# Patient Record
Sex: FEMALE | Race: BLACK OR AFRICAN AMERICAN | NOT HISPANIC OR LATINO | Employment: UNEMPLOYED | URBAN - METROPOLITAN AREA
[De-identification: names, ages, dates, MRNs, and addresses within clinical notes are randomized per-mention and may not be internally consistent; named-entity substitution may affect disease eponyms.]

---

## 2017-03-27 ENCOUNTER — HOSPITAL ENCOUNTER (EMERGENCY)
Facility: OTHER | Age: 22
Discharge: HOME | End: 2017-03-27
Attending: EMERGENCY MEDICINE
Payer: MEDICAID

## 2017-03-27 VITALS
WEIGHT: 280 LBS | HEIGHT: 67 IN | OXYGEN SATURATION: 99 % | SYSTOLIC BLOOD PRESSURE: 159 MMHG | DIASTOLIC BLOOD PRESSURE: 94 MMHG | RESPIRATION RATE: 12 BRPM | BODY MASS INDEX: 43.95 KG/M2 | TEMPERATURE: 99 F | HEART RATE: 102 BPM

## 2017-03-27 DIAGNOSIS — S29.011A CHEST WALL MUSCLE STRAIN, INITIAL ENCOUNTER: Primary | ICD-10-CM

## 2017-03-27 DIAGNOSIS — R07.9 CHEST PAIN: ICD-10-CM

## 2017-03-27 LAB
ANION GAP SERPL CALC-SCNC: 12 MMOL/L
B-HCG UR QL: NEGATIVE
BASOPHILS # BLD AUTO: 0.03 K/UL
BASOPHILS NFR BLD: 0.2 %
BUN SERPL-MCNC: 11 MG/DL
CALCIUM SERPL-MCNC: 9.2 MG/DL
CHLORIDE SERPL-SCNC: 105 MMOL/L
CO2 SERPL-SCNC: 21 MMOL/L
CREAT SERPL-MCNC: 0.8 MG/DL
CTP QC/QA: YES
DIFFERENTIAL METHOD: ABNORMAL
EOSINOPHIL # BLD AUTO: 0.1 K/UL
EOSINOPHIL NFR BLD: 0.5 %
ERYTHROCYTE [DISTWIDTH] IN BLOOD BY AUTOMATED COUNT: 13.7 %
EST. GFR  (AFRICAN AMERICAN): >60 ML/MIN/1.73 M^2
EST. GFR  (NON AFRICAN AMERICAN): >60 ML/MIN/1.73 M^2
GLUCOSE SERPL-MCNC: 85 MG/DL
HCT VFR BLD AUTO: 40.3 %
HGB BLD-MCNC: 13.1 G/DL
LYMPHOCYTES # BLD AUTO: 1.6 K/UL
LYMPHOCYTES NFR BLD: 12.5 %
MCH RBC QN AUTO: 27.8 PG
MCHC RBC AUTO-ENTMCNC: 32.5 %
MCV RBC AUTO: 85 FL
MONOCYTES # BLD AUTO: 0.9 K/UL
MONOCYTES NFR BLD: 7.2 %
NEUTROPHILS # BLD AUTO: 10.3 K/UL
NEUTROPHILS NFR BLD: 79.4 %
PLATELET # BLD AUTO: 317 K/UL
PMV BLD AUTO: 9.8 FL
POTASSIUM SERPL-SCNC: 4.6 MMOL/L
RBC # BLD AUTO: 4.72 M/UL
SODIUM SERPL-SCNC: 138 MMOL/L
WBC # BLD AUTO: 13.02 K/UL

## 2017-03-27 PROCEDURE — 93005 ELECTROCARDIOGRAM TRACING: CPT

## 2017-03-27 PROCEDURE — 81025 URINE PREGNANCY TEST: CPT | Performed by: EMERGENCY MEDICINE

## 2017-03-27 PROCEDURE — 96361 HYDRATE IV INFUSION ADD-ON: CPT

## 2017-03-27 PROCEDURE — 96374 THER/PROPH/DIAG INJ IV PUSH: CPT

## 2017-03-27 PROCEDURE — 25000003 PHARM REV CODE 250: Performed by: EMERGENCY MEDICINE

## 2017-03-27 PROCEDURE — 96375 TX/PRO/DX INJ NEW DRUG ADDON: CPT

## 2017-03-27 PROCEDURE — 63600175 PHARM REV CODE 636 W HCPCS: Performed by: EMERGENCY MEDICINE

## 2017-03-27 PROCEDURE — 93010 ELECTROCARDIOGRAM REPORT: CPT | Mod: ,,, | Performed by: INTERNAL MEDICINE

## 2017-03-27 PROCEDURE — 80048 BASIC METABOLIC PNL TOTAL CA: CPT

## 2017-03-27 PROCEDURE — 99284 EMERGENCY DEPT VISIT MOD MDM: CPT | Mod: 25

## 2017-03-27 PROCEDURE — 85025 COMPLETE CBC W/AUTO DIFF WBC: CPT

## 2017-03-27 RX ORDER — METHOCARBAMOL 500 MG/1
1000 TABLET, FILM COATED ORAL EVERY 8 HOURS PRN
Qty: 30 TABLET | Refills: 0 | Status: SHIPPED | OUTPATIENT
Start: 2017-03-27 | End: 2017-04-01

## 2017-03-27 RX ORDER — KETOROLAC TROMETHAMINE 30 MG/ML
15 INJECTION, SOLUTION INTRAMUSCULAR; INTRAVENOUS
Status: COMPLETED | OUTPATIENT
Start: 2017-03-27 | End: 2017-03-27

## 2017-03-27 RX ORDER — ORPHENADRINE CITRATE 30 MG/ML
60 INJECTION INTRAMUSCULAR; INTRAVENOUS
Status: COMPLETED | OUTPATIENT
Start: 2017-03-27 | End: 2017-03-27

## 2017-03-27 RX ORDER — OXAPROZIN 600 MG/1
600 TABLET, FILM COATED ORAL DAILY
Qty: 14 TABLET | Refills: 0 | Status: SHIPPED | OUTPATIENT
Start: 2017-03-27 | End: 2018-09-04

## 2017-03-27 RX ADMIN — SODIUM CHLORIDE 1000 ML: 0.9 INJECTION, SOLUTION INTRAVENOUS at 08:03

## 2017-03-27 RX ADMIN — KETOROLAC TROMETHAMINE 15 MG: 30 INJECTION, SOLUTION INTRAMUSCULAR at 07:03

## 2017-03-27 RX ADMIN — ORPHENADRINE CITRATE 60 MG: 30 INJECTION INTRAMUSCULAR; INTRAVENOUS at 08:03

## 2017-03-27 NOTE — ED NOTES
"Patient yelling in triage, "I dont want to wait to see no doctor, that's not how this works."  Explained to the patient that an ER physician will assess her EKG and once a room is available for her she will be called back and will be evaluated.  Patient still annoyed and stated, "Well this doesn't make any sense and im not waiting that long." Will update charge nurse on patients behavior.  "

## 2017-03-27 NOTE — ED AVS SNAPSHOT
[facility]  [address]  [facility] [address]               Manisha Bishop   3/27/2017  6:19 PM   ED    Description:  Female : 1995   Department:  [facility]           Your Care was Coordinated By:     Provider Role From To    Joanne Agee MD Attending Provider [phone] --      Reason for Visit     Chest Pain           Diagnoses this Visit        Comments    Chest wall muscle strain, initial encounter    -  Primary     Chest pain           ED Disposition     None           To Do List           Follow-up Information     Schedule an appointment as soon as possible for a visit with a primary care physician or clinic.    Why:  in 2-3 days       These Medications        Disp Refills Start End    oxaprozin (DAYPRO) 600 mg tablet 14 tablet 0 3/27/2017     Take 1 tablet (600 mg total) by mouth once daily. - Oral    methocarbamol (ROBAXIN) 500 MG Tab 30 tablet 0 3/27/2017 2017    Take 2 tablets (1,000 mg total) by mouth every 8 (eight) hours as needed (pain). - Oral      [facility]     [facility] -  Assistance  Registered nurses in the [facility] provide clinical advisement, health education, appointment booking, and other advisory services.  Call for this free service at [phone].             Medications           Message regarding Medications     Verify the changes and/or additions to your medication regime listed below are the same as discussed with your clinician today.  If any of these changes or additions are incorrect, please notify your healthcare provider.        START taking these NEW medications        Refills    oxaprozin (DAYPRO) 600 mg tablet 0    Sig: Take 1 tablet (600 mg total) by mouth once daily.    Class: Print    Route: Oral    methocarbamol (ROBAXIN) 500 MG Tab 0    Sig: Take 2 tablets (1,000 mg total) by mouth every 8 (eight) hours as needed (pain).    Class:  "Print    Route: Oral      These medications were administered today        Dose Freq    sodium chloride 0.9% bolus 1,000 mL 1,000 mL ED 1 Time    Sig: Inject 1,000 mLs into the vein ED 1 Time.    Class: Normal    Route: Intravenous    ketorolac injection 15 mg 15 mg ED 1 Time    Sig: Inject 15 mg into the vein ED 1 Time.    Class: Normal    Route: Intravenous    orphenadrine injection 60 mg 60 mg ED 1 Time    Sig: Inject 2 mLs (60 mg total) into the vein ED 1 Time.    Class: Normal    Route: Intravenous           Verify that the below list of medications is an accurate representation of the medications you are currently taking.  If none reported, the list may be blank. If incorrect, please contact your healthcare provider. Carry this list with you in case of emergency.           Current Medications     ferrous sulfate 325 (65 FE) MG EC tablet Take 1 tablet (325 mg total) by mouth once daily.    fluoxetine (PROZAC) 20 MG capsule Take 20 mg by mouth once daily.    hydrocodone-acetaminophen 5-325mg (NORCO) 5-325 mg per tablet Take 1 tablet by mouth every 6 (six) hours as needed for Pain.    methocarbamol (ROBAXIN) 500 MG Tab Take 2 tablets (1,000 mg total) by mouth every 8 (eight) hours as needed (pain).    ondansetron (ZOFRAN-ODT) 8 MG TbDL Take 1 tablet (8 mg total) by mouth every 8 (eight) hours.    oxaprozin (DAYPRO) 600 mg tablet Take 1 tablet (600 mg total) by mouth once daily.    pantoprazole (PROTONIX) 20 MG tablet Take 1 tablet (20 mg total) by mouth once daily.           Clinical Reference Information           Your Vitals Were     BP Pulse Temp Resp Height Weight    147/71 98 98.5 °F (36.9 °C) (Oral) 12 5' 7" (1.702 m) 127 kg (280 lb)    SpO2 BMI             100% 43.85 kg/m2         Allergies as of 3/27/2017        Reactions    Iodine And Iodide Containing Products Hives      Immunizations Administered on Date of Encounter - 3/27/2017     None      ED Micro, Lab, POCT     Start Ordered       Status Ordering " Provider    03/27/17 1841 03/27/17 1841  CBC auto differential  STAT      Final result     03/27/17 1841 03/27/17 1841  Basic metabolic panel  STAT      Final result     03/27/17 1806 03/27/17 1805  POCT urine pregnancy  Once      Final result       ED Imaging Orders     Start Ordered       Status Ordering Provider    03/27/17 1840 03/27/17 1841  X-Ray Chest PA And Lateral  1 time imaging      Final result       Discharge References/Attachments     CHEST WALL STRAIN (ENGLISH)      [facility] Sign-Up     Activating your [facility] account is as easy as 1-2-3!     1) Visit [email or web address], select Sign Up Now, enter this activation code and your date of birth, then select Next.  [email or web address]  Expires: 5/11/2017  9:08 PM      2) Create a username and password to use when you visit [facility] in the future and select a security question in case you lose your password and select Next.    3) Enter your e-mail address and click Sign Up!    Additional Information  If you have questions, please e-mail [email or web address] or call [phone] to talk to our [facility] staff. Remember, [facility] is NOT to be used for urgent needs. For medical emergencies, dial 911.          [facility] complies with applicable Federal civil rights laws and does not discriminate on the basis of race, color, national origin, age, disability, or sex.        Language Assistance Services     ATTENTION: Language assistance services are available, free of charge. Please call [phone].      ATENCIÓN: Si habla español, tiene a gold disposición servicios gratuitos de asistencia lingüística. Llame al [phone].     CHÚ Ý: N?u b?n nói Ti?ng Vi?t, có các d?ch v? h? tr? ngôn ng? mi?n phí dành cho b?n. G?i s? [phone].

## 2017-03-27 NOTE — ED PROVIDER NOTES
"Encounter Date: 3/27/2017    SCRIBE #1 NOTE: I, Robson Glover, am scribing for, and in the presence of,  Dr. Agee. I have scribed the entire note.       History     Chief Complaint   Patient presents with    Chest Pain     Patient c/o midsternal chest pain since earlier today. Patient also c/o bilateral side pain with nausea.  Patient denies taking any medication for my symptoms.      Review of patient's allergies indicates:   Allergen Reactions    Iodine and iodide containing products Hives     HPI Comments: Time seen by provider: 6:31 PM    This is a 21 y.o. female who presents with complaint of chest pain starting this morning. The pain has been worsening throughout the day and is accompanied by a dry cough. She describes the pain as "sharp" and central.  It is better when sitting up and worse while lying flat. The pain radiates around the bilateral ribs.. Yesterday she had abdominal pain with nausea and vomiting that has since resolved. She denies fever, urinary symptoms, CP, SOB, numbness and weakness. She has a PMHx of PNA. She denies recent travel. She denies smoking.  She has no additional complaints.    Additional past medical, surgical, and social history as outlined in the nursing assessment was reviewed by me.     The history is provided by the patient.     Past Medical History:   Diagnosis Date    Bipolar 1 disorder      Past Surgical History:   Procedure Laterality Date    LEG SURGERY       Family History   Problem Relation Age of Onset    No Known Problems Mother     No Known Problems Father      Social History   Substance Use Topics    Smoking status: Never Smoker    Smokeless tobacco: Not on file    Alcohol use No     Review of Systems   Constitutional: Negative for chills, diaphoresis and fever.   HENT: Negative for congestion and sore throat.    Respiratory: Positive for cough. Negative for shortness of breath.    Cardiovascular: Positive for chest pain and leg swelling (chronic left " leg swelling). Negative for palpitations.   Gastrointestinal: Positive for abdominal pain, nausea (resolved) and vomiting (resolved). Negative for diarrhea.   Genitourinary: Positive for flank pain (right). Negative for difficulty urinating, dysuria, frequency and urgency.   Musculoskeletal: Negative for myalgias.   Skin: Negative for color change.   Allergic/Immunologic: Negative for immunocompromised state.   Neurological: Negative for weakness, light-headedness and headaches.   Psychiatric/Behavioral: Negative for behavioral problems and confusion.       Physical Exam   Initial Vitals   BP Pulse Resp Temp SpO2   03/27/17 1726 03/27/17 1726 03/27/17 1726 03/27/17 1726 03/27/17 1726   131/84 108 12 98.5 °F (36.9 °C) 98 %     Physical Exam    Nursing note and vitals reviewed.  Constitutional: She appears well-developed and well-nourished. She is not diaphoretic. No distress.   HENT:   Head: Normocephalic and atraumatic.   Mouth/Throat: Oropharynx is clear and moist.   Eyes: Conjunctivae and EOM are normal. Pupils are equal, round, and reactive to light. Right eye exhibits no discharge. Left eye exhibits no discharge.   Neck: Normal range of motion. Neck supple. No tracheal deviation present.   Cardiovascular: Regular rhythm, normal heart sounds and intact distal pulses. Exam reveals no gallop and no friction rub.    No murmur heard.  tachycardia   Pulmonary/Chest: Breath sounds normal. No stridor. No respiratory distress. She has no wheezes. She has no rhonchi. She has no rales.   Abdominal: Soft. Bowel sounds are normal. She exhibits no distension. There is no tenderness. There is no rebound and no guarding.   Musculoskeletal: Normal range of motion. She exhibits no edema.   Chest pain reproducible with rotation of torso.    Neurological: She is alert and oriented to person, place, and time. She has normal strength. No cranial nerve deficit.   Skin: Skin is warm and dry. No rash noted. No erythema. No pallor.    Psychiatric: She has a normal mood and affect. Her behavior is normal. Judgment and thought content normal.         ED Course   Procedures  Labs Reviewed   CBC W/ AUTO DIFFERENTIAL - Abnormal; Notable for the following:        Result Value    WBC 13.02 (*)     Gran # 10.3 (*)     Gran% 79.4 (*)     Lymph% 12.5 (*)     All other components within normal limits   BASIC METABOLIC PANEL   POCT URINE PREGNANCY     EKG Readings: (Independently Interpreted)   Initial Reading: No STEMI.   Sinus tachycardia at a rate of 107 with benign early repolarization.        X-Rays:   Independently Interpreted Readings:   Chest X-Ray: Poor inspiratory effort. No effusion, consolidation or pneumothorax.       Imaging Results         X-Ray Chest PA And Lateral (Final result) Result time:  03/27/17 19:00:09    Final result by Aimee Hardy MD (03/27/17 19:00:09)    Impression:         Stable chronic findings, no acute cardiopulmonary process..          Electronically signed by: AIMEE HARDY MD  Date:     03/27/17  Time:    19:00     Narrative:    Chest PA and lateral    Indication:Chest pain    Comparison:CT 11/25/2016, radiograph 11/25/2015    Findings:  The cardiomediastinal silhouette is prominent, likely on the basis of magnification and shallow inspiratory effort.  There is no pleural effusion.  The trachea is midline.  The lungs are symmetrically expanded bilaterally with minimal record central hilar interstitial attenuation, likely on the basis of shallow inspiration. No large focal consolidation seen.  There is no pneumothorax.  The osseous structures are remarkable for mild dextro scoliotic curvature of the thoracic spine.             Medical Decision Making:   Clinical Tests:   Lab Tests: Ordered and Reviewed  Radiological Study: Ordered and Reviewed  Medical Tests: Ordered and Reviewed  ED Management:  Patient presents with cough and pleuritic chest pain. I will obtain a CXR to r/o PNA. Because of the positional relief  I will look for cardiomegaly and pericardial effusion. My differential also includes pericarditis. Her EKG does not suggest acute coronary ischemia. I will give parenteral analgesia. I will reassess.     Of note, after I explained this plan to the patient she questioned whether or not she would be placed into a bed.  I responded that we do not have any available at present; however, if a bed or recliner becomes available she will be placed into it.  Patient became very upset when she was made aware that she would need to begin her workup in our RWR area.  I stressed that this was so we can begin testing even before a bed is available and thus expedite her care.  At this time the patient chose to not talk to me any further during my assessment.  On a series of further questioning she did not respond.  I will continue to monitor.    9:02 PM - patient notes marked improvement of her symptoms.  She says her pain is currently mild.  When asked for her to give it a number she stated it was a 10.  Her bedside ultrasound showed no pericardial effusion.  There is also no evidence of pneumonia on her x-ray.  She remains afebrile and her heart rate has improved after IV fluids.  Her labs are unremarkable except for a mild leukocytosis of 13 K.  She appears well perfused.  I do not suspect occult bacteremia or further serious bacterial illness at this time.  I explained to the patient that her symptoms are likely due to musculoskeletal strain.  I advised that she take NSAIDs for relief.  She has asked for a muscle relaxant noted that NSAIDs do not work for her. I will give her information for PCP follow-up.  I stressed the importance of re-evaluation if her symptoms persist.  I am comfortable with her discharge at this time.    Additional MDM:   EKG: I have independently interpreted EKG(s) - see notes.   X-Rays: I have independently interpreted X-Ray(s) - see notes.          Scribe Attestation:   Scribe #1: I performed the  above scribed service and the documentation accurately describes the services I performed. I attest to the accuracy of the note.    Attending Attestation:           Physician Attestation for Scribe:  Physician Attestation Statement for Scribe #1: I, Dr. Agee, reviewed documentation, as scribed by Robson Glover in my presence, and it is both accurate and complete.                 ED Course     Clinical Impression:     1. Chest pain               Joanne Agee MD  03/27/17 2106       Joanne Agee MD  03/27/17 2108

## 2017-03-28 NOTE — ED NOTES
Patient reclined in recliner, eyes open, AAOx4.  No apparent SOB or distress noted.  Comfort measures address, call bell within reach.  Will continue to monitor.

## 2017-03-28 NOTE — ED NOTES
"Pt noted to be in wheelchair , wheeled herself from DISPO room to nurses station yelling and stating,"if I don't get my pain meds I'm going to flip out"  Pt informed the nurse is ready to giver her the pain medication is the DISPO room and I personally wheeled the pt in the wheelchair to the DISPO room to an awaiting LPN, Monica, to start her IV, get blood work and giver her medicine.  "

## 2017-03-28 NOTE — ED NOTES
"Pt stated I was a "sorry ass fucking nurse", pt stated "your an ugly ass lazy bitch" "all you fucking nurses here are some lazy ass bitches", pt stated "if you don't hurry and give me pain medicine I'm gonna click you the fuck out of here", TROY Mitchell Rn Charge made aware pt threatened me and asked me to perform orders.  "

## 2017-03-28 NOTE — ED NOTES
"While inserting IV, pt stated "all you bitches up in here are a bunch of lazy fucking nurses" "if you don't hurry and give me my pain medication, I'm gonna click you the fuck out", pt phone rang and speaking to caller, stated "that stupid ass woman doctor told me ain't nothing wrong with my heart, I told her bitch please, I'm gonna click that ugly ass bitch the fuck out if she don't take care of my heart", pt hung up phone, two patient identifiers were completed, pt asked if I was going to administer the pain medication at this time, I explained to her that I was unable to and an RN would administer the medication, pt asked me what I was, I stated to pt that I was an LPN, pt stated "the stupid ass RN's at [facility] ain't worth shit", pt stated "if you don't get someone to give me my pain medication now I'm gonna click you the fuck out", pt stated "I've been waiting here an hour, no make that two hours for my pain medication.  If I don't get it soon I'm gonna bounce out of here", TROY Mitchell RN Charge notified pt stated threats to me, Charge nurse with pt at this time.  "

## 2017-04-14 ENCOUNTER — HOSPITAL ENCOUNTER (EMERGENCY)
Facility: OTHER | Age: 22
Discharge: HOME | End: 2017-04-15
Attending: EMERGENCY MEDICINE
Payer: MEDICAID

## 2017-04-14 DIAGNOSIS — R07.9 CHEST PAIN: ICD-10-CM

## 2017-04-14 DIAGNOSIS — F31.9 BIPOLAR 1 DISORDER: Primary | ICD-10-CM

## 2017-04-14 PROCEDURE — 96375 TX/PRO/DX INJ NEW DRUG ADDON: CPT

## 2017-04-14 PROCEDURE — 93010 ELECTROCARDIOGRAM REPORT: CPT | Mod: ,,, | Performed by: INTERNAL MEDICINE

## 2017-04-14 PROCEDURE — 96374 THER/PROPH/DIAG INJ IV PUSH: CPT

## 2017-04-14 PROCEDURE — 99285 EMERGENCY DEPT VISIT HI MDM: CPT | Mod: 25

## 2017-04-14 PROCEDURE — 93005 ELECTROCARDIOGRAM TRACING: CPT

## 2017-04-14 PROCEDURE — 96361 HYDRATE IV INFUSION ADD-ON: CPT

## 2017-04-14 PROCEDURE — 81025 URINE PREGNANCY TEST: CPT | Performed by: EMERGENCY MEDICINE

## 2017-04-14 NOTE — ED AVS SNAPSHOT
[facility]  [address]  [facility] [address]               Manisha Bishop   2017 11:16 PM   ED    Description:  Female : 1995   Department:  [facility], [place], operated by [facility]           Your Care was Coordinated By:     Provider Role From To    Quynh Conner MD Attending Provider 17 [phone] --      Reason for Visit     Chest Pain           Diagnoses this Visit        Comments    Bipolar 1 disorder    -  Primary     Chest pain           ED Disposition     ED Disposition Condition Comment    Discharge             To Do List           Follow-up Information     Go to [facility].    Specialties:  Behavioral Health, Psychiatry, Psychology    Contact information:    [address] [facility] [address]  [phone]        [facility]     [facility] -  Assistance  Unless otherwise directed by your provider, please contact [facility], our nurse care line that is available for  assistance.     Registered nurses in the [facility] provide: appointment scheduling, clinical advisement, health education, and other advisory services.  Call: [phone] (toll free)               Medications           Message regarding Medications     Verify the changes and/or additions to your medication regime listed below are the same as discussed with your clinician today.  If any of these changes or additions are incorrect, please notify your healthcare provider.        These medications were administered today        Dose Freq    sodium chloride 0.9% bolus 1,000 mL 1,000 mL ED 1 Time    Sig: Inject 1,000 mLs into the vein ED 1 Time.    Class: Normal    Route: Intravenous    ketorolac injection 30 mg 30 mg ED 1 Time    Sig: Inject 30 mg into the vein ED 1 Time.    Class: Normal    Route: Intravenous    diazePAM injection 5 mg 5 mg ED 1 Time    Sig: Inject 1 mL (5 mg total) into the vein ED 1 Time.    Class: Normal     "Route: Intravenous           Verify that the below list of medications is an accurate representation of the medications you are currently taking.  If none reported, the list may be blank. If incorrect, please contact your healthcare provider. Carry this list with you in case of emergency.           Current Medications     ferrous sulfate 325 (65 FE) MG EC tablet Take 1 tablet (325 mg total) by mouth once daily.    fluoxetine (PROZAC) 20 MG capsule Take 20 mg by mouth once daily.    hydrocodone-acetaminophen 5-325mg (NORCO) 5-325 mg per tablet Take 1 tablet by mouth every 6 (six) hours as needed for Pain.    ondansetron (ZOFRAN-ODT) 8 MG TbDL Take 1 tablet (8 mg total) by mouth every 8 (eight) hours.    oxaprozin (DAYPRO) 600 mg tablet Take 1 tablet (600 mg total) by mouth once daily.    pantoprazole (PROTONIX) 20 MG tablet Take 1 tablet (20 mg total) by mouth once daily.           Clinical Reference Information           Your Vitals Were     BP Pulse Temp Resp Height Weight    128/57 90 99.7 °F (37.6 °C) (Oral) 14 5' 7" (1.702 m) 127 kg (280 lb)    SpO2 BMI             99% 43.85 kg/m2         Allergies as of 4/15/2017        Reactions    Iodine And Iodide Containing Products Hives      Immunizations Administered on Date of Encounter - 4/15/2017     None      ED Micro, Lab, POCT     Start Ordered       Status Ordering Provider    04/15/17 0005 04/15/17 0004  CBC auto differential  STAT      Final result     04/15/17 0005 04/15/17 0004  Comprehensive metabolic panel  STAT      Final result     04/15/17 0005 04/15/17 0004  Troponin I  STAT      Final result     04/15/17 0005 04/15/17 0004  TSH  STAT      Final result     04/14/17 2325 04/14/17 2324  POCT urine pregnancy  Once      Final result       ED Imaging Orders     Start Ordered       Status Ordering Provider    04/15/17 0151 04/15/17 0150  NM Lung Ventilation Perfusion Imaging  1 time imaging      Final result     04/15/17 0100 04/15/17 0100    1 time imaging,   " Status:  Canceled      Canceled         Discharge Instructions         Noncardiac Chest Pain    Based on your visit today, the health care provider doesnt know what is causing your chest pain. In most cases, people who come to the emergency department with chest pain dont have a problem with their heart. Instead, the pain is caused by other conditions. These may be problems with the lungs, muscles, bones, digestive tract, nerves, or mental health.  Lung problems  · Inflammation around the lungs (pleurisy)  · Collapsed lung (pneumothorax)  · Fluid around the lungs (pleural effusion)  · Lung cancer. This is a rare cause of chest pain.  Muscle or bone problems  · Inflamed cartilage between the ribs (pleurisy)  · Fibromyalgia  · Rheumatoid arthritis  Digestive system problems  · Reflux  · Stomach ulcer  · Spasms of the esophagus  · Gall stones  · Gallbladder inflammation  Mental health conditions  · Panic or anxiety attacks  · Emotional distress  Your condition doesnt seem serious and your pain doesnt appear to be coming from your heart. But sometimes the signs of a serious problem take more time to appear. Watch for the warning signs listed below.  Home care  Follow these guidelines when caring for yourself at home:  · Rest today and avoid strenuous activity.  · Take any prescribed medicine as directed.  Follow-up care  Follow up with your health care provider, or as advised, if you dont start to feel better within 24 hours.  When to seek medical advice  Call your health care provider right away if any of these occur:  · A change in the type of pain. Call if it feels different, becomes more serious, lasts longer, or begins to spread into your shoulder, arm, neck, jaw, or back.  · Shortness of breath  · You feel more pain when you breathe  · Cough with dark-colored mucus or blood  · Weakness, dizziness, or fainting  · Fever of 100.4ºF (38ºC) or higher, or as directed by your health care provider  · Swelling, pain,  or redness in one leg  Date Last Reviewed: 11/24/2014  © 5589-4837 [employer], [employer] [employer] [facility]. [address] [place] [address], [place] [address]. All rights reserved. This information is not intended as a substitute for professional medical care. Always follow your healthcare professional's instructions.          [facility] Sign-Up     Activating your [facility] account is as easy as 1-2-3!     1) Visit [email or web address], select Sign Up Now, enter this activation code and your date of birth, then select Next.  [email or web address]  Expires: 5/11/2017  9:08 PM      2) Create a username and password to use when you visit [facility] in the future and select a security question in case you lose your password and select Next.    3) Enter your e-mail address and click Sign Up!    Additional Information  If you have questions, please e-mail [email or web address] or call [phone] to talk to our [facility] staff. Remember, [facility] is NOT to be used for urgent needs. For medical emergencies, dial 911.          [facility] complies with applicable Federal civil rights laws and does not discriminate on the basis of race, color, national origin, age, disability, or sex.        Language Assistance Services     ATTENTION: Language assistance services are available, free of charge. Please call [phone].      ATENCIÓN: Si habla español, tiene a gold disposición servicios gratuitos de asistencia lingüística. Llame al [phone].     CHÚ Ý: N?u b?n nói Ti?ng Vi?t, có các d?ch v? h? tr? ngôn ng? mi?n phí dành cho b?n. G?i s? [phone].

## 2017-04-15 VITALS
HEIGHT: 67 IN | OXYGEN SATURATION: 99 % | SYSTOLIC BLOOD PRESSURE: 128 MMHG | HEART RATE: 90 BPM | DIASTOLIC BLOOD PRESSURE: 57 MMHG | BODY MASS INDEX: 43.95 KG/M2 | TEMPERATURE: 100 F | RESPIRATION RATE: 14 BRPM | WEIGHT: 280 LBS

## 2017-04-15 LAB
ALBUMIN SERPL BCP-MCNC: 3.6 G/DL
ALP SERPL-CCNC: 53 U/L
ALT SERPL W/O P-5'-P-CCNC: 13 U/L
ANION GAP SERPL CALC-SCNC: 11 MMOL/L
AST SERPL-CCNC: 13 U/L
B-HCG UR QL: NEGATIVE
BASOPHILS # BLD AUTO: 0.03 K/UL
BASOPHILS NFR BLD: 0.3 %
BILIRUB SERPL-MCNC: 0.7 MG/DL
BUN SERPL-MCNC: 9 MG/DL
CALCIUM SERPL-MCNC: 9.1 MG/DL
CHLORIDE SERPL-SCNC: 104 MMOL/L
CO2 SERPL-SCNC: 22 MMOL/L
CREAT SERPL-MCNC: 0.7 MG/DL
CTP QC/QA: YES
DIFFERENTIAL METHOD: ABNORMAL
EOSINOPHIL # BLD AUTO: 0.1 K/UL
EOSINOPHIL NFR BLD: 1 %
ERYTHROCYTE [DISTWIDTH] IN BLOOD BY AUTOMATED COUNT: 14.5 %
EST. GFR  (AFRICAN AMERICAN): >60 ML/MIN/1.73 M^2
EST. GFR  (NON AFRICAN AMERICAN): >60 ML/MIN/1.73 M^2
GLUCOSE SERPL-MCNC: 113 MG/DL
HCT VFR BLD AUTO: 39.9 %
HGB BLD-MCNC: 13.1 G/DL
LYMPHOCYTES # BLD AUTO: 1.9 K/UL
LYMPHOCYTES NFR BLD: 15.8 %
MCH RBC QN AUTO: 28.2 PG
MCHC RBC AUTO-ENTMCNC: 32.8 %
MCV RBC AUTO: 86 FL
MONOCYTES # BLD AUTO: 1.3 K/UL
MONOCYTES NFR BLD: 11.2 %
NEUTROPHILS # BLD AUTO: 8.3 K/UL
NEUTROPHILS NFR BLD: 71.4 %
PLATELET # BLD AUTO: 271 K/UL
PMV BLD AUTO: 10 FL
POTASSIUM SERPL-SCNC: 3.4 MMOL/L
PROT SERPL-MCNC: 7.3 G/DL
RBC # BLD AUTO: 4.65 M/UL
SODIUM SERPL-SCNC: 137 MMOL/L
TROPONIN I SERPL DL<=0.01 NG/ML-MCNC: <0.006 NG/ML
TSH SERPL DL<=0.005 MIU/L-ACNC: 1.01 UIU/ML
WBC # BLD AUTO: 11.68 K/UL

## 2017-04-15 PROCEDURE — 63600175 PHARM REV CODE 636 W HCPCS: Performed by: EMERGENCY MEDICINE

## 2017-04-15 PROCEDURE — 84443 ASSAY THYROID STIM HORMONE: CPT

## 2017-04-15 PROCEDURE — 80053 COMPREHEN METABOLIC PANEL: CPT

## 2017-04-15 PROCEDURE — 84484 ASSAY OF TROPONIN QUANT: CPT

## 2017-04-15 PROCEDURE — 85025 COMPLETE CBC W/AUTO DIFF WBC: CPT

## 2017-04-15 PROCEDURE — 25000003 PHARM REV CODE 250: Performed by: EMERGENCY MEDICINE

## 2017-04-15 RX ORDER — KETOROLAC TROMETHAMINE 30 MG/ML
30 INJECTION, SOLUTION INTRAMUSCULAR; INTRAVENOUS
Status: COMPLETED | OUTPATIENT
Start: 2017-04-15 | End: 2017-04-15

## 2017-04-15 RX ORDER — DIAZEPAM 10 MG/2ML
5 INJECTION INTRAMUSCULAR
Status: COMPLETED | OUTPATIENT
Start: 2017-04-15 | End: 2017-04-15

## 2017-04-15 RX ADMIN — SODIUM CHLORIDE 1000 ML: 0.9 INJECTION, SOLUTION INTRAVENOUS at 12:04

## 2017-04-15 RX ADMIN — DIAZEPAM 5 MG: 5 INJECTION, SOLUTION INTRAMUSCULAR; INTRAVENOUS at 12:04

## 2017-04-15 RX ADMIN — KETOROLAC TROMETHAMINE 30 MG: 30 INJECTION, SOLUTION INTRAMUSCULAR at 12:04

## 2017-04-15 NOTE — ED NOTES
Went in to update pt on plan of care, pt is asleep and laying on her L side, VSS, pt is showing no signs of distress, will continue to monitor.

## 2017-04-15 NOTE — ED NOTES
[facility] called and stated they were ready for the patient. Tech notified to transport pt via wheelchair.

## 2017-04-15 NOTE — ED PROVIDER NOTES
Encounter Date: 4/14/2017    SCRIBE #1 NOTE: I, Marcus Ames, am scribing for, and in the presence of, Dr. Conner.       History     Chief Complaint   Patient presents with    Chest Pain     R side chest pain since this AM.      Review of patient's allergies indicates:   Allergen Reactions    Iodine and iodide containing products Hives     HPI Comments: Time seen by provider: 11:52 PM    This is a 21 y.o. female who presents to the ED with a chief complaint of moderate right sided chest pain. The patient reports it began early this morning and is worse with deep inspiration and with laying down. She notes that she feels SOB. She reports that she has had a cough today, but it has not been productive. She reports no exacerbation with movement of her arms. No analgesia use reported. She reports that she was here 3 weeks ago with similar complaints. She denies any hx of PE or DVT. The patient denies any calf pain, leg swelling, rhinorrhea, congestion, fevers, or palpitations. She reports no familial hx of MI's. She denies any tobacco, alcohol, or drug use.     The history is provided by the patient.     Past Medical History:   Diagnosis Date    Bipolar 1 disorder      Past Surgical History:   Procedure Laterality Date    LEG SURGERY       Family History   Problem Relation Age of Onset    No Known Problems Mother     No Known Problems Father      Social History   Substance Use Topics    Smoking status: Never Smoker    Smokeless tobacco: Not on file    Alcohol use No     Review of Systems   Constitutional: Negative for activity change, appetite change, chills, diaphoresis and fever.   HENT: Negative for congestion, rhinorrhea, sore throat and trouble swallowing.    Eyes: Negative for photophobia and visual disturbance.   Respiratory: Positive for cough and shortness of breath. Negative for chest tightness.    Cardiovascular: Positive for chest pain. Negative for palpitations and leg swelling (or calf pain).    Gastrointestinal: Negative for abdominal pain, nausea and vomiting.   Endocrine: Negative for polydipsia and polyuria.   Genitourinary: Negative for difficulty urinating and flank pain.   Musculoskeletal: Negative for back pain and neck pain.   Skin: Negative for rash.   Neurological: Negative for weakness and headaches.   Psychiatric/Behavioral: Negative for confusion.       Physical Exam   Initial Vitals   BP Pulse Resp Temp SpO2   04/14/17 2320 04/14/17 2320 04/14/17 2320 04/14/17 2320 04/14/17 2320   146/76 114 18 99.7 °F (37.6 °C) 100 %     Physical Exam    Nursing note and vitals reviewed.  Constitutional: She is cooperative.  Non-toxic appearance. No distress.   Obese.    HENT:   Head: Normocephalic and atraumatic.   Mouth/Throat: Oropharynx is clear and moist.   Eyes: Conjunctivae and EOM are normal. Pupils are equal, round, and reactive to light.   Neck: Normal range of motion and full passive range of motion without pain. Neck supple. No thyromegaly present. No JVD present.   Cardiovascular: Normal rate, regular rhythm, normal heart sounds and normal pulses.   Pulmonary/Chest: Effort normal. No respiratory distress.   Mild tachypnea. Symmetric breath sounds.   Abdominal: Soft. Normal appearance and bowel sounds are normal. She exhibits no distension and no mass. There is no tenderness.   Musculoskeletal: Normal range of motion.   No reproducible chest wall TTP. No calf edema or TTP.    Neurological: She is alert and oriented to person, place, and time. She has normal strength. No cranial nerve deficit or sensory deficit.   Skin: Skin is warm, dry and intact. No rash noted.   Psychiatric: She has a normal mood and affect. Her speech is normal and behavior is normal. Judgment and thought content normal.         ED Course   Procedures  Labs Reviewed   CBC W/ AUTO DIFFERENTIAL - Abnormal; Notable for the following:        Result Value    Gran # 8.3 (*)     Mono # 1.3 (*)     Lymph% 15.8 (*)     All other  "components within normal limits   COMPREHENSIVE METABOLIC PANEL - Abnormal; Notable for the following:     Potassium 3.4 (*)     CO2 22 (*)     Glucose 113 (*)     Alkaline Phosphatase 53 (*)     All other components within normal limits   POCT URINE PREGNANCY - Normal   TROPONIN I   TSH     EKG Readings: (Independently Interpreted)   Sinus tachycardia. Rate of 110. Normal axis. Normal intervals. T wave inversions in leads III and AVF.           Medical Decision Making:   Initial Assessment:   Urgent evaluation of 21-year-old female with history of bipolar disorder presenting with central chest discomfort and shortness of breath since this morning.  Endorses previous symptoms began last month, "but they didn't do anything, and told me my heart was fine".  Patient denies fevers, no sputum production, no sick contacts, recent travel, no history of DVT or PE.  Patient has been seen for this previously, but also admitted for pneumonia in the past.  Differential includes pneumonia, anxiety, costochondritis, pericarditis, or PE.   Clinical Tests:   Lab Tests: Ordered and Reviewed  The following lab test(s) were unremarkable: CBC       <> Summary of Lab: Mild hypokalemia, negative troponin, not pregnant  Radiological Study: Ordered and Reviewed  Medical Tests: Ordered and Reviewed  ED Management:  Given persistent tachycardia, decision was made to obtain imaging to evaluate for thromboembolic disease.  Given patient unable to receive iodine, VQ scan was performed and negative for perfusion defect.    4:12 AM: Patient made aware of negative workup, was found sleeping in bed comfortably.  No longer tachycardic. Stable for discharge home.             Scribe Attestation:   Scribe #1: I performed the above scribed service and the documentation accurately describes the services I performed. I attest to the accuracy of the note.    Attending Attestation:           Physician Attestation for Scribe:  Physician Attestation Statement " for Scribe #1: I, Dr. Conner, reviewed documentation, as scribed by Marcus Ames in my presence, and it is both accurate and complete.                 ED Course     Clinical Impression:     1. Bipolar 1 disorder    2. Chest pain          Disposition:   Disposition: Discharged  Condition: Stable       Quynh Conner MD  04/15/17 0510

## 2017-04-15 NOTE — ED NOTES
"Went in to discharge pt, pt was sleeping and easily aroused, pt was given d/c instructions to include OTC medications to take as well as follow up care, pt states she has no follow up care and educated pt on how to get PCP through this hospital as well as community clinics. Pt started to be irate and stated "well if it doesn't get better I'm coming right back." Assured pt our doors are always open and she can come back at any time. Pt states "well i think i was having an anxiety attack and yall didn't rule that out!" reminded pt of all tests that were performed and medication that was given. Pt states " Yall are worse then [facility], can I get dressed now." Pt given d/c instructions and allow privacy to get dressed. Pt ambulated with steady gait to d/c window and d/c in stable condition.  "

## 2017-04-15 NOTE — ED NOTES
Rounding on pt complete. Pt reports no change in pain and still states is a 10/10. Pt is resting comfortably in bed, VSS, no signs of distress noted.

## 2017-04-15 NOTE — DISCHARGE INSTRUCTIONS
Noncardiac Chest Pain    Based on your visit today, the health care provider doesnt know what is causing your chest pain. In most cases, people who come to the emergency department with chest pain dont have a problem with their heart. Instead, the pain is caused by other conditions. These may be problems with the lungs, muscles, bones, digestive tract, nerves, or mental health.  Lung problems  · Inflammation around the lungs (pleurisy)  · Collapsed lung (pneumothorax)  · Fluid around the lungs (pleural effusion)  · Lung cancer. This is a rare cause of chest pain.  Muscle or bone problems  · Inflamed cartilage between the ribs (pleurisy)  · Fibromyalgia  · Rheumatoid arthritis  Digestive system problems  · Reflux  · Stomach ulcer  · Spasms of the esophagus  · Gall stones  · Gallbladder inflammation  Mental health conditions  · Panic or anxiety attacks  · Emotional distress  Your condition doesnt seem serious and your pain doesnt appear to be coming from your heart. But sometimes the signs of a serious problem take more time to appear. Watch for the warning signs listed below.  Home care  Follow these guidelines when caring for yourself at home:  · Rest today and avoid strenuous activity.  · Take any prescribed medicine as directed.  Follow-up care  Follow up with your health care provider, or as advised, if you dont start to feel better within 24 hours.  When to seek medical advice  Call your health care provider right away if any of these occur:  · A change in the type of pain. Call if it feels different, becomes more serious, lasts longer, or begins to spread into your shoulder, arm, neck, jaw, or back.  · Shortness of breath  · You feel more pain when you breathe  · Cough with dark-colored mucus or blood  · Weakness, dizziness, or fainting  · Fever of 100.4ºF (38ºC) or higher, or as directed by your health care provider  · Swelling, pain, or redness in one leg  Date Last Reviewed: 11/24/2014  © 0963-6944  [employer], [employer]. [address] [place] [address], [place] [address]. All rights reserved. This information is not intended as a substitute for professional medical care. Always follow your healthcare professional's instructions.

## 2017-10-17 ENCOUNTER — TELEPHONE (OUTPATIENT)
Dept: OBSTETRICS AND GYNECOLOGY | Facility: CLINIC | Age: 22
End: 2017-10-17

## 2017-10-17 NOTE — TELEPHONE ENCOUNTER
----- Message from Nicole Bird sent at 10/17/2017  9:06 AM CDT -----  X_  1st Request  _  2nd Request  _  3rd Request      Who: FABIANO ASHFORD [[id number]]    Why: Requesting a call back in regards to scheduling an initial ob visit. Her private area is irritating her, UTI symptoms and she's experiencing cramping.    LMP-09-15-17  Pos Preg Test-10-15-17  What Number to Call Back:[phone]    When to Expect a call back: (Within 24 hours)    Please return the call at earliest convenience. Thanks!

## 2017-10-17 NOTE — TELEPHONE ENCOUNTER
Pt requesting information on home remedies to end pregnancy. Advised not aware of any and that she can call an  provider foe that infor. Advised can come in to this clinic for pregnancy confirmation.

## 2017-10-18 ENCOUNTER — LAB VISIT (OUTPATIENT)
Dept: LAB | Facility: OTHER | Age: 22
End: 2017-10-18
Attending: STUDENT IN AN ORGANIZED HEALTH CARE EDUCATION/TRAINING PROGRAM
Payer: MEDICAID

## 2017-10-18 ENCOUNTER — ROUTINE PRENATAL (OUTPATIENT)
Dept: OBSTETRICS AND GYNECOLOGY | Facility: CLINIC | Age: 22
End: 2017-10-18
Payer: MEDICAID

## 2017-10-18 VITALS
DIASTOLIC BLOOD PRESSURE: 80 MMHG | SYSTOLIC BLOOD PRESSURE: 120 MMHG | WEIGHT: 290.81 LBS | BODY MASS INDEX: 45.54 KG/M2

## 2017-10-18 DIAGNOSIS — Z32.01 POSITIVE PREGNANCY TEST: ICD-10-CM

## 2017-10-18 DIAGNOSIS — R10.2 FEMALE PELVIC PAIN: ICD-10-CM

## 2017-10-18 DIAGNOSIS — R30.0 DYSURIA: ICD-10-CM

## 2017-10-18 DIAGNOSIS — Z32.01 POSITIVE PREGNANCY TEST: Primary | ICD-10-CM

## 2017-10-18 DIAGNOSIS — O20.9 VAGINAL BLEEDING IN PREGNANCY, FIRST TRIMESTER: ICD-10-CM

## 2017-10-18 DIAGNOSIS — Z20.2 POSSIBLE EXPOSURE TO STD: ICD-10-CM

## 2017-10-18 DIAGNOSIS — N89.8 VAGINAL DISCHARGE: ICD-10-CM

## 2017-10-18 LAB
CANDIDA RRNA VAG QL PROBE: NEGATIVE
G VAGINALIS RRNA GENITAL QL PROBE: POSITIVE
HCG INTACT+B SERPL-ACNC: 671 MIU/ML
T VAGINALIS RRNA GENITAL QL PROBE: POSITIVE

## 2017-10-18 PROCEDURE — 84702 CHORIONIC GONADOTROPIN TEST: CPT

## 2017-10-18 PROCEDURE — 87086 URINE CULTURE/COLONY COUNT: CPT

## 2017-10-18 PROCEDURE — 99204 OFFICE O/P NEW MOD 45 MIN: CPT | Mod: TH,S$PBB,, | Performed by: STUDENT IN AN ORGANIZED HEALTH CARE EDUCATION/TRAINING PROGRAM

## 2017-10-18 PROCEDURE — 36415 COLL VENOUS BLD VENIPUNCTURE: CPT

## 2017-10-18 PROCEDURE — 87480 CANDIDA DNA DIR PROBE: CPT

## 2017-10-18 PROCEDURE — 99999 PR PBB SHADOW E&M-EST. PATIENT-LVL II: CPT | Mod: PBBFAC,,, | Performed by: STUDENT IN AN ORGANIZED HEALTH CARE EDUCATION/TRAINING PROGRAM

## 2017-10-18 PROCEDURE — 99212 OFFICE O/P EST SF 10 MIN: CPT | Mod: PBBFAC,PO | Performed by: STUDENT IN AN ORGANIZED HEALTH CARE EDUCATION/TRAINING PROGRAM

## 2017-10-18 PROCEDURE — 87077 CULTURE AEROBIC IDENTIFY: CPT

## 2017-10-18 PROCEDURE — 87591 N.GONORRHOEAE DNA AMP PROB: CPT

## 2017-10-18 PROCEDURE — 87088 URINE BACTERIA CULTURE: CPT

## 2017-10-18 PROCEDURE — 87186 SC STD MICRODIL/AGAR DIL: CPT

## 2017-10-18 PROCEDURE — 87660 TRICHOMONAS VAGIN DIR PROBE: CPT

## 2017-10-18 RX ORDER — ALPRAZOLAM 1 MG/1
1 TABLET ORAL 2 TIMES DAILY
Refills: 0 | COMMUNITY
Start: 2017-09-27 | End: 2017-12-01

## 2017-10-18 NOTE — PROGRESS NOTES
Chief Complaint   Patient presents with    Vaginal Discharge     with odor    Vaginal Bleeding     a little bleeding     Vaginal Itching    Pelvic Pain       HPI:  22 y.o. female  who presents with a recent positive pregnancy test. She states that her last menstrual period was on 2017.  She states that this was an unplanned pregnancy and is unsure as to whether she desires to continue with this pregnancy. On initial examination the patient states that she had come in to terminate her pregnancy but after more conversation she later admitted that she was unsure as to whether she wanted to continue the pregnancy. In addition to her positive pregnancy exam, the patient presents complaining of vaginal discharge and mild intermittent abdominal pain. She denies vaginal bleeding but complains that the vaginal discharge is without color and not malodorous.  She also complains of some vague urinary frequency and discomfort.     Past Medical History:   Diagnosis Date    Bipolar 1 disorder      Past Surgical History:   Procedure Laterality Date    LEG SURGERY         Social History   Substance Use Topics    Smoking status: Never Smoker    Smokeless tobacco: Not on file    Alcohol use No     Family History   Problem Relation Age of Onset    No Known Problems Mother     No Known Problems Father     Breast cancer Neg Hx     Colon cancer Neg Hx     Ovarian cancer Neg Hx      OB History    Para Term  AB Living   1             SAB TAB Ectopic Multiple Live Births                  # Outcome Date GA Lbr Ilia/2nd Weight Sex Delivery Anes PTL Lv   1 Current                   MEDICATIONS: Reviewed with patient.  ALLERGIES: Iodine and iodide containing products     ROS:  Review of Systems   Constitutional: Negative for chills and fatigue.   Respiratory: Negative for shortness of breath.    Cardiovascular: Negative for chest pain.   Gastrointestinal: Negative for abdominal pain.   Genitourinary: Positive  for vaginal discharge. Negative for frequency, hematuria and vaginal bleeding.        Amenorrhea   Musculoskeletal: Negative.    Skin:  Negative.   Neurological: Negative for headaches.   Psychiatric/Behavioral: The patient is nervous/anxious.        PHYSICAL EXAM:    /80   Wt 131.9 kg (290 lb 12.6 oz)   LMP 09/20/2017   BMI 45.54 kg/m²     Physical Exam:   Constitutional: She is oriented to person, place, and time. She appears well-developed and well-nourished. No distress.    HENT:   Head: Normocephalic and atraumatic.    Eyes: Conjunctivae are normal.    Neck: Neck supple.    Cardiovascular: Normal rate, regular rhythm and intact distal pulses.     Pulmonary/Chest: Effort normal and breath sounds normal. No respiratory distress.        Abdominal: She exhibits no distension. There is no rebound and no guarding.     Genitourinary: Vagina normal. Cervix is normal. No vaginal discharge found.   Genitourinary Comments: External genitalia: Normal  Urethra: No tenderness  Urethral meatus: Normal  Bladder: No tenderness  No abnormal discharge noted               Neurological: She is alert and oriented to person, place, and time.    Skin: Skin is warm and dry. She is not diaphoretic.    Psychiatric: She has a normal mood and affect. Her behavior is normal. Judgment and thought content normal.         ASSESSMENT & PLAN:   Positive pregnancy test  -     hCG, quantitative; Future; Expected date: 10/18/2017  -     hCG, quantitative; Future; Expected date: 10/20/2017  -     hCG, quantitative; Future; Expected date: 10/18/2017    Vaginal discharge  -     Vaginosis Screen by DNA Probe    Dysuria  -     Urine culture    Possible exposure to STD  -     C. trachomatis/N. gonorrhoeae by AMP DNA Cervix    Female pelvic pain  -     hCG, quantitative; Future; Expected date: 10/18/2017  -     hCG, quantitative; Future; Expected date: 10/20/2017  -     hCG, quantitative; Future; Expected date: 10/18/2017    Vaginal bleeding in  pregnancy, first trimester  -     hCG, quantitative; Future; Expected date: 10/18/2017  -     hCG, quantitative; Future; Expected date: 10/20/2017  -     hCG, quantitative; Future; Expected date: 10/18/2017      Resources provided for [facility] in the event patient chooses not to proceed with current pregnancy.    Will order dating scan if B-HCG shows appropriate rise.

## 2017-10-19 LAB
C TRACH DNA SPEC QL NAA+PROBE: NOT DETECTED
N GONORRHOEA DNA SPEC QL NAA+PROBE: NOT DETECTED

## 2017-10-20 ENCOUNTER — LAB VISIT (OUTPATIENT)
Dept: LAB | Facility: OTHER | Age: 22
End: 2017-10-20
Attending: STUDENT IN AN ORGANIZED HEALTH CARE EDUCATION/TRAINING PROGRAM
Payer: MEDICAID

## 2017-10-20 DIAGNOSIS — Z32.01 POSITIVE PREGNANCY TEST: ICD-10-CM

## 2017-10-20 LAB — HCG INTACT+B SERPL-ACNC: 1548 MIU/ML

## 2017-10-20 PROCEDURE — 84702 CHORIONIC GONADOTROPIN TEST: CPT

## 2017-10-20 PROCEDURE — 36415 COLL VENOUS BLD VENIPUNCTURE: CPT

## 2017-10-21 ENCOUNTER — TELEPHONE (OUTPATIENT)
Dept: OBSTETRICS AND GYNECOLOGY | Facility: CLINIC | Age: 22
End: 2017-10-21

## 2017-10-21 ENCOUNTER — HOSPITAL ENCOUNTER (EMERGENCY)
Facility: OTHER | Age: 22
Discharge: HOME | End: 2017-10-22
Attending: EMERGENCY MEDICINE
Payer: MEDICAID

## 2017-10-21 DIAGNOSIS — O21.9 NAUSEA AND VOMITING IN PREGNANCY: Primary | ICD-10-CM

## 2017-10-21 LAB
ABO + RH BLD: NORMAL
ALBUMIN SERPL BCP-MCNC: 3.4 G/DL
ALP SERPL-CCNC: 65 U/L
ALT SERPL W/O P-5'-P-CCNC: 13 U/L
ANION GAP SERPL CALC-SCNC: 8 MMOL/L
AST SERPL-CCNC: 16 U/L
B-HCG UR QL: POSITIVE
BACTERIA UR CULT: NORMAL
BASOPHILS # BLD AUTO: 0.03 K/UL
BASOPHILS NFR BLD: 0.3 %
BILIRUB SERPL-MCNC: 0.5 MG/DL
BILIRUB UR QL STRIP: NEGATIVE
BLD GP AB SCN CELLS X3 SERPL QL: NORMAL
BUN SERPL-MCNC: 10 MG/DL
CALCIUM SERPL-MCNC: 8.5 MG/DL
CHLORIDE SERPL-SCNC: 105 MMOL/L
CLARITY UR: CLEAR
CO2 SERPL-SCNC: 26 MMOL/L
COLOR UR: YELLOW
CREAT SERPL-MCNC: 0.8 MG/DL
CTP QC/QA: YES
DIFFERENTIAL METHOD: ABNORMAL
EOSINOPHIL # BLD AUTO: 0.1 K/UL
EOSINOPHIL NFR BLD: 0.8 %
ERYTHROCYTE [DISTWIDTH] IN BLOOD BY AUTOMATED COUNT: 13.2 %
EST. GFR  (AFRICAN AMERICAN): >60 ML/MIN/1.73 M^2
EST. GFR  (NON AFRICAN AMERICAN): >60 ML/MIN/1.73 M^2
GLUCOSE SERPL-MCNC: 84 MG/DL
GLUCOSE UR QL STRIP: NEGATIVE
HCG INTACT+B SERPL-ACNC: 2858 MIU/ML
HCT VFR BLD AUTO: 36.4 %
HGB BLD-MCNC: 12 G/DL
HGB UR QL STRIP: NEGATIVE
KETONES UR QL STRIP: NEGATIVE
LEUKOCYTE ESTERASE UR QL STRIP: NEGATIVE
LIPASE SERPL-CCNC: 25 U/L
LYMPHOCYTES # BLD AUTO: 2.2 K/UL
LYMPHOCYTES NFR BLD: 20.5 %
MCH RBC QN AUTO: 28.2 PG
MCHC RBC AUTO-ENTMCNC: 33 G/DL
MCV RBC AUTO: 86 FL
MONOCYTES # BLD AUTO: 0.8 K/UL
MONOCYTES NFR BLD: 7.5 %
NEUTROPHILS # BLD AUTO: 7.5 K/UL
NEUTROPHILS NFR BLD: 70.6 %
NITRITE UR QL STRIP: NEGATIVE
PH UR STRIP: 6 [PH] (ref 5–8)
PLATELET # BLD AUTO: 329 K/UL
PMV BLD AUTO: 9.5 FL
POTASSIUM SERPL-SCNC: 3.5 MMOL/L
PROT SERPL-MCNC: 6.8 G/DL
PROT UR QL STRIP: NEGATIVE
RBC # BLD AUTO: 4.25 M/UL
SODIUM SERPL-SCNC: 139 MMOL/L
SP GR UR STRIP: >=1.03 (ref 1–1.03)
URN SPEC COLLECT METH UR: ABNORMAL
UROBILINOGEN UR STRIP-ACNC: NEGATIVE EU/DL
WBC # BLD AUTO: 10.6 K/UL

## 2017-10-21 PROCEDURE — 86850 RBC ANTIBODY SCREEN: CPT

## 2017-10-21 PROCEDURE — 63600175 PHARM REV CODE 636 W HCPCS: Performed by: EMERGENCY MEDICINE

## 2017-10-21 PROCEDURE — 80053 COMPREHEN METABOLIC PANEL: CPT

## 2017-10-21 PROCEDURE — 85025 COMPLETE CBC W/AUTO DIFF WBC: CPT

## 2017-10-21 PROCEDURE — 83690 ASSAY OF LIPASE: CPT

## 2017-10-21 PROCEDURE — 25000003 PHARM REV CODE 250: Performed by: EMERGENCY MEDICINE

## 2017-10-21 PROCEDURE — 86900 BLOOD TYPING SEROLOGIC ABO: CPT

## 2017-10-21 PROCEDURE — 96361 HYDRATE IV INFUSION ADD-ON: CPT

## 2017-10-21 PROCEDURE — 81025 URINE PREGNANCY TEST: CPT | Performed by: EMERGENCY MEDICINE

## 2017-10-21 PROCEDURE — 84702 CHORIONIC GONADOTROPIN TEST: CPT

## 2017-10-21 PROCEDURE — 99284 EMERGENCY DEPT VISIT MOD MDM: CPT | Mod: 25

## 2017-10-21 PROCEDURE — 96374 THER/PROPH/DIAG INJ IV PUSH: CPT

## 2017-10-21 PROCEDURE — 96375 TX/PRO/DX INJ NEW DRUG ADDON: CPT

## 2017-10-21 PROCEDURE — 81003 URINALYSIS AUTO W/O SCOPE: CPT

## 2017-10-21 RX ORDER — METOCLOPRAMIDE HYDROCHLORIDE 5 MG/ML
10 INJECTION INTRAMUSCULAR; INTRAVENOUS
Status: COMPLETED | OUTPATIENT
Start: 2017-10-21 | End: 2017-10-21

## 2017-10-21 RX ORDER — DEXTROAMPHETAMINE SACCHARATE, AMPHETAMINE ASPARTATE MONOHYDRATE, DEXTROAMPHETAMINE SULFATE AND AMPHETAMINE SULFATE 7.5; 7.5; 7.5; 7.5 MG/1; MG/1; MG/1; MG/1
30 CAPSULE, EXTENDED RELEASE ORAL 4 TIMES DAILY
COMMUNITY
End: 2018-09-04

## 2017-10-21 RX ORDER — DIPHENHYDRAMINE HYDROCHLORIDE 50 MG/ML
50 INJECTION INTRAMUSCULAR; INTRAVENOUS
Status: COMPLETED | OUTPATIENT
Start: 2017-10-21 | End: 2017-10-21

## 2017-10-21 RX ORDER — NITROFURANTOIN 25; 75 MG/1; MG/1
100 CAPSULE ORAL 2 TIMES DAILY
Qty: 14 CAPSULE | Refills: 0 | Status: SHIPPED | OUTPATIENT
Start: 2017-10-21 | End: 2017-10-28

## 2017-10-21 RX ORDER — DOXYLAMINE SUCCINATE AND PYRIDOXINE HYDROCHLORIDE, DELAYED RELEASE TABLETS 10 MG/10 MG 10; 10 MG/1; MG/1
2 TABLET, DELAYED RELEASE ORAL NIGHTLY
Qty: 60 TABLET | Refills: 3 | Status: SHIPPED | OUTPATIENT
Start: 2017-10-21 | End: 2018-09-04

## 2017-10-21 RX ORDER — METRONIDAZOLE 500 MG/1
2000 TABLET ORAL ONCE
Qty: 4 TABLET | Refills: 0 | Status: SHIPPED | OUTPATIENT
Start: 2017-10-21 | End: 2017-10-21

## 2017-10-21 RX ADMIN — METOCLOPRAMIDE 10 MG: 5 INJECTION, SOLUTION INTRAMUSCULAR; INTRAVENOUS at 08:10

## 2017-10-21 RX ADMIN — SODIUM CHLORIDE 1000 ML: 0.9 INJECTION, SOLUTION INTRAVENOUS at 10:10

## 2017-10-21 RX ADMIN — SODIUM CHLORIDE 1000 ML: 0.9 INJECTION, SOLUTION INTRAVENOUS at 08:10

## 2017-10-21 RX ADMIN — DIPHENHYDRAMINE HYDROCHLORIDE 50 MG: 50 INJECTION, SOLUTION INTRAMUSCULAR; INTRAVENOUS at 08:10

## 2017-10-21 NOTE — TELEPHONE ENCOUNTER
Patient contacted via phone regarding recent laboratory results indicating appropriate rise in B-HCG, + Trichomonas, and + urinary tract infection.  Patient did not answer the phone.  Clinical staff notified to follow-up with patient.  Prescriptions sent to patient's pharmacy.

## 2017-10-22 VITALS
HEART RATE: 100 BPM | HEIGHT: 67 IN | RESPIRATION RATE: 13 BRPM | SYSTOLIC BLOOD PRESSURE: 127 MMHG | OXYGEN SATURATION: 98 % | WEIGHT: 280 LBS | BODY MASS INDEX: 43.95 KG/M2 | TEMPERATURE: 99 F | DIASTOLIC BLOOD PRESSURE: 60 MMHG

## 2017-10-22 NOTE — ED PROVIDER NOTES
"Encounter Date: 10/21/2017    SCRIBE #1 NOTE: I, Reynafilomena Nicole, am scribing for, and in the presence of, Dr. Agee.       History     Chief Complaint   Patient presents with    Abdominal Pain     C/o intermittent generalized abdominal pain onset today, worse now. Currently 5 weeks pregnant, last seen by OBGYN 3 days ago. Reports bleeding upon wiping only 2 days ago, now stopped. + nausea, one episode of vomiting.     Time seen by provider: 8:03 PM    This is a 22 y.o. female that is 5 weeks pregnant who presents with complaint of lower abdominal pain that has persisted since this morning.  She endorses constant suprapubic discomfort. She additionally endorses nausea and vomiting; her last episode of emesis was at 4am.  She mentions that she had one episode of diarrhea  2 days ago, which has since resolved, and she had 1 episode of vaginal bleeding noted when wiping approximately 1 week ago.  She denies fever, chills, or  symptoms. She says she was seen at Urgent Care last week and treated for a UTI which is now gone.  She was also seen at GYN clinic this week and told her baby may be in her fallopian tube. She has not taken any pain medication noting tylenol "won't do anything."  She has been drinking plenty of fluids though her solid intake is minimal. She has no additional complaints.      Review of patient's medical records shows that she was seen in GYN clinic 3 days ago and had a vaginal screen, which was positive for BV and trichomonas.  Her UA was also consistent with a UTI.  Dr. Hardy of OB attempted to contact patient this morning and make her aware that prescriptions for the above were available at her local pharmacy.  Patient says no one tried to call her.       The patient has history of Type I Bipolar Disorder. Additional past medical, surgical, and social history as outlined in the nursing assessment was reviewed by me.       The history is provided by the patient and medical records.     Review " of patient's allergies indicates:   Allergen Reactions    Iodine and iodide containing products Hives     Past Medical History:   Diagnosis Date    Bipolar 1 disorder      Past Surgical History:   Procedure Laterality Date    LEG SURGERY       Family History   Problem Relation Age of Onset    No Known Problems Mother     No Known Problems Father     Breast cancer Neg Hx     Colon cancer Neg Hx     Ovarian cancer Neg Hx      Social History   Substance Use Topics    Smoking status: Never Smoker    Smokeless tobacco: Not on file    Alcohol use No     Review of Systems   Constitutional: Negative for chills and fever.   HENT: Negative for congestion, rhinorrhea and sore throat.    Respiratory: Negative for cough and shortness of breath.    Cardiovascular: Negative for chest pain.   Gastrointestinal: Positive for abdominal pain, nausea and vomiting. Diarrhea: Resolved.   Endocrine: Negative for polyuria.   Genitourinary: Negative for decreased urine volume, difficulty urinating, dysuria, frequency, urgency, vaginal bleeding (Resolved.) and vaginal discharge.   Musculoskeletal: Negative for back pain.   Skin: Negative for rash.   Allergic/Immunologic: Negative for immunocompromised state.   Neurological: Negative for dizziness and weakness.   Hematological: Does not bruise/bleed easily.   Psychiatric/Behavioral: Negative for confusion.       Physical Exam     Initial Vitals [10/21/17 1842]   BP Pulse Resp Temp SpO2   138/88 (S) (!) 120 (S) (!) 26 98.7 °F (37.1 °C) 100 %      MAP       104.67         Physical Exam    Nursing note and vitals reviewed.  Constitutional: She appears well-developed and well-nourished. She is not diaphoretic. No distress.   HENT:   Head: Normocephalic and atraumatic.   Right Ear: External ear normal.   Left Ear: External ear normal.   Eyes: Conjunctivae and EOM are normal. Right eye exhibits no discharge. Left eye exhibits no discharge.   Neck: Normal range of motion. Neck supple. No  tracheal deviation present.   Cardiovascular: Normal rate, regular rhythm, normal heart sounds and intact distal pulses. Exam reveals no gallop and no friction rub.    No murmur heard.  Pulmonary/Chest: Breath sounds normal. No stridor. No respiratory distress. She has no wheezes. She has no rhonchi. She has no rales.   Abdominal: Soft. Bowel sounds are normal. She exhibits no distension. There is no tenderness. There is no rebound and no guarding.   Musculoskeletal: Normal range of motion. She exhibits no edema or tenderness.   Neurological: She is alert and oriented to person, place, and time. She has normal strength. No cranial nerve deficit.   Skin: Skin is warm and dry. Capillary refill takes less than 2 seconds. No erythema. No pallor.   Psychiatric: She has a normal mood and affect. Her behavior is normal.         ED Course   Procedures  Labs Reviewed   URINALYSIS - Abnormal; Notable for the following:        Result Value    Specific Gravity, UA >=1.030 (*)     All other components within normal limits   CBC W/ AUTO DIFFERENTIAL - Abnormal; Notable for the following:     Hematocrit 36.4 (*)     All other components within normal limits   COMPREHENSIVE METABOLIC PANEL - Abnormal; Notable for the following:     Calcium 8.5 (*)     Albumin 3.4 (*)     All other components within normal limits   POCT URINE PREGNANCY - Abnormal; Notable for the following:     POC Preg Test, Ur Positive (*)     All other components within normal limits   LIPASE   HCG, QUANTITATIVE, PREGNANCY   TYPE & SCREEN     Imaging Results    None                 Medical Decision Making:   History:   Old Medical Records: I decided to obtain old medical records.  Initial Assessment:   Patient presents with abdominal pain in pregnancy. Her abdomen is soft and nontender. I will obtain an US to look for IUP. I discussed the lab findings from 10/18 with the patient. She said she knows she doesn't have a UTI and won't be taking antibiotics for it,  and I am keeping her from knowing what I really wrong with her baby. I assured her that an US is being obtained. She has no vaginal bleeding or discharge. She also just had STD testing 72 hours ago. I do not suspect ovarian torsion, PID, or TOA. I will check LFTs and renal function. I will give IVF in addition to Reglan. I will reassess.   Clinical Tests:   Lab Tests: Reviewed and Ordered  ED Management:  9:45 PM - Patient resting comfortably. Tachycardic on several prior admissions; I do not suspect this is due to shock. Labs unremarkable except elevated SG on urinalysis. HCG is 2500. US is pending. Care will be endorsed to Dr. Leo at 22:00. Assuming unremarkable US, patient can be discharged and followup with GYN in clinic.             Scribe Attestation:   Scribe #1: I performed the above scribed service and the documentation accurately describes the services I performed. I attest to the accuracy of the note.    I, Dr. Joanne Agee, reviewed documentation as scribed above. I personally performed the services described in this documentation.  I agree that the record reflects my personal performance and is accurate and complete. Joanne Agee MD.  10/21/2017 9:52 PM          ED Course      Clinical Impression:     1. Nausea and vomiting in pregnancy                                 Joanne Agee MD  10/21/17 9531

## 2017-10-22 NOTE — ED NOTES
Pt still appears drowsy from IV benadryl administration x 2 hours ago. MD notified. Pt to be monitored for x 2 hours. Pt given food to eat. Will continue to monitor.

## 2017-10-22 NOTE — ED NOTES
Pt AAOx4 and appropriate at this time. Pt ambulatory with steady gait to discharge window. Pt requesting security to escort her to her car in garage. Security notified and will escort pt to garage.

## 2017-10-22 NOTE — ED NOTES
Unsuccessful attempt for IV access x 2 by primary RN. Another RN at bedside and will attempt x 3. Will continue to monitor.

## 2017-10-22 NOTE — DISCHARGE INSTRUCTIONS
Your gynecologist sent prescriptions for antibiotics to your local [facility] pharmacy to treat Trichomonas and urinary tract infection.

## 2017-10-22 NOTE — ED NOTES
Pt presents to ED via POV with complaints of generalized abd pain with N/V that started earlier today, pt is approx 5 weeks pregnant, reports early in pregnancy approx 1.5 weeks pta the pt began to have episodes of mild vaginal bleeding but only when the pt wiped, denies bleeding at this time, seen by OB Wednesday scheduled blood worked, does not have the results yet at this time, pt appears anxious but cooperative, AAOx4, VSS with mild tachycardia noted on monitor without signs of distress, RR easy non labored, abd soft with mild tenderness noted to bilateral lower and quads, pt reports pain as intermittent over the past few days but is worse today. Side rails up x2, call light within reach, bed in lowest locked position, will continue to monitor and assess

## 2017-10-23 ENCOUNTER — TELEPHONE (OUTPATIENT)
Dept: OBSTETRICS AND GYNECOLOGY | Facility: CLINIC | Age: 22
End: 2017-10-23

## 2017-10-23 NOTE — TELEPHONE ENCOUNTER
States missed a call from the doctor over the weekend, explained pt needs to be treated for infections per Dr Hardy, and needs follow up appts. Pt [facility] States will  meds from pharmacy and make follow up appt after termination.

## 2017-10-23 NOTE — TELEPHONE ENCOUNTER
----- Message from Bia Mendiola sent at 10/23/2017 11:37 AM CDT -----  Ob pt needs to talk to nurse about her lab results. Pt can be reached at [phone].

## 2017-10-27 ENCOUNTER — TELEPHONE (OUTPATIENT)
Dept: OBSTETRICS AND GYNECOLOGY | Facility: CLINIC | Age: 22
End: 2017-10-27

## 2017-10-27 NOTE — TELEPHONE ENCOUNTER
----- Message from Keesha Morrissey LPN sent at 10/26/2017  4:54 PM CDT -----  Audi Hardy MD   [facility] Ob[facility]etric[facility] Gynecology Clinical Support Staff; CIELO Klein MD 23 hours ago (5:43 PM)     Please follow-up with patient regarding scheduling of repeat transvaginal ultrasound for 10/30 as B-HCGs showed appropriate rise but an IUP was not identified on ultrasound examination, likely given early gestation.  (Routing comment)     Audi Hardy MD 23 hours ago (5:32 PM)     Patient called without answer.  Patient to be scheduled for follow-up transvaginal ultrasound as B-HCGs showed appropriate rise but an IUP was not identified on ultrasound examination, likely given early gestation.     Documentation

## 2017-10-30 ENCOUNTER — TELEPHONE (OUTPATIENT)
Dept: OBSTETRICS AND GYNECOLOGY | Facility: CLINIC | Age: 22
End: 2017-10-30

## 2017-10-30 DIAGNOSIS — N91.2 AMENORRHEA: Primary | ICD-10-CM

## 2017-10-31 ENCOUNTER — TELEPHONE (OUTPATIENT)
Dept: OBSTETRICS AND GYNECOLOGY | Facility: CLINIC | Age: 22
End: 2017-10-31

## 2017-10-31 ENCOUNTER — HOSPITAL ENCOUNTER (OUTPATIENT)
Dept: RADIOLOGY | Facility: OTHER | Age: 22
Discharge: HOME | End: 2017-10-31
Attending: OBSTETRICS & GYNECOLOGY
Payer: MEDICAID

## 2017-10-31 DIAGNOSIS — N91.2 AMENORRHEA: ICD-10-CM

## 2017-10-31 PROCEDURE — 76801 OB US < 14 WKS SINGLE FETUS: CPT | Mod: 26,,, | Performed by: RADIOLOGY

## 2017-10-31 PROCEDURE — 76802 OB US < 14 WKS ADDL FETUS: CPT | Mod: TC

## 2017-10-31 NOTE — TELEPHONE ENCOUNTER
----- Message from Keesha Morrissey LPN sent at 10/31/2017  4:50 PM CDT -----  Needs appt this week with dr. Klein

## 2017-10-31 NOTE — TELEPHONE ENCOUNTER
----- Message from Matias Whaley MA sent at 10/31/2017  1:42 PM CDT -----  Contact: Self  Pt states that she is needing paper work confirming pregnancy to receive WIC.  The pt can be reached at [phone].  Thanks [place]

## 2017-11-01 ENCOUNTER — TELEPHONE (OUTPATIENT)
Dept: OBSTETRICS AND GYNECOLOGY | Facility: CLINIC | Age: 22
End: 2017-11-01

## 2017-11-02 ENCOUNTER — TELEPHONE (OUTPATIENT)
Dept: OBSTETRICS AND GYNECOLOGY | Facility: CLINIC | Age: 22
End: 2017-11-02

## 2017-11-02 DIAGNOSIS — O21.9 NAUSEA AND VOMITING IN PREGNANCY: Primary | ICD-10-CM

## 2017-11-02 RX ORDER — PYRIDOXINE HCL (VITAMIN B6) 25 MG
25 TABLET ORAL NIGHTLY
Qty: 30 TABLET | Refills: 2 | Status: SHIPPED | OUTPATIENT
Start: 2017-11-02 | End: 2018-01-31

## 2017-11-02 NOTE — PROGRESS NOTES
Called patient with results of her pelvic ultrasound.    Ultrasound demonstrated an IUP with fetal pole at about 6 weeks EGA.  No cardiac activity was seen yet.  Patient will need a repeat scan in 1 week.  Repeat ultrasound is already scheduled for 11/9/2017.  Patient notes some occasional cramps but denies any bleeding.    Patient c/o nausea and vomiting.  Rx for vitB6 and unisom ordered for pregnancy-associated N/V.    Bleeding precautions given.

## 2017-11-15 ENCOUNTER — TELEPHONE (OUTPATIENT)
Dept: OBSTETRICS AND GYNECOLOGY | Facility: CLINIC | Age: 22
End: 2017-11-15

## 2017-11-15 NOTE — TELEPHONE ENCOUNTER
Returned pt call and pt stated that she need a excuse letter for her school. Informed pt that letter will be at the  at the [facility]. Pt verbalized understanding

## 2017-11-15 NOTE — TELEPHONE ENCOUNTER
----- Message from Bia Mendiola sent at 11/15/2017  9:11 AM CST -----  Ob pt needs to talk to nurse about a doctor excuse. Pt can be reached at [phone].

## 2017-11-16 ENCOUNTER — TELEPHONE (OUTPATIENT)
Dept: OBSTETRICS AND GYNECOLOGY | Facility: CLINIC | Age: 22
End: 2017-11-16

## 2017-12-01 ENCOUNTER — HOSPITAL ENCOUNTER (EMERGENCY)
Facility: OTHER | Age: 22
Discharge: HOME | End: 2017-12-01
Attending: EMERGENCY MEDICINE
Payer: COMMERCIAL

## 2017-12-01 VITALS
RESPIRATION RATE: 17 BRPM | OXYGEN SATURATION: 100 % | WEIGHT: 280 LBS | DIASTOLIC BLOOD PRESSURE: 66 MMHG | BODY MASS INDEX: 43.95 KG/M2 | HEART RATE: 89 BPM | SYSTOLIC BLOOD PRESSURE: 132 MMHG | TEMPERATURE: 98 F | HEIGHT: 67 IN

## 2017-12-01 DIAGNOSIS — R51.9 ACUTE NONINTRACTABLE HEADACHE, UNSPECIFIED HEADACHE TYPE: ICD-10-CM

## 2017-12-01 DIAGNOSIS — R10.30 LOWER ABDOMINAL PAIN: Primary | ICD-10-CM
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
## 2017-12-01 LAB
B-HCG UR QL: POSITIVE
BILIRUB UR QL STRIP: NEGATIVE
CLARITY UR: CLEAR
COLOR UR: YELLOW
CTP QC/QA: YES
GLUCOSE UR QL STRIP: NEGATIVE
HGB UR QL STRIP: NEGATIVE
KETONES UR QL STRIP: NEGATIVE
LEUKOCYTE ESTERASE UR QL STRIP: NEGATIVE
NITRITE UR QL STRIP: NEGATIVE
PH UR STRIP: 8 [PH] (ref 5–8)
PROT UR QL STRIP: NEGATIVE
SP GR UR STRIP: 1.01 (ref 1–1.03)
URN SPEC COLLECT METH UR: NORMAL
UROBILINOGEN UR STRIP-ACNC: NEGATIVE EU/DL

## 2017-12-01 PROCEDURE — 25000003 PHARM REV CODE 250: Performed by: PHYSICIAN ASSISTANT

## 2017-12-01 PROCEDURE — 81003 URINALYSIS AUTO W/O SCOPE: CPT

## 2017-12-01 PROCEDURE — 81025 URINE PREGNANCY TEST: CPT | Performed by: PHYSICIAN ASSISTANT

## 2017-12-01 PROCEDURE — 99283 EMERGENCY DEPT VISIT LOW MDM: CPT | Mod: 25

## 2017-12-01 RX ORDER — ACETAMINOPHEN 500 MG
1000 TABLET ORAL EVERY 6 HOURS PRN
Qty: 20 TABLET | Refills: 0 | Status: SHIPPED | OUTPATIENT
Start: 2017-12-01 | End: 2018-09-04

## 2017-12-01 RX ORDER — ACETAMINOPHEN 500 MG
1000 TABLET ORAL
Status: COMPLETED | OUTPATIENT
Start: 2017-12-01 | End: 2017-12-01

## 2017-12-01 RX ADMIN — ACETAMINOPHEN 1000 MG: 500 TABLET ORAL at 02:12

## 2017-12-01 NOTE — ED NOTES
"Patient is not being cooperative states she has never had to go to an intake room they must have just created this and she is pregnant and needs to be in a bed .  I explained to patient the intake process is not new and we do not have any beds in the back but she is being seen by a provider in the intake.  She still was not happy she was not in "the back" I told the patient again we do not have any rooms in the back if she wanted to go back to the waiting room and wait for a room in the back she could do that.  Patient decided she would stay in intake.    "
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
## 2018-01-23 ENCOUNTER — TELEPHONE (OUTPATIENT)
Dept: OBSTETRICS AND GYNECOLOGY | Facility: CLINIC | Age: 23
End: 2018-01-23

## 2018-01-23 NOTE — TELEPHONE ENCOUNTER
----- Message from Bia Mendiola sent at 1/23/2018  8:54 AM CST -----  Patient can be reached [phone]. Please call pt, she needs to talk to nurse about paper work.
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
## 2018-01-31 ENCOUNTER — TELEPHONE (OUTPATIENT)
Dept: OBSTETRICS AND GYNECOLOGY | Facility: CLINIC | Age: 23
End: 2018-01-31

## 2018-01-31 NOTE — TELEPHONE ENCOUNTER
----- Message from Bia Mendiola sent at 1/30/2018 10:24 AM CST -----  Pt needs to talk to nurse about letter. Pt states that she is having lower stomach. Pt can be reached at [phone].

## 2018-01-31 NOTE — TELEPHONE ENCOUNTER
Returned pt call and pt stated that she is having pelvic pain. Scheduled pt 2/5/2018 with . Pt verbalized understanding

## 2018-02-08 ENCOUNTER — TELEPHONE (OUTPATIENT)
Dept: OBSTETRICS AND GYNECOLOGY | Facility: CLINIC | Age: 23
End: 2018-02-08

## 2018-02-08 NOTE — TELEPHONE ENCOUNTER
----- Message from Nuris Centeno MA sent at 2/8/2018 10:18 AM CST -----      ----- Message -----  From: Giovana Dudley MA  Sent: 2/7/2018   3:52 PM  To: Nuris Centeno MA    Tried making this pt f/u appt., she refused 2/23. Pt would like something sooner  ----- Message -----  From: Bia Mendiola  Sent: 2/7/2018   2:49 PM  To: [facility] Staff    Please call about appt. Pt can be reached at [phone].

## 2018-02-08 NOTE — TELEPHONE ENCOUNTER
Pt scheduled and aware that she is scheduled at 11am on 2/19/2018 at [facility] with Dr Klein

## 2018-02-19 ENCOUNTER — OFFICE VISIT (OUTPATIENT)
Dept: OBSTETRICS AND GYNECOLOGY | Facility: CLINIC | Age: 23
End: 2018-02-19
Payer: MEDICAID

## 2018-02-19 ENCOUNTER — LAB VISIT (OUTPATIENT)
Dept: LAB | Facility: OTHER | Age: 23
End: 2018-02-19
Attending: OBSTETRICS & GYNECOLOGY
Payer: MEDICAID

## 2018-02-19 VITALS
DIASTOLIC BLOOD PRESSURE: 70 MMHG | SYSTOLIC BLOOD PRESSURE: 120 MMHG | BODY MASS INDEX: 45.54 KG/M2 | HEIGHT: 67 IN | WEIGHT: 290.13 LBS

## 2018-02-19 DIAGNOSIS — R10.2 PELVIC PAIN: Primary | ICD-10-CM

## 2018-02-19 DIAGNOSIS — A59.01 VAGINITIS DUE TO TRICHOMONAS: ICD-10-CM

## 2018-02-19 DIAGNOSIS — Z12.4 ENCOUNTER FOR SCREENING FOR MALIGNANT NEOPLASM OF CERVIX: ICD-10-CM

## 2018-02-19 DIAGNOSIS — Z20.2 POSSIBLE EXPOSURE TO STD: ICD-10-CM

## 2018-02-19 DIAGNOSIS — R10.2 SUPRAPUBIC PAIN: ICD-10-CM

## 2018-02-19 LAB
B-HCG UR QL: NEGATIVE
C TRACH DNA SPEC QL NAA+PROBE: NOT DETECTED
CANDIDA RRNA VAG QL PROBE: NEGATIVE
CTP QC/QA: YES
G VAGINALIS RRNA GENITAL QL PROBE: POSITIVE
N GONORRHOEA DNA SPEC QL NAA+PROBE: NOT DETECTED
T VAGINALIS RRNA GENITAL QL PROBE: NEGATIVE

## 2018-02-19 PROCEDURE — 87086 URINE CULTURE/COLONY COUNT: CPT

## 2018-02-19 PROCEDURE — 81025 URINE PREGNANCY TEST: CPT | Mod: PBBFAC | Performed by: OBSTETRICS & GYNECOLOGY

## 2018-02-19 PROCEDURE — 99213 OFFICE O/P EST LOW 20 MIN: CPT | Mod: PBBFAC | Performed by: OBSTETRICS & GYNECOLOGY

## 2018-02-19 PROCEDURE — 36415 COLL VENOUS BLD VENIPUNCTURE: CPT

## 2018-02-19 PROCEDURE — 88175 CYTOPATH C/V AUTO FLUID REDO: CPT | Performed by: PATHOLOGY

## 2018-02-19 PROCEDURE — 87480 CANDIDA DNA DIR PROBE: CPT

## 2018-02-19 PROCEDURE — 99999 PR PBB SHADOW E&M-EST. PATIENT-LVL III: CPT | Mod: PBBFAC,,, | Performed by: OBSTETRICS & GYNECOLOGY

## 2018-02-19 PROCEDURE — 99213 OFFICE O/P EST LOW 20 MIN: CPT | Mod: S$PBB,,, | Performed by: OBSTETRICS & GYNECOLOGY

## 2018-02-19 PROCEDURE — 88141 CYTOPATH C/V INTERPRET: CPT | Mod: ,,, | Performed by: PATHOLOGY

## 2018-02-19 PROCEDURE — 87491 CHLMYD TRACH DNA AMP PROBE: CPT

## 2018-02-19 PROCEDURE — 3008F BODY MASS INDEX DOCD: CPT | Mod: ,,, | Performed by: OBSTETRICS & GYNECOLOGY

## 2018-02-19 PROCEDURE — 86592 SYPHILIS TEST NON-TREP QUAL: CPT

## 2018-02-19 PROCEDURE — 86703 HIV-1/HIV-2 1 RESULT ANTBDY: CPT

## 2018-02-19 RX ORDER — ALPRAZOLAM 1 MG/1
2 TABLET ORAL 2 TIMES DAILY PRN
Refills: 0 | COMMUNITY
Start: 2018-02-17 | End: 2019-06-04

## 2018-02-19 RX ORDER — BUPRENORPHINE HYDROCHLORIDE AND NALOXONE HYDROCHLORIDE DIHYDRATE 8; 2 MG/1; MG/1
TABLET SUBLINGUAL
Refills: 0 | COMMUNITY
Start: 2017-12-04 | End: 2018-09-04

## 2018-02-19 RX ORDER — NORGESTIMATE AND ETHINYL ESTRADIOL 0.25-0.035
1 KIT ORAL DAILY
Refills: 1 | COMMUNITY
Start: 2017-12-22 | End: 2018-09-04

## 2018-02-19 RX ORDER — DEXTROAMPHETAMINE SACCHARATE, AMPHETAMINE ASPARTATE, DEXTROAMPHETAMINE SULFATE AND AMPHETAMINE SULFATE 7.5; 7.5; 7.5; 7.5 MG/1; MG/1; MG/1; MG/1
TABLET ORAL
Refills: 0 | COMMUNITY
Start: 2018-02-17 | End: 2018-09-04

## 2018-02-19 RX ORDER — IBUPROFEN 800 MG/1
800 TABLET ORAL EVERY 8 HOURS
Refills: 0 | COMMUNITY
Start: 2017-12-22 | End: 2018-09-04

## 2018-02-19 NOTE — PROGRESS NOTES
"Chief Complaint   Patient presents with    Pelvic Pain    STD CHECK       HPI:  22 y.o. female     Patient's last menstrual period was 2017.    - Patient is s/p SAB in 2017  - She notes some pelvic pain/suprapubic pain since that time  - She also notes a possible recent STD exposure and desires testing  - She has a recent history of trichomonas, treated in 2017  - She denies any irregular bleeding; she had what was likely her cycle last month    Contraception: Declines  Pap: No result found, Performed today  Mammogram: N/A    Past Medical History:   Diagnosis Date    Bipolar 1 disorder      Past Surgical History:   Procedure Laterality Date    LEG SURGERY         Social History   Substance Use Topics    Smoking status: Never Smoker    Smokeless tobacco: Never Used    Alcohol use No     Family History   Problem Relation Age of Onset    No Known Problems Mother     No Known Problems Father     Breast cancer Neg Hx     Colon cancer Neg Hx     Ovarian cancer Neg Hx      OB History    Para Term  AB Living   1       1     SAB TAB Ectopic Multiple Live Births   1              # Outcome Date GA Lbr Ilia/2nd Weight Sex Delivery Anes PTL Lv   1 SAB 17                  MEDICATIONS: Reviewed with patient.  ALLERGIES: Iodine and iodide containing products     ROS:  Review of Systems   Constitutional: Negative for fever.   Respiratory: Negative for shortness of breath.    Cardiovascular: Negative for chest pain.   Gastrointestinal: Negative for abdominal pain, nausea and vomiting.   Genitourinary: Positive for pelvic pain. Negative for dysuria, menstrual problem, vaginal bleeding and vaginal discharge.   Neurological: Negative for headaches.       PHYSICAL EXAM:    /70   Ht 5' 7" (1.702 m)   Wt 131.6 kg (290 lb 2 oz)   LMP 2017   BMI 45.44 kg/m²     Physical Exam:   Constitutional: She is oriented to person, place, and time. She appears well-developed.    HENT: "   Head: Normocephalic.       Pulmonary/Chest: Effort normal.        Abdominal: She exhibits no mass. There is no hepatosplenomegaly. There is no tenderness.     Genitourinary: Vagina normal. Uterus is not enlarged and not tender. Cervix is normal. Right adnexum displays no tenderness and no fullness. Left adnexum displays no tenderness and no fullness. No vaginal discharge found.   Genitourinary Comments: External genitalia: Normal  Urethra: No tenderness; normal meatus  Bladder: No tenderness               Neurological: She is alert and oriented to person, place, and time.     Psychiatric: She has a normal mood and affect.         ASSESSMENT & PLAN:   Pelvic pain  -     POCT urine pregnancy  -     C. trachomatis/N. gonorrhoeae by AMP DNA Cervix  -     Vaginosis Screen by DNA Probe  -     US Pelvis Complete Non OB; Future; Expected date: 02/19/2018    Vaginitis due to Trichomonas  -     Vaginosis Screen by DNA Probe    Possible exposure to STD  -     HIV-1 and HIV-2 antibodies; Future; Expected date: 02/19/2018  -     RPR; Future; Expected date: 02/19/2018  -     C. trachomatis/N. gonorrhoeae by AMP DNA Cervix    Suprapubic pain  -     Urine culture    Encounter for screening for malignant neoplasm of cervix  -     Liquid-based pap smear, screening        - UPT=(-)  - No significant pain on exam  - No bleeding noted  - Check urine culture  - Check Gc, chlamydia, RPR, and HIV  - Given recent trich infection, check vaginosis screen  - Schedule pelvic u/s    - Pap performed today

## 2018-02-20 LAB
BACTERIA UR CULT: NORMAL
HIV 1+2 AB+HIV1 P24 AG SERPL QL IA: NEGATIVE
RPR SER QL: NORMAL

## 2018-02-21 ENCOUNTER — OFFICE VISIT (OUTPATIENT)
Dept: PLASTIC SURGERY | Facility: CLINIC | Age: 23
End: 2018-02-21
Payer: MEDICAID

## 2018-02-21 VITALS
BODY MASS INDEX: 45.52 KG/M2 | DIASTOLIC BLOOD PRESSURE: 89 MMHG | HEART RATE: 112 BPM | TEMPERATURE: 98 F | HEIGHT: 67 IN | SYSTOLIC BLOOD PRESSURE: 134 MMHG | WEIGHT: 290 LBS

## 2018-02-21 DIAGNOSIS — G89.29 CHRONIC NECK PAIN: ICD-10-CM

## 2018-02-21 DIAGNOSIS — N62 MACROMASTIA: Primary | ICD-10-CM

## 2018-02-21 DIAGNOSIS — M54.2 CHRONIC NECK PAIN: ICD-10-CM

## 2018-02-21 PROCEDURE — 3008F BODY MASS INDEX DOCD: CPT | Mod: ,,, | Performed by: SURGERY

## 2018-02-21 PROCEDURE — 99213 OFFICE O/P EST LOW 20 MIN: CPT | Mod: PBBFAC,PO | Performed by: SURGERY

## 2018-02-21 PROCEDURE — 99204 OFFICE O/P NEW MOD 45 MIN: CPT | Mod: S$PBB,,, | Performed by: SURGERY

## 2018-02-21 PROCEDURE — 99999 PR PBB SHADOW E&M-EST. PATIENT-LVL III: CPT | Mod: PBBFAC,,, | Performed by: SURGERY

## 2018-03-01 ENCOUNTER — TELEPHONE (OUTPATIENT)
Dept: OBSTETRICS AND GYNECOLOGY | Facility: CLINIC | Age: 23
End: 2018-03-01

## 2018-03-01 NOTE — TELEPHONE ENCOUNTER
----- Message from Bia Mendiola sent at 3/1/2018 11:02 AM CST -----  Pt needs excuse for school. Pt can be reached at [phone].

## 2018-03-18 ENCOUNTER — HOSPITAL ENCOUNTER (EMERGENCY)
Facility: OTHER | Age: 23
Discharge: HOME | End: 2018-03-18
Attending: EMERGENCY MEDICINE
Payer: MEDICAID

## 2018-03-18 VITALS
SYSTOLIC BLOOD PRESSURE: 140 MMHG | HEIGHT: 67 IN | WEIGHT: 286 LBS | BODY MASS INDEX: 44.89 KG/M2 | OXYGEN SATURATION: 99 % | DIASTOLIC BLOOD PRESSURE: 88 MMHG | HEART RATE: 88 BPM | RESPIRATION RATE: 18 BRPM | TEMPERATURE: 98 F

## 2018-03-18 DIAGNOSIS — R07.89 DISCOMFORT IN CHEST: ICD-10-CM

## 2018-03-18 DIAGNOSIS — B96.89 BACTERIAL VAGINOSIS: Primary | ICD-10-CM

## 2018-03-18 DIAGNOSIS — R10.30 LOWER ABDOMINAL PAIN: ICD-10-CM

## 2018-03-18 DIAGNOSIS — N76.0 BACTERIAL VAGINOSIS: Primary | ICD-10-CM

## 2018-03-18 LAB
B-HCG UR QL: NEGATIVE
BILIRUBIN, POC UA: NEGATIVE
BLOOD, POC UA: NEGATIVE
CLARITY, POC UA: CLEAR
COLOR, POC UA: YELLOW
CTP QC/QA: YES
GLUCOSE, POC UA: NEGATIVE
KETONES, POC UA: NEGATIVE
LEUKOCYTE EST, POC UA: NEGATIVE
NITRITE, POC UA: NEGATIVE
PH UR STRIP: 7.5 [PH]
PROTEIN, POC UA: NEGATIVE
SPECIFIC GRAVITY, POC UA: 1.02
UROBILINOGEN, POC UA: 0.2 E.U./DL

## 2018-03-18 PROCEDURE — 93010 ELECTROCARDIOGRAM REPORT: CPT | Mod: ,,, | Performed by: INTERNAL MEDICINE

## 2018-03-18 PROCEDURE — 93005 ELECTROCARDIOGRAM TRACING: CPT

## 2018-03-18 PROCEDURE — 81003 URINALYSIS AUTO W/O SCOPE: CPT

## 2018-03-18 PROCEDURE — 81025 URINE PREGNANCY TEST: CPT | Performed by: EMERGENCY MEDICINE

## 2018-03-18 PROCEDURE — 99284 EMERGENCY DEPT VISIT MOD MDM: CPT | Mod: 25

## 2018-03-18 RX ORDER — METRONIDAZOLE 500 MG/1
500 TABLET ORAL EVERY 12 HOURS
Qty: 14 TABLET | Refills: 0 | Status: SHIPPED | OUTPATIENT
Start: 2018-03-18 | End: 2018-03-25

## 2018-03-18 NOTE — ED NOTES
Pt to nursing station stating she is still waiting for a blood pregnancy test. Pt states she is angry with the wait time. Pt accompanied back to room and RN summarized testing and care provided to this point as well as acuity in the ED - MD in room shortly after to discuss plan of care with pt. Pt calmer following explanation.

## 2018-03-18 NOTE — ED NOTES
Pt reports abdominal pain and spotting x3 days. She states she went to urgent care on Wednesday and had a negative pregnancy test but was told that she needed to go to the hospital and have a blood test performed. She states she had an  in December and is worried because she was told she would be more fertile afterward. Pt c/o cramping type abdominal pain, not localized.

## 2018-04-03 NOTE — ED PROVIDER NOTES
"Encounter Date: 3/18/2018       History     Chief Complaint   Patient presents with    Abdominal Pain     pt states "I've been having abdominal pain x 3 days. I started spotting 3 days ago". pt denies taking medicine for symptoms        Ms Bishop reports midlines lower abd pain for 2-3 days that is mild and intermittent and she wanted to know if she is pregnant. States she began spotting and this is the normal time for her cycle but she talked to someone who said she might be pregnant and she wanted to know. She said she was worried and her abd pain went up into her chest but that went away and she really just wanted to come to the back of the er to see about her stomach. She said she went to [facility] last week and was not put on any meds and had a pelvic exam done at that time.       The history is provided by the patient.     Review of patient's allergies indicates:   Allergen Reactions    Iodine and iodide containing products Hives     Past Medical History:   Diagnosis Date    Bipolar 1 disorder      Past Surgical History:   Procedure Laterality Date    LEG SURGERY       Family History   Problem Relation Age of Onset    No Known Problems Mother     No Known Problems Father     Breast cancer Neg Hx     Colon cancer Neg Hx     Ovarian cancer Neg Hx      Social History   Substance Use Topics    Smoking status: Never Smoker    Smokeless tobacco: Never Used    Alcohol use No     Review of Systems   Gastrointestinal: Positive for abdominal pain.   Genitourinary: Positive for vaginal bleeding. Negative for dysuria and flank pain.   All other systems reviewed and are negative.      Physical Exam     Initial Vitals [03/18/18 1406]   BP Pulse Resp Temp SpO2   (!) 140/88 88 18 97.8 °F (36.6 °C) 99 %      MAP       105.33         Physical Exam    Nursing note and vitals reviewed.  Constitutional: She appears well-developed and well-nourished. She is not diaphoretic. No distress.   HENT:   Head: Normocephalic " and atraumatic.   Eyes: Conjunctivae and EOM are normal.   Neck: Normal range of motion. Neck supple.   Cardiovascular: Normal rate, regular rhythm and normal heart sounds.   No murmur heard.  Pulmonary/Chest: Breath sounds normal. No respiratory distress. She has no wheezes.   Abdominal: Soft. She exhibits no distension. There is no tenderness. There is no rebound and no guarding.   Musculoskeletal: Normal range of motion. She exhibits no edema or tenderness.   Neurological: She is alert and oriented to person, place, and time. No cranial nerve deficit or sensory deficit.   Skin: Skin is warm and dry. Capillary refill takes less than 2 seconds. No rash noted. No erythema.   Psychiatric: Thought content normal.   Anxious, impulsive at times. Able to calm.          ED Course   Procedures  Labs Reviewed   POCT URINALYSIS W/O SCOPE - Abnormal; Notable for the following:        Result Value    Glucose, UA Negative (*)     Bilirubin, UA Negative (*)     Ketones, UA Negative (*)     Blood, UA Negative (*)     Protein, UA Negative (*)     Nitrite, UA Negative (*)     Leukocytes, UA Negative (*)     All other components within normal limits   POCT URINE PREGNANCY   POCT URINALYSIS W/O SCOPE     EKG Readings: (Independently Interpreted)   Initial Reading: No STEMI. Rhythm: Normal Sinus Rhythm. Ectopy: No Ectopy. Conduction: Normal.     ECG Results          EKG 12-lead (Final result)  Result time 03/19/18 17:53:10    Final result by Interface, Lab In [facility] (03/19/18 17:53:10)                 Narrative:    Test Reason : R07.89  Blood Pressure : 140/88 mmHG  Vent. Rate : 076 BPM     Atrial Rate : 076 BPM     P-R Int : 148 ms          QRS Dur : 082 ms      QT Int : 366 ms       P-R-T Axes : 024 039 007 degrees     QTc Int : 411 ms    Normal sinus rhythm with sinus arrhythmia  Low voltage QRS  Borderline Abnormal ECG  When compared with ECG of 14-APR-2017 23:31,  Nonspecific T wave abnormality no longer evident in Lateral  leads  Confirmed by Audrey Johnson MD ([id number]) on 3/19/2018 5:52:58 PM    Referred By: [id number]   SELF           Confirmed By:Audrey Johnson MD                               Medical Decision Making:   Clinical Tests:   Lab Tests: Ordered and Reviewed  ED Management:  Upt negative. Previous records reviewed. Pt tested pos for bv, states she was not told or give meds. Will prescribe flagyl and pt will f/u w pcp. She is stable for d/c. We discussed home care and worrisome signs that should prompt need to return to er should they occur. Of note, pt reportedly told ekg tech that she didn't have chest pain, just said that so she could come to back quickly. There is no indication for further emergent intervention or evaluation at this time.                         Clinical Impression:   The primary encounter diagnosis was Bacterial vaginosis. Diagnoses of Discomfort in chest and Lower abdominal pain were also pertinent to this visit.                           Maya Stokes MD  04/02/18 [id number]

## 2018-05-17 ENCOUNTER — HOSPITAL ENCOUNTER (EMERGENCY)
Facility: HOSPITAL | Age: 23
Discharge: HOME | End: 2018-05-18
Attending: EMERGENCY MEDICINE
Payer: MEDICAID

## 2018-05-17 VITALS
HEIGHT: 67 IN | HEART RATE: 102 BPM | SYSTOLIC BLOOD PRESSURE: 120 MMHG | WEIGHT: 280 LBS | TEMPERATURE: 98 F | OXYGEN SATURATION: 98 % | DIASTOLIC BLOOD PRESSURE: 60 MMHG | RESPIRATION RATE: 20 BRPM | BODY MASS INDEX: 43.95 KG/M2

## 2018-05-17 DIAGNOSIS — N94.6 DYSMENORRHEA: Primary | ICD-10-CM

## 2018-05-17 LAB
ABO + RH BLD: NORMAL
ALBUMIN SERPL BCP-MCNC: 4.1 G/DL
ALP SERPL-CCNC: 71 U/L
ALT SERPL W/O P-5'-P-CCNC: 13 U/L
ANION GAP SERPL CALC-SCNC: 11 MMOL/L
AST SERPL-CCNC: 15 U/L
B-HCG UR QL: NEGATIVE
BASOPHILS # BLD AUTO: 0.04 K/UL
BASOPHILS NFR BLD: 0.3 %
BILIRUB SERPL-MCNC: 0.9 MG/DL
BILIRUB UR QL STRIP: NEGATIVE
BLD GP AB SCN CELLS X3 SERPL QL: NORMAL
BUN SERPL-MCNC: 10 MG/DL
CALCIUM SERPL-MCNC: 9.6 MG/DL
CHLORIDE SERPL-SCNC: 105 MMOL/L
CLARITY UR: CLEAR
CO2 SERPL-SCNC: 21 MMOL/L
COLOR UR: YELLOW
CREAT SERPL-MCNC: 0.8 MG/DL
CTP QC/QA: YES
DIFFERENTIAL METHOD: ABNORMAL
EOSINOPHIL # BLD AUTO: 0.1 K/UL
EOSINOPHIL NFR BLD: 0.4 %
ERYTHROCYTE [DISTWIDTH] IN BLOOD BY AUTOMATED COUNT: 14.3 %
EST. GFR  (AFRICAN AMERICAN): >60 ML/MIN/1.73 M^2
EST. GFR  (NON AFRICAN AMERICAN): >60 ML/MIN/1.73 M^2
GLUCOSE SERPL-MCNC: 99 MG/DL
GLUCOSE UR QL STRIP: NEGATIVE
HCG INTACT+B SERPL-ACNC: <1.2 MIU/ML
HCT VFR BLD AUTO: 40.5 %
HGB BLD-MCNC: 13.4 G/DL
HGB UR QL STRIP: NEGATIVE
INR PPP: 1
KETONES UR QL STRIP: NEGATIVE
LEUKOCYTE ESTERASE UR QL STRIP: NEGATIVE
LYMPHOCYTES # BLD AUTO: 1.4 K/UL
LYMPHOCYTES NFR BLD: 9.2 %
MCH RBC QN AUTO: 27.5 PG
MCHC RBC AUTO-ENTMCNC: 33.1 G/DL
MCV RBC AUTO: 83 FL
MONOCYTES # BLD AUTO: 1 K/UL
MONOCYTES NFR BLD: 7.1 %
NEUTROPHILS # BLD AUTO: 12.1 K/UL
NEUTROPHILS NFR BLD: 82.8 %
NITRITE UR QL STRIP: NEGATIVE
PH UR STRIP: 6 [PH] (ref 5–8)
PLATELET # BLD AUTO: 367 K/UL
PMV BLD AUTO: 10 FL
POTASSIUM SERPL-SCNC: 3.7 MMOL/L
PROT SERPL-MCNC: 8.2 G/DL
PROT UR QL STRIP: NEGATIVE
PROTHROMBIN TIME: 10.4 SEC
RBC # BLD AUTO: 4.87 M/UL
SODIUM SERPL-SCNC: 137 MMOL/L
SP GR UR STRIP: 1.02 (ref 1–1.03)
URN SPEC COLLECT METH UR: NORMAL
UROBILINOGEN UR STRIP-ACNC: NEGATIVE EU/DL
WBC # BLD AUTO: 14.6 K/UL

## 2018-05-17 PROCEDURE — 85610 PROTHROMBIN TIME: CPT

## 2018-05-17 PROCEDURE — 85025 COMPLETE CBC W/AUTO DIFF WBC: CPT

## 2018-05-17 PROCEDURE — 81025 URINE PREGNANCY TEST: CPT | Performed by: EMERGENCY MEDICINE

## 2018-05-17 PROCEDURE — 63600175 PHARM REV CODE 636 W HCPCS: Performed by: NURSE PRACTITIONER

## 2018-05-17 PROCEDURE — 81003 URINALYSIS AUTO W/O SCOPE: CPT

## 2018-05-17 PROCEDURE — 96374 THER/PROPH/DIAG INJ IV PUSH: CPT

## 2018-05-17 PROCEDURE — 96361 HYDRATE IV INFUSION ADD-ON: CPT

## 2018-05-17 PROCEDURE — 99284 EMERGENCY DEPT VISIT MOD MDM: CPT | Mod: 25

## 2018-05-17 PROCEDURE — 84702 CHORIONIC GONADOTROPIN TEST: CPT

## 2018-05-17 PROCEDURE — 25000003 PHARM REV CODE 250: Performed by: PHYSICIAN ASSISTANT

## 2018-05-17 PROCEDURE — 80053 COMPREHEN METABOLIC PANEL: CPT

## 2018-05-17 PROCEDURE — 86901 BLOOD TYPING SEROLOGIC RH(D): CPT

## 2018-05-17 RX ORDER — HYDROMORPHONE HYDROCHLORIDE 1 MG/ML
0.5 INJECTION, SOLUTION INTRAMUSCULAR; INTRAVENOUS; SUBCUTANEOUS
Status: DISCONTINUED | OUTPATIENT
Start: 2018-05-17 | End: 2018-05-17

## 2018-05-17 RX ORDER — MORPHINE SULFATE 10 MG/ML
4 INJECTION INTRAMUSCULAR; INTRAVENOUS; SUBCUTANEOUS
Status: COMPLETED | OUTPATIENT
Start: 2018-05-17 | End: 2018-05-17

## 2018-05-17 RX ORDER — NAPROXEN 500 MG/1
500 TABLET ORAL 2 TIMES DAILY WITH MEALS
Qty: 14 TABLET | Refills: 0 | Status: SHIPPED | OUTPATIENT
Start: 2018-05-17 | End: 2018-09-04

## 2018-05-17 RX ADMIN — MORPHINE SULFATE 4 MG: 10 INJECTION INTRAVENOUS at 10:05

## 2018-05-17 RX ADMIN — SODIUM CHLORIDE 1000 ML: 0.9 INJECTION, SOLUTION INTRAVENOUS at 09:05

## 2018-05-18 NOTE — ED PROVIDER NOTES
Encounter Date: 5/17/2018    SORT:  22 y.o. female presenting to ED for vaginal bleeding associated with lower abdominal pain. Soaking 1 pad/tampon per hour. Limited triage exam:  Non-toxic. If orders were placed, they are pending.     Stanley Da Silva PA-C     History     Chief Complaint   Patient presents with    Abdominal Pain     Pt c/o abdominal pain that started yesterday.  Reports last menses March 31, 2018.  Reports negative UPT at home.  Reports bleeding that started yesterday.  Denies taking birth control.  Reports hx of normal cycles.      Chief complaint:  Abdominal pain    History of present illness:  Patient is a 22-year-old female who states that for the last day she is experiencing bilateral lower quadrant abdominal pain that is constant and worsening.  She states the character of the pain is sharp and shooting.  Nothing alleviates the pain.  Movement exacerbates the pain.  Currently reports 10/10 pain. She states that she has bled through approximately 1 tampon per hour.  She reports vomiting x1 and denies urinary symptoms as well as fever and chills, diarrhea constipation.      The history is provided by the patient. No  was used.     Review of patient's allergies indicates:   Allergen Reactions    Iodine and iodide containing products Hives     Past Medical History:   Diagnosis Date    Bipolar 1 disorder      Past Surgical History:   Procedure Laterality Date    LEG SURGERY       Family History   Problem Relation Age of Onset    No Known Problems Mother     No Known Problems Father     Breast cancer Neg Hx     Colon cancer Neg Hx     Ovarian cancer Neg Hx      Social History   Substance Use Topics    Smoking status: Never Smoker    Smokeless tobacco: Never Used    Alcohol use No     Review of Systems   Constitutional: Negative for chills, fatigue and fever.   HENT: Negative for congestion, ear discharge, ear pain, postnasal drip, rhinorrhea, sinus pressure, sneezing,  sore throat and voice change.    Eyes: Negative for discharge and itching.   Respiratory: Negative for cough, shortness of breath and wheezing.    Cardiovascular: Negative for chest pain, palpitations and leg swelling.   Gastrointestinal: Positive for abdominal pain, nausea and vomiting. Negative for constipation and diarrhea.   Endocrine: Negative for polydipsia, polyphagia and polyuria.   Genitourinary: Positive for vaginal bleeding. Negative for dysuria, frequency, hematuria, urgency, vaginal discharge and vaginal pain.   Musculoskeletal: Negative for arthralgias and myalgias.   Skin: Negative for rash and wound.   Neurological: Negative for dizziness, seizures, syncope, weakness and numbness.   Hematological: Negative for adenopathy. Does not bruise/bleed easily.   Psychiatric/Behavioral: Negative for self-injury and suicidal ideas. The patient is not nervous/anxious.        Physical Exam     Initial Vitals [05/17/18 2030]   BP Pulse Resp Temp SpO2   128/86 (!) 123 16 99.7 °F (37.6 °C) 100 %      MAP       100         Physical Exam    Nursing note and vitals reviewed.  Constitutional: She appears well-developed and well-nourished.   HENT:   Head: Normocephalic and atraumatic.   Right Ear: External ear normal.   Left Ear: External ear normal.   Nose: Nose normal.   Eyes: Conjunctivae and EOM are normal. Pupils are equal, round, and reactive to light. Right eye exhibits no discharge. Left eye exhibits no discharge.   Neck: Normal range of motion.   Abdominal: Soft. Normal appearance and bowel sounds are normal. She exhibits no distension. There is no tenderness. Hernia confirmed negative in the right inguinal area and confirmed negative in the left inguinal area.   Genitourinary: Vagina normal and uterus normal. Pelvic exam was performed with patient prone. No labial fusion. There is no rash, tenderness, lesion or injury on the right labia. There is no rash, tenderness, lesion or injury on the left labia. Uterus  is not deviated, not enlarged, not fixed and not tender. Cervix exhibits no motion tenderness, no discharge and no friability. Right adnexum displays no mass, no tenderness and no fullness. Left adnexum displays no mass, no tenderness and no fullness. No erythema, tenderness or bleeding in the vagina. No foreign body in the vagina. No signs of injury around the vagina. No vaginal discharge found.   Genitourinary Comments: Blood prsent in the vaginal vault but the cervix is not actively bleeding.   Musculoskeletal: Normal range of motion.   Lymphadenopathy:        Right: No inguinal adenopathy present.        Left: No inguinal adenopathy present.   Neurological: She is alert and oriented to person, place, and time.   Skin: Skin is dry. Capillary refill takes less than 2 seconds.         ED Course   Procedures  Labs Reviewed   CBC W/ AUTO DIFFERENTIAL - Abnormal; Notable for the following:        Result Value    WBC 14.60 (*)     Platelets 367 (*)     Gran # (ANC) 12.1 (*)     Gran% 82.8 (*)     Lymph% 9.2 (*)     All other components within normal limits   COMPREHENSIVE METABOLIC PANEL - Abnormal; Notable for the following:     CO2 21 (*)     All other components within normal limits   URINALYSIS   PROTIME-INR   HCG, QUANTITATIVE, PREGNANCY   POCT URINE PREGNANCY   TYPE & SCREEN                   APC / Resident Notes:   Patient is a 22-year-old female who reports vaginal bleeding and bilateral lower quadrant abdominal pain. She is afebrile and nontoxic with a nontender abdomen x4 quadrants.  She is not vomiting in the room at the present time.   examination reveals there is some blood in the vaginal vault but it is not refilling.  See below for laboratory analysis and interpretation.    Patient is not pregnant.   In the emergency department patient was given morphine 4 mg IV push, normal saline 1 L which gave some relief.    This is most likely abnormal uterine bleeding and patient should follow up with her OBGYN  at the soonest convenience.  She is not anemic.  No emergency is represented by her presentation.    Patient is discharged to home in good condition. She is given a prescription for Naprosyn 500 mg p.o. B.i.d.    Care of the patient discussed with Dr. Webster who agreed with the assessment and plan.         Attending Attestation:     Physician Attestation Statement for NP/PA:   I reviewed the chart but I did not personally examine the patient. The face to face encounter was performed by the NP/PA.                  ED Course as of May 18 0221   Thu May 17, 2018   2105 Preg Test, Ur: Negative [VC]   2136 CBC: leukocyte count was increased, the H&H was normal. The platelet count was reduced. This indicates possible infection      [VC]   2137 UA is negative for infection, no nitrites, leukocytes, blood, or protein present.    [VC]   2137 Protime: 10.4 [VC]   2137 PT/INR is within normal limits. Coumadin Monitoring INR: 1.0 [VC]   2153 The chemistry was negative for hypo-or hyper natremia, kalemia, chloridemia, or other electrolyte abnormalities; BUN and creatinine were within normal limits indicating normal kidney function, ALT and AST were within normal limits indicating normal liver function, there was no transaminitis.      [VC]   2253 Group & Rh: O POS [VC]   2253 hCG Quant: <1.2 [VC]      ED Course User Index  [VC] Elmer Ribeiro DNP     Clinical Impression:   The encounter diagnosis was Dysmenorrhea.    Disposition:   Disposition: Discharged  Condition: Stable                        Elmer Ribeiro DNP  05/18/18 0221       Morales Webster MD  05/24/18 [id number]

## 2018-05-21 ENCOUNTER — TELEPHONE (OUTPATIENT)
Dept: OBSTETRICS AND GYNECOLOGY | Facility: CLINIC | Age: 23
End: 2018-05-21
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
## 2018-06-04 ENCOUNTER — TELEPHONE (OUTPATIENT)
Dept: OBSTETRICS AND GYNECOLOGY | Facility: CLINIC | Age: 23
End: 2018-06-04

## 2018-06-04 NOTE — TELEPHONE ENCOUNTER
----- Message from Matias Whaley MA sent at 6/4/2018 10:21 AM CDT -----  Contact: Self  Pt states that has questions regarding getting records of her pregnancy.  The pt can be reached at [phone].  Thanks FL
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
## 2018-09-04 ENCOUNTER — HOSPITAL ENCOUNTER (EMERGENCY)
Facility: HOSPITAL | Age: 23
Discharge: HOME | End: 2018-09-04
Attending: EMERGENCY MEDICINE
Payer: MEDICAID

## 2018-09-04 VITALS
OXYGEN SATURATION: 97 % | BODY MASS INDEX: 41.44 KG/M2 | HEIGHT: 67 IN | RESPIRATION RATE: 18 BRPM | WEIGHT: 264 LBS | TEMPERATURE: 98 F | HEART RATE: 88 BPM | SYSTOLIC BLOOD PRESSURE: 109 MMHG | DIASTOLIC BLOOD PRESSURE: 73 MMHG

## 2018-09-04 DIAGNOSIS — M25.562 LEFT KNEE PAIN: ICD-10-CM

## 2018-09-04 DIAGNOSIS — R07.9 CHEST PAIN: Primary | ICD-10-CM

## 2018-09-04 DIAGNOSIS — F41.9 ANXIETY: ICD-10-CM

## 2018-09-04 LAB
ALBUMIN SERPL BCP-MCNC: 4 G/DL
ALP SERPL-CCNC: 72 U/L
ALT SERPL W/O P-5'-P-CCNC: 11 U/L
ANION GAP SERPL CALC-SCNC: 7 MMOL/L
AST SERPL-CCNC: 12 U/L
B-HCG UR QL: NEGATIVE
BASOPHILS # BLD AUTO: 0.04 K/UL
BASOPHILS NFR BLD: 0.5 %
BILIRUB SERPL-MCNC: 1.1 MG/DL
BILIRUB UR QL STRIP: NEGATIVE
BUN SERPL-MCNC: 7 MG/DL
CALCIUM SERPL-MCNC: 9.9 MG/DL
CHLORIDE SERPL-SCNC: 100 MMOL/L
CLARITY UR: ABNORMAL
CO2 SERPL-SCNC: 31 MMOL/L
COLOR UR: YELLOW
CREAT SERPL-MCNC: 0.9 MG/DL
CTP QC/QA: YES
DIFFERENTIAL METHOD: ABNORMAL
EOSINOPHIL # BLD AUTO: 0 K/UL
EOSINOPHIL NFR BLD: 0.5 %
ERYTHROCYTE [DISTWIDTH] IN BLOOD BY AUTOMATED COUNT: 14.8 %
EST. GFR  (AFRICAN AMERICAN): >60 ML/MIN/1.73 M^2
EST. GFR  (NON AFRICAN AMERICAN): >60 ML/MIN/1.73 M^2
GLUCOSE SERPL-MCNC: 95 MG/DL
GLUCOSE UR QL STRIP: NEGATIVE
HCT VFR BLD AUTO: 43 %
HGB BLD-MCNC: 14.4 G/DL
HGB UR QL STRIP: NEGATIVE
KETONES UR QL STRIP: ABNORMAL
LEUKOCYTE ESTERASE UR QL STRIP: NEGATIVE
LIPASE SERPL-CCNC: 16 U/L
LYMPHOCYTES # BLD AUTO: 2.2 K/UL
LYMPHOCYTES NFR BLD: 29.5 %
MCH RBC QN AUTO: 28.6 PG
MCHC RBC AUTO-ENTMCNC: 33.5 G/DL
MCV RBC AUTO: 86 FL
MONOCYTES # BLD AUTO: 0.8 K/UL
MONOCYTES NFR BLD: 10.9 %
NEUTROPHILS # BLD AUTO: 4.4 K/UL
NEUTROPHILS NFR BLD: 59.1 %
NITRITE UR QL STRIP: NEGATIVE
PH UR STRIP: 5 [PH] (ref 5–8)
PLATELET # BLD AUTO: 342 K/UL
PMV BLD AUTO: 10.4 FL
POTASSIUM SERPL-SCNC: 3.8 MMOL/L
PROT SERPL-MCNC: 8 G/DL
PROT UR QL STRIP: NEGATIVE
RBC # BLD AUTO: 5.03 M/UL
SODIUM SERPL-SCNC: 138 MMOL/L
SP GR UR STRIP: 1.02 (ref 1–1.03)
TROPONIN I SERPL DL<=0.01 NG/ML-MCNC: 0.01 NG/ML
URN SPEC COLLECT METH UR: ABNORMAL
UROBILINOGEN UR STRIP-ACNC: NEGATIVE EU/DL
WBC # BLD AUTO: 7.59 K/UL

## 2018-09-04 PROCEDURE — 96374 THER/PROPH/DIAG INJ IV PUSH: CPT

## 2018-09-04 PROCEDURE — 81003 URINALYSIS AUTO W/O SCOPE: CPT

## 2018-09-04 PROCEDURE — 80053 COMPREHEN METABOLIC PANEL: CPT

## 2018-09-04 PROCEDURE — 93005 ELECTROCARDIOGRAM TRACING: CPT

## 2018-09-04 PROCEDURE — 99284 EMERGENCY DEPT VISIT MOD MDM: CPT | Mod: 25

## 2018-09-04 PROCEDURE — 93010 ELECTROCARDIOGRAM REPORT: CPT | Mod: ,,, | Performed by: INTERNAL MEDICINE

## 2018-09-04 PROCEDURE — 85025 COMPLETE CBC W/AUTO DIFF WBC: CPT

## 2018-09-04 PROCEDURE — 81025 URINE PREGNANCY TEST: CPT | Performed by: PHYSICIAN ASSISTANT

## 2018-09-04 PROCEDURE — 84484 ASSAY OF TROPONIN QUANT: CPT

## 2018-09-04 PROCEDURE — 63600175 PHARM REV CODE 636 W HCPCS: Performed by: PHYSICIAN ASSISTANT

## 2018-09-04 PROCEDURE — 83690 ASSAY OF LIPASE: CPT

## 2018-09-04 RX ORDER — KETOROLAC TROMETHAMINE 30 MG/ML
15 INJECTION, SOLUTION INTRAMUSCULAR; INTRAVENOUS
Status: COMPLETED | OUTPATIENT
Start: 2018-09-04 | End: 2018-09-04

## 2018-09-04 RX ORDER — ACETAMINOPHEN 500 MG
1000 TABLET ORAL
Status: DISCONTINUED | OUTPATIENT
Start: 2018-09-04 | End: 2018-09-04

## 2018-09-04 RX ORDER — KETOROLAC TROMETHAMINE 30 MG/ML
15 INJECTION, SOLUTION INTRAMUSCULAR; INTRAVENOUS
Status: DISCONTINUED | OUTPATIENT
Start: 2018-09-04 | End: 2018-09-04

## 2018-09-04 RX ADMIN — KETOROLAC TROMETHAMINE 15 MG: 30 INJECTION INTRAMUSCULAR; INTRAVENOUS at 03:09

## 2018-09-04 NOTE — ED TRIAGE NOTES
"Pt states her SOB and upper left sided chest pain started day before yesterday. Denies n/v/d. Reports her left knee is " locking up". States she was able to bend it back into place, now the pt is walking with a limp.  "
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
## 2018-09-04 NOTE — ED NOTES
Kylee called from lab stating pt's specimen hemolyzed and someone will be sent to redraw her labs.

## 2018-10-11 ENCOUNTER — OFFICE VISIT (OUTPATIENT)
Dept: OBSTETRICS AND GYNECOLOGY | Facility: CLINIC | Age: 23
End: 2018-10-11
Payer: MEDICAID

## 2018-10-11 VITALS — WEIGHT: 256.63 LBS | HEIGHT: 67 IN | BODY MASS INDEX: 40.28 KG/M2

## 2018-10-11 DIAGNOSIS — R87.612 LOW GRADE SQUAMOUS INTRAEPITHELIAL LESION ON CYTOLOGIC SMEAR OF CERVIX (LGSIL): ICD-10-CM

## 2018-10-11 DIAGNOSIS — N89.8 VAGINAL DISCHARGE: Primary | ICD-10-CM

## 2018-10-11 DIAGNOSIS — Z20.2 POSSIBLE EXPOSURE TO STD: ICD-10-CM

## 2018-10-11 PROCEDURE — 99999 PR PBB SHADOW E&M-EST. PATIENT-LVL III: CPT | Mod: PBBFAC,,, | Performed by: OBSTETRICS & GYNECOLOGY

## 2018-10-11 PROCEDURE — 87491 CHLMYD TRACH DNA AMP PROBE: CPT

## 2018-10-11 PROCEDURE — 99213 OFFICE O/P EST LOW 20 MIN: CPT | Mod: PBBFAC,PO | Performed by: OBSTETRICS & GYNECOLOGY

## 2018-10-11 PROCEDURE — 99213 OFFICE O/P EST LOW 20 MIN: CPT | Mod: S$PBB,,, | Performed by: OBSTETRICS & GYNECOLOGY

## 2018-10-11 PROCEDURE — 87660 TRICHOMONAS VAGIN DIR PROBE: CPT

## 2018-10-11 NOTE — PROGRESS NOTES
"Chief Complaint   Patient presents with    STD CHECK    Vaginal Discharge       HPI:  23 y.o. female     Patient's last menstrual period was 2018 (approximate).    - Vaginal discharge for about 1 week  - Possible odor  - Desires GC and chlamydia testing  - Declines blood draw    Contraception: Declines  Pap: 2018, lsil  Mammogram: N/A    Past Medical History:   Diagnosis Date    Bipolar 1 disorder     Miscarriage 2018    pt reported      Past Surgical History:   Procedure Laterality Date    LEG SURGERY         Social History     Tobacco Use    Smoking status: Never Smoker    Smokeless tobacco: Never Used   Substance Use Topics    Alcohol use: No     Family History   Problem Relation Age of Onset    No Known Problems Mother     No Known Problems Father     Breast cancer Neg Hx     Colon cancer Neg Hx     Ovarian cancer Neg Hx      OB History    Para Term  AB Living   1       1     SAB TAB Ectopic Multiple Live Births   1              # Outcome Date GA Lbr Ilia/2nd Weight Sex Delivery Anes PTL Lv   1 SAB 17                  MEDICATIONS: Reviewed with patient.  ALLERGIES: Iodine and iodide containing products     ROS:  Review of Systems   Constitutional: Negative for fever.   Respiratory: Negative for shortness of breath.    Cardiovascular: Negative for chest pain.   Gastrointestinal: Negative for abdominal pain, nausea and vomiting.   Genitourinary: Positive for vaginal discharge.       PHYSICAL EXAM:    Ht 5' 7" (1.702 m)   Wt 116.4 kg (256 lb 9.9 oz)   LMP 2018 (Approximate)   BMI 40.19 kg/m²     Physical Exam:   Constitutional: She is oriented to person, place, and time. She appears well-developed.    HENT:   Head: Normocephalic.       Pulmonary/Chest: Effort normal.          Genitourinary: Cervix is normal. Vaginal discharge found.   Genitourinary Comments: External genitalia: Normal  Urethra: No tenderness; normal meatus  Bladder: No tenderness         "       Neurological: She is alert and oriented to person, place, and time.     Psychiatric: She has a normal mood and affect.         ASSESSMENT & PLAN:   Vaginal discharge  -     C. trachomatis/N. gonorrhoeae by AMP DNA  -     Vaginosis Screen by DNA Probe    Possible exposure to STD  -     C. trachomatis/N. gonorrhoeae by AMP DNA      - Labs as above  - Will treat based on results    - Counseled patient on pap findings from 2/2018 and need for repeat pap in 2/2019  - Patient was 22 at time of pap.  Per [facility]P guidelines, repeat pap in 12 months is recommended

## 2018-10-12 LAB
C TRACH DNA SPEC QL NAA+PROBE: NOT DETECTED
CANDIDA RRNA VAG QL PROBE: NEGATIVE
G VAGINALIS RRNA GENITAL QL PROBE: POSITIVE
N GONORRHOEA DNA SPEC QL NAA+PROBE: NOT DETECTED
T VAGINALIS RRNA GENITAL QL PROBE: NEGATIVE

## 2018-10-15 ENCOUNTER — TELEPHONE (OUTPATIENT)
Dept: OBSTETRICS AND GYNECOLOGY | Facility: CLINIC | Age: 23
End: 2018-10-15

## 2018-10-15 RX ORDER — METRONIDAZOLE 7.5 MG/G
1 GEL VAGINAL DAILY
Qty: 70 G | Refills: 0 | Status: SHIPPED | OUTPATIENT
Start: 2018-10-15 | End: 2018-10-20

## 2018-10-15 NOTE — TELEPHONE ENCOUNTER
Spoke with Pt    Gave pt her results for her GCCT cultures which were negative.    Pt was told she was positive for BV:  Your test came back positive for Bacterial Vaginosis.    There are both good and bad bacteria normally in a healthy vagina.  Bacterial vaginosis (BV) occurs when these bacteria get out of balance.  The numbers of good bacteria decrease.  This allows the numbers of bad bacteria to increase and cause BV.  In most cases, BV is not a serious problem.   BV is often treated with antibiotics.  They may be given in oral pill form or as a vaginal cream.  Please be sure to take all of your medication, even is your symptoms go away.  If your taking antibiotic pills, do not drink alcohol until your 're finished with all your medication.  If your using vaginal cream, apply it as directed.  Be aware that the cream may make condoms and diaphragms less effective.  Please reach back out to me if your symptoms do not go way within 4 days of starting treatment.  Also call if you have a reaction to the medication;        Please avoid using any scented soaps, scented detergents, scented dryer sheets, and feminine hygiene   products.    Pt was instructed to take medication as directed.    Pt verbalized understanding.

## 2018-10-15 NOTE — TELEPHONE ENCOUNTER
----- Message from Adelina Nieves MA sent at 10/15/2018  3:01 PM CDT -----  Dr Hameed,  Dr Klein is out of the office this afternoon.  Pt is requesting to know the results for her cultures.  Please advise....  Thank You  Adelina  ----- Message -----  From: Bia Mendiola  Sent: 10/15/2018   2:19 PM  To: [facility] Staff    Please call pt about her lab results. Pt can be reached at [phone].

## 2018-10-22 ENCOUNTER — TELEPHONE (OUTPATIENT)
Dept: OBSTETRICS AND GYNECOLOGY | Facility: CLINIC | Age: 23
End: 2018-10-22

## 2018-10-22 DIAGNOSIS — N76.0 ACUTE VAGINITIS: Primary | ICD-10-CM

## 2018-10-22 RX ORDER — METRONIDAZOLE 500 MG/1
500 TABLET ORAL EVERY 12 HOURS
Qty: 14 TABLET | Refills: 0 | Status: SHIPPED | OUTPATIENT
Start: 2018-10-22 | End: 2018-10-29

## 2018-10-22 NOTE — TELEPHONE ENCOUNTER
Returned pt call and informed pt that medication was sent to her pharmacy. Pt verbalized understanding

## 2018-11-05 ENCOUNTER — HOSPITAL ENCOUNTER (EMERGENCY)
Facility: HOSPITAL | Age: 23
Discharge: HOME | End: 2018-11-05
Attending: EMERGENCY MEDICINE
Payer: MEDICAID

## 2018-11-05 VITALS
HEIGHT: 67 IN | TEMPERATURE: 99 F | BODY MASS INDEX: 39.24 KG/M2 | OXYGEN SATURATION: 98 % | HEART RATE: 87 BPM | RESPIRATION RATE: 18 BRPM | WEIGHT: 250 LBS | SYSTOLIC BLOOD PRESSURE: 130 MMHG | DIASTOLIC BLOOD PRESSURE: 81 MMHG

## 2018-11-05 DIAGNOSIS — R07.9 CHEST PAIN, UNSPECIFIED TYPE: Primary | ICD-10-CM

## 2018-11-05 PROCEDURE — 99283 EMERGENCY DEPT VISIT LOW MDM: CPT | Performed by: EMERGENCY MEDICINE

## 2018-11-05 RX ORDER — HYDROXYZINE PAMOATE 50 MG/1
50 CAPSULE ORAL 4 TIMES DAILY
Qty: 20 CAPSULE | Refills: 0 | Status: SHIPPED | OUTPATIENT
Start: 2018-11-05 | End: 2019-06-04

## 2018-11-05 NOTE — ED PROVIDER NOTES
Encounter Date: 11/5/2018       History     Chief Complaint   Patient presents with    Chest Pain     pt here for midsternal chest pain that began this morning; pain is constant. denies n/v/d.      Chief Complaint:  Chest pain  History of  Present Illness: History obtained from patient. This 23 y.o. female who has past medical history of bipolar disorder and anxiety presents to the ED complaining of midsternal chest pain that began at 7:00 a.m. this morning.  Patient reports a history of panic attacks and states that she has been under a lot of stress lately with family issues.  She states she frequently gets chest pain with her panic attacks.  She states she has previously been prescribed Xanax by her psychiatrist's but states she lost the remainder of her pills.  She denies shortness of breath, cough, fever, chills, abdominal pain nausea, vomiting, back pain, dizziness, weakness, SI, HI, auditory or visual hallucinations. She reports last dose of Xanax approximately 1 week ago.            Review of patient's allergies indicates:   Allergen Reactions    Iodine and iodide containing products Hives     Past Medical History:   Diagnosis Date    Anxiety     Bipolar 1 disorder     Miscarriage 05/2018    pt reported      Past Surgical History:   Procedure Laterality Date    LEG SURGERY       Family History   Problem Relation Age of Onset    No Known Problems Mother     No Known Problems Father     Breast cancer Neg Hx     Colon cancer Neg Hx     Ovarian cancer Neg Hx      Social History     Tobacco Use    Smoking status: Never Smoker    Smokeless tobacco: Never Used   Substance Use Topics    Alcohol use: No    Drug use: No     Review of Systems   Constitutional: Negative for fever.   Respiratory: Negative for cough and shortness of breath.    Cardiovascular: Positive for chest pain.   Gastrointestinal: Negative for abdominal pain, nausea and vomiting.   Musculoskeletal: Negative for back pain.   Skin:  Negative for rash.   Neurological: Negative for dizziness, weakness and numbness.   Psychiatric/Behavioral: Negative for hallucinations and suicidal ideas.        (-) homicidal ideations       Physical Exam     Initial Vitals [11/05/18 1626]   BP Pulse Resp Temp SpO2   138/83 91 18 98.4 °F (36.9 °C) 100 %      MAP       --         Physical Exam    Nursing note and vitals reviewed.  Constitutional: She appears well-developed and well-nourished. No distress.   HENT:   Head: Normocephalic and atraumatic.   Eyes: EOM are normal. Pupils are equal, round, and reactive to light.   Neck: Normal range of motion. Neck supple.   Cardiovascular: Normal rate, regular rhythm and normal heart sounds.   Pulmonary/Chest: Breath sounds normal. No respiratory distress. She has no wheezes. She exhibits no tenderness.   Abdominal: Soft. There is no tenderness.   Musculoskeletal: Normal range of motion.   Neurological: She is alert and oriented to person, place, and time.   Skin: Skin is warm. Capillary refill takes less than 2 seconds.   Psychiatric:   Flat affect         ED Course   Procedures  Labs Reviewed - No data to display  EKG Readings: (Independently Interpreted)   Initial Reading: No STEMI. Rhythm: Normal Sinus Rhythm. Heart Rate: 95. Ectopy: No Ectopy. Conduction: Normal. ST Segments: Normal ST Segments. T Waves: Normal. Clinical Impression: Normal Sinus Rhythm       Imaging Results    None           Medical Decision Making:   ED Management:  This is an evaluation of a 23 y.o. female who presents to the ED for chest pain.  Vital signs are stable.  Afebrile.  Patient is nontoxic appearing and in no acute distress. Heart sounds are normal.  Lungs are clear to auscultation. Pain is not reproducible with palpation of the chest wall or movement.  Patient has flat affect. EKG shows normal sinus rhythm with a rate of 95 with no ST segment changes.  I suspect the etiology of patient's pain likely panic attack.  Given patient's angry  regarding her Xanax, I suspect the patient may be seeking a refill of her Xanax.  I informed the patient we do not routinely prescribed Xanax abdomen emergency department.  Patient will be discharged home with Vistaril.    Patient given return precautions and instructed to return to the emergency department for any new or worsening symptoms. Patient verbalized understanding and agreed with plan.     I discussed the case with Dr. Brooks who is in agreement with my assessment and plan.                        Clinical Impression:   The encounter diagnosis was Chest pain, unspecified type.                             Grover Caban PA-C  11/05/18 [id number]

## 2018-11-05 NOTE — ED TRIAGE NOTES
Patient stated that she's having chest pain since this morning. HX of anxiety. Sharp and constant in nature. Pain 10/10. Activity worsens pain. No medication taken today for pain

## 2018-11-07 ENCOUNTER — HOSPITAL ENCOUNTER (EMERGENCY)
Facility: HOSPITAL | Age: 23
Discharge: HOME | End: 2018-11-07
Attending: EMERGENCY MEDICINE
Payer: MEDICAID

## 2018-11-07 VITALS
TEMPERATURE: 98 F | HEIGHT: 67 IN | OXYGEN SATURATION: 98 % | WEIGHT: 240 LBS | RESPIRATION RATE: 19 BRPM | SYSTOLIC BLOOD PRESSURE: 134 MMHG | BODY MASS INDEX: 37.67 KG/M2 | DIASTOLIC BLOOD PRESSURE: 82 MMHG | HEART RATE: 79 BPM

## 2018-11-07 DIAGNOSIS — R07.9 CHEST PAIN: ICD-10-CM

## 2018-11-07 DIAGNOSIS — R03.0 ELEVATED BLOOD-PRESSURE READING, WITHOUT DIAGNOSIS OF HYPERTENSION: Primary | ICD-10-CM

## 2018-11-07 PROCEDURE — 93005 ELECTROCARDIOGRAM TRACING: CPT

## 2018-11-07 PROCEDURE — 99284 EMERGENCY DEPT VISIT MOD MDM: CPT

## 2018-11-07 PROCEDURE — 93010 ELECTROCARDIOGRAM REPORT: CPT | Mod: ,,, | Performed by: INTERNAL MEDICINE

## 2018-11-07 PROCEDURE — 25000003 PHARM REV CODE 250: Performed by: EMERGENCY MEDICINE

## 2018-11-07 RX ORDER — ACETAMINOPHEN 325 MG/1
650 TABLET ORAL
Status: COMPLETED | OUTPATIENT
Start: 2018-11-07 | End: 2018-11-07

## 2018-11-07 RX ADMIN — ACETAMINOPHEN 650 MG: 325 TABLET, FILM COATED ORAL at 07:11

## 2018-11-08 NOTE — ED NOTES
MD at bedside discussing patient's discharge. Patient verbalizes understanding and asks MD if he can prescribe her some anxiety medication. MD states to follow up with PCP.

## 2018-11-08 NOTE — ED PROVIDER NOTES
"Encounter Date: 11/7/2018    SCRIBE #1 NOTE: I, Harvey Boone, am scribing for, and in the presence of,  Flakito Mahoney MD. I have scribed the following portions of the note - Other sections scribed: HPI, ROS and PE.       History     Chief Complaint   Patient presents with    Chest Pain     Midsternal CP that radiates to the left breast and SOB x 2 days. Pt reports pain worse with breathing. She states she was seen for same symptoms 2 days ago and diagnosed with anxiety.      CC: Chest Pain     HPI: This 23 y.o F (LMP 10/30/18) with Anxiety and Bipolar 1 disorder presents to the ED c/o acute onset of constant and severe (10/10) "sharp" chest pain for two days. This pain is located at the left upper chest. She denies trauma including heavy lifting, pushing, and pulling. She states her pain is worse with deep breaths and when lying flat. She was last seen in this ED 11/5/18 for chest pain and was prescribed Vistaril. She reports no improvement with this medication. She has a history of anxiety and feels that her chest pain is caused by this. She states that she has been feeling very stressed out. She denies smoking cigarettes. No family hx of MI. She denies fever, chills, diaphoresis, nausea, emesis, diarrhea, abdominal pain, back pain, chest pain, dysuria, difficulty urinating, visual disturbances and rash.       The history is provided by the patient. No  was used.     Review of patient's allergies indicates:   Allergen Reactions    Iodine and iodide containing products Hives     Past Medical History:   Diagnosis Date    Anxiety     Bipolar 1 disorder     Miscarriage 05/2018    pt reported      Past Surgical History:   Procedure Laterality Date    LEG SURGERY       Family History   Problem Relation Age of Onset    No Known Problems Mother     No Known Problems Father     Breast cancer Neg Hx     Colon cancer Neg Hx     Ovarian cancer Neg Hx      Social History     Tobacco Use    " Smoking status: Never Smoker    Smokeless tobacco: Never Used   Substance Use Topics    Alcohol use: No    Drug use: No     Review of Systems   Constitutional: Negative for chills, diaphoresis and fever.   HENT: Negative for sore throat and trouble swallowing.    Eyes: Negative for visual disturbance.   Respiratory: Positive for shortness of breath. Negative for cough.    Cardiovascular: Positive for chest pain. Negative for palpitations.   Gastrointestinal: Negative for abdominal pain, diarrhea, nausea and vomiting.   Skin: Negative for rash.   Psychiatric/Behavioral: The patient is nervous/anxious.        Physical Exam     Initial Vitals [11/07/18 1819]   BP Pulse Resp Temp SpO2   (!) 172/102 78 16 98.5 °F (36.9 °C) 100 %      MAP       --         Physical Exam    Constitutional: She appears well-developed and well-nourished. She is not diaphoretic. No distress.   HENT:   Mouth/Throat: Oropharynx is clear and moist.   Neck: No JVD present.   Cardiovascular: Normal rate and regular rhythm. Exam reveals no gallop and no friction rub.    No murmur heard.  Pulses:       Radial pulses are 2+ on the right side, and 2+ on the left side.        Posterior tibial pulses are 2+ on the right side, and 2+ on the left side.   No peripheral edema.   Pulmonary/Chest: Effort normal and breath sounds normal. No respiratory distress. She has no decreased breath sounds. She has no wheezes. She has no rhonchi. She has no rales.   Abdominal: Soft. She exhibits no distension. There is no tenderness.   Musculoskeletal:   No calf swelling or tenderness. Negative Vance's sign.   Neurological: She is alert and oriented to person, place, and time. No cranial nerve deficit or sensory deficit. Gait normal. GCS eye subscore is 4. GCS verbal subscore is 5. GCS motor subscore is 6.   Skin: Skin is warm and dry. No pallor.         ED Course   Procedures  Labs Reviewed - No data to display  EKG Readings: (Independently Interpreted)   Initial  Reading: No STEMI. Rhythm: Normal Sinus Rhythm. Heart Rate: 90. Ectopy: No Ectopy. Conduction: Normal. ST Segments: Normal ST Segments. T Waves: Normal. Axis: Normal. Other Impression: Normal study.       Imaging Results          X-Ray Chest PA And Lateral (Final result)  Result time 11/07/18 20:35:35    Final result by Shamika Abarca MD (11/07/18 20:35:35)                 Impression:      No acute intrathoracic process identified.      Electronically signed by: Shamika Abarca MD  Date:    11/07/2018  Time:    20:35             Narrative:    EXAMINATION:  XR CHEST PA AND LATERAL    CLINICAL HISTORY:  Chest pain, unspecified    TECHNIQUE:  PA and lateral views of the chest were performed.    COMPARISON:  09/04/2018.    FINDINGS:  Cardiac silhouette is normal in size.  Lungs are symmetrically expanded.  No evidence of focal consolidative process, pneumothorax, or significant effusion.  No acute osseous abnormality identified.                                            Scribe Attestation:   Scribe #1: I performed the above scribed service and the documentation accurately describes the services I performed. I attest to the accuracy of the note.    Attending Attestation:           Physician Attestation for Scribe:  Physician Attestation Statement for Scribe #1: I, Flakito Mahoney MD, reviewed documentation, as scribed by Harvey Boone in my presence, and it is both accurate and complete.                    Clinical Impression:   The primary encounter diagnosis was Elevated blood-pressure reading, without diagnosis of hypertension. A diagnosis of Chest pain was also pertinent to this visit.      Disposition:   Disposition: Discharged  Condition: Stable                        Flakito Mahoney III, MD  11/07/18 2100

## 2018-12-03 ENCOUNTER — HOSPITAL ENCOUNTER (EMERGENCY)
Facility: HOSPITAL | Age: 23
Discharge: HOME | End: 2018-12-03
Attending: EMERGENCY MEDICINE
Payer: MEDICAID

## 2018-12-03 VITALS
BODY MASS INDEX: 38.14 KG/M2 | HEART RATE: 94 BPM | WEIGHT: 243 LBS | OXYGEN SATURATION: 99 % | TEMPERATURE: 99 F | DIASTOLIC BLOOD PRESSURE: 63 MMHG | RESPIRATION RATE: 20 BRPM | HEIGHT: 67 IN | SYSTOLIC BLOOD PRESSURE: 110 MMHG

## 2018-12-03 DIAGNOSIS — J18.9 PNEUMONIA OF RIGHT LUNG DUE TO INFECTIOUS ORGANISM, UNSPECIFIED PART OF LUNG: Primary | ICD-10-CM

## 2018-12-03 DIAGNOSIS — R52 PAIN: ICD-10-CM

## 2018-12-03 DIAGNOSIS — R07.9 CHEST PAIN: ICD-10-CM

## 2018-12-03 LAB
ALBUMIN SERPL BCP-MCNC: 3.9 G/DL
ALP SERPL-CCNC: 65 U/L
ALT SERPL W/O P-5'-P-CCNC: 10 U/L
ANION GAP SERPL CALC-SCNC: 8 MMOL/L
AST SERPL-CCNC: 11 U/L
B-HCG UR QL: NEGATIVE
BASOPHILS # BLD AUTO: 0.02 K/UL
BASOPHILS NFR BLD: 0.2 %
BILIRUB SERPL-MCNC: 1.3 MG/DL
BUN SERPL-MCNC: 9 MG/DL
CALCIUM SERPL-MCNC: 9.4 MG/DL
CHLORIDE SERPL-SCNC: 104 MMOL/L
CO2 SERPL-SCNC: 28 MMOL/L
CREAT SERPL-MCNC: 0.9 MG/DL
CTP QC/QA: YES
D DIMER PPP IA.FEU-MCNC: <0.19 MG/L FEU
DIFFERENTIAL METHOD: ABNORMAL
EOSINOPHIL # BLD AUTO: 0 K/UL
EOSINOPHIL NFR BLD: 0.3 %
ERYTHROCYTE [DISTWIDTH] IN BLOOD BY AUTOMATED COUNT: 13.4 %
EST. GFR  (AFRICAN AMERICAN): >60 ML/MIN/1.73 M^2
EST. GFR  (NON AFRICAN AMERICAN): >60 ML/MIN/1.73 M^2
GLUCOSE SERPL-MCNC: 82 MG/DL
HCT VFR BLD AUTO: 38.5 %
HGB BLD-MCNC: 12.9 G/DL
LYMPHOCYTES # BLD AUTO: 1.5 K/UL
LYMPHOCYTES NFR BLD: 16.3 %
MCH RBC QN AUTO: 28.8 PG
MCHC RBC AUTO-ENTMCNC: 33.5 G/DL
MCV RBC AUTO: 86 FL
MONOCYTES # BLD AUTO: 1 K/UL
MONOCYTES NFR BLD: 10.9 %
NEUTROPHILS # BLD AUTO: 6.4 K/UL
NEUTROPHILS NFR BLD: 72.3 %
PLATELET # BLD AUTO: 274 K/UL
PMV BLD AUTO: 10.4 FL
POTASSIUM SERPL-SCNC: 3.7 MMOL/L
PROT SERPL-MCNC: 7.5 G/DL
RBC # BLD AUTO: 4.48 M/UL
SODIUM SERPL-SCNC: 140 MMOL/L
TROPONIN I SERPL DL<=0.01 NG/ML-MCNC: <0.006 NG/ML
WBC # BLD AUTO: 8.91 K/UL

## 2018-12-03 PROCEDURE — 96374 THER/PROPH/DIAG INJ IV PUSH: CPT

## 2018-12-03 PROCEDURE — 84484 ASSAY OF TROPONIN QUANT: CPT

## 2018-12-03 PROCEDURE — 81025 URINE PREGNANCY TEST: CPT | Performed by: EMERGENCY MEDICINE

## 2018-12-03 PROCEDURE — 85379 FIBRIN DEGRADATION QUANT: CPT

## 2018-12-03 PROCEDURE — 85025 COMPLETE CBC W/AUTO DIFF WBC: CPT

## 2018-12-03 PROCEDURE — 99285 EMERGENCY DEPT VISIT HI MDM: CPT | Mod: 25

## 2018-12-03 PROCEDURE — 93005 ELECTROCARDIOGRAM TRACING: CPT

## 2018-12-03 PROCEDURE — 80053 COMPREHEN METABOLIC PANEL: CPT

## 2018-12-03 PROCEDURE — 63600175 PHARM REV CODE 636 W HCPCS: Performed by: EMERGENCY MEDICINE

## 2018-12-03 PROCEDURE — 93010 ELECTROCARDIOGRAM REPORT: CPT | Mod: ,,, | Performed by: INTERNAL MEDICINE

## 2018-12-03 RX ORDER — NAPROXEN 500 MG/1
500 TABLET ORAL 2 TIMES DAILY WITH MEALS
Qty: 60 TABLET | Refills: 0 | Status: SHIPPED | OUTPATIENT
Start: 2018-12-03 | End: 2019-06-04

## 2018-12-03 RX ORDER — LEVOFLOXACIN 500 MG/1
750 TABLET, FILM COATED ORAL DAILY
Qty: 8 TABLET | Refills: 0 | Status: SHIPPED | OUTPATIENT
Start: 2018-12-03 | End: 2018-12-08

## 2018-12-03 RX ORDER — LEVOFLOXACIN 750 MG/1
750 TABLET ORAL
Status: COMPLETED | OUTPATIENT
Start: 2018-12-03 | End: 2018-12-03

## 2018-12-03 RX ORDER — KETOROLAC TROMETHAMINE 30 MG/ML
15 INJECTION, SOLUTION INTRAMUSCULAR; INTRAVENOUS
Status: COMPLETED | OUTPATIENT
Start: 2018-12-03 | End: 2018-12-03

## 2018-12-03 RX ADMIN — LEVOFLOXACIN 750 MG: 750 TABLET, FILM COATED ORAL at 02:12

## 2018-12-03 RX ADMIN — KETOROLAC TROMETHAMINE 15 MG: 30 INJECTION, SOLUTION INTRAMUSCULAR at 01:12

## 2018-12-03 NOTE — ED TRIAGE NOTES
Pt reports chest pain with movement starting yesterday, shortness of breath on exertion. Report having anxiety in the past

## 2018-12-03 NOTE — ED PROVIDER NOTES
Encounter Date: 12/3/2018    SCRIBE #1 NOTE: I, Cristofer Laura, am scribing for, and in the presence of,  Lorena Walker MD. I have scribed the following portions of the note - Other sections scribed: HPI, ROS and PE.       History     Chief Complaint   Patient presents with    Chest Pain     reports started yesterday; constant sharp stabbing pain; denies cough     CC: Chest Pain    HPI: This is a 23 y.o. female PMHx bipolar 1 disorder, anxiety who presents with worsening right-sided chest pain that began yesterday. Non-migratory. Describes pain as sharp. Rates pain 10/10. It is exacerbated by inspiration and positional changes. Patient also reports associated SOB this morning. Symptoms similar to when she had pneumonia 2 years ago. She was diaphoretic last night as well. Denies recent strenuous activity, long travel. No leg swelling or pain, cough, congestion, rhinorrhea. Patient recovered from an illness 2 days ago. She does not have a family history of heart disease or blood clots. No treatment attempted. Allergic to iodine (rash). No further symptoms.      The history is provided by the patient. No  was used.     Review of patient's allergies indicates:   Allergen Reactions    Iodine and iodide containing products Hives     Past Medical History:   Diagnosis Date    Anxiety     Bipolar 1 disorder     Miscarriage 05/2018    pt reported      Past Surgical History:   Procedure Laterality Date    LEG SURGERY       Family History   Problem Relation Age of Onset    No Known Problems Mother     No Known Problems Father     Breast cancer Neg Hx     Colon cancer Neg Hx     Ovarian cancer Neg Hx      Social History     Tobacco Use    Smoking status: Never Smoker    Smokeless tobacco: Never Used   Substance Use Topics    Alcohol use: No    Drug use: No     Review of Systems   Constitutional: Positive for diaphoresis. Negative for chills and fever.   HENT: Negative for congestion,  rhinorrhea and sore throat.    Eyes: Negative for visual disturbance.   Respiratory: Positive for shortness of breath. Negative for cough.    Cardiovascular: Positive for chest pain.   Gastrointestinal: Negative for abdominal pain, constipation, diarrhea, nausea and vomiting.   Genitourinary: Negative for dysuria.   Neurological: Negative for headaches.   All other systems reviewed and are negative.      Physical Exam     Initial Vitals [12/03/18 1152]   BP Pulse Resp Temp SpO2   131/77 96 20 98.7 °F (37.1 °C) 100 %      MAP       --         Physical Exam    Nursing note and vitals reviewed.  Constitutional: No distress.   HENT:   Head: Normocephalic and atraumatic.   Nose: Nose normal.   Mouth/Throat: Oropharynx is clear and moist.   Eyes: EOM are normal. Pupils are equal, round, and reactive to light.   Neck: Normal range of motion.   Cardiovascular: Intact distal pulses.   See documented heart rate and blood pressure, radial pulses 2+ equal DP pulses 2+ and equal.   Pulmonary/Chest: Breath sounds normal. No respiratory distress. She has no wheezes. She has no rhonchi. She has no rales.   Abdominal: Soft. She exhibits no distension. There is no tenderness. There is no rebound and no guarding.   Musculoskeletal: She exhibits no edema.   No tender to palpation over right chest wall where patient complained of pain.   Neurological: She is alert and oriented to person, place, and time. She has normal strength.   No obvious focal deficit   Skin: Skin is warm.   Psychiatric: She has a normal mood and affect.         ED Course   Procedures  Labs Reviewed   CBC W/ AUTO DIFFERENTIAL - Abnormal; Notable for the following components:       Result Value    Lymph% 16.3 (*)     All other components within normal limits   COMPREHENSIVE METABOLIC PANEL - Abnormal; Notable for the following components:    Total Bilirubin 1.3 (*)     All other components within normal limits   TROPONIN I   D DIMER, QUANTITATIVE   POCT URINE  PREGNANCY     EKG Readings: (Independently Interpreted)   Initial Reading: No STEMI. Rhythm: Normal Sinus Rhythm.       Imaging Results          X-Ray Chest PA And Lateral (Final result)  Result time 12/03/18 13:31:39    Final result by Brannon Burgess MD (12/03/18 13:31:39)                 Impression:      Mild increased density within the right lung base medially.  This may be due to atelectatic changes, however subtle right basilar consolidation/pneumonia cannot be excluded.      Electronically signed by: Brannon Burgess MD  Date:    12/03/2018  Time:    13:31             Narrative:    EXAMINATION:  XR CHEST PA AND LATERAL    CLINICAL HISTORY:  Chest pain, unspecified    TECHNIQUE:  PA and lateral views of the chest were performed.    COMPARISON:  11/07/2018.    FINDINGS:  There is a poor depth of inspiration.  The heart and mediastinal structures appear unchanged.  Pulmonary vasculature is within normal limits.  There is mild increased density within the right lung base likely due to right basilar atelectasis/consolidation.  Subtle right basilar pneumonia cannot be excluded.  There is no evidence for pneumothorax or pleural effusions.  Bony structures are grossly intact.                              X-Rays:   Independently Interpreted Readings:   Chest X-Ray: Normal heart size. Question of infiltrate, right sided     Medical Decision Making:   Clinical Tests:   Lab Tests: Reviewed  The following lab test(s) were unremarkable: BMP, CBC, D-Dimer and Troponin  Radiological Study: Reviewed  ED Management:  22 yo very well appearing female c/o pain to the right chest x 2 days with mild cough recent cold and assoicated SOB. Patient found to have Xray chest concerning for pneumonia. Will treat with Levaquin, Naprosyn, Incentive spirometer. Return precautions given.            Scribe Attestation:   Scribe #1: I performed the above scribed service and the documentation accurately describes the services I performed. I  attest to the accuracy of the note.    Attending Attestation:           Physician Attestation for Scribe:  Physician Attestation Statement for Scribe #1: I, Lorena Walker MD, reviewed documentation, as scribed by Cristofer Laura in my presence, and it is both accurate and complete.                    Clinical Impression:   The primary encounter diagnosis was Pneumonia of right lung due to infectious organism, unspecified part of lung. A diagnosis of Chest pain was also pertinent to this visit.      Disposition:   Disposition: Discharged  Condition: Fair                        Lorena Walker MD  12/03/18 7036

## 2018-12-12 ENCOUNTER — HOSPITAL ENCOUNTER (EMERGENCY)
Facility: HOSPITAL | Age: 23
Discharge: HOME | End: 2018-12-12
Attending: EMERGENCY MEDICINE
Payer: MEDICAID

## 2018-12-12 VITALS
TEMPERATURE: 98 F | WEIGHT: 244 LBS | HEART RATE: 90 BPM | OXYGEN SATURATION: 99 % | BODY MASS INDEX: 38.3 KG/M2 | HEIGHT: 67 IN | RESPIRATION RATE: 20 BRPM | DIASTOLIC BLOOD PRESSURE: 67 MMHG | SYSTOLIC BLOOD PRESSURE: 127 MMHG

## 2018-12-12 DIAGNOSIS — R07.89 CHEST DISCOMFORT: Primary | ICD-10-CM

## 2018-12-12 LAB
ALBUMIN SERPL BCP-MCNC: 3.8 G/DL
ALP SERPL-CCNC: 64 U/L
ALT SERPL W/O P-5'-P-CCNC: 13 U/L
ANION GAP SERPL CALC-SCNC: 7 MMOL/L
AST SERPL-CCNC: 19 U/L
B-HCG UR QL: NEGATIVE
BASOPHILS # BLD AUTO: 0.03 K/UL
BASOPHILS NFR BLD: 0.4 %
BILIRUB SERPL-MCNC: 1.2 MG/DL
BUN SERPL-MCNC: 7 MG/DL
CALCIUM SERPL-MCNC: 9.1 MG/DL
CHLORIDE SERPL-SCNC: 104 MMOL/L
CO2 SERPL-SCNC: 27 MMOL/L
CREAT SERPL-MCNC: 0.8 MG/DL
CTP QC/QA: YES
D DIMER PPP IA.FEU-MCNC: <0.19 MG/L FEU
DIFFERENTIAL METHOD: ABNORMAL
EOSINOPHIL # BLD AUTO: 0.2 K/UL
EOSINOPHIL NFR BLD: 2.1 %
ERYTHROCYTE [DISTWIDTH] IN BLOOD BY AUTOMATED COUNT: 14.1 %
EST. GFR  (AFRICAN AMERICAN): >60 ML/MIN/1.73 M^2
EST. GFR  (NON AFRICAN AMERICAN): >60 ML/MIN/1.73 M^2
FLUAV AG SPEC QL IA: NEGATIVE
FLUBV AG SPEC QL IA: NEGATIVE
GLUCOSE SERPL-MCNC: 77 MG/DL
HCT VFR BLD AUTO: 38.1 %
HGB BLD-MCNC: 12.7 G/DL
LYMPHOCYTES # BLD AUTO: 1.5 K/UL
LYMPHOCYTES NFR BLD: 17.8 %
MCH RBC QN AUTO: 28.4 PG
MCHC RBC AUTO-ENTMCNC: 33.3 G/DL
MCV RBC AUTO: 85 FL
MONOCYTES # BLD AUTO: 0.8 K/UL
MONOCYTES NFR BLD: 9.4 %
NEUTROPHILS # BLD AUTO: 6 K/UL
NEUTROPHILS NFR BLD: 69.9 %
PLATELET # BLD AUTO: 162 K/UL
PMV BLD AUTO: 11.2 FL
POTASSIUM SERPL-SCNC: 3.9 MMOL/L
PROT SERPL-MCNC: 7 G/DL
RBC # BLD AUTO: 4.47 M/UL
SODIUM SERPL-SCNC: 138 MMOL/L
SPECIMEN SOURCE: NORMAL
TROPONIN I SERPL DL<=0.01 NG/ML-MCNC: <0.006 NG/ML
WBC # BLD AUTO: 8.5 K/UL

## 2018-12-12 PROCEDURE — 81025 URINE PREGNANCY TEST: CPT | Performed by: PHYSICIAN ASSISTANT

## 2018-12-12 PROCEDURE — 85025 COMPLETE CBC W/AUTO DIFF WBC: CPT

## 2018-12-12 PROCEDURE — 87400 INFLUENZA A/B EACH AG IA: CPT

## 2018-12-12 PROCEDURE — 80053 COMPREHEN METABOLIC PANEL: CPT

## 2018-12-12 PROCEDURE — 99285 EMERGENCY DEPT VISIT HI MDM: CPT | Mod: 25

## 2018-12-12 PROCEDURE — 63600175 PHARM REV CODE 636 W HCPCS: Performed by: PHYSICIAN ASSISTANT

## 2018-12-12 PROCEDURE — 85379 FIBRIN DEGRADATION QUANT: CPT

## 2018-12-12 PROCEDURE — 93005 ELECTROCARDIOGRAM TRACING: CPT

## 2018-12-12 PROCEDURE — 93010 ELECTROCARDIOGRAM REPORT: CPT | Mod: ,,, | Performed by: INTERNAL MEDICINE

## 2018-12-12 PROCEDURE — 99900035 HC TECH TIME PER 15 MIN (STAT)

## 2018-12-12 PROCEDURE — 84484 ASSAY OF TROPONIN QUANT: CPT

## 2018-12-12 PROCEDURE — 96372 THER/PROPH/DIAG INJ SC/IM: CPT

## 2018-12-12 RX ORDER — KETOROLAC TROMETHAMINE 10 MG/1
10 TABLET, FILM COATED ORAL 3 TIMES DAILY PRN
Qty: 15 TABLET | Refills: 0 | Status: SHIPPED | OUTPATIENT
Start: 2018-12-12 | End: 2018-12-17

## 2018-12-12 RX ORDER — KETOROLAC TROMETHAMINE 30 MG/ML
15 INJECTION, SOLUTION INTRAMUSCULAR; INTRAVENOUS
Status: COMPLETED | OUTPATIENT
Start: 2018-12-12 | End: 2018-12-12

## 2018-12-12 RX ORDER — HYDROXYZINE PAMOATE 25 MG/1
25 CAPSULE ORAL EVERY 6 HOURS PRN
Qty: 12 CAPSULE | Refills: 0 | Status: SHIPPED | OUTPATIENT
Start: 2018-12-12 | End: 2019-06-04

## 2018-12-12 RX ADMIN — KETOROLAC TROMETHAMINE 15 MG: 30 INJECTION, SOLUTION INTRAMUSCULAR at 09:12

## 2018-12-12 NOTE — ED NOTES
One unsuccessful attempt for IV access to right ac with 20 gauge, pt tolerated well, will continue to monitor.

## 2018-12-12 NOTE — ED PROVIDER NOTES
Encounter Date: 12/12/2018    SCRIBE #1 NOTE: I, Marshal Marroquin, am scribing for, and in the presence of,  Geoffrey Valera PA-C. I have scribed the following portions of the note - Other sections scribed: HPI/ROS.       History     Chief Complaint   Patient presents with    Chest Pain     center to right sided chest pains.  Dx: pneumonia on 12/03.  states it feels like the same pain but worse.  denies radiation.  denies n/v/d or fever.  states pain worse with deep breathing or exertion.  pt is tearful and hyperventilating.  attempts to get breathing under control, unsucessful.     CC: Chest Pain      HPI: This 23 y.o. female who does not smoke presents to the ED for an evaluation of acute onset, severe (10/10) R-sided chest pain that began yesterday around 2:00 PM. Pt reports pain worsened this morning. Pain becomes worse with deep breathing and movement. It is alleviated when sitting up. She describes the pain as sharp. There is associated SOB. Pt was evaluated at this ED on 12/3/18 where she was d/x with pneumonia and started on Levaquin. She does not feel anxious at the moment. No family hx of heart attacks. No personal hx of cardiac problems. No alleviating factors. No prior tx. Otherwise, pt denies fever, chills, n/v/d, abdominal pain, light-headedness, dizziness, and any other associated symptoms.      The history is provided by the patient. No  was used.     Review of patient's allergies indicates:   Allergen Reactions    Iodine and iodide containing products Hives     Past Medical History:   Diagnosis Date    Anxiety     Bipolar 1 disorder     Miscarriage 05/2018    pt reported      Past Surgical History:   Procedure Laterality Date    LEG SURGERY       Family History   Problem Relation Age of Onset    No Known Problems Mother     No Known Problems Father     Breast cancer Neg Hx     Colon cancer Neg Hx     Ovarian cancer Neg Hx      Social History     Tobacco Use     Smoking status: Never Smoker    Smokeless tobacco: Never Used   Substance Use Topics    Alcohol use: No    Drug use: No     Review of Systems   Constitutional: Negative for chills and fever.   HENT: Negative for congestion, ear pain, rhinorrhea and sore throat.    Eyes: Negative for pain and visual disturbance.   Respiratory: Positive for shortness of breath. Negative for cough.    Cardiovascular: Positive for chest pain.   Gastrointestinal: Negative for abdominal pain, diarrhea, nausea and vomiting.   Genitourinary: Negative for dysuria.   Musculoskeletal: Negative for back pain and neck pain.   Skin: Negative for rash.   Neurological: Negative for dizziness, light-headedness and headaches.   All other systems reviewed and are negative.      Physical Exam     Initial Vitals [12/12/18 0820]   BP Pulse Resp Temp SpO2   (!) 145/83 (!) 18 (!) 22 99.4 °F (37.4 °C) 97 %      MAP       --         Physical Exam    Nursing note and vitals reviewed.  Constitutional: She appears well-developed and well-nourished. She is not diaphoretic. No distress.   Overall well-appearing, nontoxic.  Resting comfortably on exam table.  Speaking in full sentences without pause or difficulty.   HENT:   Head: Normocephalic and atraumatic.   Eyes: Conjunctivae and EOM are normal. Pupils are equal, round, and reactive to light.   Neck: Normal range of motion. Neck supple. No tracheal deviation present.   Cardiovascular: Intact distal pulses.   No lower extremity edema.   Pulmonary/Chest: No stridor. No respiratory distress.   Decreased breath sounds to right base.  Poor inspiratory effort.  No wheeze, rhonchi.  No hypoxia on room air.  No tachypnea.  No chest wall tenderness.   Abdominal: Soft. Bowel sounds are normal. She exhibits no distension. There is no tenderness.   Musculoskeletal: Normal range of motion. She exhibits no tenderness.   No unilateral lower extremity swelling or calf tenderness.   Lymphadenopathy:     She has no cervical  adenopathy.   Neurological: She is alert and oriented to person, place, and time.   Skin: Skin is warm and dry. Capillary refill takes less than 2 seconds.   Psychiatric: She has a normal mood and affect. Her behavior is normal. Judgment and thought content normal.         ED Course   Procedures  Labs Reviewed   CBC W/ AUTO DIFFERENTIAL - Abnormal; Notable for the following components:       Result Value    Lymph% 17.8 (*)     All other components within normal limits   COMPREHENSIVE METABOLIC PANEL - Abnormal; Notable for the following components:    Total Bilirubin 1.2 (*)     Anion Gap 7 (*)     All other components within normal limits   TROPONIN I   D DIMER, QUANTITATIVE   INFLUENZA A AND B ANTIGEN   POCT URINE PREGNANCY     EKG Readings: (Independently Interpreted)   Normal sinus rhythm.  Ventricular rate 99 beats per minute.  No evidence of ischemia, arrhythmia, or heart block.  No ST elevation.       Imaging Results          X-Ray Chest PA And Lateral (Final result)  Result time 12/12/18 09:20:59    Final result by Marcus Ba MD (12/12/18 09:20:59)                 Impression:      Hypoinflation of the lungs.      Electronically signed by: aMrcus Ba MD  Date:    12/12/2018  Time:    09:20             Narrative:    EXAMINATION:  XR CHEST PA AND LATERAL    CLINICAL HISTORY:  Other chest pain    TECHNIQUE:  PA and lateral views of the chest were performed.    COMPARISON:  Chest radiograph from 12/03/2018    FINDINGS:  Hypoinflation of the lungs.  No cardiomegaly.  Normal pulmonary vasculature.  Lungs are clear.  Osseous structures are unremarkable.                                 Medical Decision Making:   Differential Diagnosis:   Anxiety, myocardial ischemia, arrhythmia, pneumothorax, pleural effusion, costochondritis  ED Management:  23-year-old female with a history of anxiety presents to ED complaining of chest pain and shortness of breath.  Patient states yesterday she felt right-sided  chest discomfort, described as a sharp, stabbing pain.  Pain is worse with change in position, worse with deep inspiration.  She admits to shortness of breath, dyspnea on exertion.  Recently treated for pneumonia on 12/03/2018.  She took all of the Levaquin antibiotics.  She denies fever.  Denies night sweats. On exam she is well appearing and nontoxic.  Her vitals are reassuring.  She has decreased breath sounds to her right lung, however no wheeze or rhonchi.  There is no tachypnea.  There is no hypoxia on room air.  There is no chest wall tenderness.  There is no rash. EKG with normal sinus rhythm, no evidence of ischemia, arrhythmia, heart block, or ST elevation.  Given recent infectious process to right lung, shortness of breath and chest discomfort, will get some lab work, D-dimer, imaging.  She denies history of PE or DVT.  No lower extremity unilateral swelling. She is not a diabetic.  No history of hypertension or hyperlipidemia.  Denies family history of premature heart disease.  No history of MI.    I see no acute change compared to previous chest x-ray.  D-dimer negative. Troponin negative. Heart score 0, possibly 1.  CBC without leukocytosis or anemia.  CMP within normal limits. Flu swab negative. Overall, low suspicion for emergent process.  Will discharge with refill of Vistaril as needed for anxiety, Toradol as needed for chest discomfort.  I have given her information to follow up with PCP.  She does understand and agree.  Return precautions given.    Additional MDM:   Heart Score:    History:          Slightly suspicious.  ECG:             Normal  Age:               Less than 45 years  Risk factors: no risk factors known  Troponin:       Less than or equal to normal limit  Final Score: 0             Scribe Attestation:   Scribe #1: I performed the above scribed service and the documentation accurately describes the services I performed. I attest to the accuracy of the note.    Attending  Attestation:           Physician Attestation for Scribe:  Physician Attestation Statement for Scribe #1: I, Geoffrey Valera PA-C, reviewed documentation, as scribed by Marshal Marroquin in my presence, and it is both accurate and complete.                 ED Course as of Dec 12 1131   Wed Dec 12, 2018   0833 This was entered in error.  Pulse: (!) 18 [SM]      ED Course User Index  [SM] Geoffrey Valera PA-C     Clinical Impression:   The encounter diagnosis was Chest discomfort.      Disposition:   Disposition: Discharged  Condition: Stable                        Geoffrey Valera PA-C  12/12/18 1138

## 2018-12-12 NOTE — ED TRIAGE NOTES
"Patient reports CP and SOB.  Patient was diagnosed with pneumonia on 12/03/18, Patient refusing to answer questions, informed this nurse "all of my information is in the computer I shouldn't have to tell you anything else".  "

## 2018-12-12 NOTE — DISCHARGE INSTRUCTIONS
Continue with Vistaril as needed for anxiety. Toradol as needed for chest discomfort. Follow-up with a primary care provider for reevaluation. Return to this ED if you begin with worsening shortness of breath or difficulty breathing, if chest pain persists despite treatment, if you begin with fever, if any other problems occur.

## 2019-03-20 ENCOUNTER — TELEPHONE (OUTPATIENT)
Dept: OBSTETRICS AND GYNECOLOGY | Facility: CLINIC | Age: 24
End: 2019-03-20

## 2019-03-20 NOTE — TELEPHONE ENCOUNTER
----- Message from CIELO Klein MD sent at 3/20/2019  2:32 PM CDT -----  Regarding: FW: Pap  Please schedule patient for a repeat pap.  Thanks.    ----- Message -----  From: CIELO Klein MD  Sent: 3/16/2018  12:33 PM  To: CIELO Klein MD  Subject: Pap                                              Needs repeat pap in 1 year.

## 2019-04-29 ENCOUNTER — TELEPHONE (OUTPATIENT)
Dept: OBSTETRICS AND GYNECOLOGY | Facility: CLINIC | Age: 24
End: 2019-04-29

## 2019-04-29 NOTE — TELEPHONE ENCOUNTER
----- Message from CIELO Klein MD sent at 4/26/2019  9:51 PM CDT -----  Regarding: FW: Pap  Please schedule patient for a repeat pap.  Thanks.    ----- Message -----  From: CIELO Klein MD  Sent: 3/20/2019   2:32 PM  To: CIELO Klein MD  Subject: Pap                                              Needs 2nd reminder for repeat pap.

## 2019-04-29 NOTE — TELEPHONE ENCOUNTER
I have been unable to reach this patient by phone.  A letter is being sent to the last known home address.

## 2019-05-08 ENCOUNTER — HOSPITAL ENCOUNTER (EMERGENCY)
Facility: HOSPITAL | Age: 24
Discharge: HOME | End: 2019-05-08
Attending: EMERGENCY MEDICINE
Payer: MEDICAID

## 2019-05-08 VITALS
BODY MASS INDEX: 36.1 KG/M2 | HEART RATE: 101 BPM | WEIGHT: 230 LBS | SYSTOLIC BLOOD PRESSURE: 98 MMHG | OXYGEN SATURATION: 100 % | RESPIRATION RATE: 19 BRPM | HEIGHT: 67 IN | TEMPERATURE: 97 F | DIASTOLIC BLOOD PRESSURE: 63 MMHG

## 2019-05-08 DIAGNOSIS — R10.84 GENERALIZED ABDOMINAL PAIN: ICD-10-CM

## 2019-05-08 DIAGNOSIS — N39.0 URINARY TRACT INFECTION WITHOUT HEMATURIA, SITE UNSPECIFIED: Primary | ICD-10-CM

## 2019-05-08 LAB
ALBUMIN SERPL BCP-MCNC: 3.4 G/DL (ref 3.5–5.2)
ALP SERPL-CCNC: 69 U/L (ref 55–135)
ALT SERPL W/O P-5'-P-CCNC: 9 U/L (ref 10–44)
AMORPH CRY URNS QL MICRO: ABNORMAL
ANION GAP SERPL CALC-SCNC: 8 MMOL/L (ref 8–16)
AST SERPL-CCNC: 12 U/L (ref 10–40)
B-HCG UR QL: NEGATIVE
BACTERIA #/AREA URNS HPF: ABNORMAL /HPF
BASOPHILS # BLD AUTO: 0.03 K/UL (ref 0–0.2)
BASOPHILS NFR BLD: 0.3 % (ref 0–1.9)
BILIRUB SERPL-MCNC: 1.3 MG/DL (ref 0.1–1)
BILIRUB UR QL STRIP: NEGATIVE
BUN SERPL-MCNC: 9 MG/DL (ref 6–20)
CALCIUM SERPL-MCNC: 9.2 MG/DL (ref 8.7–10.5)
CHLORIDE SERPL-SCNC: 100 MMOL/L (ref 95–110)
CLARITY UR: ABNORMAL
CO2 SERPL-SCNC: 27 MMOL/L (ref 23–29)
COLOR UR: ABNORMAL
CREAT SERPL-MCNC: 0.9 MG/DL (ref 0.5–1.4)
CTP QC/QA: YES
DIFFERENTIAL METHOD: ABNORMAL
EOSINOPHIL # BLD AUTO: 0.1 K/UL (ref 0–0.5)
EOSINOPHIL NFR BLD: 0.9 % (ref 0–8)
ERYTHROCYTE [DISTWIDTH] IN BLOOD BY AUTOMATED COUNT: 14.2 % (ref 11.5–14.5)
EST. GFR  (AFRICAN AMERICAN): >60 ML/MIN/1.73 M^2
EST. GFR  (NON AFRICAN AMERICAN): >60 ML/MIN/1.73 M^2
GLUCOSE SERPL-MCNC: 87 MG/DL (ref 70–110)
GLUCOSE UR QL STRIP: NEGATIVE
HCT VFR BLD AUTO: 35.6 % (ref 37–48.5)
HGB BLD-MCNC: 11.3 G/DL (ref 12–16)
HGB UR QL STRIP: ABNORMAL
HYALINE CASTS #/AREA URNS LPF: 0 /LPF
KETONES UR QL STRIP: NEGATIVE
LEUKOCYTE ESTERASE UR QL STRIP: ABNORMAL
LIPASE SERPL-CCNC: 12 U/L (ref 4–60)
LYMPHOCYTES # BLD AUTO: 1 K/UL (ref 1–4.8)
LYMPHOCYTES NFR BLD: 9.7 % (ref 18–48)
MCH RBC QN AUTO: 27.3 PG (ref 27–31)
MCHC RBC AUTO-ENTMCNC: 31.7 G/DL (ref 32–36)
MCV RBC AUTO: 86 FL (ref 82–98)
MICROSCOPIC COMMENT: ABNORMAL
MONOCYTES # BLD AUTO: 0.6 K/UL (ref 0.3–1)
MONOCYTES NFR BLD: 6.5 % (ref 4–15)
NEUTROPHILS # BLD AUTO: 8.1 K/UL (ref 1.8–7.7)
NEUTROPHILS NFR BLD: 82.6 % (ref 38–73)
NITRITE UR QL STRIP: NEGATIVE
PH UR STRIP: 5 [PH] (ref 5–8)
PLATELET # BLD AUTO: 332 K/UL (ref 150–350)
PMV BLD AUTO: 9.1 FL (ref 9.2–12.9)
POTASSIUM SERPL-SCNC: 3.9 MMOL/L (ref 3.5–5.1)
PROT SERPL-MCNC: 7.4 G/DL (ref 6–8.4)
PROT UR QL STRIP: ABNORMAL
RBC # BLD AUTO: 4.14 M/UL (ref 4–5.4)
RBC #/AREA URNS HPF: 0 /HPF (ref 0–4)
SODIUM SERPL-SCNC: 135 MMOL/L (ref 136–145)
SP GR UR STRIP: 1.02 (ref 1–1.03)
URN SPEC COLLECT METH UR: ABNORMAL
UROBILINOGEN UR STRIP-ACNC: NEGATIVE EU/DL
WBC # BLD AUTO: 9.75 K/UL (ref 3.9–12.7)
WBC #/AREA URNS HPF: >100 /HPF (ref 0–5)

## 2019-05-08 PROCEDURE — 81000 URINALYSIS NONAUTO W/SCOPE: CPT

## 2019-05-08 PROCEDURE — 63600175 PHARM REV CODE 636 W HCPCS: Performed by: EMERGENCY MEDICINE

## 2019-05-08 PROCEDURE — 96375 TX/PRO/DX INJ NEW DRUG ADDON: CPT | Mod: 59

## 2019-05-08 PROCEDURE — 96365 THER/PROPH/DIAG IV INF INIT: CPT

## 2019-05-08 PROCEDURE — 81025 URINE PREGNANCY TEST: CPT | Performed by: EMERGENCY MEDICINE

## 2019-05-08 PROCEDURE — 83690 ASSAY OF LIPASE: CPT

## 2019-05-08 PROCEDURE — 25000003 PHARM REV CODE 250: Performed by: EMERGENCY MEDICINE

## 2019-05-08 PROCEDURE — 85025 COMPLETE CBC W/AUTO DIFF WBC: CPT

## 2019-05-08 PROCEDURE — 80053 COMPREHEN METABOLIC PANEL: CPT

## 2019-05-08 PROCEDURE — 99284 EMERGENCY DEPT VISIT MOD MDM: CPT | Mod: 25

## 2019-05-08 PROCEDURE — 96361 HYDRATE IV INFUSION ADD-ON: CPT

## 2019-05-08 RX ORDER — ONDANSETRON 4 MG/1
4 TABLET, FILM COATED ORAL EVERY 6 HOURS PRN
Qty: 12 TABLET | Refills: 0 | Status: SHIPPED | OUTPATIENT
Start: 2019-05-08 | End: 2019-06-04

## 2019-05-08 RX ORDER — MORPHINE SULFATE 10 MG/ML
4 INJECTION INTRAMUSCULAR; INTRAVENOUS; SUBCUTANEOUS
Status: COMPLETED | OUTPATIENT
Start: 2019-05-08 | End: 2019-05-08

## 2019-05-08 RX ORDER — ONDANSETRON 4 MG/1
4 TABLET, FILM COATED ORAL EVERY 6 HOURS PRN
Qty: 12 TABLET | Refills: 0 | Status: SHIPPED | OUTPATIENT
Start: 2019-05-08 | End: 2019-05-08 | Stop reason: SDUPTHER

## 2019-05-08 RX ORDER — CEPHALEXIN 500 MG/1
500 CAPSULE ORAL EVERY 12 HOURS
Qty: 14 CAPSULE | Refills: 0 | Status: SHIPPED | OUTPATIENT
Start: 2019-05-08 | End: 2019-05-08 | Stop reason: SDUPTHER

## 2019-05-08 RX ORDER — CEPHALEXIN 500 MG/1
500 CAPSULE ORAL EVERY 12 HOURS
Qty: 14 CAPSULE | Refills: 0 | Status: SHIPPED | OUTPATIENT
Start: 2019-05-08 | End: 2019-05-15

## 2019-05-08 RX ORDER — ONDANSETRON 2 MG/ML
4 INJECTION INTRAMUSCULAR; INTRAVENOUS
Status: COMPLETED | OUTPATIENT
Start: 2019-05-08 | End: 2019-05-08

## 2019-05-08 RX ORDER — NAPROXEN 500 MG/1
500 TABLET ORAL 2 TIMES DAILY PRN
Qty: 14 TABLET | Refills: 0 | Status: SHIPPED | OUTPATIENT
Start: 2019-05-08 | End: 2019-05-15

## 2019-05-08 RX ORDER — NAPROXEN 500 MG/1
500 TABLET ORAL 2 TIMES DAILY PRN
Qty: 14 TABLET | Refills: 0 | Status: SHIPPED | OUTPATIENT
Start: 2019-05-08 | End: 2019-05-08 | Stop reason: SDUPTHER

## 2019-05-08 RX ADMIN — SODIUM CHLORIDE 1000 ML: 0.9 INJECTION, SOLUTION INTRAVENOUS at 11:05

## 2019-05-08 RX ADMIN — ONDANSETRON 4 MG: 2 INJECTION INTRAMUSCULAR; INTRAVENOUS at 11:05

## 2019-05-08 RX ADMIN — MORPHINE SULFATE 4 MG: 10 INJECTION INTRAVENOUS at 11:05

## 2019-05-08 RX ADMIN — CEFTRIAXONE 1 G: 1 INJECTION, SOLUTION INTRAVENOUS at 01:05

## 2019-05-08 NOTE — ED PROVIDER NOTES
Encounter Date: 5/8/2019    SCRIBE #1 NOTE: I, Madonna Kaur, am scribing for, and in the presence of,  Reyna Cintron MD. I have scribed the following portions of the note - Other sections scribed: HPI, ROS, and PE.       History     Chief Complaint   Patient presents with    Abdominal Pain     reports having abdominal pain since yesterday. denies n/v and pain with urination      CC: Abdominal Pain    HPI: This 23 y.o female who has anxiety, bipolar 1 disorder presents to the ED for an evaluation of acute onset, constant, 10/10 lower abdominal pain described as being sharp in nature for the past 2 days.  Patient also reports of fever (temp max 101) and light headed last night.  Patient reports due to her symptoms she has been having difficulty eating and sleeping.  She reports she has been able to tolerate some water PO.  She reports attempting treatment with Ibuprofen, but reports of no improvement.  Patient denies fever, chills, shortness of breath, nausea, emesis, vaginal bleeding, vaginal discharge, or any other associated symptoms.  No prior abdominal surgeries.  No sick contacts.    The history is provided by the patient. No  was used.     Review of patient's allergies indicates:   Allergen Reactions    Iodine and iodide containing products Hives     Past Medical History:   Diagnosis Date    Anxiety     Bipolar 1 disorder     Miscarriage 05/2018    pt reported      Past Surgical History:   Procedure Laterality Date    LEG SURGERY       Family History   Problem Relation Age of Onset    No Known Problems Mother     No Known Problems Father     Breast cancer Neg Hx     Colon cancer Neg Hx     Ovarian cancer Neg Hx      Social History     Tobacco Use    Smoking status: Never Smoker    Smokeless tobacco: Never Used   Substance Use Topics    Alcohol use: No    Drug use: No     Review of Systems   Constitutional: Positive for fever. Negative for chills.   HENT: Negative for ear  pain and sore throat.    Eyes: Negative for pain.   Respiratory: Negative for cough and shortness of breath.    Cardiovascular: Negative for chest pain.   Gastrointestinal: Positive for abdominal pain. Negative for blood in stool, constipation, diarrhea, nausea and vomiting.   Genitourinary: Negative for difficulty urinating, dysuria, frequency, hematuria, urgency, vaginal bleeding and vaginal discharge.   Musculoskeletal: Negative for back pain.   Skin: Negative for rash.   Neurological: Positive for light-headedness. Negative for headaches.       Physical Exam     Initial Vitals [05/08/19 1035]   BP Pulse Resp Temp SpO2   120/71 108 18 98.8 °F (37.1 °C) 100 %      MAP       --         Physical Exam    Nursing note and vitals reviewed.  Constitutional: She is not diaphoretic. No distress.   Obese, BMI 36   HENT:   Head: Normocephalic and atraumatic.   Mouth/Throat: Oropharynx is clear and moist.   Eyes: Conjunctivae are normal. Pupils are equal, round, and reactive to light. No scleral icterus.   Neck: Normal range of motion. Neck supple.   Cardiovascular: Regular rhythm. Tachycardia present.    Pulmonary/Chest: Breath sounds normal. No respiratory distress.   Abdominal: Soft. She exhibits no distension. Bowel sounds are decreased. There is generalized tenderness (TTP near epigastrum) and tenderness in the epigastric area.   Musculoskeletal: She exhibits no edema.   Neurological: She is alert and oriented to person, place, and time.   Skin: Skin is warm and dry.   Psychiatric: Her mood appears anxious.         ED Course   Procedures  Labs Reviewed   URINALYSIS - Abnormal; Notable for the following components:       Result Value    Appearance, UA Cloudy (*)     Protein, UA 1+ (*)     Occult Blood UA 3+ (*)     Leukocytes, UA 3+ (*)     All other components within normal limits   CBC W/ AUTO DIFFERENTIAL - Abnormal; Notable for the following components:    Hemoglobin 11.3 (*)     Hematocrit 35.6 (*)     Mean  Corpuscular Hemoglobin Conc 31.7 (*)     MPV 9.1 (*)     Gran # (ANC) 8.1 (*)     Gran% 82.6 (*)     Lymph% 9.7 (*)     All other components within normal limits   COMPREHENSIVE METABOLIC PANEL - Abnormal; Notable for the following components:    Sodium 135 (*)     Albumin 3.4 (*)     Total Bilirubin 1.3 (*)     ALT 9 (*)     All other components within normal limits   URINALYSIS MICROSCOPIC - Abnormal; Notable for the following components:    WBC, UA >100 (*)     Bacteria Few (*)     Amorphous, UA Many (*)     All other components within normal limits   C. TRACHOMATIS/N. GONORRHOEAE BY AMP DNA   LIPASE   POCT URINE PREGNANCY          Imaging Results          CT Abdomen Pelvis  Without Contrast (Final result)  Result time 05/08/19 12:31:17    Final result by Arpita Reyna MD (05/08/19 12:31:17)                 Impression:      There are no CT findings to explain this patient's acute abdominal pain.      Electronically signed by: Arpita Reyna MD  Date:    05/08/2019  Time:    12:31             Narrative:    EXAMINATION:  CT ABDOMEN PELVIS WITHOUT CONTRAST    CLINICAL HISTORY:  Abdominal pain, unspecified;    TECHNIQUE:  Low dose axial images, sagittal and coronal reformations were obtained from the lung bases to the pubic symphysis.    COMPARISON:  None    FINDINGS:  The liver, spleen, adrenal glands, kidneys and pancreas show an unremarkable appearance.  The gallbladder is in place.  There is no evidence bowel obstruction.  There is no evidence intra-abdominal free fluid nor free air.  The appendix is within normal limits.  The osseous structures are unremarkable.                                 Medical Decision Making:   Initial Assessment:   23-year-old female presents with abdominal pain. Decreased p.o. intake, reports history fever last night.  Exam notable for mild tachycardia, tenderness that is most severe near the epigastrium but is diffuse.  No rigidity or abdominal distention. Etiology unclear, SBO  versus pancreatitis versus gastritis versus UTI.  Appendicitis consideration.  Workup with labs, CT abdomen pelvis without contrast as patient has had allergic reaction to IV contrast in the past, will treat with IV fluids, Zofran and morphine.            Scribe Attestation:   Scribe #1: I performed the above scribed service and the documentation accurately describes the services I performed. I attest to the accuracy of the note.    Attending Attestation:           Physician Attestation for Scribe:  Physician Attestation Statement for Scribe #1: I, Reyna Cintron MD, reviewed documentation, as scribed by Madonna Kaur in my presence, and it is both accurate and complete.                 ED Course as of May 09 1204   Wed May 08, 2019   1332 On reassessment, patient is sleeping in bed.  No apparent distress. I reviewed with her her laboratory and CT findings.  Urinalysis significant for leukocytes, greater than 100 white blood cells, bacteria.  Her CT abdomen pelvis did not show any acute finding.  There is no leukocytosis.  I suspect patient's symptoms may be related to urinary tract infection.  I will give her dose of Rocephin here, will send GC Chlamydia on the urine, plan to discharge with course of NSAIDs and Keflex and follow up with primary care.  I also noted in this patient's chart that her OBGYN has been trying to get in touch with her for a Pap smear.  I confirmed with her her phone number listed in the chart.  I advised her to call her OBGYN to schedule her routine Pap smear.    [LH]      ED Course User Index  [LH] Reyna Cintron MD     Clinical Impression:       ICD-10-CM ICD-9-CM   1. Urinary tract infection without hematuria, site unspecified N39.0 599.0   2. Generalized abdominal pain R10.84 789.07                                Reyna Cintron MD  05/09/19 1204

## 2019-05-08 NOTE — ED NOTES
"Called to room by triage nurse, pt. States she would like a bed and I do not want to seat on the exam table. Pt. Stated, " If I get on that table and fall, hitting my head, then that will fall on you all". Pt. Cont to states that she will fall of the exam table and staff will be responsible for her fall. explaind to pt. That we only have 1 bed available and it is being occupied by another pt. Explained to pt that the main side of the ED is full at the moment. Pt. States she would like to wait for a bed and cont to say to staff, " if I fall of that table you all are responsible, right"  "

## 2019-05-08 NOTE — ED TRIAGE NOTES
"Pt arrived via personal transportation with c/o mid lower abd pain that began 2 days ago; states "it feels sharp;" denies n/v/d; denies cp or sob as well as fever pr chills  "

## 2019-06-04 ENCOUNTER — HOSPITAL ENCOUNTER (EMERGENCY)
Facility: HOSPITAL | Age: 24
Discharge: HOME | End: 2019-06-04
Attending: EMERGENCY MEDICINE
Payer: MEDICAID

## 2019-06-04 VITALS
OXYGEN SATURATION: 100 % | TEMPERATURE: 99 F | BODY MASS INDEX: 36.1 KG/M2 | WEIGHT: 230 LBS | RESPIRATION RATE: 18 BRPM | DIASTOLIC BLOOD PRESSURE: 74 MMHG | HEIGHT: 67 IN | SYSTOLIC BLOOD PRESSURE: 150 MMHG | HEART RATE: 79 BPM

## 2019-06-04 DIAGNOSIS — S00.83XA CONTUSION OF FACE, INITIAL ENCOUNTER: Primary | ICD-10-CM

## 2019-06-04 DIAGNOSIS — S16.1XXA CERVICAL STRAIN, ACUTE, INITIAL ENCOUNTER: ICD-10-CM

## 2019-06-04 DIAGNOSIS — S09.90XA CLOSED HEAD INJURY: ICD-10-CM

## 2019-06-04 LAB
B-HCG UR QL: NEGATIVE
CTP QC/QA: YES

## 2019-06-04 PROCEDURE — 81025 URINE PREGNANCY TEST: CPT | Performed by: EMERGENCY MEDICINE

## 2019-06-04 PROCEDURE — 99284 EMERGENCY DEPT VISIT MOD MDM: CPT | Mod: 25

## 2019-06-04 RX ORDER — IBUPROFEN 600 MG/1
600 TABLET ORAL EVERY 6 HOURS PRN
Qty: 20 TABLET | Refills: 0 | OUTPATIENT
Start: 2019-06-04 | End: 2019-09-19

## 2019-06-04 NOTE — DISCHARGE INSTRUCTIONS
Ibuprofen for pain.  You may apply ice to injuries for 24 hr after the injury. Return immediately if you get worse or if new problems develop.  Please follow-up with your primary care doctor, or than Ear Nose and Throat doctor above.
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
## 2019-06-11 ENCOUNTER — HOSPITAL ENCOUNTER (EMERGENCY)
Facility: HOSPITAL | Age: 24
Discharge: HOME | End: 2019-06-11
Attending: EMERGENCY MEDICINE
Payer: MEDICAID

## 2019-06-11 VITALS
OXYGEN SATURATION: 100 % | WEIGHT: 240 LBS | TEMPERATURE: 100 F | DIASTOLIC BLOOD PRESSURE: 96 MMHG | BODY MASS INDEX: 37.67 KG/M2 | HEIGHT: 67 IN | RESPIRATION RATE: 16 BRPM | HEART RATE: 100 BPM | SYSTOLIC BLOOD PRESSURE: 125 MMHG

## 2019-06-11 DIAGNOSIS — R07.9 CHEST PAIN: ICD-10-CM

## 2019-06-11 DIAGNOSIS — R07.9 CHEST PAIN, UNSPECIFIED TYPE: Primary | ICD-10-CM

## 2019-06-11 LAB
ALBUMIN SERPL BCP-MCNC: 4.2 G/DL (ref 3.5–5.2)
ALP SERPL-CCNC: 68 U/L (ref 55–135)
ALT SERPL W/O P-5'-P-CCNC: 13 U/L (ref 10–44)
ANION GAP SERPL CALC-SCNC: 9 MMOL/L (ref 8–16)
AST SERPL-CCNC: 15 U/L (ref 10–40)
BASOPHILS # BLD AUTO: 0.04 K/UL (ref 0–0.2)
BASOPHILS NFR BLD: 0.4 % (ref 0–1.9)
BILIRUB SERPL-MCNC: 0.7 MG/DL (ref 0.1–1)
BUN SERPL-MCNC: 15 MG/DL (ref 6–20)
CALCIUM SERPL-MCNC: 9.2 MG/DL (ref 8.7–10.5)
CHLORIDE SERPL-SCNC: 103 MMOL/L (ref 95–110)
CO2 SERPL-SCNC: 26 MMOL/L (ref 23–29)
CREAT SERPL-MCNC: 0.8 MG/DL (ref 0.5–1.4)
D DIMER PPP IA.FEU-MCNC: <0.19 MG/L FEU
DIFFERENTIAL METHOD: ABNORMAL
EOSINOPHIL # BLD AUTO: 0.1 K/UL (ref 0–0.5)
EOSINOPHIL NFR BLD: 0.7 % (ref 0–8)
ERYTHROCYTE [DISTWIDTH] IN BLOOD BY AUTOMATED COUNT: 14.4 % (ref 11.5–14.5)
EST. GFR  (AFRICAN AMERICAN): >60 ML/MIN/1.73 M^2
EST. GFR  (NON AFRICAN AMERICAN): >60 ML/MIN/1.73 M^2
GLUCOSE SERPL-MCNC: 78 MG/DL (ref 70–110)
HCT VFR BLD AUTO: 39.5 % (ref 37–48.5)
HGB BLD-MCNC: 12.5 G/DL (ref 12–16)
LYMPHOCYTES # BLD AUTO: 1.6 K/UL (ref 1–4.8)
LYMPHOCYTES NFR BLD: 15.1 % (ref 18–48)
MCH RBC QN AUTO: 28 PG (ref 27–31)
MCHC RBC AUTO-ENTMCNC: 31.6 G/DL (ref 32–36)
MCV RBC AUTO: 89 FL (ref 82–98)
MONOCYTES # BLD AUTO: 0.8 K/UL (ref 0.3–1)
MONOCYTES NFR BLD: 7.6 % (ref 4–15)
NEUTROPHILS # BLD AUTO: 8 K/UL (ref 1.8–7.7)
NEUTROPHILS NFR BLD: 76.2 % (ref 38–73)
PLATELET # BLD AUTO: 322 K/UL (ref 150–350)
PMV BLD AUTO: 9.9 FL (ref 9.2–12.9)
POTASSIUM SERPL-SCNC: 3.8 MMOL/L (ref 3.5–5.1)
PROT SERPL-MCNC: 7.7 G/DL (ref 6–8.4)
RBC # BLD AUTO: 4.46 M/UL (ref 4–5.4)
SODIUM SERPL-SCNC: 138 MMOL/L (ref 136–145)
WBC # BLD AUTO: 10.44 K/UL (ref 3.9–12.7)

## 2019-06-11 PROCEDURE — 93010 EKG 12-LEAD: ICD-10-PCS | Mod: ,,, | Performed by: INTERNAL MEDICINE

## 2019-06-11 PROCEDURE — 85379 FIBRIN DEGRADATION QUANT: CPT

## 2019-06-11 PROCEDURE — 80307 DRUG TEST PRSMV CHEM ANLYZR: CPT

## 2019-06-11 PROCEDURE — 96374 THER/PROPH/DIAG INJ IV PUSH: CPT

## 2019-06-11 PROCEDURE — 85025 COMPLETE CBC W/AUTO DIFF WBC: CPT

## 2019-06-11 PROCEDURE — 93005 ELECTROCARDIOGRAM TRACING: CPT

## 2019-06-11 PROCEDURE — 63600175 PHARM REV CODE 636 W HCPCS: Performed by: EMERGENCY MEDICINE

## 2019-06-11 PROCEDURE — 93010 ELECTROCARDIOGRAM REPORT: CPT | Mod: ,,, | Performed by: INTERNAL MEDICINE

## 2019-06-11 PROCEDURE — 81000 URINALYSIS NONAUTO W/SCOPE: CPT | Mod: 59

## 2019-06-11 PROCEDURE — 99285 EMERGENCY DEPT VISIT HI MDM: CPT | Mod: 25

## 2019-06-11 PROCEDURE — 80053 COMPREHEN METABOLIC PANEL: CPT

## 2019-06-11 PROCEDURE — 96375 TX/PRO/DX INJ NEW DRUG ADDON: CPT

## 2019-06-11 RX ORDER — LORAZEPAM 2 MG/ML
1 INJECTION INTRAMUSCULAR
Status: COMPLETED | OUTPATIENT
Start: 2019-06-11 | End: 2019-06-11

## 2019-06-11 RX ORDER — SUCRALFATE 1 G/1
1 TABLET ORAL 4 TIMES DAILY
Qty: 20 TABLET | Refills: 0 | Status: SHIPPED | OUTPATIENT
Start: 2019-06-11 | End: 2021-05-14 | Stop reason: CLARIF

## 2019-06-11 RX ORDER — KETOROLAC TROMETHAMINE 30 MG/ML
15 INJECTION, SOLUTION INTRAMUSCULAR; INTRAVENOUS
Status: COMPLETED | OUTPATIENT
Start: 2019-06-11 | End: 2019-06-11

## 2019-06-11 RX ADMIN — KETOROLAC TROMETHAMINE 15 MG: 30 INJECTION, SOLUTION INTRAMUSCULAR; INTRAVENOUS at 10:06

## 2019-06-11 RX ADMIN — LORAZEPAM 1 MG: 2 INJECTION INTRAMUSCULAR; INTRAVENOUS at 10:06

## 2019-06-12 LAB
AMPHET+METHAMPHET UR QL: NEGATIVE
BACTERIA #/AREA URNS HPF: NORMAL /HPF
BARBITURATES UR QL SCN>200 NG/ML: NEGATIVE
BENZODIAZ UR QL SCN>200 NG/ML: NORMAL
BILIRUB UR QL STRIP: NEGATIVE
BZE UR QL SCN: NEGATIVE
CANNABINOIDS UR QL SCN: NEGATIVE
CLARITY UR: ABNORMAL
COLOR UR: YELLOW
CREAT UR-MCNC: 60.4 MG/DL (ref 15–325)
GLUCOSE UR QL STRIP: NEGATIVE
HGB UR QL STRIP: ABNORMAL
KETONES UR QL STRIP: NEGATIVE
LEUKOCYTE ESTERASE UR QL STRIP: NEGATIVE
METHADONE UR QL SCN>300 NG/ML: NEGATIVE
MICROSCOPIC COMMENT: NORMAL
NITRITE UR QL STRIP: NEGATIVE
OPIATES UR QL SCN: NEGATIVE
PCP UR QL SCN>25 NG/ML: NEGATIVE
PH UR STRIP: 5 [PH] (ref 5–8)
PROT UR QL STRIP: NEGATIVE
RBC #/AREA URNS HPF: 0 /HPF (ref 0–4)
SP GR UR STRIP: 1.01 (ref 1–1.03)
SQUAMOUS #/AREA URNS HPF: NORMAL /HPF
TOXICOLOGY INFORMATION: NORMAL
URN SPEC COLLECT METH UR: ABNORMAL
UROBILINOGEN UR STRIP-ACNC: NEGATIVE EU/DL
WBC #/AREA URNS HPF: 3 /HPF (ref 0–5)

## 2019-06-12 NOTE — ED TRIAGE NOTES
"Pt brought in by EMS. States she has been having chest pain and SOB since this morning. Also states she has had 2 episodes of syncope. States the chest pain is localized to her right side of the chest and can be described as "sharp." In NAD at this time.  "
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
## 2019-08-09 ENCOUNTER — TELEPHONE (OUTPATIENT)
Dept: PLASTIC SURGERY | Facility: CLINIC | Age: 24
End: 2019-08-09

## 2019-08-09 NOTE — TELEPHONE ENCOUNTER
----- Message from Jeremy Olivas sent at 8/9/2019  2:15 PM CDT -----  Contact: Pt  Needs Advice    Reason for call:The Pt states that she lost the 50 pounds needed to be lost in order to get her surgery and she wants a call back to schedule it.        Communication Preference:[phone]    Additional Information:

## 2019-08-09 NOTE — TELEPHONE ENCOUNTER
Patient called requesting F/U consult for BBR, patient has Medicaid insurance, scheduled for appt at [facility], patient states her authorization for surgery should be already complete and she should be able to schedule surgery, advised patient that she has not been seen since 2/21/2018 and at that time she must have a weight below 250 lbs prior to going to surgery and prior to authorization for surgery, patient current weight 240 lbs, discussed at length surgery authorization process with patient, discussed at length surgery scheduling process, patient requesting appointment reminder be emailed to her at [email or web address], emailed per her request

## 2019-08-21 ENCOUNTER — TELEPHONE (OUTPATIENT)
Dept: PLASTIC SURGERY | Facility: CLINIC | Age: 24
End: 2019-08-21

## 2019-08-21 NOTE — TELEPHONE ENCOUNTER
Called pt back and she answered . I asked pt to move her appt time up as our schedule had changed and we were to have a morning clinics. Pt began to raise her voice and tell me how she was going to purposely show up late to appt if rescheduled. Also stated that she was going to have her surgery approved and that she knew this because she had already spoken to her insurance company. I tried to explain to pt that we could reschedule to a late morning appt but pt continued to be rude and eventually said she would call back and hung up the phone.

## 2019-08-21 NOTE — TELEPHONE ENCOUNTER
I called pt to move her appt time up as we will not be operating that day. Pt didnt answer the phone and no vm was set up to leave a message. Pt is also not active on the portal.

## 2019-08-22 ENCOUNTER — TELEPHONE (OUTPATIENT)
Dept: PLASTIC SURGERY | Facility: CLINIC | Age: 24
End: 2019-08-22

## 2019-08-22 NOTE — TELEPHONE ENCOUNTER
Spoke with pt and f/u with her to see if she wouldnt mind moving her appt up to an earlier time slot . Pt agreed to come in for 11am. Pt stated that she would not have school but she wasnt going to come until after 10am. I told her that was fine.

## 2019-08-23 ENCOUNTER — TELEPHONE (OUTPATIENT)
Dept: PLASTIC SURGERY | Facility: CLINIC | Age: 24
End: 2019-08-23

## 2019-08-23 NOTE — TELEPHONE ENCOUNTER
spoke to pt to see if she wouldnt mind coming on the 13th because Dr Cottrell would be in surgery at [facility] and would not be available to come to [facility] that day . Pt said that she would call me back if she decided to be scheduled.

## 2019-08-23 NOTE — TELEPHONE ENCOUNTER
spoke with pt who had an extremly disresptful tone/attitude told me that she would like to cancel her appt and that she would like to move it to the following friday I explained that we are only in clinics every other friday. Pt asked for another physician I told her that at this time Dr Cottrell was the physician who accepted her ins . She insisted that there was someone else at [facility] who did accept Medicaid for breast reductions. I told the pt that if she would like to reschedule with us that's fine I will add her to schedule and if not I could call her back when she was ready to schedule. She agreed to be added to another schedule but the time was an issue. She wanted to know why she couldn't have a late afternoon appt. Around 3-4 I told at the time slots that we had available.She agreed to come in at 11:30 on September 6th                   ----- Message from Trini Gutiérrez sent at 8/23/2019  9:03 AM CDT -----  Contact: pt  The pt states that she is unable to make it to her appt on today. The pt would like to reschedule for next Friday 08/30.     Preferred Contact: [phone]

## 2019-09-19 ENCOUNTER — HOSPITAL ENCOUNTER (EMERGENCY)
Facility: HOSPITAL | Age: 24
Discharge: HOME | End: 2019-09-19
Attending: EMERGENCY MEDICINE
Payer: MEDICAID

## 2019-09-19 VITALS
HEART RATE: 91 BPM | SYSTOLIC BLOOD PRESSURE: 144 MMHG | RESPIRATION RATE: 18 BRPM | HEIGHT: 67 IN | WEIGHT: 230 LBS | BODY MASS INDEX: 36.1 KG/M2 | DIASTOLIC BLOOD PRESSURE: 85 MMHG | OXYGEN SATURATION: 100 % | TEMPERATURE: 99 F

## 2019-09-19 DIAGNOSIS — M54.2 NECK PAIN: ICD-10-CM

## 2019-09-19 DIAGNOSIS — R10.13 EPIGASTRIC ABDOMINAL PAIN: Primary | ICD-10-CM

## 2019-09-19 LAB
B-HCG UR QL: NEGATIVE
CTP QC/QA: YES

## 2019-09-19 PROCEDURE — 81025 URINE PREGNANCY TEST: CPT | Performed by: PHYSICIAN ASSISTANT

## 2019-09-19 PROCEDURE — 25000003 PHARM REV CODE 250: Performed by: PHYSICIAN ASSISTANT

## 2019-09-19 PROCEDURE — 99284 EMERGENCY DEPT VISIT MOD MDM: CPT | Mod: 25

## 2019-09-19 PROCEDURE — 63600175 PHARM REV CODE 636 W HCPCS: Performed by: PHYSICIAN ASSISTANT

## 2019-09-19 RX ORDER — PANTOPRAZOLE SODIUM 20 MG/1
20 TABLET, DELAYED RELEASE ORAL DAILY
Qty: 14 TABLET | Refills: 0 | Status: SHIPPED | OUTPATIENT
Start: 2019-09-19 | End: 2021-06-18 | Stop reason: ALTCHOICE

## 2019-09-19 RX ORDER — PREDNISONE 20 MG/1
40 TABLET ORAL
Status: COMPLETED | OUTPATIENT
Start: 2019-09-19 | End: 2019-09-19

## 2019-09-19 RX ORDER — PREDNISONE 20 MG/1
TABLET ORAL
Qty: 7 TABLET | Refills: 0 | Status: SHIPPED | OUTPATIENT
Start: 2019-09-19 | End: 2019-09-25

## 2019-09-19 RX ORDER — PANTOPRAZOLE SODIUM 40 MG/1
40 TABLET, DELAYED RELEASE ORAL
Status: COMPLETED | OUTPATIENT
Start: 2019-09-19 | End: 2019-09-19

## 2019-09-19 RX ORDER — IBUPROFEN 600 MG/1
600 TABLET ORAL EVERY 6 HOURS PRN
Qty: 20 TABLET | Refills: 0 | OUTPATIENT
Start: 2019-09-19 | End: 2021-01-12

## 2019-09-19 RX ADMIN — PANTOPRAZOLE SODIUM 40 MG: 40 TABLET, DELAYED RELEASE ORAL at 11:09

## 2019-09-19 RX ADMIN — PREDNISONE 40 MG: 20 TABLET ORAL at 11:09

## 2019-09-19 NOTE — ED PROVIDER NOTES
"Encounter Date: 9/19/2019    SCRIBE #1 NOTE: I, Harvey Boone, am scribing for, and in the presence of,  Geoffrey Valera PA-C. I have scribed the following portions of the note - Other sections scribed: HPI, ROS.       History     Chief Complaint   Patient presents with    Abdominal Pain     Pt with pain to top of neck and epigastric abdominal pain starting last night. Denies nausea, emesis or diarrhea. LMP "2 weeks ago"     CC: Abdominal Pain     HPI: This 24 y.o F with a hx of Anxiety and Bipolar 1 disorder presents to the ED c/o acute onset of constant and severe (10/10) epigastric abdominal pain since last night. She states "I just felt l had to use the bathroom." Her last normal BM occurred yesterday. She also c/o constant neck pain described as pulsatile "sharp and shooting pain" since last night. Her neck pain is worse with movement. No alleviating factors. She denies any recent heavy lifting. The pt denies fever, chills, diaphoresis, nausea, emesis, diarrhea, constipation, SOB and rash. She does not smoke cigarettes.     PSHx: Leg Surgery               Review of patient's allergies indicates:   Allergen Reactions    Iodine and iodide containing products Hives     Past Medical History:   Diagnosis Date    Anxiety     Bipolar 1 disorder     Miscarriage 05/2018    pt reported      Past Surgical History:   Procedure Laterality Date    LEG SURGERY       Family History   Problem Relation Age of Onset    No Known Problems Mother     No Known Problems Father     Breast cancer Neg Hx     Colon cancer Neg Hx     Ovarian cancer Neg Hx      Social History     Tobacco Use    Smoking status: Never Smoker    Smokeless tobacco: Never Used   Substance Use Topics    Alcohol use: No    Drug use: No     Review of Systems   Constitutional: Negative for chills, diaphoresis and fever.   HENT: Negative for rhinorrhea and sore throat.    Eyes: Negative for redness.   Respiratory: Negative for cough and shortness of " breath.    Cardiovascular: Negative for chest pain.   Gastrointestinal: Positive for abdominal pain. Negative for diarrhea, nausea and vomiting.   Genitourinary: Negative for dysuria, frequency and urgency.   Musculoskeletal: Positive for neck pain. Negative for back pain.   Skin: Negative for rash.   Psychiatric/Behavioral: The patient is not nervous/anxious.        Physical Exam     Initial Vitals [09/19/19 1003]   BP Pulse Resp Temp SpO2   138/88 98 16 98.3 °F (36.8 °C) 98 %      MAP       --         Physical Exam    Nursing note and vitals reviewed.  Constitutional: She appears well-developed and well-nourished. She is not diaphoretic. No distress.   HENT:   Head: Normocephalic and atraumatic.   Eyes: Conjunctivae and EOM are normal. Pupils are equal, round, and reactive to light.   Neck: Normal range of motion. Neck supple. No tracheal deviation present.   No nuchal rigidity.   Cardiovascular: Intact distal pulses.   1+ radial bilaterally.   Pulmonary/Chest: No stridor. No respiratory distress.   Abdominal:   Abdomen overall soft, normal bowel sounds ×4.  No significant tenderness to palpation of any quadrant.  No rebound or guarding.  No palpable mass or distention.  No flank or CVA tenderness.  Negative Segura sign.  No pain over McBurney's point.   Musculoskeletal: Normal range of motion. She exhibits no tenderness.   No midline spinal tenderness.   Lymphadenopathy:     She has no cervical adenopathy.   Neurological: She is alert and oriented to person, place, and time. GCS score is 15. GCS eye subscore is 4. GCS verbal subscore is 5. GCS motor subscore is 6.   Cervical motor/sensory grossly intact.   Skin: Skin is warm and dry. Capillary refill takes less than 2 seconds.   Psychiatric: She has a normal mood and affect. Her behavior is normal. Judgment and thought content normal.         ED Course   Procedures  Labs Reviewed   POCT URINE PREGNANCY          Imaging Results    None          Medical Decision  Making:   Differential Diagnosis:   Muscle strain/sprain, muscle spasm, fracture, radiculopathy, GERD, gastritis, cholecystitis  ED Management:  Complaining of upper abdomen also neck pain since last night. No trauma.    Describes neck pain as a shooting type pain radiating up and down in her cervical spinal region. No midline spinal ttp.  No muscular tenderness. Neck is supple without nuchal rigidity.  Vitals reassuring.  Neurovascularly intact distally.  No unilateral weakness or focal deficit.  Does not appear to be muscular.  Will discharge with prednisone in case there is some radicular component.    No abdominal tenderness despite complaint of abdominal pain. No nausea vomiting. No local peritoneal signs. Vitals reassuring.  She is overall well-appearing and nontoxic.  She does admit to heartburn last week.  No right upper quadrant tenderness, negative Segura sign. Will DC with PPI, have her see her PCP.    Return precautions given.            Scribe Attestation:   Scribe #1: I performed the above scribed service and the documentation accurately describes the services I performed. I attest to the accuracy of the note.    I, Geoffrey Valera, personally performed the services described in this documentation. All medical record entries made by the scribe were at my direction and in my presence. I have reviewed the chart and agree that the record reflects my personal performance and is accurate and complete.           Clinical Impression:       ICD-10-CM ICD-9-CM   1. Epigastric abdominal pain R10.13 789.06   2. Neck pain M54.2 723.1         Disposition:   Disposition: Discharged  Condition: Stable                        Geoffrey Valera PA-C  09/19/19 1450

## 2019-09-19 NOTE — ED TRIAGE NOTES
"Ptc/o upper abdominal pain that "shoots up" to her neck since last night. Denies trauma, fall, strenuous activity. Pt states "It feels like a ball in there" (abdomen). Describes pain as constant/sharp, rated 10/10.   "

## 2019-09-19 NOTE — ED NOTES
Pt given specimen cup and instructed to give urine sample. Pt has ambulated to restroom at this time

## 2019-09-19 NOTE — ED NOTES
Yokasta De La Rosa attempting to speak to patient and explaining to patient that she can be seen in qtrack. Pt is irate and stated she wants a bed. Security speaking with patient as well at present. Pt still refusing.

## 2019-09-19 NOTE — ED NOTES
"Pt refusing to go to fast track. Pt cussing and fussing with RN. Explained to patient that the provider is available to see the patient in fast track now. Pt is demanding an ED bed. Explained to patient that there is no ED bed available at present time and that she can be seen in qtrack now so she can be evaluated by a provider. Pt refusing to finish triage stating "you will need security for me now because I will click out" Pt getting in RN personal space. Alerted security, Charge RN and Yokasta  "

## 2019-09-19 NOTE — DISCHARGE INSTRUCTIONS
GERD diet.  Protonix daily.  Prednisone as prescribed.  Ibuprofen as needed for discomfort.    Follow-up with your primary care provider for re-evaluation and further recommendations.  Return to this emergency department if symptoms persist or worsen despite treatment, if any other problems occur.

## 2020-05-20 ENCOUNTER — TELEPHONE (OUTPATIENT)
Dept: OBSTETRICS AND GYNECOLOGY | Facility: CLINIC | Age: 25
End: 2020-05-20

## 2020-05-20 NOTE — TELEPHONE ENCOUNTER
----- Message from CIELO Klein MD sent at 5/20/2020  9:31 AM CDT -----  Regarding: FW: Pap  Please schedule patient for annual exam / repeat pap.  Thanks.    ----- Message -----  From: CIELO Klein MD  Sent: 4/26/2019   9:50 PM CDT  To: CIELO Klein MD  Subject: Pap                                              Needs 3rd reminder for repeat pap

## 2020-05-22 ENCOUNTER — TELEPHONE (OUTPATIENT)
Dept: OBSTETRICS AND GYNECOLOGY | Facility: CLINIC | Age: 25
End: 2020-05-22

## 2020-05-22 NOTE — TELEPHONE ENCOUNTER
----- Message from Kateryna Gallardo MA sent at 5/21/2020  4:16 PM CDT -----  Regarding: FW: Pap      ----- Message -----  From: CIELO Klein MD  Sent: 5/20/2020   9:31 AM CDT  To: [facility] Staff  Subject: FW: Pap                                          Please schedule patient for annual exam / repeat pap.  Thanks.    ----- Message -----  From: CIELO Klein MD  Sent: 4/26/2019   9:50 PM CDT  To: CIELO Klein MD  Subject: Pap                                              Needs 3rd reminder for repeat pap

## 2020-10-03 ENCOUNTER — HOSPITAL ENCOUNTER (EMERGENCY)
Facility: HOSPITAL | Age: 25
Discharge: LEFT BEFORE DISCHARGE | End: 2020-10-03
Attending: EMERGENCY MEDICINE
Payer: MEDICAID

## 2020-10-03 ENCOUNTER — HOSPITAL ENCOUNTER (EMERGENCY)
Facility: HOSPITAL | Age: 25
Discharge: HOME | End: 2020-10-03
Attending: EMERGENCY MEDICINE
Payer: MEDICAID

## 2020-10-03 VITALS
DIASTOLIC BLOOD PRESSURE: 66 MMHG | OXYGEN SATURATION: 99 % | HEART RATE: 86 BPM | HEIGHT: 67 IN | TEMPERATURE: 98 F | RESPIRATION RATE: 18 BRPM | SYSTOLIC BLOOD PRESSURE: 126 MMHG | WEIGHT: 260 LBS | BODY MASS INDEX: 40.81 KG/M2

## 2020-10-03 VITALS
RESPIRATION RATE: 54 BRPM | TEMPERATURE: 99 F | HEART RATE: 100 BPM | BODY MASS INDEX: 40.81 KG/M2 | OXYGEN SATURATION: 100 % | HEIGHT: 67 IN | WEIGHT: 260 LBS | DIASTOLIC BLOOD PRESSURE: 66 MMHG | SYSTOLIC BLOOD PRESSURE: 137 MMHG

## 2020-10-03 DIAGNOSIS — Z76.5 DRUG-SEEKING BEHAVIOR: ICD-10-CM

## 2020-10-03 DIAGNOSIS — R10.9 ABDOMINAL PAIN, UNSPECIFIED ABDOMINAL LOCATION: Primary | ICD-10-CM

## 2020-10-03 DIAGNOSIS — R10.9 ABDOMINAL PAIN: Primary | ICD-10-CM

## 2020-10-03 LAB
ALBUMIN SERPL BCP-MCNC: 3.8 G/DL (ref 3.5–5.2)
ALBUMIN SERPL BCP-MCNC: 4 G/DL (ref 3.5–5.2)
ALP SERPL-CCNC: 52 U/L (ref 55–135)
ALP SERPL-CCNC: 58 U/L (ref 55–135)
ALT SERPL W/O P-5'-P-CCNC: 11 U/L (ref 10–44)
ALT SERPL W/O P-5'-P-CCNC: 12 U/L (ref 10–44)
ANION GAP SERPL CALC-SCNC: 12 MMOL/L (ref 8–16)
ANION GAP SERPL CALC-SCNC: 8 MMOL/L (ref 8–16)
APTT PPP: 37.1 SEC (ref 23.6–33.3)
AST SERPL-CCNC: 12 U/L (ref 10–40)
AST SERPL-CCNC: 15 U/L (ref 10–40)
B-HCG UR QL: NEGATIVE
B-HCG UR QL: NEGATIVE
BACTERIA #/AREA URNS HPF: NEGATIVE /HPF
BASOPHILS # BLD AUTO: 0.09 K/UL (ref 0–0.2)
BASOPHILS # BLD AUTO: 0.09 K/UL (ref 0–0.2)
BASOPHILS NFR BLD: 0.9 % (ref 0–1.9)
BASOPHILS NFR BLD: 1.1 % (ref 0–1.9)
BILIRUB SERPL-MCNC: 0.9 MG/DL (ref 0.1–1)
BILIRUB SERPL-MCNC: 1.3 MG/DL (ref 0.1–1)
BILIRUB UR QL STRIP: NEGATIVE
BNP SERPL-MCNC: 55 PG/ML (ref 0–99)
BUN SERPL-MCNC: 5 MG/DL (ref 6–20)
BUN SERPL-MCNC: 6 MG/DL (ref 6–20)
CALCIUM SERPL-MCNC: 8.4 MG/DL (ref 8.7–10.5)
CALCIUM SERPL-MCNC: 9 MG/DL (ref 8.7–10.5)
CHLORIDE SERPL-SCNC: 102 MMOL/L (ref 95–110)
CHLORIDE SERPL-SCNC: 103 MMOL/L (ref 95–110)
CK SERPL-CCNC: 85 U/L (ref 20–180)
CLARITY UR: CLEAR
CO2 SERPL-SCNC: 22 MMOL/L (ref 23–29)
CO2 SERPL-SCNC: 23 MMOL/L (ref 23–29)
COLOR UR: YELLOW
CREAT SERPL-MCNC: 0.6 MG/DL (ref 0.5–1.4)
CREAT SERPL-MCNC: 0.7 MG/DL (ref 0.5–1.4)
CTP QC/QA: YES
CTP QC/QA: YES
DIFFERENTIAL METHOD: ABNORMAL
DIFFERENTIAL METHOD: ABNORMAL
EOSINOPHIL # BLD AUTO: 0.3 K/UL (ref 0–0.5)
EOSINOPHIL # BLD AUTO: 0.3 K/UL (ref 0–0.5)
EOSINOPHIL NFR BLD: 2.7 % (ref 0–8)
EOSINOPHIL NFR BLD: 3.2 % (ref 0–8)
ERYTHROCYTE [DISTWIDTH] IN BLOOD BY AUTOMATED COUNT: 14.2 % (ref 11.5–14.5)
ERYTHROCYTE [DISTWIDTH] IN BLOOD BY AUTOMATED COUNT: 14.4 % (ref 11.5–14.5)
EST. GFR  (AFRICAN AMERICAN): >60 ML/MIN/1.73 M^2
EST. GFR  (AFRICAN AMERICAN): >60 ML/MIN/1.73 M^2
EST. GFR  (NON AFRICAN AMERICAN): >60 ML/MIN/1.73 M^2
EST. GFR  (NON AFRICAN AMERICAN): >60 ML/MIN/1.73 M^2
GLUCOSE SERPL-MCNC: 102 MG/DL (ref 70–110)
GLUCOSE SERPL-MCNC: 79 MG/DL (ref 70–110)
GLUCOSE UR QL STRIP: NEGATIVE
HCT VFR BLD AUTO: 35.4 % (ref 37–48.5)
HCT VFR BLD AUTO: 41.4 % (ref 37–48.5)
HGB BLD-MCNC: 11.5 G/DL (ref 12–16)
HGB BLD-MCNC: 13.1 G/DL (ref 12–16)
HGB UR QL STRIP: NEGATIVE
HYALINE CASTS #/AREA URNS LPF: 3 /LPF
IMM GRANULOCYTES # BLD AUTO: 0.03 K/UL (ref 0–0.04)
IMM GRANULOCYTES # BLD AUTO: 0.04 K/UL (ref 0–0.04)
IMM GRANULOCYTES NFR BLD AUTO: 0.4 % (ref 0–0.5)
IMM GRANULOCYTES NFR BLD AUTO: 0.4 % (ref 0–0.5)
INR PPP: 1.1
KETONES UR QL STRIP: NEGATIVE
LACTATE SERPL-SCNC: 1.1 MMOL/L (ref 0.5–1.9)
LEUKOCYTE ESTERASE UR QL STRIP: ABNORMAL
LIPASE SERPL-CCNC: 20 U/L (ref 4–60)
LIPASE SERPL-CCNC: 26 U/L (ref 4–60)
LYMPHOCYTES # BLD AUTO: 1.8 K/UL (ref 1–4.8)
LYMPHOCYTES # BLD AUTO: 2.5 K/UL (ref 1–4.8)
LYMPHOCYTES NFR BLD: 18.3 % (ref 18–48)
LYMPHOCYTES NFR BLD: 29.2 % (ref 18–48)
MAGNESIUM SERPL-MCNC: 1.9 MG/DL (ref 1.6–2.6)
MCH RBC QN AUTO: 27.2 PG (ref 27–31)
MCH RBC QN AUTO: 27.4 PG (ref 27–31)
MCHC RBC AUTO-ENTMCNC: 31.6 G/DL (ref 32–36)
MCHC RBC AUTO-ENTMCNC: 32.5 G/DL (ref 32–36)
MCV RBC AUTO: 84 FL (ref 82–98)
MCV RBC AUTO: 86 FL (ref 82–98)
MICROSCOPIC COMMENT: ABNORMAL
MONOCYTES # BLD AUTO: 0.8 K/UL (ref 0.3–1)
MONOCYTES # BLD AUTO: 1 K/UL (ref 0.3–1)
MONOCYTES NFR BLD: 8.8 % (ref 4–15)
MONOCYTES NFR BLD: 9.8 % (ref 4–15)
NEUTROPHILS # BLD AUTO: 4.9 K/UL (ref 1.8–7.7)
NEUTROPHILS # BLD AUTO: 6.8 K/UL (ref 1.8–7.7)
NEUTROPHILS NFR BLD: 57.3 % (ref 38–73)
NEUTROPHILS NFR BLD: 67.9 % (ref 38–73)
NITRITE UR QL STRIP: NEGATIVE
NRBC BLD-RTO: 0 /100 WBC
NRBC BLD-RTO: 0 /100 WBC
PH UR STRIP: 6 [PH] (ref 5–8)
PLATELET # BLD AUTO: 311 K/UL (ref 150–350)
PLATELET # BLD AUTO: 315 K/UL (ref 150–350)
PMV BLD AUTO: 10.4 FL (ref 9.2–12.9)
PMV BLD AUTO: 9.9 FL (ref 9.2–12.9)
POTASSIUM SERPL-SCNC: 3.3 MMOL/L (ref 3.5–5.1)
POTASSIUM SERPL-SCNC: 3.3 MMOL/L (ref 3.5–5.1)
PROCALCITONIN SERPL IA-MCNC: <0.05 NG/ML (ref 0–0.5)
PROT SERPL-MCNC: 7.3 G/DL (ref 6–8.4)
PROT SERPL-MCNC: 7.6 G/DL (ref 6–8.4)
PROT UR QL STRIP: NEGATIVE
PROTHROMBIN TIME: 13.8 SEC (ref 10.6–14.8)
RBC # BLD AUTO: 4.2 M/UL (ref 4–5.4)
RBC # BLD AUTO: 4.82 M/UL (ref 4–5.4)
RBC #/AREA URNS HPF: 1 /HPF (ref 0–4)
SARS-COV-2 RDRP RESP QL NAA+PROBE: NEGATIVE
SODIUM SERPL-SCNC: 133 MMOL/L (ref 136–145)
SODIUM SERPL-SCNC: 137 MMOL/L (ref 136–145)
SP GR UR STRIP: 1.01 (ref 1–1.03)
SQUAMOUS #/AREA URNS HPF: 5 /HPF
TROPONIN I SERPL DL<=0.01 NG/ML-MCNC: <0.03 NG/ML
TSH SERPL DL<=0.005 MIU/L-ACNC: 2.16 UIU/ML (ref 0.34–5.6)
URN SPEC COLLECT METH UR: ABNORMAL
UROBILINOGEN UR STRIP-ACNC: NEGATIVE EU/DL
WBC # BLD AUTO: 8.54 K/UL (ref 3.9–12.7)
WBC # BLD AUTO: 9.95 K/UL (ref 3.9–12.7)
WBC #/AREA URNS HPF: 9 /HPF (ref 0–5)

## 2020-10-03 PROCEDURE — 99284 EMERGENCY DEPT VISIT MOD MDM: CPT | Mod: 25

## 2020-10-03 PROCEDURE — 85730 THROMBOPLASTIN TIME PARTIAL: CPT

## 2020-10-03 PROCEDURE — 96361 HYDRATE IV INFUSION ADD-ON: CPT

## 2020-10-03 PROCEDURE — 99285 EMERGENCY DEPT VISIT HI MDM: CPT | Mod: 25

## 2020-10-03 PROCEDURE — 80053 COMPREHEN METABOLIC PANEL: CPT | Mod: 91

## 2020-10-03 PROCEDURE — 83605 ASSAY OF LACTIC ACID: CPT

## 2020-10-03 PROCEDURE — 84484 ASSAY OF TROPONIN QUANT: CPT

## 2020-10-03 PROCEDURE — 63600175 PHARM REV CODE 636 W HCPCS: Performed by: GENERAL PRACTICE

## 2020-10-03 PROCEDURE — 25000003 PHARM REV CODE 250: Performed by: GENERAL PRACTICE

## 2020-10-03 PROCEDURE — 83880 ASSAY OF NATRIURETIC PEPTIDE: CPT

## 2020-10-03 PROCEDURE — 83690 ASSAY OF LIPASE: CPT

## 2020-10-03 PROCEDURE — 84145 PROCALCITONIN (PCT): CPT

## 2020-10-03 PROCEDURE — 82550 ASSAY OF CK (CPK): CPT

## 2020-10-03 PROCEDURE — 85025 COMPLETE CBC W/AUTO DIFF WBC: CPT | Mod: 91

## 2020-10-03 PROCEDURE — 81025 URINE PREGNANCY TEST: CPT | Performed by: EMERGENCY MEDICINE

## 2020-10-03 PROCEDURE — 84443 ASSAY THYROID STIM HORMONE: CPT

## 2020-10-03 PROCEDURE — 25000003 PHARM REV CODE 250: Performed by: EMERGENCY MEDICINE

## 2020-10-03 PROCEDURE — 96366 THER/PROPH/DIAG IV INF ADDON: CPT

## 2020-10-03 PROCEDURE — U0002 COVID-19 LAB TEST NON-CDC: HCPCS

## 2020-10-03 PROCEDURE — 83735 ASSAY OF MAGNESIUM: CPT

## 2020-10-03 PROCEDURE — 93005 ELECTROCARDIOGRAM TRACING: CPT | Performed by: INTERNAL MEDICINE

## 2020-10-03 PROCEDURE — 93010 EKG 12-LEAD: ICD-10-PCS | Mod: ,,, | Performed by: INTERNAL MEDICINE

## 2020-10-03 PROCEDURE — 96374 THER/PROPH/DIAG INJ IV PUSH: CPT

## 2020-10-03 PROCEDURE — 80053 COMPREHEN METABOLIC PANEL: CPT

## 2020-10-03 PROCEDURE — 96372 THER/PROPH/DIAG INJ SC/IM: CPT | Mod: 59

## 2020-10-03 PROCEDURE — 85610 PROTHROMBIN TIME: CPT

## 2020-10-03 PROCEDURE — 96375 TX/PRO/DX INJ NEW DRUG ADDON: CPT

## 2020-10-03 PROCEDURE — 96365 THER/PROPH/DIAG IV INF INIT: CPT

## 2020-10-03 PROCEDURE — 63600175 PHARM REV CODE 636 W HCPCS: Performed by: EMERGENCY MEDICINE

## 2020-10-03 PROCEDURE — 83690 ASSAY OF LIPASE: CPT | Mod: 91

## 2020-10-03 PROCEDURE — 96376 TX/PRO/DX INJ SAME DRUG ADON: CPT

## 2020-10-03 PROCEDURE — 81001 URINALYSIS AUTO W/SCOPE: CPT

## 2020-10-03 PROCEDURE — 85025 COMPLETE CBC W/AUTO DIFF WBC: CPT

## 2020-10-03 PROCEDURE — 80307 DRUG TEST PRSMV CHEM ANLYZR: CPT

## 2020-10-03 PROCEDURE — 93010 ELECTROCARDIOGRAM REPORT: CPT | Mod: ,,, | Performed by: INTERNAL MEDICINE

## 2020-10-03 RX ORDER — HYOSCYAMINE SULFATE 0.12 MG/1
0.12 TABLET SUBLINGUAL
Status: COMPLETED | OUTPATIENT
Start: 2020-10-03 | End: 2020-10-03

## 2020-10-03 RX ORDER — METHYLPREDNISOLONE SOD SUCC 125 MG
40 VIAL (EA) INJECTION
Status: DISCONTINUED | OUTPATIENT
Start: 2020-10-03 | End: 2020-10-04 | Stop reason: HOSPADM

## 2020-10-03 RX ORDER — HYDROMORPHONE HYDROCHLORIDE 1 MG/ML
0.5 INJECTION, SOLUTION INTRAMUSCULAR; INTRAVENOUS; SUBCUTANEOUS
Status: COMPLETED | OUTPATIENT
Start: 2020-10-03 | End: 2020-10-03

## 2020-10-03 RX ORDER — ONDANSETRON 2 MG/ML
4 INJECTION INTRAMUSCULAR; INTRAVENOUS
Status: COMPLETED | OUTPATIENT
Start: 2020-10-03 | End: 2020-10-03

## 2020-10-03 RX ORDER — DIPHENHYDRAMINE HYDROCHLORIDE 50 MG/ML
25 INJECTION INTRAMUSCULAR; INTRAVENOUS
Status: DISCONTINUED | OUTPATIENT
Start: 2020-10-03 | End: 2020-10-03

## 2020-10-03 RX ORDER — NAPROXEN 500 MG/1
500 TABLET ORAL 2 TIMES DAILY WITH MEALS
Qty: 20 TABLET | Refills: 0 | Status: SHIPPED | OUTPATIENT
Start: 2020-10-03 | End: 2020-10-13

## 2020-10-03 RX ORDER — DICYCLOMINE HYDROCHLORIDE 10 MG/ML
20 INJECTION INTRAMUSCULAR
Status: COMPLETED | OUTPATIENT
Start: 2020-10-03 | End: 2020-10-03

## 2020-10-03 RX ORDER — DIPHENHYDRAMINE HYDROCHLORIDE 50 MG/ML
50 INJECTION INTRAMUSCULAR; INTRAVENOUS
Status: DISCONTINUED | OUTPATIENT
Start: 2020-10-03 | End: 2020-10-04 | Stop reason: HOSPADM

## 2020-10-03 RX ORDER — METHYLPREDNISOLONE SOD SUCC 125 MG
125 VIAL (EA) INJECTION
Status: DISCONTINUED | OUTPATIENT
Start: 2020-10-03 | End: 2020-10-03

## 2020-10-03 RX ORDER — POTASSIUM CHLORIDE 20 MEQ/1
40 TABLET, EXTENDED RELEASE ORAL ONCE
Status: DISCONTINUED | OUTPATIENT
Start: 2020-10-03 | End: 2020-10-04 | Stop reason: HOSPADM

## 2020-10-03 RX ADMIN — SODIUM CHLORIDE 1000 ML: 0.9 INJECTION, SOLUTION INTRAVENOUS at 02:10

## 2020-10-03 RX ADMIN — HYDROMORPHONE HYDROCHLORIDE 0.5 MG: 1 INJECTION, SOLUTION INTRAMUSCULAR; INTRAVENOUS; SUBCUTANEOUS at 04:10

## 2020-10-03 RX ADMIN — PROMETHAZINE HYDROCHLORIDE 12.5 MG: 25 INJECTION INTRAMUSCULAR; INTRAVENOUS at 09:10

## 2020-10-03 RX ADMIN — DICYCLOMINE HYDROCHLORIDE 20 MG: 10 INJECTION INTRAMUSCULAR at 10:10

## 2020-10-03 RX ADMIN — HYOSCYAMINE SULFATE 0.12 MG: 0.12 TABLET ORAL at 09:10

## 2020-10-03 RX ADMIN — HYDROMORPHONE HYDROCHLORIDE 0.5 MG: 1 INJECTION, SOLUTION INTRAMUSCULAR; INTRAVENOUS; SUBCUTANEOUS at 03:10

## 2020-10-03 RX ADMIN — SODIUM CHLORIDE 1000 ML: 9 INJECTION, SOLUTION INTRAVENOUS at 07:10

## 2020-10-03 RX ADMIN — ONDANSETRON 4 MG: 2 INJECTION INTRAMUSCULAR; INTRAVENOUS at 03:10

## 2020-10-03 RX ADMIN — POTASSIUM BICARBONATE 50 MEQ: 977.5 TABLET, EFFERVESCENT ORAL at 03:10

## 2020-10-03 NOTE — ED PROVIDER NOTES
"Encounter Date: 10/3/2020       History     Chief Complaint   Patient presents with    Abdominal Pain     "off and on since last week"      Manisha Bishop is a 25 y.o. female with history of anxiety, bipolar disorder presents to the emergency department with 2-3 days of constant lower abdominal pain.  Patient states onset of abdominal pain Thursday morning upon awakening; patient denies any significant vomiting; but does endorse some nausea intermittently.  States that the pain just below the umbilicus radiating bilaterally outward across the lower abdomen.  Denies fevers, SOB, CP, vaginal bleeding, vaginal discharge, dysuria, or menstrual concerns; LMP two week's ago (lasted 5 day's).  Patient endorses poor p.o. intake over the past 2-3 days secondary to nausea and abdominal pain.  Denies EtOH, illicit drug use, or tobacco use.        Review of patient's allergies indicates:   Allergen Reactions    Iodine and iodide containing products Hives     Past Medical History:   Diagnosis Date    Anxiety     Bipolar 1 disorder     Miscarriage 05/2018    pt reported      Past Surgical History:   Procedure Laterality Date    LEG SURGERY       Family History   Problem Relation Age of Onset    No Known Problems Mother     No Known Problems Father     Breast cancer Neg Hx     Colon cancer Neg Hx     Ovarian cancer Neg Hx      Social History     Tobacco Use    Smoking status: Never Smoker    Smokeless tobacco: Never Used   Substance Use Topics    Alcohol use: No    Drug use: No     Review of Systems   Constitutional: Positive for activity change. Negative for chills, fatigue and fever.   HENT: Negative for congestion.    Eyes: Negative for photophobia and visual disturbance.   Respiratory: Negative for cough, shortness of breath, wheezing and stridor.    Gastrointestinal: Positive for abdominal pain, diarrhea and nausea. Negative for vomiting.   Genitourinary: Negative for dysuria, flank pain and hematuria. "   Allergic/Immunologic: Negative for environmental allergies, food allergies and immunocompromised state.   Neurological: Negative for dizziness, weakness, numbness and headaches.   Psychiatric/Behavioral: Negative for agitation, behavioral problems and confusion.     Physical Exam     Initial Vitals [10/03/20 0152]   BP Pulse Resp Temp SpO2   137/69 90 16 98.8 °F (37.1 °C) 99 %      MAP       --         Physical Exam    Nursing note and vitals reviewed.  Constitutional: She appears well-developed and well-nourished. She is not diaphoretic.   HENT:   Head: Normocephalic and atraumatic.   Nose: Nose normal.   Mouth/Throat: No oropharyngeal exudate.   Eyes: Conjunctivae and EOM are normal. Pupils are equal, round, and reactive to light. No scleral icterus.   Neck: Normal range of motion. Neck supple. No tracheal deviation present. No JVD present.   Cardiovascular: Normal rate, regular rhythm, normal heart sounds and intact distal pulses.   No murmur heard.  Pulmonary/Chest: Breath sounds normal. No stridor. No respiratory distress. She has no wheezes. She has no rales.   Abdominal: Soft. She exhibits no mass. Tenderness: Minimal tenderness in LLQ; otherwise redistractable abdominal pain. There is no rebound and no guarding.   Morbidly obese   Musculoskeletal: Normal range of motion. No tenderness or edema.   Neurological: She is alert and oriented to person, place, and time. She has normal strength. GCS score is 15. GCS eye subscore is 4. GCS verbal subscore is 5. GCS motor subscore is 6.   Skin: Skin is warm. No rash noted. No erythema.   Psychiatric: She has a normal mood and affect. Her behavior is normal. Judgment and thought content normal.       ED Course   Procedures  Labs Reviewed   URINALYSIS, REFLEX TO URINE CULTURE   CBC W/ AUTO DIFFERENTIAL   COMPREHENSIVE METABOLIC PANEL   LIPASE   POCT URINE PREGNANCY          Imaging Results    None          Medical Decision Making:   Initial Assessment:   25-year-old  female with 2-3 days lower abdominal pain presents to the emergency department for emergent evaluation  Differential Diagnosis:   Muscle spasm; functional abdominal pain; viral gastroenteritis; diverticulosis/diverticulitis  Clinical Tests:   Lab Tests: Ordered and Reviewed  The following lab test(s) were unremarkable: CBC, CMP, Lipase and Urinalysis  ED Management:  CBC, CMP, lipase, UA/DVT, IV fluids, IV Zofran    Attending Note:  I provided a face to face evaluation of this patient.  I discussed the patient's care with the Resident.  I reviewed their note and agree with the history, physical, assessment, diagnosis, treatment, all procedures performed, xray and EKG interpretations and discharge plan provided by the Resident. My overall impression is generalized abd pain, worse in lower abdomen.  Patient originally told Dr. Skelton she was having pain localized to the lower abdomen particularly left lower quadrant with minimal tenderness it was read distractible during physical exam and was nauseous.  when I evaluated the patient patient now reports she was never nauseous and was upset that she was going to be given Zofran.  She reports she only having pain and reports pain is generalized.  On my exam she is tearful with voluntary guarding throughout the abdomen.  There is no rebound abdomen is soft normal bowel sounds.  The patient is crying and appears in extreme pain she also reports she feels lightheaded and dizzy.  Her lasted appear was 2 weeks ago will check a UPT to ensure patient does not have pregnancy and possible ectopic.  Lab work is pending.  She began be given 0.5 mg of Dilaudid.  We have added a CT scan of the abdomen pelvis for further evaluation.  Marisela Yoo M.D. 10/3/2020 2:50 AM  Labs revealed negative UPT, urinalysis with 1+ leukocytes 9 white blood cells no bacteria, 5 squamous cells, CBC with an H&H of 11.5 and 35.4 normal white count of 9.9 lipase 20, CMP with a sodium 133 potassium of  3.3 which was replaced orally, bicarb 22 calcium 8.4 bili of 1.3 alk-phos 52 patient had CT scan of the abdomen pelvis which I have visualized.  This was without contrast.  I do not see any acute abnormalities gallbladder does not reveal any stones dilation or pericholecystic fluid we are awaiting a facial CT read from Radiology.  Marisela Yoo M.D.  4:23 AM 10/3/2020      Other:   I discussed test(s) with the performing physician.       APC / Resident Notes:   25 year old female presenting to the emergency department for emergent evaluation of lower abdominal pain; most tender in the left lower quadrant.    Afebrile, normal heart rate and blood pressure.    Abdomen was soft, nondistended, obese, tender palpation in the left lower quadrant without rebound, guarding, peritoneal findings.  Heart regular rate and rhythm; lungs clear to auscultation bilaterally.    CC order did not demonstrate any evidence of leukocytosis that would suggest urinary tract infection/pyelonephritis with the additional findings of a normal urinalysis; H/H shows a stable anemia.  CMP was obtained and demonstrated hypokalemia (3.3) which was repleted orally in the emergency department this evening.  UPT obtained was negative so unlikely to be normal pregnancy or ectopic pregnancy.    IV fluids, IV pain medications, IV antiemetics were ordered for symptomatic management; CT abdomen pelvis without contrast ( patient has iodine allergy) because when patient was evaluated by staff she was tearful and in significant amount pain.    Awaiting CT abdomen pelvis; will continue monitor closely at this time    Song Skelton DO  PGY-3 Emergency Medicine  2:22 AM    PGY-3 Update  CT abdomen/pelvis demonstrated no intra-abdominal pathology including colitis, nephrolithiasis/hydronephrosis, bowel obstruction, mass.    Labs and imaging reviewed with patient at bedside this evening; discussed ongoing treatment with naproxen and follow up with primary  care; discussed following up with Ob/gyn as an outpatient for further evaluation if she continues to have ongoing symptoms.  ED return precautions discussed; discharged home in stable condition.    Song Skelton DO  PGY-2 Emergency Medicine  4:43 AM                    ED Course as of Oct 03 0402   Sat Oct 03, 2020   0355 Potassium(!): 3.3 [RF]   0355 WBC: 9.95 [RF]   0355 NITRITE UA: Negative [RF]   0355 Leukocytes, UA(!): 1+ [RF]   0355 Preg Test, Ur: Negative [RF]   0402 Hemoglobin(!): 11.5 [RF]   0402 Hematocrit(!): 35.4 [RF]      ED Course User Index  [RF] Song Skelton DO            Clinical Impression:     ICD-10-CM ICD-9-CM   1. Abdominal pain, unspecified abdominal location  R10.9 789.00                                               Song Skelton DO  Resident  10/03/20 0444

## 2020-10-03 NOTE — ED NOTES
After being notified by Yumiko that the pt demanded to speak to her boss, I went to lobby and spoke to pt. Pt stated she did not want to be seen by a practitioner and needed to be back in a room. I advised the pt of our room status and stated that being seen by a practitioner does not mean she would not see a doctor it is just to help get her care started so that time is not wasted. Pt refused the practitioner. Pt requested to see the House Supervisor.

## 2020-10-03 NOTE — ED NOTES
"PT UPSET BC I TOLD HER THERE  WERE NO ROOMS AVAIL IN THE ER AT THIS TIME, I TOLD HER  I WAS GOING TO GET THE PRACTIONER FOR A QUICK EVAL , PT GOT UPSET AND STATED "MY CONDITION IS SERIOUS, MY PAIN IS GETTING WORSER" " I WANT TO SEE A DOCTOR". AT THAT POINT I STATED I WOULD NOT GET THE PRACTIONER AND THAT SHE COULD WAIT UNTIL A ROOM BECAME AVAILABLE AND SEE A DOCTOR. I TOLD HER WE WOULDN'T DO ANYTHING SHE DIDN'T WANT US TO DO. AT THAT TIME SHE ASKED TO SPEAK TO MY BOSS. BRENDAN, CHARGE RN WAS NOTIFIED AND WENT TO TALK WITH THE PATIENT  "

## 2020-10-04 NOTE — ED PROVIDER NOTES
Encounter Date: 10/3/2020       History     Chief Complaint   Patient presents with    Abdominal Pain     X 2-3 DAYS     This is a 25-year-old female who presents complaining of abdominal pain.  The patient reports she has been having crampy diffuse abdominal pain over the past 3 days.  She has had nausea but has not had vomiting.  She states that eating or drinking anything makes the pain worse.  She was seen in the emergency room for these symptoms last night and underwent a CT scan.  Symptoms had improved.  She was discharged.  Symptoms have reoccurred.  She denies fever or chills.  She denies chest pain or shortness of breath.  She denies any gastrointestinal bleeding.  She states that her bowel movements have been normal.  She states that her symptoms have worsened today and are worse than previous symptoms for which she was evaluated and underwent a CT scan.  She denies chest pain or shortness of breath.  She denies coughing or upper respiratory symptoms.  She denies dysuria frequency urgency or vaginal discharge.  She denies any other problems or complaints.        Review of patient's allergies indicates:   Allergen Reactions    Haloperidol lactate Anaphylaxis    Iodine and iodide containing products Hives    Vitamin e (d-alpha tocopherol)      Hives (skin)^Black eyed peas and oatmeal causede hives     Past Medical History:   Diagnosis Date    Anxiety     Bipolar 1 disorder     GERD (gastroesophageal reflux disease)     Miscarriage 05/2018    pt reported     Obese      Past Surgical History:   Procedure Laterality Date    LEG SURGERY       Family History   Problem Relation Age of Onset    No Known Problems Mother     No Known Problems Father     Breast cancer Neg Hx     Colon cancer Neg Hx     Ovarian cancer Neg Hx      Social History     Tobacco Use    Smoking status: Never Smoker    Smokeless tobacco: Never Used   Substance Use Topics    Alcohol use: Yes    Drug use: No     Review of  Systems   Constitutional: Positive for appetite change and fatigue. Negative for activity change, chills and fever.   HENT: Negative for congestion, ear pain, rhinorrhea, sinus pressure, sinus pain, sore throat and trouble swallowing.    Eyes: Negative.  Negative for photophobia, pain, redness and visual disturbance.   Respiratory: Negative.  Negative for cough, chest tightness, shortness of breath and wheezing.    Cardiovascular: Negative.  Negative for chest pain, palpitations and leg swelling.   Gastrointestinal: Positive for abdominal pain, diarrhea and nausea. Negative for abdominal distention, anal bleeding, blood in stool, constipation and vomiting.   Endocrine: Negative.    Genitourinary: Negative.  Negative for decreased urine volume, difficulty urinating, dysuria, flank pain, frequency, hematuria, pelvic pain and urgency.   Musculoskeletal: Negative.  Negative for arthralgias, back pain, gait problem, joint swelling, myalgias, neck pain and neck stiffness.   Skin: Negative.  Negative for color change, pallor and rash.   Neurological: Negative.  Negative for dizziness, tremors, seizures, syncope, facial asymmetry, speech difficulty, weakness, light-headedness, numbness and headaches.   Hematological: Negative.  Does not bruise/bleed easily.   Psychiatric/Behavioral: Negative.  Negative for confusion.   All other systems reviewed and are negative.      Physical Exam     Initial Vitals [10/03/20 1808]   BP Pulse Resp Temp SpO2   132/72 90 18 98.9 °F (37.2 °C) 97 %      MAP       --         Physical Exam    Nursing note and vitals reviewed.  Constitutional: She is not diaphoretic. She is active and cooperative.  Non-toxic appearance. She does not have a sickly appearance. She does not appear ill. No distress.   HENT:   Head: Normocephalic and atraumatic.   Right Ear: Tympanic membrane normal.   Left Ear: Tympanic membrane normal.   Nose: Nose normal.   Mouth/Throat: Uvula is midline, oropharynx is clear and  moist and mucous membranes are normal. No oral lesions. No uvula swelling. No oropharyngeal exudate, posterior oropharyngeal edema or posterior oropharyngeal erythema.   Eyes: Conjunctivae, EOM and lids are normal. Pupils are equal, round, and reactive to light. Right eye exhibits no discharge. Left eye exhibits no discharge. No scleral icterus.   Neck: Trachea normal, normal range of motion, full passive range of motion without pain and phonation normal. Neck supple. No thyroid mass and no thyromegaly present. No stridor present. No spinous process tenderness and no muscular tenderness present. No tracheal deviation, no edema, no erythema and normal range of motion present. No neck rigidity. No JVD present.   Cardiovascular: Normal rate, regular rhythm, normal heart sounds, intact distal pulses and normal pulses. Exam reveals no gallop and no friction rub.    No murmur heard.  Pulmonary/Chest: Effort normal and breath sounds normal. No accessory muscle usage. No tachypnea. No respiratory distress. She has no wheezes. She has no rhonchi. She has no rales.   Abdominal: Soft. Normal appearance and bowel sounds are normal. She exhibits no distension, no pulsatile midline mass and no mass. There is no hepatosplenomegaly. There is generalized abdominal tenderness. There is no rigidity, no rebound, no guarding, no CVA tenderness, no tenderness at McBurney's point and negative Segura's sign.   Musculoskeletal: Normal range of motion. No tenderness or edema.      Comments: Pulses are 2+ throughout, cap refill is less than 2 sec throughout, extremities are nontender throughout with full range of motion. There is no spinal tenderness to palpation.   Lymphadenopathy:     She has no cervical adenopathy.   Neurological: She is alert and oriented to person, place, and time. She has normal strength. She displays normal reflexes. No cranial nerve deficit or sensory deficit.   No focal deficits.   Skin: Skin is warm, dry and intact.  Capillary refill takes less than 2 seconds. No ecchymosis, no petechiae and no rash noted. No erythema. No pallor.   Psychiatric: She has a normal mood and affect. Her speech is normal and behavior is normal. Judgment and thought content normal. Cognition and memory are normal.         ED Course   Procedures  Labs Reviewed   APTT - Abnormal; Notable for the following components:       Result Value    aPTT 37.1 (*)     All other components within normal limits    Narrative:     Recoll. [id number] by [facility] at 10/03/2020 20:40, reason: Specimen   clotted   CBC W/ AUTO DIFFERENTIAL - Abnormal; Notable for the following components:    Mean Corpuscular Hemoglobin Conc 31.6 (*)     All other components within normal limits   COMPREHENSIVE METABOLIC PANEL - Abnormal; Notable for the following components:    Potassium 3.3 (*)     BUN, Bld 5 (*)     All other components within normal limits   B-TYPE NATRIURETIC PEPTIDE   LIPASE   MAGNESIUM   PROTIME-INR    Narrative:     Recoll. [id number] by [facility] at 10/03/2020 20:40, reason: Specimen   clotted   TROPONIN I   TSH   CK   PROCALCITONIN   LACTIC ACID, PLASMA   SARS-COV-2 RNA AMPLIFICATION, QUAL   DRUG SCREEN PANEL, URINE EMERGENCY   URINALYSIS   POCT URINE PREGNANCY          Imaging Results          X-Ray Chest AP Portable (Preliminary result)  Result time 10/03/20 21:56:56   Procedure changed from X-Ray Chest 1 View     ED Interpretation by Dakota De DO (10/03/20 21:56:56, [facility], Emergency Medicine)    Poor inspiratory effort.  No acute pathology.                               Medical Decision Making:   ED Management:  I received the patient sign-out from Dr. Rodgers.  I evaluated the patient at bedside.  Patient was requesting pain medication.  She did not want any medication that I offered.  I thought that getting a CT scan with IV contrast would be the most efficient study patient has had hives in the past.  We did order premedication with Benadryl /  Solu-Medrol.  Patient did not report that she has never had any anaphylaxis with IV contrast.  She was also drinking oral contrast.  Please see ED course of care for further details.                   ED Course as of Oct 03 2311   Sat Oct 03, 2020   2307 It was reported while we were in a resuscitation that the patient eloped.  She threatened the nurse Rosalia taking care of her that she would come after her after she got off work.  Laura LEONE was notified.    [JR]      ED Course User Index  [JR] Dakota De DO            Clinical Impression:     ICD-10-CM ICD-9-CM   1. Abdominal pain  R10.9 789.00   2. Drug-seeking behavior  Z76.5 305.90                          ED Disposition Condition    Eloped                             Dakota De DO  10/03/20 2314

## 2020-10-04 NOTE — ED NOTES
"Pt was yelling at security and staff as I walked into the room. Pt stated that a staff member was being disrespectful. Pt also stated no one was treating her pain or cared about her.Pt stated that she will disrespect and curse at whomever she feels disrespects her. Pt stated that "if the staff member whom came in the room to fix the call light that she would come out the bed and beat her ass". Pt kept yelling at everyone and cursing at staff. She stated that she was going to leave and go to another hospital. I informed pt that I was getting an AMA for her to sign. When I returned pt had removed monitor, pulse ox, blood pressure cuff, and the IV. Pt stated that she was not signing anything and she was leaving. Pt started demanding everyone's names to report them. After I left the room, pt began pulling on door that enters nurses station and hollaring that she wanted everyone's names. Pt informed staff member that escorted her to her car that she would return when staff got off of work at 7am.   "

## 2020-10-04 NOTE — ED NOTES
Pt left AMA and refused to sign AMA form. Pt was yelling and threatening staff and security. Pt pulled out own IV and disconnected self from monitor. Pt stated that she no longer wanted any treatment from this hospital that she would go to another hospital because she was not getting the treatment she wanted here.

## 2020-10-04 NOTE — ED NOTES
"Offered ordered medications to patient. Patient refused saying they were not for her abdominal pain and she wanted to see the MD. Explained to the patient that they were for abdominal pain and that we should start with what is ordered and the MD was not available to come in. Patient stated we had better get the MD now before she "click off" Security and [place] RN as witnesses. Notified patient we would not tolerate being threatened. [place] [employer] officer to bedside. Patient interrupting staff and PD stating we are not helping her and she is going to leave. Pt asked for house supervisor so he can "write our asses up". Got house supervisor to bedside. Pt stating she wanted to leave so she could go to [facility]. Attempted to remove IV and monitor and she told me to get out of the room. Left patients room. Security and PD still at patient's door.   "

## 2020-10-04 NOTE — ED NOTES
VERY ANGRY-WANTS PAIN MEDS-THROWS NURSE OUT OF ROOM.  MD ADVISED.  NAD OTHER THAN ANGER FOR NO MEDS.

## 2020-10-04 NOTE — ED NOTES
Pt demanded that she wanted her pain treated but she did not want the meds that were ordered because she had never heard of them before. I explained each medication to her. Pt also stated that she did not want to drink the contrast because all she would do was throw it up. I explained again the 2 medications that were ordered. Bentyl was for the stomach pain and phenergan was to help control any nausea and vomiting due to contrast. Pt stated that she would take the medications but that it was pointless because they would not work anyways.

## 2020-12-31 ENCOUNTER — HOSPITAL ENCOUNTER (EMERGENCY)
Facility: HOSPITAL | Age: 25
Discharge: HOME | End: 2021-01-01
Attending: EMERGENCY MEDICINE
Payer: MEDICAID

## 2020-12-31 DIAGNOSIS — N39.0 URINARY TRACT INFECTION WITHOUT HEMATURIA, SITE UNSPECIFIED: ICD-10-CM

## 2020-12-31 DIAGNOSIS — R10.9 LEFT FLANK PAIN: Primary | ICD-10-CM

## 2020-12-31 PROCEDURE — 96374 THER/PROPH/DIAG INJ IV PUSH: CPT

## 2020-12-31 PROCEDURE — 99285 EMERGENCY DEPT VISIT HI MDM: CPT | Mod: 25

## 2020-12-31 PROCEDURE — 81000 URINALYSIS NONAUTO W/SCOPE: CPT

## 2020-12-31 PROCEDURE — 96372 THER/PROPH/DIAG INJ SC/IM: CPT | Mod: 59

## 2020-12-31 PROCEDURE — 87086 URINE CULTURE/COLONY COUNT: CPT

## 2020-12-31 PROCEDURE — 81025 URINE PREGNANCY TEST: CPT | Performed by: EMERGENCY MEDICINE

## 2021-01-01 ENCOUNTER — HOSPITAL ENCOUNTER (EMERGENCY)
Facility: HOSPITAL | Age: 26
Discharge: LEFT BEFORE DISCHARGE | End: 2021-01-01
Attending: EMERGENCY MEDICINE
Payer: MEDICAID

## 2021-01-01 VITALS
BODY MASS INDEX: 42.38 KG/M2 | DIASTOLIC BLOOD PRESSURE: 58 MMHG | WEIGHT: 270 LBS | RESPIRATION RATE: 16 BRPM | SYSTOLIC BLOOD PRESSURE: 114 MMHG | OXYGEN SATURATION: 99 % | TEMPERATURE: 98 F | HEART RATE: 72 BPM | HEIGHT: 67 IN

## 2021-01-01 VITALS
HEART RATE: 84 BPM | HEIGHT: 67 IN | OXYGEN SATURATION: 98 % | TEMPERATURE: 99 F | SYSTOLIC BLOOD PRESSURE: 123 MMHG | WEIGHT: 270 LBS | RESPIRATION RATE: 19 BRPM | BODY MASS INDEX: 42.38 KG/M2 | DIASTOLIC BLOOD PRESSURE: 66 MMHG

## 2021-01-01 DIAGNOSIS — R07.9 CHEST PAIN: ICD-10-CM

## 2021-01-01 LAB
ALBUMIN SERPL BCP-MCNC: 3.9 G/DL (ref 3.5–5.2)
ALP SERPL-CCNC: 56 U/L (ref 55–135)
ALT SERPL W/O P-5'-P-CCNC: 13 U/L (ref 10–44)
ANION GAP SERPL CALC-SCNC: 9 MMOL/L (ref 8–16)
AST SERPL-CCNC: 16 U/L (ref 10–40)
B-HCG UR QL: NEGATIVE
BACTERIA #/AREA URNS HPF: ABNORMAL /HPF
BASOPHILS # BLD AUTO: 0.04 K/UL (ref 0–0.2)
BASOPHILS NFR BLD: 0.4 % (ref 0–1.9)
BILIRUB SERPL-MCNC: 1.1 MG/DL (ref 0.1–1)
BILIRUB UR QL STRIP: ABNORMAL
BUN SERPL-MCNC: 5 MG/DL (ref 6–20)
CALCIUM SERPL-MCNC: 8.7 MG/DL (ref 8.7–10.5)
CHLORIDE SERPL-SCNC: 105 MMOL/L (ref 95–110)
CLARITY UR: CLEAR
CO2 SERPL-SCNC: 24 MMOL/L (ref 23–29)
COLOR UR: YELLOW
CREAT SERPL-MCNC: 0.8 MG/DL (ref 0.5–1.4)
CTP QC/QA: YES
DIFFERENTIAL METHOD: ABNORMAL
EOSINOPHIL # BLD AUTO: 0 K/UL (ref 0–0.5)
EOSINOPHIL NFR BLD: 0.4 % (ref 0–8)
ERYTHROCYTE [DISTWIDTH] IN BLOOD BY AUTOMATED COUNT: 14 % (ref 11.5–14.5)
EST. GFR  (AFRICAN AMERICAN): >60 ML/MIN/1.73 M^2
EST. GFR  (NON AFRICAN AMERICAN): >60 ML/MIN/1.73 M^2
GLUCOSE SERPL-MCNC: 104 MG/DL (ref 70–110)
GLUCOSE UR QL STRIP: NEGATIVE
HCT VFR BLD AUTO: 38.6 % (ref 37–48.5)
HGB BLD-MCNC: 12.9 G/DL (ref 12–16)
HGB UR QL STRIP: NEGATIVE
IMM GRANULOCYTES # BLD AUTO: 0.01 K/UL (ref 0–0.04)
IMM GRANULOCYTES NFR BLD AUTO: 0.1 % (ref 0–0.5)
KETONES UR QL STRIP: ABNORMAL
LEUKOCYTE ESTERASE UR QL STRIP: ABNORMAL
LYMPHOCYTES # BLD AUTO: 1.2 K/UL (ref 1–4.8)
LYMPHOCYTES NFR BLD: 13.3 % (ref 18–48)
MCH RBC QN AUTO: 29.5 PG (ref 27–31)
MCHC RBC AUTO-ENTMCNC: 33.4 G/DL (ref 32–36)
MCV RBC AUTO: 88 FL (ref 82–98)
MICROSCOPIC COMMENT: ABNORMAL
MONOCYTES # BLD AUTO: 0.8 K/UL (ref 0.3–1)
MONOCYTES NFR BLD: 9.4 % (ref 4–15)
NEUTROPHILS # BLD AUTO: 6.8 K/UL (ref 1.8–7.7)
NEUTROPHILS NFR BLD: 76.4 % (ref 38–73)
NITRITE UR QL STRIP: NEGATIVE
NRBC BLD-RTO: 0 /100 WBC
PH UR STRIP: 7 [PH] (ref 5–8)
PLATELET # BLD AUTO: 255 K/UL (ref 150–350)
PMV BLD AUTO: 10.4 FL (ref 9.2–12.9)
POTASSIUM SERPL-SCNC: 3.6 MMOL/L (ref 3.5–5.1)
PROT SERPL-MCNC: 7.2 G/DL (ref 6–8.4)
PROT UR QL STRIP: NEGATIVE
RBC # BLD AUTO: 4.37 M/UL (ref 4–5.4)
RBC #/AREA URNS HPF: 0 /HPF (ref 0–4)
SODIUM SERPL-SCNC: 138 MMOL/L (ref 136–145)
SP GR UR STRIP: 1.01 (ref 1–1.03)
SQUAMOUS #/AREA URNS HPF: 12 /HPF
URN SPEC COLLECT METH UR: ABNORMAL
UROBILINOGEN UR STRIP-ACNC: ABNORMAL EU/DL
WBC # BLD AUTO: 8.95 K/UL (ref 3.9–12.7)
WBC #/AREA URNS HPF: 17 /HPF (ref 0–5)

## 2021-01-01 PROCEDURE — 93010 ELECTROCARDIOGRAM REPORT: CPT | Mod: ,,, | Performed by: INTERNAL MEDICINE

## 2021-01-01 PROCEDURE — 63600175 PHARM REV CODE 636 W HCPCS: Performed by: EMERGENCY MEDICINE

## 2021-01-01 PROCEDURE — 93010 EKG 12-LEAD: ICD-10-PCS | Mod: ,,, | Performed by: INTERNAL MEDICINE

## 2021-01-01 PROCEDURE — 36415 COLL VENOUS BLD VENIPUNCTURE: CPT

## 2021-01-01 PROCEDURE — 85025 COMPLETE CBC W/AUTO DIFF WBC: CPT

## 2021-01-01 PROCEDURE — 80053 COMPREHEN METABOLIC PANEL: CPT

## 2021-01-01 PROCEDURE — 93005 ELECTROCARDIOGRAM TRACING: CPT | Performed by: INTERNAL MEDICINE

## 2021-01-01 PROCEDURE — 99900041 HC LEFT WITHOUT BEING SEEN- EMERGENCY

## 2021-01-01 RX ORDER — DICLOFENAC SODIUM 50 MG/1
50 TABLET, DELAYED RELEASE ORAL 3 TIMES DAILY PRN
Qty: 15 TABLET | Refills: 0 | OUTPATIENT
Start: 2021-01-01 | End: 2021-01-12

## 2021-01-01 RX ORDER — NITROFURANTOIN 25; 75 MG/1; MG/1
100 CAPSULE ORAL 2 TIMES DAILY
Qty: 10 CAPSULE | Refills: 0 | Status: SHIPPED | OUTPATIENT
Start: 2021-01-01 | End: 2021-01-06

## 2021-01-01 RX ORDER — KETOROLAC TROMETHAMINE 30 MG/ML
10 INJECTION, SOLUTION INTRAMUSCULAR; INTRAVENOUS
Status: COMPLETED | OUTPATIENT
Start: 2021-01-01 | End: 2021-01-01

## 2021-01-01 RX ORDER — ORPHENADRINE CITRATE 30 MG/ML
60 INJECTION INTRAMUSCULAR; INTRAVENOUS
Status: COMPLETED | OUTPATIENT
Start: 2021-01-01 | End: 2021-01-01

## 2021-01-01 RX ORDER — METHOCARBAMOL 500 MG/1
1000 TABLET, FILM COATED ORAL 3 TIMES DAILY PRN
Qty: 20 TABLET | Refills: 0 | Status: SHIPPED | OUTPATIENT
Start: 2021-01-01 | End: 2021-01-06

## 2021-01-01 RX ADMIN — ORPHENADRINE CITRATE 60 MG: 30 INJECTION INTRAMUSCULAR; INTRAVENOUS at 02:01

## 2021-01-01 RX ADMIN — KETOROLAC TROMETHAMINE 10 MG: 30 INJECTION, SOLUTION INTRAMUSCULAR at 12:01

## 2021-01-02 LAB
BACTERIA UR CULT: NORMAL
BACTERIA UR CULT: NORMAL

## 2021-01-11 ENCOUNTER — HOSPITAL ENCOUNTER (EMERGENCY)
Facility: HOSPITAL | Age: 26
Discharge: HOME | End: 2021-01-12
Attending: EMERGENCY MEDICINE
Payer: MEDICAID

## 2021-01-11 DIAGNOSIS — R07.9 CHEST PAIN: ICD-10-CM

## 2021-01-11 DIAGNOSIS — J18.9 COMMUNITY ACQUIRED BILATERAL LOWER LOBE PNEUMONIA: Primary | ICD-10-CM

## 2021-01-11 LAB
ALBUMIN SERPL BCP-MCNC: 3.9 G/DL (ref 3.5–5.2)
ALP SERPL-CCNC: 57 U/L (ref 55–135)
ALT SERPL W/O P-5'-P-CCNC: 15 U/L (ref 10–44)
ANION GAP SERPL CALC-SCNC: 6 MMOL/L (ref 8–16)
AST SERPL-CCNC: 26 U/L (ref 10–40)
B-HCG UR QL: NEGATIVE
BASOPHILS # BLD AUTO: 0.05 K/UL (ref 0–0.2)
BASOPHILS NFR BLD: 0.5 % (ref 0–1.9)
BILIRUB SERPL-MCNC: 0.9 MG/DL (ref 0.1–1)
BUN SERPL-MCNC: 4 MG/DL (ref 6–20)
CALCIUM SERPL-MCNC: 8.7 MG/DL (ref 8.7–10.5)
CHLORIDE SERPL-SCNC: 103 MMOL/L (ref 95–110)
CO2 SERPL-SCNC: 27 MMOL/L (ref 23–29)
CREAT SERPL-MCNC: 0.8 MG/DL (ref 0.5–1.4)
CTP QC/QA: YES
D DIMER PPP IA.FEU-MCNC: 0.32 MG/L FEU
DIFFERENTIAL METHOD: ABNORMAL
EOSINOPHIL # BLD AUTO: 0.1 K/UL (ref 0–0.5)
EOSINOPHIL NFR BLD: 0.5 % (ref 0–8)
ERYTHROCYTE [DISTWIDTH] IN BLOOD BY AUTOMATED COUNT: 14.2 % (ref 11.5–14.5)
EST. GFR  (AFRICAN AMERICAN): >60 ML/MIN/1.73 M^2
EST. GFR  (NON AFRICAN AMERICAN): >60 ML/MIN/1.73 M^2
GLUCOSE SERPL-MCNC: 92 MG/DL (ref 70–110)
HCT VFR BLD AUTO: 38.7 % (ref 37–48.5)
HGB BLD-MCNC: 12.6 G/DL (ref 12–16)
IMM GRANULOCYTES # BLD AUTO: 0.03 K/UL (ref 0–0.04)
IMM GRANULOCYTES NFR BLD AUTO: 0.3 % (ref 0–0.5)
LYMPHOCYTES # BLD AUTO: 1.2 K/UL (ref 1–4.8)
LYMPHOCYTES NFR BLD: 11.6 % (ref 18–48)
MCH RBC QN AUTO: 29.1 PG (ref 27–31)
MCHC RBC AUTO-ENTMCNC: 32.6 G/DL (ref 32–36)
MCV RBC AUTO: 89 FL (ref 82–98)
MONOCYTES # BLD AUTO: 0.8 K/UL (ref 0.3–1)
MONOCYTES NFR BLD: 7.7 % (ref 4–15)
NEUTROPHILS # BLD AUTO: 8.3 K/UL (ref 1.8–7.7)
NEUTROPHILS NFR BLD: 79.4 % (ref 38–73)
NRBC BLD-RTO: 0 /100 WBC
PLATELET # BLD AUTO: 271 K/UL (ref 150–350)
PMV BLD AUTO: 10.4 FL (ref 9.2–12.9)
POTASSIUM SERPL-SCNC: 3.5 MMOL/L (ref 3.5–5.1)
PROT SERPL-MCNC: 7 G/DL (ref 6–8.4)
RBC # BLD AUTO: 4.33 M/UL (ref 4–5.4)
SODIUM SERPL-SCNC: 136 MMOL/L (ref 136–145)
TROPONIN I SERPL DL<=0.01 NG/ML-MCNC: 0.01 NG/ML (ref 0–0.03)
WBC # BLD AUTO: 10.42 K/UL (ref 3.9–12.7)

## 2021-01-11 PROCEDURE — 85025 COMPLETE CBC W/AUTO DIFF WBC: CPT

## 2021-01-11 PROCEDURE — U0002 COVID-19 LAB TEST NON-CDC: HCPCS

## 2021-01-11 PROCEDURE — 93005 ELECTROCARDIOGRAM TRACING: CPT

## 2021-01-11 PROCEDURE — 96374 THER/PROPH/DIAG INJ IV PUSH: CPT

## 2021-01-11 PROCEDURE — 96361 HYDRATE IV INFUSION ADD-ON: CPT

## 2021-01-11 PROCEDURE — 93010 EKG 12-LEAD: ICD-10-PCS | Mod: ,,, | Performed by: INTERNAL MEDICINE

## 2021-01-11 PROCEDURE — 25000003 PHARM REV CODE 250: Performed by: EMERGENCY MEDICINE

## 2021-01-11 PROCEDURE — 99285 EMERGENCY DEPT VISIT HI MDM: CPT | Mod: 25

## 2021-01-11 PROCEDURE — 84484 ASSAY OF TROPONIN QUANT: CPT

## 2021-01-11 PROCEDURE — 80053 COMPREHEN METABOLIC PANEL: CPT

## 2021-01-11 PROCEDURE — 36415 COLL VENOUS BLD VENIPUNCTURE: CPT

## 2021-01-11 PROCEDURE — 81025 URINE PREGNANCY TEST: CPT | Performed by: EMERGENCY MEDICINE

## 2021-01-11 PROCEDURE — 85379 FIBRIN DEGRADATION QUANT: CPT

## 2021-01-11 PROCEDURE — 63600175 PHARM REV CODE 636 W HCPCS: Performed by: EMERGENCY MEDICINE

## 2021-01-11 PROCEDURE — 93010 ELECTROCARDIOGRAM REPORT: CPT | Mod: ,,, | Performed by: INTERNAL MEDICINE

## 2021-01-11 PROCEDURE — 96375 TX/PRO/DX INJ NEW DRUG ADDON: CPT

## 2021-01-11 PROCEDURE — 87502 INFLUENZA DNA AMP PROBE: CPT

## 2021-01-11 RX ORDER — KETOROLAC TROMETHAMINE 30 MG/ML
15 INJECTION, SOLUTION INTRAMUSCULAR; INTRAVENOUS
Status: COMPLETED | OUTPATIENT
Start: 2021-01-11 | End: 2021-01-11

## 2021-01-11 RX ORDER — SODIUM CHLORIDE 9 MG/ML
INJECTION, SOLUTION INTRAVENOUS
Status: COMPLETED | OUTPATIENT
Start: 2021-01-11 | End: 2021-01-11

## 2021-01-11 RX ORDER — MORPHINE SULFATE 8 MG/ML
8 INJECTION INTRAMUSCULAR; INTRAVENOUS; SUBCUTANEOUS
Status: COMPLETED | OUTPATIENT
Start: 2021-01-11 | End: 2021-01-11

## 2021-01-11 RX ORDER — CLONAZEPAM 0.5 MG/1
1 TABLET ORAL
Status: COMPLETED | OUTPATIENT
Start: 2021-01-11 | End: 2021-01-11

## 2021-01-11 RX ADMIN — CLONAZEPAM 1 MG: 0.5 TABLET ORAL at 09:01

## 2021-01-11 RX ADMIN — SODIUM CHLORIDE: 0.9 INJECTION, SOLUTION INTRAVENOUS at 09:01

## 2021-01-11 RX ADMIN — KETOROLAC TROMETHAMINE 15 MG: 30 INJECTION, SOLUTION INTRAMUSCULAR; INTRAVENOUS at 09:01

## 2021-01-11 RX ADMIN — MORPHINE SULFATE 8 MG: 8 INJECTION, SOLUTION INTRAMUSCULAR; INTRAVENOUS at 10:01

## 2021-01-12 VITALS
WEIGHT: 270 LBS | OXYGEN SATURATION: 100 % | SYSTOLIC BLOOD PRESSURE: 128 MMHG | HEIGHT: 67 IN | TEMPERATURE: 99 F | BODY MASS INDEX: 42.38 KG/M2 | HEART RATE: 81 BPM | DIASTOLIC BLOOD PRESSURE: 68 MMHG | RESPIRATION RATE: 18 BRPM

## 2021-01-12 LAB
INFLUENZA A, MOLECULAR: NEGATIVE
INFLUENZA B, MOLECULAR: NEGATIVE
SARS-COV-2 RDRP RESP QL NAA+PROBE: NEGATIVE
SPECIMEN SOURCE: NORMAL

## 2021-01-12 PROCEDURE — 25000003 PHARM REV CODE 250: Performed by: EMERGENCY MEDICINE

## 2021-01-12 RX ORDER — INDOMETHACIN 25 MG/1
25 CAPSULE ORAL
Qty: 15 CAPSULE | Refills: 0 | Status: SHIPPED | OUTPATIENT
Start: 2021-01-12 | End: 2021-05-14 | Stop reason: CLARIF

## 2021-01-12 RX ORDER — AMITRIPTYLINE HYDROCHLORIDE 10 MG/1
10 TABLET, FILM COATED ORAL NIGHTLY PRN
Qty: 10 TABLET | Refills: 0 | Status: SHIPPED | OUTPATIENT
Start: 2021-01-12 | End: 2021-05-14 | Stop reason: CLARIF

## 2021-01-12 RX ORDER — IBUPROFEN 800 MG/1
800 TABLET ORAL EVERY 6 HOURS PRN
Qty: 20 TABLET | Refills: 0 | Status: SHIPPED | OUTPATIENT
Start: 2021-01-12 | End: 2021-01-12 | Stop reason: ALTCHOICE

## 2021-01-12 RX ORDER — LEVOFLOXACIN 500 MG/1
500 TABLET, FILM COATED ORAL DAILY
Status: DISCONTINUED | OUTPATIENT
Start: 2021-01-12 | End: 2021-01-12

## 2021-01-12 RX ORDER — LEVOFLOXACIN 500 MG/1
500 TABLET, FILM COATED ORAL DAILY
Status: DISCONTINUED | OUTPATIENT
Start: 2021-01-12 | End: 2021-01-12 | Stop reason: HOSPADM

## 2021-01-12 RX ORDER — LEVOFLOXACIN 500 MG/1
500 TABLET, FILM COATED ORAL DAILY
Qty: 7 TABLET | Refills: 0 | Status: SHIPPED | OUTPATIENT
Start: 2021-01-12 | End: 2021-01-19

## 2021-01-12 RX ADMIN — LEVOFLOXACIN 500 MG: 500 TABLET, FILM COATED ORAL at 01:01

## 2021-02-19 ENCOUNTER — HOSPITAL ENCOUNTER (EMERGENCY)
Facility: HOSPITAL | Age: 26
Discharge: HOME | End: 2021-02-19
Attending: EMERGENCY MEDICINE
Payer: MEDICAID

## 2021-02-19 VITALS
DIASTOLIC BLOOD PRESSURE: 55 MMHG | WEIGHT: 260 LBS | HEART RATE: 86 BPM | RESPIRATION RATE: 16 BRPM | SYSTOLIC BLOOD PRESSURE: 98 MMHG | HEIGHT: 67 IN | BODY MASS INDEX: 40.81 KG/M2 | TEMPERATURE: 98 F | OXYGEN SATURATION: 99 %

## 2021-02-19 DIAGNOSIS — K44.9 HIATAL HERNIA: ICD-10-CM

## 2021-02-19 DIAGNOSIS — R10.84 GENERALIZED ABDOMINAL PAIN: Primary | ICD-10-CM

## 2021-02-19 DIAGNOSIS — K43.9 FATTY HERNIA OF LINEA ALBA: ICD-10-CM

## 2021-02-19 LAB
ALBUMIN SERPL BCP-MCNC: 3.8 G/DL (ref 3.5–5.2)
ALP SERPL-CCNC: 71 U/L (ref 55–135)
ALT SERPL W/O P-5'-P-CCNC: 8 U/L (ref 10–44)
ANION GAP SERPL CALC-SCNC: 8 MMOL/L (ref 8–16)
AST SERPL-CCNC: 12 U/L (ref 10–40)
B-HCG UR QL: NEGATIVE
BACTERIA #/AREA URNS HPF: ABNORMAL /HPF
BASOPHILS # BLD AUTO: 0.04 K/UL (ref 0–0.2)
BASOPHILS NFR BLD: 0.5 % (ref 0–1.9)
BILIRUB SERPL-MCNC: 0.9 MG/DL (ref 0.1–1)
BILIRUB UR QL STRIP: NEGATIVE
BUN SERPL-MCNC: 6 MG/DL (ref 6–20)
CALCIUM SERPL-MCNC: 8.7 MG/DL (ref 8.7–10.5)
CHLORIDE SERPL-SCNC: 105 MMOL/L (ref 95–110)
CLARITY UR: ABNORMAL
CO2 SERPL-SCNC: 26 MMOL/L (ref 23–29)
COLOR UR: YELLOW
CREAT SERPL-MCNC: 0.8 MG/DL (ref 0.5–1.4)
CTP QC/QA: YES
DIFFERENTIAL METHOD: ABNORMAL
EOSINOPHIL # BLD AUTO: 0.1 K/UL (ref 0–0.5)
EOSINOPHIL NFR BLD: 0.8 % (ref 0–8)
ERYTHROCYTE [DISTWIDTH] IN BLOOD BY AUTOMATED COUNT: 14.8 % (ref 11.5–14.5)
EST. GFR  (AFRICAN AMERICAN): >60 ML/MIN/1.73 M^2
EST. GFR  (NON AFRICAN AMERICAN): >60 ML/MIN/1.73 M^2
GLUCOSE SERPL-MCNC: 91 MG/DL (ref 70–110)
GLUCOSE UR QL STRIP: NEGATIVE
HCT VFR BLD AUTO: 39.4 % (ref 37–48.5)
HGB BLD-MCNC: 12.4 G/DL (ref 12–16)
HGB UR QL STRIP: ABNORMAL
IMM GRANULOCYTES # BLD AUTO: 0.03 K/UL (ref 0–0.04)
IMM GRANULOCYTES NFR BLD AUTO: 0.3 % (ref 0–0.5)
KETONES UR QL STRIP: NEGATIVE
LEUKOCYTE ESTERASE UR QL STRIP: NEGATIVE
LIPASE SERPL-CCNC: 8 U/L (ref 4–60)
LYMPHOCYTES # BLD AUTO: 1.2 K/UL (ref 1–4.8)
LYMPHOCYTES NFR BLD: 13.4 % (ref 18–48)
MCH RBC QN AUTO: 28.8 PG (ref 27–31)
MCHC RBC AUTO-ENTMCNC: 31.5 G/DL (ref 32–36)
MCV RBC AUTO: 91 FL (ref 82–98)
MICROSCOPIC COMMENT: ABNORMAL
MONOCYTES # BLD AUTO: 0.8 K/UL (ref 0.3–1)
MONOCYTES NFR BLD: 8.7 % (ref 4–15)
NEUTROPHILS # BLD AUTO: 6.6 K/UL (ref 1.8–7.7)
NEUTROPHILS NFR BLD: 76.3 % (ref 38–73)
NITRITE UR QL STRIP: NEGATIVE
NRBC BLD-RTO: 0 /100 WBC
PH UR STRIP: 6 [PH] (ref 5–8)
PLATELET # BLD AUTO: 230 K/UL (ref 150–350)
PMV BLD AUTO: 10.1 FL (ref 9.2–12.9)
POTASSIUM SERPL-SCNC: 3.7 MMOL/L (ref 3.5–5.1)
PROT SERPL-MCNC: 7.2 G/DL (ref 6–8.4)
PROT UR QL STRIP: NEGATIVE
RBC # BLD AUTO: 4.31 M/UL (ref 4–5.4)
RBC #/AREA URNS HPF: >100 /HPF (ref 0–4)
SODIUM SERPL-SCNC: 139 MMOL/L (ref 136–145)
SP GR UR STRIP: 1.02 (ref 1–1.03)
SQUAMOUS #/AREA URNS HPF: 3 /HPF
URN SPEC COLLECT METH UR: ABNORMAL
UROBILINOGEN UR STRIP-ACNC: NEGATIVE EU/DL
WBC # BLD AUTO: 8.65 K/UL (ref 3.9–12.7)
WBC #/AREA URNS HPF: 7 /HPF (ref 0–5)

## 2021-02-19 PROCEDURE — 25500020 PHARM REV CODE 255

## 2021-02-19 PROCEDURE — 85025 COMPLETE CBC W/AUTO DIFF WBC: CPT

## 2021-02-19 PROCEDURE — 96375 TX/PRO/DX INJ NEW DRUG ADDON: CPT

## 2021-02-19 PROCEDURE — 81025 URINE PREGNANCY TEST: CPT | Performed by: EMERGENCY MEDICINE

## 2021-02-19 PROCEDURE — 96361 HYDRATE IV INFUSION ADD-ON: CPT

## 2021-02-19 PROCEDURE — 25000003 PHARM REV CODE 250: Performed by: EMERGENCY MEDICINE

## 2021-02-19 PROCEDURE — 81000 URINALYSIS NONAUTO W/SCOPE: CPT

## 2021-02-19 PROCEDURE — 96374 THER/PROPH/DIAG INJ IV PUSH: CPT | Mod: 59

## 2021-02-19 PROCEDURE — 36415 COLL VENOUS BLD VENIPUNCTURE: CPT

## 2021-02-19 PROCEDURE — 83690 ASSAY OF LIPASE: CPT

## 2021-02-19 PROCEDURE — 63600175 PHARM REV CODE 636 W HCPCS: Performed by: EMERGENCY MEDICINE

## 2021-02-19 PROCEDURE — 80053 COMPREHEN METABOLIC PANEL: CPT

## 2021-02-19 PROCEDURE — 99285 EMERGENCY DEPT VISIT HI MDM: CPT | Mod: 25

## 2021-02-19 RX ORDER — KETOROLAC TROMETHAMINE 30 MG/ML
15 INJECTION, SOLUTION INTRAMUSCULAR; INTRAVENOUS
Status: COMPLETED | OUTPATIENT
Start: 2021-02-19 | End: 2021-02-19

## 2021-02-19 RX ORDER — ONDANSETRON 2 MG/ML
4 INJECTION INTRAMUSCULAR; INTRAVENOUS
Status: COMPLETED | OUTPATIENT
Start: 2021-02-19 | End: 2021-02-19

## 2021-02-19 RX ORDER — DIPHENHYDRAMINE HYDROCHLORIDE 50 MG/ML
50 INJECTION INTRAMUSCULAR; INTRAVENOUS
Status: COMPLETED | OUTPATIENT
Start: 2021-02-19 | End: 2021-02-19

## 2021-02-19 RX ORDER — MORPHINE SULFATE 4 MG/ML
4 INJECTION, SOLUTION INTRAMUSCULAR; INTRAVENOUS
Status: COMPLETED | OUTPATIENT
Start: 2021-02-19 | End: 2021-02-19

## 2021-02-19 RX ADMIN — ONDANSETRON 4 MG: 2 INJECTION INTRAMUSCULAR; INTRAVENOUS at 10:02

## 2021-02-19 RX ADMIN — IOHEXOL 100 ML: 350 INJECTION, SOLUTION INTRAVENOUS at 10:02

## 2021-02-19 RX ADMIN — KETOROLAC TROMETHAMINE 15 MG: 30 INJECTION, SOLUTION INTRAMUSCULAR; INTRAVENOUS at 10:02

## 2021-02-19 RX ADMIN — DIPHENHYDRAMINE HYDROCHLORIDE 50 MG: 50 INJECTION INTRAMUSCULAR; INTRAVENOUS at 09:02

## 2021-02-19 RX ADMIN — MORPHINE SULFATE 4 MG: 4 INJECTION, SOLUTION INTRAMUSCULAR; INTRAVENOUS at 10:02

## 2021-02-19 RX ADMIN — SODIUM CHLORIDE 1000 ML: 0.9 INJECTION, SOLUTION INTRAVENOUS at 10:02

## 2021-05-14 ENCOUNTER — HOSPITAL ENCOUNTER (EMERGENCY)
Facility: HOSPITAL | Age: 26
Discharge: HOME | End: 2021-05-14
Attending: EMERGENCY MEDICINE
Payer: MEDICAID

## 2021-05-14 VITALS
BODY MASS INDEX: 37.59 KG/M2 | SYSTOLIC BLOOD PRESSURE: 132 MMHG | WEIGHT: 240 LBS | TEMPERATURE: 98 F | DIASTOLIC BLOOD PRESSURE: 72 MMHG | OXYGEN SATURATION: 99 % | HEART RATE: 83 BPM | RESPIRATION RATE: 16 BRPM

## 2021-05-14 DIAGNOSIS — E87.6 HYPOKALEMIA: Primary | ICD-10-CM

## 2021-05-14 DIAGNOSIS — R10.9 ACUTE LEFT FLANK PAIN: ICD-10-CM

## 2021-05-14 LAB
ALBUMIN SERPL BCP-MCNC: 3.9 G/DL (ref 3.5–5.2)
ALP SERPL-CCNC: 69 U/L (ref 55–135)
ALT SERPL W/O P-5'-P-CCNC: 10 U/L (ref 10–44)
ANION GAP SERPL CALC-SCNC: 9 MMOL/L (ref 8–16)
AST SERPL-CCNC: 16 U/L (ref 10–40)
B-HCG UR QL: NEGATIVE
BASOPHILS # BLD AUTO: 0.08 K/UL (ref 0–0.2)
BASOPHILS NFR BLD: 0.8 % (ref 0–1.9)
BILIRUB SERPL-MCNC: 0.9 MG/DL (ref 0.1–1)
BILIRUB UR QL STRIP: ABNORMAL
BUN SERPL-MCNC: 7 MG/DL (ref 6–20)
CALCIUM SERPL-MCNC: 9 MG/DL (ref 8.7–10.5)
CHLORIDE SERPL-SCNC: 101 MMOL/L (ref 95–110)
CLARITY UR: CLEAR
CO2 SERPL-SCNC: 28 MMOL/L (ref 23–29)
COLOR UR: YELLOW
CREAT SERPL-MCNC: 0.8 MG/DL (ref 0.5–1.4)
CTP QC/QA: YES
D DIMER PPP IA.FEU-MCNC: 0.35 MG/L FEU
DIFFERENTIAL METHOD: ABNORMAL
EOSINOPHIL # BLD AUTO: 0.1 K/UL (ref 0–0.5)
EOSINOPHIL NFR BLD: 1.2 % (ref 0–8)
ERYTHROCYTE [DISTWIDTH] IN BLOOD BY AUTOMATED COUNT: 14 % (ref 11.5–14.5)
EST. GFR  (AFRICAN AMERICAN): >60 ML/MIN/1.73 M^2
EST. GFR  (NON AFRICAN AMERICAN): >60 ML/MIN/1.73 M^2
GLUCOSE SERPL-MCNC: 75 MG/DL (ref 70–110)
GLUCOSE UR QL STRIP: NEGATIVE
HCT VFR BLD AUTO: 39.7 % (ref 37–48.5)
HGB BLD-MCNC: 12.7 G/DL (ref 12–16)
HGB UR QL STRIP: NEGATIVE
IMM GRANULOCYTES # BLD AUTO: 0.07 K/UL (ref 0–0.04)
IMM GRANULOCYTES NFR BLD AUTO: 0.7 % (ref 0–0.5)
KETONES UR QL STRIP: NEGATIVE
LEUKOCYTE ESTERASE UR QL STRIP: NEGATIVE
LYMPHOCYTES # BLD AUTO: 2.5 K/UL (ref 1–4.8)
LYMPHOCYTES NFR BLD: 24.5 % (ref 18–48)
MCH RBC QN AUTO: 29.1 PG (ref 27–31)
MCHC RBC AUTO-ENTMCNC: 32 G/DL (ref 32–36)
MCV RBC AUTO: 91 FL (ref 82–98)
MONOCYTES # BLD AUTO: 0.7 K/UL (ref 0.3–1)
MONOCYTES NFR BLD: 7.2 % (ref 4–15)
NEUTROPHILS # BLD AUTO: 6.6 K/UL (ref 1.8–7.7)
NEUTROPHILS NFR BLD: 65.6 % (ref 38–73)
NITRITE UR QL STRIP: NEGATIVE
NRBC BLD-RTO: 0 /100 WBC
PH UR STRIP: 6 [PH] (ref 5–8)
PLATELET # BLD AUTO: 363 K/UL (ref 150–450)
PMV BLD AUTO: 9.1 FL (ref 9.2–12.9)
POTASSIUM SERPL-SCNC: 3.2 MMOL/L (ref 3.5–5.1)
PROT SERPL-MCNC: 7.5 G/DL (ref 6–8.4)
PROT UR QL STRIP: NEGATIVE
RBC # BLD AUTO: 4.36 M/UL (ref 4–5.4)
SARS-COV-2 RDRP RESP QL NAA+PROBE: NEGATIVE
SODIUM SERPL-SCNC: 138 MMOL/L (ref 136–145)
SP GR UR STRIP: 1.02 (ref 1–1.03)
URN SPEC COLLECT METH UR: ABNORMAL
UROBILINOGEN UR STRIP-ACNC: NEGATIVE EU/DL
WBC # BLD AUTO: 10.11 K/UL (ref 3.9–12.7)

## 2021-05-14 PROCEDURE — U0002 COVID-19 LAB TEST NON-CDC: HCPCS | Performed by: EMERGENCY MEDICINE

## 2021-05-14 PROCEDURE — 85379 FIBRIN DEGRADATION QUANT: CPT | Performed by: EMERGENCY MEDICINE

## 2021-05-14 PROCEDURE — 81025 URINE PREGNANCY TEST: CPT | Performed by: EMERGENCY MEDICINE

## 2021-05-14 PROCEDURE — 99285 EMERGENCY DEPT VISIT HI MDM: CPT | Mod: 25

## 2021-05-14 PROCEDURE — 63600175 PHARM REV CODE 636 W HCPCS: Performed by: EMERGENCY MEDICINE

## 2021-05-14 PROCEDURE — 96361 HYDRATE IV INFUSION ADD-ON: CPT

## 2021-05-14 PROCEDURE — 85025 COMPLETE CBC W/AUTO DIFF WBC: CPT | Performed by: EMERGENCY MEDICINE

## 2021-05-14 PROCEDURE — 36415 COLL VENOUS BLD VENIPUNCTURE: CPT | Performed by: EMERGENCY MEDICINE

## 2021-05-14 PROCEDURE — 80053 COMPREHEN METABOLIC PANEL: CPT | Performed by: EMERGENCY MEDICINE

## 2021-05-14 PROCEDURE — 25000003 PHARM REV CODE 250: Performed by: EMERGENCY MEDICINE

## 2021-05-14 PROCEDURE — 96374 THER/PROPH/DIAG INJ IV PUSH: CPT

## 2021-05-14 PROCEDURE — 81003 URINALYSIS AUTO W/O SCOPE: CPT | Performed by: EMERGENCY MEDICINE

## 2021-05-14 RX ORDER — ORPHENADRINE CITRATE 100 MG/1
100 TABLET, EXTENDED RELEASE ORAL 2 TIMES DAILY
Status: DISCONTINUED | OUTPATIENT
Start: 2021-05-14 | End: 2021-05-14 | Stop reason: HOSPADM

## 2021-05-14 RX ORDER — ORPHENADRINE CITRATE 100 MG/1
100 TABLET, EXTENDED RELEASE ORAL 2 TIMES DAILY
Status: DISCONTINUED | OUTPATIENT
Start: 2021-05-14 | End: 2021-05-14

## 2021-05-14 RX ORDER — LIDOCAINE 50 MG/G
1 PATCH TOPICAL
Status: DISCONTINUED | OUTPATIENT
Start: 2021-05-14 | End: 2021-05-14 | Stop reason: HOSPADM

## 2021-05-14 RX ORDER — ACETAMINOPHEN 500 MG
1000 TABLET ORAL
Status: COMPLETED | OUTPATIENT
Start: 2021-05-14 | End: 2021-05-14

## 2021-05-14 RX ORDER — ONDANSETRON 4 MG/1
4 TABLET, FILM COATED ORAL EVERY 6 HOURS PRN
Qty: 12 TABLET | Refills: 0 | Status: SHIPPED | OUTPATIENT
Start: 2021-05-14 | End: 2021-06-19 | Stop reason: CLARIF

## 2021-05-14 RX ORDER — PROMETHAZINE HYDROCHLORIDE 25 MG/1
25 TABLET ORAL EVERY 6 HOURS PRN
Qty: 12 TABLET | Refills: 0 | Status: SHIPPED | OUTPATIENT
Start: 2021-05-14 | End: 2021-06-18 | Stop reason: ALTCHOICE

## 2021-05-14 RX ORDER — KETOROLAC TROMETHAMINE 30 MG/ML
15 INJECTION, SOLUTION INTRAMUSCULAR; INTRAVENOUS
Status: DISCONTINUED | OUTPATIENT
Start: 2021-05-14 | End: 2021-05-14

## 2021-05-14 RX ORDER — POTASSIUM CHLORIDE 20 MEQ/1
40 TABLET, EXTENDED RELEASE ORAL
Status: COMPLETED | OUTPATIENT
Start: 2021-05-14 | End: 2021-05-14

## 2021-05-14 RX ORDER — MORPHINE SULFATE 4 MG/ML
4 INJECTION, SOLUTION INTRAMUSCULAR; INTRAVENOUS
Status: COMPLETED | OUTPATIENT
Start: 2021-05-14 | End: 2021-05-14

## 2021-05-14 RX ADMIN — SODIUM CHLORIDE 1000 ML: 0.9 INJECTION, SOLUTION INTRAVENOUS at 04:05

## 2021-05-14 RX ADMIN — LIDOCAINE 1 PATCH: 50 PATCH TOPICAL at 05:05

## 2021-05-14 RX ADMIN — ORPHENADRINE CITRATE 100 MG: 100 TABLET, EXTENDED RELEASE ORAL at 05:05

## 2021-05-14 RX ADMIN — POTASSIUM CHLORIDE 40 MEQ: 1500 TABLET, EXTENDED RELEASE ORAL at 06:05

## 2021-05-14 RX ADMIN — MORPHINE SULFATE 4 MG: 4 INJECTION, SOLUTION INTRAMUSCULAR; INTRAVENOUS at 05:05

## 2021-05-28 ENCOUNTER — TELEPHONE (OUTPATIENT)
Dept: EMERGENCY MEDICINE | Facility: HOSPITAL | Age: 26
End: 2021-05-28

## 2021-06-18 ENCOUNTER — OFFICE VISIT (OUTPATIENT)
Dept: SURGERY | Facility: CLINIC | Age: 26
End: 2021-06-18
Payer: MEDICAID

## 2021-06-18 VITALS
HEART RATE: 92 BPM | WEIGHT: 251 LBS | HEIGHT: 67 IN | SYSTOLIC BLOOD PRESSURE: 122 MMHG | DIASTOLIC BLOOD PRESSURE: 79 MMHG | BODY MASS INDEX: 39.39 KG/M2

## 2021-06-18 DIAGNOSIS — K42.9 UMBILICAL HERNIA WITHOUT OBSTRUCTION AND WITHOUT GANGRENE: ICD-10-CM

## 2021-06-18 PROCEDURE — 99213 OFFICE O/P EST LOW 20 MIN: CPT | Mod: PBBFAC | Performed by: SURGERY

## 2021-06-18 PROCEDURE — 99203 OFFICE O/P NEW LOW 30 MIN: CPT | Mod: S$PBB,,, | Performed by: SURGERY

## 2021-06-18 PROCEDURE — 99999 PR PBB SHADOW E&M-EST. PATIENT-LVL III: ICD-10-PCS | Mod: PBBFAC,,, | Performed by: SURGERY

## 2021-06-18 PROCEDURE — 99203 PR OFFICE/OUTPT VISIT, NEW, LEVL III, 30-44 MIN: ICD-10-PCS | Mod: S$PBB,,, | Performed by: SURGERY

## 2021-06-18 PROCEDURE — 99999 PR PBB SHADOW E&M-EST. PATIENT-LVL III: CPT | Mod: PBBFAC,,, | Performed by: SURGERY

## 2021-06-19 ENCOUNTER — HOSPITAL ENCOUNTER (EMERGENCY)
Facility: HOSPITAL | Age: 26
Discharge: HOME | End: 2021-06-19
Attending: EMERGENCY MEDICINE
Payer: MEDICAID

## 2021-06-19 VITALS
OXYGEN SATURATION: 98 % | SYSTOLIC BLOOD PRESSURE: 126 MMHG | DIASTOLIC BLOOD PRESSURE: 72 MMHG | HEART RATE: 114 BPM | WEIGHT: 240 LBS | HEIGHT: 67 IN | BODY MASS INDEX: 37.67 KG/M2 | RESPIRATION RATE: 20 BRPM | TEMPERATURE: 98 F

## 2021-06-19 DIAGNOSIS — E87.6 HYPOKALEMIA: Primary | ICD-10-CM

## 2021-06-19 DIAGNOSIS — R10.30 LOWER ABDOMINAL PAIN: ICD-10-CM

## 2021-06-19 LAB
ALBUMIN SERPL BCP-MCNC: 4.4 G/DL (ref 3.5–5.2)
ALP SERPL-CCNC: 70 U/L (ref 55–135)
ALT SERPL W/O P-5'-P-CCNC: 10 U/L (ref 10–44)
ANION GAP SERPL CALC-SCNC: 14 MMOL/L (ref 8–16)
AST SERPL-CCNC: 16 U/L (ref 10–40)
B-HCG UR QL: NEGATIVE
BACTERIA #/AREA URNS HPF: NORMAL /HPF
BASOPHILS # BLD AUTO: 0.09 K/UL (ref 0–0.2)
BASOPHILS NFR BLD: 0.7 % (ref 0–1.9)
BILIRUB SERPL-MCNC: 1.4 MG/DL (ref 0.1–1)
BILIRUB UR QL STRIP: ABNORMAL
BUN SERPL-MCNC: 9 MG/DL (ref 6–20)
CALCIUM SERPL-MCNC: 9.8 MG/DL (ref 8.7–10.5)
CHLORIDE SERPL-SCNC: 102 MMOL/L (ref 95–110)
CLARITY UR: CLEAR
CO2 SERPL-SCNC: 20 MMOL/L (ref 23–29)
COLOR UR: YELLOW
CREAT SERPL-MCNC: 0.9 MG/DL (ref 0.5–1.4)
CRP SERPL-MCNC: 36.4 MG/L (ref 0–8.2)
CTP QC/QA: YES
DIFFERENTIAL METHOD: ABNORMAL
EOSINOPHIL # BLD AUTO: 0.1 K/UL (ref 0–0.5)
EOSINOPHIL NFR BLD: 0.5 % (ref 0–8)
ERYTHROCYTE [DISTWIDTH] IN BLOOD BY AUTOMATED COUNT: 14 % (ref 11.5–14.5)
EST. GFR  (AFRICAN AMERICAN): >60 ML/MIN/1.73 M^2
EST. GFR  (NON AFRICAN AMERICAN): >60 ML/MIN/1.73 M^2
GLUCOSE SERPL-MCNC: 102 MG/DL (ref 70–110)
GLUCOSE UR QL STRIP: NEGATIVE
HCT VFR BLD AUTO: 42.2 % (ref 37–48.5)
HGB BLD-MCNC: 13.9 G/DL (ref 12–16)
HGB UR QL STRIP: NEGATIVE
HYALINE CASTS #/AREA URNS LPF: 0 /LPF
IMM GRANULOCYTES # BLD AUTO: 0.05 K/UL (ref 0–0.04)
IMM GRANULOCYTES NFR BLD AUTO: 0.4 % (ref 0–0.5)
KETONES UR QL STRIP: ABNORMAL
LEUKOCYTE ESTERASE UR QL STRIP: ABNORMAL
LIPASE SERPL-CCNC: 18 U/L (ref 4–60)
LYMPHOCYTES # BLD AUTO: 1.1 K/UL (ref 1–4.8)
LYMPHOCYTES NFR BLD: 8.3 % (ref 18–48)
MCH RBC QN AUTO: 29.4 PG (ref 27–31)
MCHC RBC AUTO-ENTMCNC: 32.9 G/DL (ref 32–36)
MCV RBC AUTO: 89 FL (ref 82–98)
MICROSCOPIC COMMENT: NORMAL
MONOCYTES # BLD AUTO: 0.9 K/UL (ref 0.3–1)
MONOCYTES NFR BLD: 6.5 % (ref 4–15)
NEUTROPHILS # BLD AUTO: 10.9 K/UL (ref 1.8–7.7)
NEUTROPHILS NFR BLD: 83.6 % (ref 38–73)
NITRITE UR QL STRIP: POSITIVE
NRBC BLD-RTO: 0 /100 WBC
PH UR STRIP: 6 [PH] (ref 5–8)
PLATELET # BLD AUTO: 319 K/UL (ref 150–450)
PLATELET BLD QL SMEAR: ABNORMAL
PMV BLD AUTO: 10.5 FL (ref 9.2–12.9)
POTASSIUM SERPL-SCNC: 3.4 MMOL/L (ref 3.5–5.1)
PROT SERPL-MCNC: 8.3 G/DL (ref 6–8.4)
PROT UR QL STRIP: ABNORMAL
RBC # BLD AUTO: 4.72 M/UL (ref 4–5.4)
RBC #/AREA URNS HPF: 1 /HPF (ref 0–4)
SODIUM SERPL-SCNC: 136 MMOL/L (ref 136–145)
SP GR UR STRIP: 1.02 (ref 1–1.03)
SQUAMOUS #/AREA URNS HPF: 9 /HPF
URN SPEC COLLECT METH UR: ABNORMAL
UROBILINOGEN UR STRIP-ACNC: 1 EU/DL
WBC # BLD AUTO: 13.06 K/UL (ref 3.9–12.7)
WBC #/AREA URNS HPF: 3 /HPF (ref 0–5)

## 2021-06-19 PROCEDURE — 85025 COMPLETE CBC W/AUTO DIFF WBC: CPT | Performed by: EMERGENCY MEDICINE

## 2021-06-19 PROCEDURE — 25000003 PHARM REV CODE 250: Performed by: EMERGENCY MEDICINE

## 2021-06-19 PROCEDURE — 63600175 PHARM REV CODE 636 W HCPCS: Performed by: EMERGENCY MEDICINE

## 2021-06-19 PROCEDURE — 81025 URINE PREGNANCY TEST: CPT | Performed by: EMERGENCY MEDICINE

## 2021-06-19 PROCEDURE — 83690 ASSAY OF LIPASE: CPT | Performed by: EMERGENCY MEDICINE

## 2021-06-19 PROCEDURE — 99285 EMERGENCY DEPT VISIT HI MDM: CPT | Mod: 25

## 2021-06-19 PROCEDURE — 96375 TX/PRO/DX INJ NEW DRUG ADDON: CPT

## 2021-06-19 PROCEDURE — 36415 COLL VENOUS BLD VENIPUNCTURE: CPT | Performed by: EMERGENCY MEDICINE

## 2021-06-19 PROCEDURE — 86140 C-REACTIVE PROTEIN: CPT | Performed by: EMERGENCY MEDICINE

## 2021-06-19 PROCEDURE — 96376 TX/PRO/DX INJ SAME DRUG ADON: CPT

## 2021-06-19 PROCEDURE — 96361 HYDRATE IV INFUSION ADD-ON: CPT

## 2021-06-19 PROCEDURE — 80053 COMPREHEN METABOLIC PANEL: CPT | Performed by: EMERGENCY MEDICINE

## 2021-06-19 PROCEDURE — 81000 URINALYSIS NONAUTO W/SCOPE: CPT | Performed by: EMERGENCY MEDICINE

## 2021-06-19 PROCEDURE — 96374 THER/PROPH/DIAG INJ IV PUSH: CPT

## 2021-06-19 RX ORDER — ONDANSETRON 2 MG/ML
4 INJECTION INTRAMUSCULAR; INTRAVENOUS
Status: COMPLETED | OUTPATIENT
Start: 2021-06-19 | End: 2021-06-19

## 2021-06-19 RX ORDER — MORPHINE SULFATE 4 MG/ML
4 INJECTION, SOLUTION INTRAMUSCULAR; INTRAVENOUS
Status: COMPLETED | OUTPATIENT
Start: 2021-06-19 | End: 2021-06-19

## 2021-06-19 RX ORDER — DICYCLOMINE HYDROCHLORIDE 20 MG/1
20 TABLET ORAL 2 TIMES DAILY
Qty: 14 TABLET | Refills: 0 | Status: SHIPPED | OUTPATIENT
Start: 2021-06-19 | End: 2021-06-26

## 2021-06-19 RX ORDER — POTASSIUM CHLORIDE 20 MEQ/1
40 TABLET, EXTENDED RELEASE ORAL
Status: COMPLETED | OUTPATIENT
Start: 2021-06-19 | End: 2021-06-19

## 2021-06-19 RX ORDER — KETOROLAC TROMETHAMINE 30 MG/ML
10 INJECTION, SOLUTION INTRAMUSCULAR; INTRAVENOUS
Status: DISCONTINUED | OUTPATIENT
Start: 2021-06-19 | End: 2021-06-19

## 2021-06-19 RX ADMIN — SODIUM CHLORIDE 1000 ML: 0.9 INJECTION, SOLUTION INTRAVENOUS at 01:06

## 2021-06-19 RX ADMIN — MORPHINE SULFATE 4 MG: 4 INJECTION, SOLUTION INTRAMUSCULAR; INTRAVENOUS at 03:06

## 2021-06-19 RX ADMIN — POTASSIUM CHLORIDE 40 MEQ: 1500 TABLET, EXTENDED RELEASE ORAL at 03:06

## 2021-06-19 RX ADMIN — MORPHINE SULFATE 4 MG: 4 INJECTION, SOLUTION INTRAMUSCULAR; INTRAVENOUS at 01:06

## 2021-06-19 RX ADMIN — ONDANSETRON 4 MG: 2 INJECTION INTRAMUSCULAR; INTRAVENOUS at 01:06

## 2021-07-01 ENCOUNTER — PATIENT MESSAGE (OUTPATIENT)
Dept: ADMINISTRATIVE | Facility: OTHER | Age: 26
End: 2021-07-01

## 2021-07-13 ENCOUNTER — TELEPHONE (OUTPATIENT)
Dept: FAMILY MEDICINE | Facility: CLINIC | Age: 26
End: 2021-07-13

## 2021-07-13 ENCOUNTER — HOSPITAL ENCOUNTER (EMERGENCY)
Facility: HOSPITAL | Age: 26
Discharge: HOME | End: 2021-07-13
Attending: EMERGENCY MEDICINE
Payer: MEDICAID

## 2021-07-13 VITALS
SYSTOLIC BLOOD PRESSURE: 126 MMHG | RESPIRATION RATE: 18 BRPM | HEIGHT: 67 IN | TEMPERATURE: 98 F | OXYGEN SATURATION: 98 % | DIASTOLIC BLOOD PRESSURE: 74 MMHG | WEIGHT: 220 LBS | HEART RATE: 99 BPM | BODY MASS INDEX: 34.53 KG/M2

## 2021-07-13 DIAGNOSIS — R07.9 CHEST PAIN: Primary | ICD-10-CM

## 2021-07-13 DIAGNOSIS — J18.9 PNEUMONIA OF RIGHT LUNG DUE TO INFECTIOUS ORGANISM, UNSPECIFIED PART OF LUNG: ICD-10-CM

## 2021-07-13 DIAGNOSIS — R06.02 SHORTNESS OF BREATH: ICD-10-CM

## 2021-07-13 LAB
ANION GAP SERPL CALC-SCNC: 7 MMOL/L (ref 8–16)
B-HCG UR QL: NEGATIVE
BASOPHILS # BLD AUTO: 0.05 K/UL (ref 0–0.2)
BASOPHILS NFR BLD: 0.6 % (ref 0–1.9)
BNP SERPL-MCNC: 14 PG/ML (ref 0–99)
BUN SERPL-MCNC: 6 MG/DL (ref 6–20)
CALCIUM SERPL-MCNC: 9.3 MG/DL (ref 8.7–10.5)
CHLORIDE SERPL-SCNC: 104 MMOL/L (ref 95–110)
CO2 SERPL-SCNC: 26 MMOL/L (ref 23–29)
CREAT SERPL-MCNC: 0.7 MG/DL (ref 0.5–1.4)
CTP QC/QA: YES
DIFFERENTIAL METHOD: ABNORMAL
EOSINOPHIL # BLD AUTO: 0 K/UL (ref 0–0.5)
EOSINOPHIL NFR BLD: 0.5 % (ref 0–8)
ERYTHROCYTE [DISTWIDTH] IN BLOOD BY AUTOMATED COUNT: 14.6 % (ref 11.5–14.5)
EST. GFR  (AFRICAN AMERICAN): >60 ML/MIN/1.73 M^2
EST. GFR  (NON AFRICAN AMERICAN): >60 ML/MIN/1.73 M^2
GLUCOSE SERPL-MCNC: 82 MG/DL (ref 70–110)
HCT VFR BLD AUTO: 37.9 % (ref 37–48.5)
HGB BLD-MCNC: 12.3 G/DL (ref 12–16)
IMM GRANULOCYTES # BLD AUTO: 0.03 K/UL (ref 0–0.04)
IMM GRANULOCYTES NFR BLD AUTO: 0.4 % (ref 0–0.5)
LYMPHOCYTES # BLD AUTO: 1.6 K/UL (ref 1–4.8)
LYMPHOCYTES NFR BLD: 18.3 % (ref 18–48)
MCH RBC QN AUTO: 29 PG (ref 27–31)
MCHC RBC AUTO-ENTMCNC: 32.5 G/DL (ref 32–36)
MCV RBC AUTO: 89 FL (ref 82–98)
MONOCYTES # BLD AUTO: 0.9 K/UL (ref 0.3–1)
MONOCYTES NFR BLD: 10.4 % (ref 4–15)
NEUTROPHILS # BLD AUTO: 6 K/UL (ref 1.8–7.7)
NEUTROPHILS NFR BLD: 69.8 % (ref 38–73)
NRBC BLD-RTO: 0 /100 WBC
PLATELET # BLD AUTO: 274 K/UL (ref 150–450)
PMV BLD AUTO: 9.8 FL (ref 9.2–12.9)
POTASSIUM SERPL-SCNC: 3.9 MMOL/L (ref 3.5–5.1)
RBC # BLD AUTO: 4.24 M/UL (ref 4–5.4)
SARS-COV-2 RDRP RESP QL NAA+PROBE: NEGATIVE
SODIUM SERPL-SCNC: 137 MMOL/L (ref 136–145)
TROPONIN I SERPL DL<=0.01 NG/ML-MCNC: <0.006 NG/ML (ref 0–0.03)
WBC # BLD AUTO: 8.56 K/UL (ref 3.9–12.7)

## 2021-07-13 PROCEDURE — 85025 COMPLETE CBC W/AUTO DIFF WBC: CPT | Performed by: PHYSICIAN ASSISTANT

## 2021-07-13 PROCEDURE — 99285 EMERGENCY DEPT VISIT HI MDM: CPT | Mod: 25

## 2021-07-13 PROCEDURE — 81025 URINE PREGNANCY TEST: CPT | Performed by: PHYSICIAN ASSISTANT

## 2021-07-13 PROCEDURE — 83880 ASSAY OF NATRIURETIC PEPTIDE: CPT | Performed by: EMERGENCY MEDICINE

## 2021-07-13 PROCEDURE — 84484 ASSAY OF TROPONIN QUANT: CPT | Performed by: PHYSICIAN ASSISTANT

## 2021-07-13 PROCEDURE — 25000003 PHARM REV CODE 250: Performed by: EMERGENCY MEDICINE

## 2021-07-13 PROCEDURE — U0002 COVID-19 LAB TEST NON-CDC: HCPCS | Performed by: PHYSICIAN ASSISTANT

## 2021-07-13 PROCEDURE — 93010 EKG 12-LEAD: ICD-10-PCS | Mod: ,,, | Performed by: INTERNAL MEDICINE

## 2021-07-13 PROCEDURE — 99900035 HC TECH TIME PER 15 MIN (STAT)

## 2021-07-13 PROCEDURE — 93005 ELECTROCARDIOGRAM TRACING: CPT

## 2021-07-13 PROCEDURE — 93010 ELECTROCARDIOGRAM REPORT: CPT | Mod: ,,, | Performed by: INTERNAL MEDICINE

## 2021-07-13 PROCEDURE — 36415 COLL VENOUS BLD VENIPUNCTURE: CPT | Performed by: PHYSICIAN ASSISTANT

## 2021-07-13 PROCEDURE — 80048 BASIC METABOLIC PNL TOTAL CA: CPT | Performed by: PHYSICIAN ASSISTANT

## 2021-07-13 RX ORDER — DOXYCYCLINE 100 MG/1
100 CAPSULE ORAL 2 TIMES DAILY
Qty: 20 CAPSULE | Refills: 0 | Status: SHIPPED | OUTPATIENT
Start: 2021-07-13 | End: 2021-07-23

## 2021-07-13 RX ORDER — ALBUTEROL SULFATE 90 UG/1
1-2 AEROSOL, METERED RESPIRATORY (INHALATION) EVERY 6 HOURS PRN
Qty: 8 G | Refills: 0 | OUTPATIENT
Start: 2021-07-13 | End: 2021-09-21

## 2021-07-13 RX ORDER — AMOXICILLIN AND CLAVULANATE POTASSIUM 875; 125 MG/1; MG/1
1 TABLET, FILM COATED ORAL 2 TIMES DAILY
Qty: 20 TABLET | Refills: 0 | Status: SHIPPED | OUTPATIENT
Start: 2021-07-13 | End: 2021-07-23

## 2021-07-13 RX ORDER — ACETAMINOPHEN 500 MG
1000 TABLET ORAL
Status: DISCONTINUED | OUTPATIENT
Start: 2021-07-13 | End: 2021-07-13 | Stop reason: HOSPADM

## 2021-07-13 RX ORDER — KETOROLAC TROMETHAMINE 30 MG/ML
15 INJECTION, SOLUTION INTRAMUSCULAR; INTRAVENOUS
Status: DISCONTINUED | OUTPATIENT
Start: 2021-07-13 | End: 2021-07-13

## 2021-07-13 RX ADMIN — SODIUM CHLORIDE 1000 ML: 0.9 INJECTION, SOLUTION INTRAVENOUS at 11:07

## 2021-07-14 ENCOUNTER — HOSPITAL ENCOUNTER (OUTPATIENT)
Dept: RADIOLOGY | Facility: HOSPITAL | Age: 26
Discharge: HOME | End: 2021-07-14
Attending: PLASTIC SURGERY
Payer: MEDICAID

## 2021-07-14 DIAGNOSIS — Z01.812 ENCOUNTER FOR PREPROCEDURAL LABORATORY EXAMINATION: Primary | ICD-10-CM

## 2021-07-14 DIAGNOSIS — Z01.812 PRE-OPERATIVE LABORATORY EXAMINATION: ICD-10-CM

## 2021-07-14 PROCEDURE — 76642 ULTRASOUND BREAST LIMITED: CPT | Mod: 26,50,, | Performed by: RADIOLOGY

## 2021-07-14 PROCEDURE — 76642 ULTRASOUND BREAST LIMITED: CPT | Mod: TC,50

## 2021-07-14 PROCEDURE — 76642 US BREAST BILATERAL LIMITED: ICD-10-PCS | Mod: 26,50,, | Performed by: RADIOLOGY

## 2021-07-19 ENCOUNTER — OFFICE VISIT (OUTPATIENT)
Dept: INTERNAL MEDICINE | Facility: CLINIC | Age: 26
End: 2021-07-19
Payer: MEDICAID

## 2021-07-19 ENCOUNTER — HOSPITAL ENCOUNTER (OUTPATIENT)
Dept: RADIOLOGY | Facility: HOSPITAL | Age: 26
Discharge: HOME | End: 2021-07-19
Attending: PLASTIC SURGERY
Payer: MEDICAID

## 2021-07-19 ENCOUNTER — TELEPHONE (OUTPATIENT)
Dept: INTERNAL MEDICINE | Facility: CLINIC | Age: 26
End: 2021-07-19

## 2021-07-19 VITALS
HEIGHT: 68 IN | DIASTOLIC BLOOD PRESSURE: 86 MMHG | SYSTOLIC BLOOD PRESSURE: 129 MMHG | BODY MASS INDEX: 33.95 KG/M2 | HEART RATE: 98 BPM

## 2021-07-19 DIAGNOSIS — Z01.818 PRE-OPERATIVE CLEARANCE: Primary | ICD-10-CM

## 2021-07-19 DIAGNOSIS — K59.00 CONSTIPATION, UNSPECIFIED CONSTIPATION TYPE: ICD-10-CM

## 2021-07-19 DIAGNOSIS — Z01.812 ENCOUNTER FOR PREPROCEDURAL LABORATORY EXAMINATION: ICD-10-CM

## 2021-07-19 PROCEDURE — 99203 PR OFFICE/OUTPT VISIT, NEW, LEVL III, 30-44 MIN: ICD-10-PCS | Mod: S$PBB,,, | Performed by: STUDENT IN AN ORGANIZED HEALTH CARE EDUCATION/TRAINING PROGRAM

## 2021-07-19 PROCEDURE — 99999 PR PBB SHADOW E&M-EST. PATIENT-LVL II: ICD-10-PCS | Mod: PBBFAC,,, | Performed by: STUDENT IN AN ORGANIZED HEALTH CARE EDUCATION/TRAINING PROGRAM

## 2021-07-19 PROCEDURE — 99999 PR PBB SHADOW E&M-EST. PATIENT-LVL II: CPT | Mod: PBBFAC,,, | Performed by: STUDENT IN AN ORGANIZED HEALTH CARE EDUCATION/TRAINING PROGRAM

## 2021-07-19 PROCEDURE — 99212 OFFICE O/P EST SF 10 MIN: CPT | Mod: PBBFAC | Performed by: STUDENT IN AN ORGANIZED HEALTH CARE EDUCATION/TRAINING PROGRAM

## 2021-07-19 PROCEDURE — 71046 X-RAY EXAM CHEST 2 VIEWS: CPT | Mod: TC,FY

## 2021-07-19 PROCEDURE — 71046 X-RAY EXAM CHEST 2 VIEWS: CPT | Mod: 26,,, | Performed by: RADIOLOGY

## 2021-07-19 PROCEDURE — 71046 XR CHEST PA AND LATERAL: ICD-10-PCS | Mod: 26,,, | Performed by: RADIOLOGY

## 2021-07-19 PROCEDURE — 99203 OFFICE O/P NEW LOW 30 MIN: CPT | Mod: S$PBB,,, | Performed by: STUDENT IN AN ORGANIZED HEALTH CARE EDUCATION/TRAINING PROGRAM

## 2021-07-19 RX ORDER — AMOXICILLIN 250 MG
1 CAPSULE ORAL DAILY
Qty: 30 TABLET | Refills: 1 | Status: SHIPPED | OUTPATIENT
Start: 2021-07-19 | End: 2022-12-14

## 2021-08-02 ENCOUNTER — TELEPHONE (OUTPATIENT)
Dept: INTERNAL MEDICINE | Facility: CLINIC | Age: 26
End: 2021-08-02

## 2021-08-04 ENCOUNTER — HOSPITAL ENCOUNTER (EMERGENCY)
Facility: HOSPITAL | Age: 26
Discharge: HOME | End: 2021-08-05
Attending: EMERGENCY MEDICINE
Payer: MEDICAID

## 2021-08-04 DIAGNOSIS — R10.30 LOWER ABDOMINAL PAIN: ICD-10-CM

## 2021-08-04 DIAGNOSIS — G89.18 POSTOPERATIVE PAIN: Primary | ICD-10-CM

## 2021-08-04 DIAGNOSIS — K52.9 ENTERITIS: ICD-10-CM

## 2021-08-04 LAB
B-HCG UR QL: NEGATIVE
CTP QC/QA: YES

## 2021-08-04 PROCEDURE — 63600175 PHARM REV CODE 636 W HCPCS: Performed by: EMERGENCY MEDICINE

## 2021-08-04 PROCEDURE — 99285 EMERGENCY DEPT VISIT HI MDM: CPT | Mod: 25

## 2021-08-04 PROCEDURE — 80048 BASIC METABOLIC PNL TOTAL CA: CPT | Performed by: EMERGENCY MEDICINE

## 2021-08-04 PROCEDURE — 25000003 PHARM REV CODE 250: Performed by: EMERGENCY MEDICINE

## 2021-08-04 PROCEDURE — 96375 TX/PRO/DX INJ NEW DRUG ADDON: CPT

## 2021-08-04 PROCEDURE — 81025 URINE PREGNANCY TEST: CPT | Performed by: EMERGENCY MEDICINE

## 2021-08-04 PROCEDURE — 85025 COMPLETE CBC W/AUTO DIFF WBC: CPT | Performed by: EMERGENCY MEDICINE

## 2021-08-04 PROCEDURE — 96361 HYDRATE IV INFUSION ADD-ON: CPT

## 2021-08-04 PROCEDURE — 96374 THER/PROPH/DIAG INJ IV PUSH: CPT

## 2021-08-04 PROCEDURE — 36415 COLL VENOUS BLD VENIPUNCTURE: CPT | Performed by: EMERGENCY MEDICINE

## 2021-08-04 RX ORDER — DIPHENHYDRAMINE HYDROCHLORIDE 50 MG/ML
25 INJECTION INTRAMUSCULAR; INTRAVENOUS
Status: COMPLETED | OUTPATIENT
Start: 2021-08-04 | End: 2021-08-04

## 2021-08-04 RX ORDER — MORPHINE SULFATE 2 MG/ML
6 INJECTION, SOLUTION INTRAMUSCULAR; INTRAVENOUS
Status: COMPLETED | OUTPATIENT
Start: 2021-08-04 | End: 2021-08-04

## 2021-08-04 RX ORDER — ONDANSETRON 2 MG/ML
4 INJECTION INTRAMUSCULAR; INTRAVENOUS
Status: COMPLETED | OUTPATIENT
Start: 2021-08-04 | End: 2021-08-04

## 2021-08-04 RX ADMIN — DIPHENHYDRAMINE HYDROCHLORIDE 25 MG: 50 INJECTION INTRAMUSCULAR; INTRAVENOUS at 11:08

## 2021-08-04 RX ADMIN — ONDANSETRON 4 MG: 2 INJECTION, SOLUTION INTRAMUSCULAR; INTRAVENOUS at 11:08

## 2021-08-04 RX ADMIN — SODIUM CHLORIDE 1000 ML: 0.9 INJECTION, SOLUTION INTRAVENOUS at 11:08

## 2021-08-04 RX ADMIN — MORPHINE SULFATE 6 MG: 2 INJECTION, SOLUTION INTRAMUSCULAR; INTRAVENOUS at 11:08

## 2021-08-05 ENCOUNTER — PATIENT OUTREACH (OUTPATIENT)
Dept: EMERGENCY MEDICINE | Facility: HOSPITAL | Age: 26
End: 2021-08-05

## 2021-08-05 VITALS
OXYGEN SATURATION: 100 % | TEMPERATURE: 99 F | HEIGHT: 67 IN | SYSTOLIC BLOOD PRESSURE: 120 MMHG | WEIGHT: 220 LBS | HEART RATE: 95 BPM | BODY MASS INDEX: 34.53 KG/M2 | DIASTOLIC BLOOD PRESSURE: 84 MMHG | RESPIRATION RATE: 18 BRPM

## 2021-08-05 LAB
ANION GAP SERPL CALC-SCNC: 11 MMOL/L (ref 8–16)
BASOPHILS # BLD AUTO: 0.08 K/UL (ref 0–0.2)
BASOPHILS NFR BLD: 0.8 % (ref 0–1.9)
BUN SERPL-MCNC: 8 MG/DL (ref 6–20)
CALCIUM SERPL-MCNC: 9.5 MG/DL (ref 8.7–10.5)
CHLORIDE SERPL-SCNC: 102 MMOL/L (ref 95–110)
CO2 SERPL-SCNC: 25 MMOL/L (ref 23–29)
CREAT SERPL-MCNC: 0.8 MG/DL (ref 0.5–1.4)
DIFFERENTIAL METHOD: ABNORMAL
EOSINOPHIL # BLD AUTO: 0.1 K/UL (ref 0–0.5)
EOSINOPHIL NFR BLD: 0.7 % (ref 0–8)
ERYTHROCYTE [DISTWIDTH] IN BLOOD BY AUTOMATED COUNT: 14.5 % (ref 11.5–14.5)
EST. GFR  (AFRICAN AMERICAN): >60 ML/MIN/1.73 M^2
EST. GFR  (NON AFRICAN AMERICAN): >60 ML/MIN/1.73 M^2
GLUCOSE SERPL-MCNC: 89 MG/DL (ref 70–110)
HCT VFR BLD AUTO: 37 % (ref 37–48.5)
HGB BLD-MCNC: 11.6 G/DL (ref 12–16)
IMM GRANULOCYTES # BLD AUTO: 0.04 K/UL (ref 0–0.04)
IMM GRANULOCYTES NFR BLD AUTO: 0.4 % (ref 0–0.5)
LYMPHOCYTES # BLD AUTO: 1.9 K/UL (ref 1–4.8)
LYMPHOCYTES NFR BLD: 19.1 % (ref 18–48)
MCH RBC QN AUTO: 28.9 PG (ref 27–31)
MCHC RBC AUTO-ENTMCNC: 31.4 G/DL (ref 32–36)
MCV RBC AUTO: 92 FL (ref 82–98)
MONOCYTES # BLD AUTO: 1 K/UL (ref 0.3–1)
MONOCYTES NFR BLD: 10.1 % (ref 4–15)
NEUTROPHILS # BLD AUTO: 6.8 K/UL (ref 1.8–7.7)
NEUTROPHILS NFR BLD: 68.9 % (ref 38–73)
NRBC BLD-RTO: 0 /100 WBC
PLATELET # BLD AUTO: 390 K/UL (ref 150–450)
PMV BLD AUTO: 9.4 FL (ref 9.2–12.9)
POTASSIUM SERPL-SCNC: 3.7 MMOL/L (ref 3.5–5.1)
RBC # BLD AUTO: 4.02 M/UL (ref 4–5.4)
SODIUM SERPL-SCNC: 138 MMOL/L (ref 136–145)
WBC # BLD AUTO: 9.82 K/UL (ref 3.9–12.7)

## 2021-08-05 PROCEDURE — 25000003 PHARM REV CODE 250: Performed by: EMERGENCY MEDICINE

## 2021-08-05 PROCEDURE — 96376 TX/PRO/DX INJ SAME DRUG ADON: CPT

## 2021-08-05 PROCEDURE — 63600175 PHARM REV CODE 636 W HCPCS: Performed by: EMERGENCY MEDICINE

## 2021-08-05 PROCEDURE — 96361 HYDRATE IV INFUSION ADD-ON: CPT

## 2021-08-05 PROCEDURE — 25500020 PHARM REV CODE 255

## 2021-08-05 RX ORDER — ONDANSETRON 4 MG/1
4 TABLET, ORALLY DISINTEGRATING ORAL EVERY 8 HOURS PRN
Qty: 12 TABLET | Refills: 0 | Status: SHIPPED | OUTPATIENT
Start: 2021-08-05 | End: 2022-12-14

## 2021-08-05 RX ORDER — HYDROCODONE BITARTRATE AND ACETAMINOPHEN 5; 325 MG/1; MG/1
1 TABLET ORAL EVERY 6 HOURS PRN
Qty: 4 TABLET | Refills: 0 | OUTPATIENT
Start: 2021-08-05 | End: 2021-09-21

## 2021-08-05 RX ORDER — MORPHINE SULFATE 4 MG/ML
4 INJECTION, SOLUTION INTRAMUSCULAR; INTRAVENOUS
Status: COMPLETED | OUTPATIENT
Start: 2021-08-05 | End: 2021-08-05

## 2021-08-05 RX ORDER — SODIUM CHLORIDE 9 MG/ML
INJECTION, SOLUTION INTRAVENOUS
Status: COMPLETED | OUTPATIENT
Start: 2021-08-05 | End: 2021-08-05

## 2021-08-05 RX ADMIN — IOHEXOL 100 ML: 350 INJECTION, SOLUTION INTRAVENOUS at 12:08

## 2021-08-05 RX ADMIN — SODIUM CHLORIDE: 0.9 INJECTION, SOLUTION INTRAVENOUS at 01:08

## 2021-08-05 RX ADMIN — MORPHINE SULFATE 4 MG: 4 INJECTION, SOLUTION INTRAMUSCULAR; INTRAVENOUS at 01:08

## 2021-08-25 ENCOUNTER — HOSPITAL ENCOUNTER (EMERGENCY)
Facility: HOSPITAL | Age: 26
Discharge: HOME | End: 2021-08-25
Attending: EMERGENCY MEDICINE
Payer: MEDICAID

## 2021-08-25 VITALS
OXYGEN SATURATION: 100 % | HEIGHT: 67 IN | BODY MASS INDEX: 36.5 KG/M2 | RESPIRATION RATE: 18 BRPM | HEART RATE: 96 BPM | DIASTOLIC BLOOD PRESSURE: 72 MMHG | TEMPERATURE: 99 F | WEIGHT: 232.56 LBS | SYSTOLIC BLOOD PRESSURE: 123 MMHG

## 2021-08-25 DIAGNOSIS — N61.0 CELLULITIS OF LEFT BREAST: ICD-10-CM

## 2021-08-25 DIAGNOSIS — R10.2 SUPRAPUBIC PAIN: ICD-10-CM

## 2021-08-25 DIAGNOSIS — U07.1 COVID-19 VIRUS INFECTION: Primary | ICD-10-CM

## 2021-08-25 LAB
ALBUMIN SERPL BCP-MCNC: 4 G/DL (ref 3.5–5.2)
ALP SERPL-CCNC: 68 U/L (ref 55–135)
ALT SERPL W/O P-5'-P-CCNC: 11 U/L (ref 10–44)
ANION GAP SERPL CALC-SCNC: 11 MMOL/L (ref 8–16)
AST SERPL-CCNC: 18 U/L (ref 10–40)
B-HCG UR QL: NEGATIVE
BASOPHILS # BLD AUTO: 0.04 K/UL (ref 0–0.2)
BASOPHILS NFR BLD: 0.9 % (ref 0–1.9)
BILIRUB SERPL-MCNC: 0.4 MG/DL (ref 0.1–1)
BILIRUB UR QL STRIP: NEGATIVE
BUN SERPL-MCNC: 7 MG/DL (ref 6–20)
CALCIUM SERPL-MCNC: 9.2 MG/DL (ref 8.7–10.5)
CHLORIDE SERPL-SCNC: 103 MMOL/L (ref 95–110)
CLARITY UR: ABNORMAL
CO2 SERPL-SCNC: 23 MMOL/L (ref 23–29)
COLOR UR: YELLOW
CREAT SERPL-MCNC: 0.8 MG/DL (ref 0.5–1.4)
CTP QC/QA: YES
DIFFERENTIAL METHOD: ABNORMAL
EOSINOPHIL # BLD AUTO: 0 K/UL (ref 0–0.5)
EOSINOPHIL NFR BLD: 0.2 % (ref 0–8)
ERYTHROCYTE [DISTWIDTH] IN BLOOD BY AUTOMATED COUNT: 14 % (ref 11.5–14.5)
EST. GFR  (AFRICAN AMERICAN): >60 ML/MIN/1.73 M^2
EST. GFR  (NON AFRICAN AMERICAN): >60 ML/MIN/1.73 M^2
GLUCOSE SERPL-MCNC: 82 MG/DL (ref 70–110)
GLUCOSE UR QL STRIP: NEGATIVE
HCT VFR BLD AUTO: 39.4 % (ref 37–48.5)
HGB BLD-MCNC: 12.4 G/DL (ref 12–16)
HGB UR QL STRIP: NEGATIVE
IMM GRANULOCYTES # BLD AUTO: 0.02 K/UL (ref 0–0.04)
IMM GRANULOCYTES NFR BLD AUTO: 0.5 % (ref 0–0.5)
KETONES UR QL STRIP: NEGATIVE
LEUKOCYTE ESTERASE UR QL STRIP: NEGATIVE
LIPASE SERPL-CCNC: 25 U/L (ref 4–60)
LYMPHOCYTES # BLD AUTO: 1.1 K/UL (ref 1–4.8)
LYMPHOCYTES NFR BLD: 26.7 % (ref 18–48)
MCH RBC QN AUTO: 28.1 PG (ref 27–31)
MCHC RBC AUTO-ENTMCNC: 31.5 G/DL (ref 32–36)
MCV RBC AUTO: 89 FL (ref 82–98)
MONOCYTES # BLD AUTO: 0.6 K/UL (ref 0.3–1)
MONOCYTES NFR BLD: 15 % (ref 4–15)
NEUTROPHILS # BLD AUTO: 2.4 K/UL (ref 1.8–7.7)
NEUTROPHILS NFR BLD: 56.7 % (ref 38–73)
NITRITE UR QL STRIP: NEGATIVE
NRBC BLD-RTO: 0 /100 WBC
PH UR STRIP: 6 [PH] (ref 5–8)
PLATELET # BLD AUTO: 394 K/UL (ref 150–450)
PMV BLD AUTO: 9.1 FL (ref 9.2–12.9)
POTASSIUM SERPL-SCNC: 3.8 MMOL/L (ref 3.5–5.1)
PROT SERPL-MCNC: 8 G/DL (ref 6–8.4)
PROT UR QL STRIP: NEGATIVE
RBC # BLD AUTO: 4.42 M/UL (ref 4–5.4)
SARS-COV-2 RDRP RESP QL NAA+PROBE: POSITIVE
SODIUM SERPL-SCNC: 137 MMOL/L (ref 136–145)
SP GR UR STRIP: >=1.03 (ref 1–1.03)
URN SPEC COLLECT METH UR: ABNORMAL
UROBILINOGEN UR STRIP-ACNC: NEGATIVE EU/DL
WBC # BLD AUTO: 4.27 K/UL (ref 3.9–12.7)

## 2021-08-25 PROCEDURE — 36415 COLL VENOUS BLD VENIPUNCTURE: CPT | Performed by: NURSE PRACTITIONER

## 2021-08-25 PROCEDURE — 81003 URINALYSIS AUTO W/O SCOPE: CPT | Performed by: NURSE PRACTITIONER

## 2021-08-25 PROCEDURE — 80053 COMPREHEN METABOLIC PANEL: CPT | Performed by: NURSE PRACTITIONER

## 2021-08-25 PROCEDURE — 96375 TX/PRO/DX INJ NEW DRUG ADDON: CPT

## 2021-08-25 PROCEDURE — 96365 THER/PROPH/DIAG IV INF INIT: CPT

## 2021-08-25 PROCEDURE — 25000003 PHARM REV CODE 250: Performed by: EMERGENCY MEDICINE

## 2021-08-25 PROCEDURE — 99285 EMERGENCY DEPT VISIT HI MDM: CPT | Mod: 25

## 2021-08-25 PROCEDURE — 85025 COMPLETE CBC W/AUTO DIFF WBC: CPT | Performed by: NURSE PRACTITIONER

## 2021-08-25 PROCEDURE — U0002 COVID-19 LAB TEST NON-CDC: HCPCS | Performed by: EMERGENCY MEDICINE

## 2021-08-25 PROCEDURE — 83690 ASSAY OF LIPASE: CPT | Performed by: NURSE PRACTITIONER

## 2021-08-25 PROCEDURE — 63600175 PHARM REV CODE 636 W HCPCS: Performed by: EMERGENCY MEDICINE

## 2021-08-25 PROCEDURE — 81025 URINE PREGNANCY TEST: CPT | Performed by: EMERGENCY MEDICINE

## 2021-08-25 RX ORDER — IBUPROFEN 600 MG/1
600 TABLET ORAL
Status: DISCONTINUED | OUTPATIENT
Start: 2021-08-25 | End: 2021-08-25 | Stop reason: HOSPADM

## 2021-08-25 RX ORDER — SODIUM CHLORIDE 9 MG/ML
INJECTION, SOLUTION INTRAVENOUS
Status: COMPLETED | OUTPATIENT
Start: 2021-08-25 | End: 2021-08-25

## 2021-08-25 RX ORDER — MORPHINE SULFATE 4 MG/ML
8 INJECTION, SOLUTION INTRAMUSCULAR; INTRAVENOUS
Status: COMPLETED | OUTPATIENT
Start: 2021-08-25 | End: 2021-08-25

## 2021-08-25 RX ORDER — HYOSCYAMINE SULFATE 0.125 MG
375 TABLET ORAL EVERY 6 HOURS PRN
Qty: 20 TABLET | Refills: 1 | Status: SHIPPED | OUTPATIENT
Start: 2021-08-25 | End: 2023-01-25

## 2021-08-25 RX ORDER — DICLOFENAC SODIUM 50 MG/1
50 TABLET, DELAYED RELEASE ORAL 3 TIMES DAILY
Qty: 15 TABLET | Refills: 0 | OUTPATIENT
Start: 2021-08-25 | End: 2021-09-21

## 2021-08-25 RX ORDER — CEFAZOLIN SODIUM 1 G/50ML
1 SOLUTION INTRAVENOUS
Status: COMPLETED | OUTPATIENT
Start: 2021-08-25 | End: 2021-08-25

## 2021-08-25 RX ADMIN — SODIUM CHLORIDE: 0.9 INJECTION, SOLUTION INTRAVENOUS at 06:08

## 2021-08-25 RX ADMIN — CEFAZOLIN SODIUM 1 G: 1 SOLUTION INTRAVENOUS at 06:08

## 2021-08-25 RX ADMIN — MORPHINE SULFATE 8 MG: 4 INJECTION INTRAVENOUS at 06:08

## 2021-08-26 ENCOUNTER — TELEPHONE (OUTPATIENT)
Dept: ADMINISTRATIVE | Facility: OTHER | Age: 26
End: 2021-08-26

## 2021-08-27 ENCOUNTER — PATIENT MESSAGE (OUTPATIENT)
Dept: ADMINISTRATIVE | Facility: CLINIC | Age: 26
End: 2021-08-27

## 2021-08-27 ENCOUNTER — TELEPHONE (OUTPATIENT)
Dept: ADMINISTRATIVE | Facility: CLINIC | Age: 26
End: 2021-08-27

## 2021-08-31 ENCOUNTER — PATIENT OUTREACH (OUTPATIENT)
Dept: EMERGENCY MEDICINE | Facility: HOSPITAL | Age: 26
End: 2021-08-31

## 2021-09-21 ENCOUNTER — HOSPITAL ENCOUNTER (EMERGENCY)
Facility: HOSPITAL | Age: 26
Discharge: HOME | End: 2021-09-21
Attending: EMERGENCY MEDICINE
Payer: MEDICAID

## 2021-09-21 VITALS
DIASTOLIC BLOOD PRESSURE: 71 MMHG | BODY MASS INDEX: 36.41 KG/M2 | TEMPERATURE: 99 F | RESPIRATION RATE: 20 BRPM | WEIGHT: 232 LBS | HEIGHT: 67 IN | HEART RATE: 96 BPM | SYSTOLIC BLOOD PRESSURE: 116 MMHG | OXYGEN SATURATION: 99 %

## 2021-09-21 DIAGNOSIS — R10.84 ABDOMINAL PAIN, DIFFUSE: ICD-10-CM

## 2021-09-21 DIAGNOSIS — R07.9 CHEST PAIN: ICD-10-CM

## 2021-09-21 DIAGNOSIS — J18.9 COMMUNITY ACQUIRED PNEUMONIA OF RIGHT LOWER LOBE OF LUNG: Primary | ICD-10-CM

## 2021-09-21 LAB
ALBUMIN SERPL BCP-MCNC: 3.8 G/DL (ref 3.5–5.2)
ALP SERPL-CCNC: 72 U/L (ref 55–135)
ALT SERPL W/O P-5'-P-CCNC: 28 U/L (ref 10–44)
ANION GAP SERPL CALC-SCNC: 14 MMOL/L (ref 8–16)
AST SERPL-CCNC: 22 U/L (ref 10–40)
B-HCG UR QL: NEGATIVE
BACTERIA #/AREA URNS HPF: NORMAL /HPF
BASOPHILS # BLD AUTO: 0.05 K/UL (ref 0–0.2)
BASOPHILS NFR BLD: 0.4 % (ref 0–1.9)
BILIRUB SERPL-MCNC: 1.3 MG/DL (ref 0.1–1)
BILIRUB UR QL STRIP: NEGATIVE
BUN SERPL-MCNC: 10 MG/DL (ref 6–20)
CALCIUM SERPL-MCNC: 9.2 MG/DL (ref 8.7–10.5)
CHLORIDE SERPL-SCNC: 102 MMOL/L (ref 95–110)
CLARITY UR: CLEAR
CO2 SERPL-SCNC: 19 MMOL/L (ref 23–29)
COLOR UR: YELLOW
CREAT SERPL-MCNC: 0.8 MG/DL (ref 0.5–1.4)
CTP QC/QA: YES
D DIMER PPP IA.FEU-MCNC: 0.24 MG/L FEU
DIFFERENTIAL METHOD: ABNORMAL
EOSINOPHIL # BLD AUTO: 0.1 K/UL (ref 0–0.5)
EOSINOPHIL NFR BLD: 0.7 % (ref 0–8)
ERYTHROCYTE [DISTWIDTH] IN BLOOD BY AUTOMATED COUNT: 14.1 % (ref 11.5–14.5)
EST. GFR  (AFRICAN AMERICAN): >60 ML/MIN/1.73 M^2
EST. GFR  (NON AFRICAN AMERICAN): >60 ML/MIN/1.73 M^2
GLUCOSE SERPL-MCNC: 91 MG/DL (ref 70–110)
GLUCOSE UR QL STRIP: NEGATIVE
HCT VFR BLD AUTO: 39.6 % (ref 37–48.5)
HGB BLD-MCNC: 12.3 G/DL (ref 12–16)
HGB UR QL STRIP: NEGATIVE
IMM GRANULOCYTES # BLD AUTO: 0.04 K/UL (ref 0–0.04)
IMM GRANULOCYTES NFR BLD AUTO: 0.3 % (ref 0–0.5)
KETONES UR QL STRIP: NEGATIVE
LEUKOCYTE ESTERASE UR QL STRIP: NEGATIVE
LYMPHOCYTES # BLD AUTO: 1.6 K/UL (ref 1–4.8)
LYMPHOCYTES NFR BLD: 13 % (ref 18–48)
MCH RBC QN AUTO: 27.7 PG (ref 27–31)
MCHC RBC AUTO-ENTMCNC: 31.1 G/DL (ref 32–36)
MCV RBC AUTO: 89 FL (ref 82–98)
MICROSCOPIC COMMENT: NORMAL
MONOCYTES # BLD AUTO: 1 K/UL (ref 0.3–1)
MONOCYTES NFR BLD: 8.4 % (ref 4–15)
NEUTROPHILS # BLD AUTO: 9.3 K/UL (ref 1.8–7.7)
NEUTROPHILS NFR BLD: 77.2 % (ref 38–73)
NITRITE UR QL STRIP: POSITIVE
NRBC BLD-RTO: 0 /100 WBC
PH UR STRIP: 6 [PH] (ref 5–8)
PLATELET # BLD AUTO: 283 K/UL (ref 150–450)
PMV BLD AUTO: 9.6 FL (ref 9.2–12.9)
POTASSIUM SERPL-SCNC: 3.9 MMOL/L (ref 3.5–5.1)
PROT SERPL-MCNC: 7.7 G/DL (ref 6–8.4)
PROT UR QL STRIP: NEGATIVE
RBC # BLD AUTO: 4.44 M/UL (ref 4–5.4)
SODIUM SERPL-SCNC: 135 MMOL/L (ref 136–145)
SP GR UR STRIP: 1.02 (ref 1–1.03)
URN SPEC COLLECT METH UR: ABNORMAL
UROBILINOGEN UR STRIP-ACNC: NEGATIVE EU/DL
WBC # BLD AUTO: 12.09 K/UL (ref 3.9–12.7)

## 2021-09-21 PROCEDURE — 96375 TX/PRO/DX INJ NEW DRUG ADDON: CPT

## 2021-09-21 PROCEDURE — 85379 FIBRIN DEGRADATION QUANT: CPT | Performed by: EMERGENCY MEDICINE

## 2021-09-21 PROCEDURE — 96374 THER/PROPH/DIAG INJ IV PUSH: CPT

## 2021-09-21 PROCEDURE — 96361 HYDRATE IV INFUSION ADD-ON: CPT

## 2021-09-21 PROCEDURE — 25000003 PHARM REV CODE 250: Performed by: EMERGENCY MEDICINE

## 2021-09-21 PROCEDURE — 81000 URINALYSIS NONAUTO W/SCOPE: CPT | Performed by: EMERGENCY MEDICINE

## 2021-09-21 PROCEDURE — 93005 ELECTROCARDIOGRAM TRACING: CPT

## 2021-09-21 PROCEDURE — 93010 ELECTROCARDIOGRAM REPORT: CPT | Mod: ,,, | Performed by: SPECIALIST

## 2021-09-21 PROCEDURE — 63600175 PHARM REV CODE 636 W HCPCS: Performed by: EMERGENCY MEDICINE

## 2021-09-21 PROCEDURE — 93010 EKG 12-LEAD: ICD-10-PCS | Mod: ,,, | Performed by: SPECIALIST

## 2021-09-21 PROCEDURE — 81025 URINE PREGNANCY TEST: CPT | Performed by: EMERGENCY MEDICINE

## 2021-09-21 PROCEDURE — 80053 COMPREHEN METABOLIC PANEL: CPT | Performed by: EMERGENCY MEDICINE

## 2021-09-21 PROCEDURE — 36415 COLL VENOUS BLD VENIPUNCTURE: CPT | Performed by: EMERGENCY MEDICINE

## 2021-09-21 PROCEDURE — 99285 EMERGENCY DEPT VISIT HI MDM: CPT | Mod: 25

## 2021-09-21 PROCEDURE — 85025 COMPLETE CBC W/AUTO DIFF WBC: CPT | Performed by: EMERGENCY MEDICINE

## 2021-09-21 RX ORDER — KETOROLAC TROMETHAMINE 30 MG/ML
30 INJECTION, SOLUTION INTRAMUSCULAR; INTRAVENOUS
Status: COMPLETED | OUTPATIENT
Start: 2021-09-21 | End: 2021-09-21

## 2021-09-21 RX ORDER — IBUPROFEN 600 MG/1
600 TABLET ORAL EVERY 6 HOURS PRN
Qty: 20 TABLET | Refills: 0 | Status: SHIPPED | OUTPATIENT
Start: 2021-09-21 | End: 2021-10-11

## 2021-09-21 RX ORDER — DIAZEPAM 10 MG/2ML
5 INJECTION INTRAMUSCULAR
Status: COMPLETED | OUTPATIENT
Start: 2021-09-21 | End: 2021-09-21

## 2021-09-21 RX ORDER — LEVOFLOXACIN 750 MG/1
750 TABLET ORAL DAILY
Qty: 5 TABLET | Refills: 0 | Status: SHIPPED | OUTPATIENT
Start: 2021-09-21 | End: 2021-09-28

## 2021-09-21 RX ORDER — DROPERIDOL 2.5 MG/ML
2.5 INJECTION, SOLUTION INTRAMUSCULAR; INTRAVENOUS
Status: COMPLETED | OUTPATIENT
Start: 2021-09-21 | End: 2021-09-21

## 2021-09-21 RX ORDER — SODIUM CHLORIDE 9 MG/ML
INJECTION, SOLUTION INTRAVENOUS CONTINUOUS
Status: DISCONTINUED | OUTPATIENT
Start: 2021-09-21 | End: 2021-09-21 | Stop reason: HOSPADM

## 2021-09-21 RX ADMIN — DIAZEPAM 5 MG: 5 INJECTION, SOLUTION INTRAMUSCULAR; INTRAVENOUS at 02:09

## 2021-09-21 RX ADMIN — DROPERIDOL 2.5 MG: 2.5 INJECTION, SOLUTION INTRAMUSCULAR; INTRAVENOUS at 02:09

## 2021-09-21 RX ADMIN — CEFTRIAXONE 1 G: 1 INJECTION, SOLUTION INTRAVENOUS at 04:09

## 2021-09-21 RX ADMIN — KETOROLAC TROMETHAMINE 30 MG: 30 INJECTION, SOLUTION INTRAMUSCULAR at 02:09

## 2021-09-21 RX ADMIN — SODIUM CHLORIDE: 0.9 INJECTION, SOLUTION INTRAVENOUS at 02:09

## 2021-09-21 RX ADMIN — AZITHROMYCIN MONOHYDRATE 500 MG: 500 INJECTION, POWDER, LYOPHILIZED, FOR SOLUTION INTRAVENOUS at 04:09

## 2021-10-10 ENCOUNTER — HOSPITAL ENCOUNTER (EMERGENCY)
Facility: HOSPITAL | Age: 26
Discharge: HOME | End: 2021-10-11
Attending: EMERGENCY MEDICINE
Payer: MEDICAID

## 2021-10-10 DIAGNOSIS — Z34.90 PREGNANCY AT EARLY STAGE: ICD-10-CM

## 2021-10-10 DIAGNOSIS — R10.30 LOWER ABDOMINAL PAIN: Primary | ICD-10-CM

## 2021-10-10 LAB
B-HCG UR QL: POSITIVE
CTP QC/QA: YES

## 2021-10-10 PROCEDURE — 96361 HYDRATE IV INFUSION ADD-ON: CPT

## 2021-10-10 PROCEDURE — 96375 TX/PRO/DX INJ NEW DRUG ADDON: CPT

## 2021-10-10 PROCEDURE — 99285 EMERGENCY DEPT VISIT HI MDM: CPT | Mod: 25

## 2021-10-10 PROCEDURE — 81025 URINE PREGNANCY TEST: CPT | Performed by: EMERGENCY MEDICINE

## 2021-10-10 RX ORDER — ACETAMINOPHEN 500 MG
1000 TABLET ORAL
Status: COMPLETED | OUTPATIENT
Start: 2021-10-11 | End: 2021-10-11

## 2021-10-11 VITALS
HEART RATE: 90 BPM | OXYGEN SATURATION: 100 % | BODY MASS INDEX: 37.59 KG/M2 | RESPIRATION RATE: 17 BRPM | DIASTOLIC BLOOD PRESSURE: 70 MMHG | SYSTOLIC BLOOD PRESSURE: 115 MMHG | WEIGHT: 240 LBS | TEMPERATURE: 99 F

## 2021-10-11 LAB
ALBUMIN SERPL BCP-MCNC: 3.6 G/DL (ref 3.5–5.2)
ALP SERPL-CCNC: 54 U/L (ref 55–135)
ALT SERPL W/O P-5'-P-CCNC: 21 U/L (ref 10–44)
ANION GAP SERPL CALC-SCNC: 10 MMOL/L (ref 8–16)
AST SERPL-CCNC: 44 U/L (ref 10–40)
BASOPHILS # BLD AUTO: 0.07 K/UL (ref 0–0.2)
BASOPHILS NFR BLD: 0.6 % (ref 0–1.9)
BILIRUB SERPL-MCNC: 0.8 MG/DL (ref 0.1–1)
BILIRUB UR QL STRIP: NEGATIVE
BUN SERPL-MCNC: 5 MG/DL (ref 6–20)
CALCIUM SERPL-MCNC: 8.9 MG/DL (ref 8.7–10.5)
CHLORIDE SERPL-SCNC: 105 MMOL/L (ref 95–110)
CLARITY UR: CLEAR
CO2 SERPL-SCNC: 22 MMOL/L (ref 23–29)
COLOR UR: YELLOW
CREAT SERPL-MCNC: 0.8 MG/DL (ref 0.5–1.4)
DIFFERENTIAL METHOD: ABNORMAL
EOSINOPHIL # BLD AUTO: 0.1 K/UL (ref 0–0.5)
EOSINOPHIL NFR BLD: 0.8 % (ref 0–8)
ERYTHROCYTE [DISTWIDTH] IN BLOOD BY AUTOMATED COUNT: 14.4 % (ref 11.5–14.5)
EST. GFR  (AFRICAN AMERICAN): >60 ML/MIN/1.73 M^2
EST. GFR  (NON AFRICAN AMERICAN): >60 ML/MIN/1.73 M^2
GLUCOSE SERPL-MCNC: 84 MG/DL (ref 70–110)
GLUCOSE UR QL STRIP: NEGATIVE
HCG INTACT+B SERPL-ACNC: 5539 MIU/ML
HCT VFR BLD AUTO: 35.4 % (ref 37–48.5)
HGB BLD-MCNC: 11.3 G/DL (ref 12–16)
HGB UR QL STRIP: NEGATIVE
IMM GRANULOCYTES # BLD AUTO: 0.04 K/UL (ref 0–0.04)
IMM GRANULOCYTES NFR BLD AUTO: 0.3 % (ref 0–0.5)
KETONES UR QL STRIP: NEGATIVE
LEUKOCYTE ESTERASE UR QL STRIP: NEGATIVE
LYMPHOCYTES # BLD AUTO: 1.9 K/UL (ref 1–4.8)
LYMPHOCYTES NFR BLD: 16.7 % (ref 18–48)
MCH RBC QN AUTO: 27.5 PG (ref 27–31)
MCHC RBC AUTO-ENTMCNC: 31.9 G/DL (ref 32–36)
MCV RBC AUTO: 86 FL (ref 82–98)
MONOCYTES # BLD AUTO: 0.7 K/UL (ref 0.3–1)
MONOCYTES NFR BLD: 6.2 % (ref 4–15)
NEUTROPHILS # BLD AUTO: 8.6 K/UL (ref 1.8–7.7)
NEUTROPHILS NFR BLD: 75.4 % (ref 38–73)
NITRITE UR QL STRIP: NEGATIVE
NRBC BLD-RTO: 0 /100 WBC
PH UR STRIP: 6 [PH] (ref 5–8)
PLATELET # BLD AUTO: 297 K/UL (ref 150–450)
PMV BLD AUTO: 9.7 FL (ref 9.2–12.9)
POTASSIUM SERPL-SCNC: 3.2 MMOL/L (ref 3.5–5.1)
PROT SERPL-MCNC: 6.9 G/DL (ref 6–8.4)
PROT UR QL STRIP: NEGATIVE
RBC # BLD AUTO: 4.11 M/UL (ref 4–5.4)
SODIUM SERPL-SCNC: 137 MMOL/L (ref 136–145)
SP GR UR STRIP: 1.02 (ref 1–1.03)
URN SPEC COLLECT METH UR: NORMAL
UROBILINOGEN UR STRIP-ACNC: NEGATIVE EU/DL
WBC # BLD AUTO: 11.43 K/UL (ref 3.9–12.7)

## 2021-10-11 PROCEDURE — 25000003 PHARM REV CODE 250: Performed by: EMERGENCY MEDICINE

## 2021-10-11 PROCEDURE — 36415 COLL VENOUS BLD VENIPUNCTURE: CPT | Performed by: EMERGENCY MEDICINE

## 2021-10-11 PROCEDURE — 85025 COMPLETE CBC W/AUTO DIFF WBC: CPT | Performed by: EMERGENCY MEDICINE

## 2021-10-11 PROCEDURE — 96365 THER/PROPH/DIAG IV INF INIT: CPT

## 2021-10-11 PROCEDURE — 63600175 PHARM REV CODE 636 W HCPCS: Performed by: EMERGENCY MEDICINE

## 2021-10-11 PROCEDURE — 84702 CHORIONIC GONADOTROPIN TEST: CPT | Performed by: EMERGENCY MEDICINE

## 2021-10-11 PROCEDURE — 81003 URINALYSIS AUTO W/O SCOPE: CPT | Performed by: EMERGENCY MEDICINE

## 2021-10-11 PROCEDURE — 80053 COMPREHEN METABOLIC PANEL: CPT | Performed by: EMERGENCY MEDICINE

## 2021-10-11 RX ORDER — DIPHENHYDRAMINE HYDROCHLORIDE 50 MG/ML
25 INJECTION INTRAMUSCULAR; INTRAVENOUS
Status: COMPLETED | OUTPATIENT
Start: 2021-10-11 | End: 2021-10-11

## 2021-10-11 RX ORDER — POTASSIUM CHLORIDE 20 MEQ/1
40 TABLET, EXTENDED RELEASE ORAL
Status: COMPLETED | OUTPATIENT
Start: 2021-10-11 | End: 2021-10-11

## 2021-10-11 RX ADMIN — ACETAMINOPHEN 1000 MG: 500 TABLET ORAL at 12:10

## 2021-10-11 RX ADMIN — DIPHENHYDRAMINE HYDROCHLORIDE 25 MG: 50 INJECTION, SOLUTION INTRAMUSCULAR; INTRAVENOUS at 01:10

## 2021-10-11 RX ADMIN — POTASSIUM CHLORIDE 40 MEQ: 1500 TABLET, EXTENDED RELEASE ORAL at 01:10

## 2021-10-11 RX ADMIN — SODIUM CHLORIDE 1000 ML: 0.9 INJECTION, SOLUTION INTRAVENOUS at 12:10

## 2021-10-11 RX ADMIN — PYRIDOXINE HYDROCHLORIDE 200 MG: 100 INJECTION, SOLUTION INTRAMUSCULAR; INTRAVENOUS at 01:10

## 2021-10-13 ENCOUNTER — PATIENT OUTREACH (OUTPATIENT)
Dept: EMERGENCY MEDICINE | Facility: HOSPITAL | Age: 26
End: 2021-10-13

## 2021-10-13 ENCOUNTER — HOSPITAL ENCOUNTER (EMERGENCY)
Facility: HOSPITAL | Age: 26
Discharge: HOME | End: 2021-10-13
Attending: EMERGENCY MEDICINE
Payer: MEDICAID

## 2021-10-13 VITALS
BODY MASS INDEX: 37.67 KG/M2 | WEIGHT: 240 LBS | HEART RATE: 80 BPM | HEIGHT: 67 IN | DIASTOLIC BLOOD PRESSURE: 78 MMHG | SYSTOLIC BLOOD PRESSURE: 132 MMHG | OXYGEN SATURATION: 100 % | RESPIRATION RATE: 18 BRPM | TEMPERATURE: 98 F

## 2021-10-13 DIAGNOSIS — Z3A.01 LESS THAN 8 WEEKS GESTATION OF PREGNANCY: ICD-10-CM
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
## 2021-10-13 DIAGNOSIS — O21.0 HYPEREMESIS ARISING DURING PREGNANCY: ICD-10-CM

## 2021-10-13 LAB
ABO + RH BLD: NORMAL
ALBUMIN SERPL BCP-MCNC: 4 G/DL (ref 3.5–5.2)
ALP SERPL-CCNC: 43 U/L (ref 55–135)
ALT SERPL W/O P-5'-P-CCNC: 19 U/L (ref 10–44)
ANION GAP SERPL CALC-SCNC: 9 MMOL/L (ref 8–16)
AST SERPL-CCNC: 26 U/L (ref 10–40)
B-HCG UR QL: POSITIVE
BASOPHILS # BLD AUTO: 0.04 K/UL (ref 0–0.2)
BASOPHILS NFR BLD: 0.6 % (ref 0–1.9)
BILIRUB SERPL-MCNC: 0.8 MG/DL (ref 0.1–1)
BILIRUB UR QL STRIP: NEGATIVE
BUN SERPL-MCNC: 7 MG/DL (ref 6–20)
CALCIUM SERPL-MCNC: 9.1 MG/DL (ref 8.7–10.5)
CHLORIDE SERPL-SCNC: 104 MMOL/L (ref 95–110)
CLARITY UR: CLEAR
CO2 SERPL-SCNC: 24 MMOL/L (ref 23–29)
COLOR UR: YELLOW
CREAT SERPL-MCNC: 0.7 MG/DL (ref 0.5–1.4)
CTP QC/QA: YES
DIFFERENTIAL METHOD: ABNORMAL
EOSINOPHIL # BLD AUTO: 0.1 K/UL (ref 0–0.5)
EOSINOPHIL NFR BLD: 0.7 % (ref 0–8)
ERYTHROCYTE [DISTWIDTH] IN BLOOD BY AUTOMATED COUNT: 14.6 % (ref 11.5–14.5)
EST. GFR  (AFRICAN AMERICAN): >60 ML/MIN/1.73 M^2
EST. GFR  (NON AFRICAN AMERICAN): >60 ML/MIN/1.73 M^2
GLUCOSE SERPL-MCNC: 86 MG/DL (ref 70–110)
GLUCOSE UR QL STRIP: NEGATIVE
HCG INTACT+B SERPL-ACNC: NORMAL MIU/ML
HCT VFR BLD AUTO: 36.9 % (ref 37–48.5)
HGB BLD-MCNC: 11.7 G/DL (ref 12–16)
HGB UR QL STRIP: NEGATIVE
IMM GRANULOCYTES # BLD AUTO: 0.02 K/UL (ref 0–0.04)
IMM GRANULOCYTES NFR BLD AUTO: 0.3 % (ref 0–0.5)
KETONES UR QL STRIP: NEGATIVE
LEUKOCYTE ESTERASE UR QL STRIP: NEGATIVE
LYMPHOCYTES # BLD AUTO: 1.6 K/UL (ref 1–4.8)
LYMPHOCYTES NFR BLD: 23.1 % (ref 18–48)
MCH RBC QN AUTO: 27 PG (ref 27–31)
MCHC RBC AUTO-ENTMCNC: 31.7 G/DL (ref 32–36)
MCV RBC AUTO: 85 FL (ref 82–98)
MONOCYTES # BLD AUTO: 0.4 K/UL (ref 0.3–1)
MONOCYTES NFR BLD: 6.1 % (ref 4–15)
NEUTROPHILS # BLD AUTO: 4.9 K/UL (ref 1.8–7.7)
NEUTROPHILS NFR BLD: 69.2 % (ref 38–73)
NITRITE UR QL STRIP: NEGATIVE
NRBC BLD-RTO: 0 /100 WBC
PH UR STRIP: 7 [PH] (ref 5–8)
PLATELET # BLD AUTO: 319 K/UL (ref 150–450)
PMV BLD AUTO: 9.7 FL (ref 9.2–12.9)
POTASSIUM SERPL-SCNC: 3.4 MMOL/L (ref 3.5–5.1)
PROT SERPL-MCNC: 7.7 G/DL (ref 6–8.4)
PROT UR QL STRIP: ABNORMAL
RBC # BLD AUTO: 4.33 M/UL (ref 4–5.4)
SODIUM SERPL-SCNC: 137 MMOL/L (ref 136–145)
SP GR UR STRIP: 1.03 (ref 1–1.03)
URN SPEC COLLECT METH UR: ABNORMAL
UROBILINOGEN UR STRIP-ACNC: ABNORMAL EU/DL
WBC # BLD AUTO: 7.09 K/UL (ref 3.9–12.7)

## 2021-10-13 PROCEDURE — 85025 COMPLETE CBC W/AUTO DIFF WBC: CPT | Performed by: EMERGENCY MEDICINE

## 2021-10-13 PROCEDURE — 25000003 PHARM REV CODE 250: Performed by: EMERGENCY MEDICINE

## 2021-10-13 PROCEDURE — 99284 EMERGENCY DEPT VISIT MOD MDM: CPT

## 2021-10-13 PROCEDURE — 86900 BLOOD TYPING SEROLOGIC ABO: CPT | Performed by: EMERGENCY MEDICINE

## 2021-10-13 PROCEDURE — 84702 CHORIONIC GONADOTROPIN TEST: CPT | Performed by: EMERGENCY MEDICINE

## 2021-10-13 PROCEDURE — 81003 URINALYSIS AUTO W/O SCOPE: CPT | Performed by: EMERGENCY MEDICINE

## 2021-10-13 PROCEDURE — 81025 URINE PREGNANCY TEST: CPT | Performed by: EMERGENCY MEDICINE

## 2021-10-13 PROCEDURE — 80053 COMPREHEN METABOLIC PANEL: CPT | Performed by: EMERGENCY MEDICINE

## 2021-10-13 RX ORDER — PYRIDOXINE HCL (VITAMIN B6) 25 MG
25 TABLET ORAL 2 TIMES DAILY PRN
Qty: 20 TABLET | Refills: 0 | Status: SHIPPED | OUTPATIENT
Start: 2021-10-13 | End: 2022-12-14

## 2021-10-13 RX ADMIN — SODIUM CHLORIDE 1000 ML: 9 INJECTION, SOLUTION INTRAVENOUS at 02:10

## 2021-10-13 RX ADMIN — POTASSIUM BICARBONATE 20 MEQ: 391 TABLET, EFFERVESCENT ORAL at 06:10

## 2021-11-01 ENCOUNTER — OFFICE VISIT (OUTPATIENT)
Dept: OBSTETRICS AND GYNECOLOGY | Facility: CLINIC | Age: 26
End: 2021-11-01
Payer: MEDICAID

## 2021-11-01 ENCOUNTER — PROCEDURE VISIT (OUTPATIENT)
Dept: OBSTETRICS AND GYNECOLOGY | Facility: CLINIC | Age: 26
End: 2021-11-01
Payer: MEDICAID

## 2021-11-01 VITALS
SYSTOLIC BLOOD PRESSURE: 128 MMHG | DIASTOLIC BLOOD PRESSURE: 84 MMHG | BODY MASS INDEX: 41.11 KG/M2 | WEIGHT: 262.44 LBS

## 2021-11-01 DIAGNOSIS — O36.80X0 ENCOUNTER TO DETERMINE FETAL VIABILITY OF PREGNANCY, SINGLE OR UNSPECIFIED FETUS: ICD-10-CM

## 2021-11-01 DIAGNOSIS — Z32.01 PREGNANCY EXAMINATION OR TEST, POSITIVE RESULT: Primary | ICD-10-CM

## 2021-11-01 PROCEDURE — 99999 PR PBB SHADOW E&M-EST. PATIENT-LVL III: CPT | Mod: PBBFAC,,, | Performed by: ADVANCED PRACTICE MIDWIFE

## 2021-11-01 PROCEDURE — 99203 PR OFFICE/OUTPT VISIT, NEW, LEVL III, 30-44 MIN: ICD-10-PCS | Mod: S$PBB,TH,, | Performed by: ADVANCED PRACTICE MIDWIFE

## 2021-11-01 PROCEDURE — 87086 URINE CULTURE/COLONY COUNT: CPT | Performed by: ADVANCED PRACTICE MIDWIFE

## 2021-11-01 PROCEDURE — 99213 OFFICE O/P EST LOW 20 MIN: CPT | Mod: PBBFAC,TH,PN | Performed by: ADVANCED PRACTICE MIDWIFE

## 2021-11-01 PROCEDURE — 99999 PR PBB SHADOW E&M-EST. PATIENT-LVL III: ICD-10-PCS | Mod: PBBFAC,,, | Performed by: ADVANCED PRACTICE MIDWIFE

## 2021-11-01 PROCEDURE — 99203 OFFICE O/P NEW LOW 30 MIN: CPT | Mod: S$PBB,TH,, | Performed by: ADVANCED PRACTICE MIDWIFE

## 2021-11-01 RX ORDER — PROMETHAZINE HYDROCHLORIDE 25 MG/1
25 TABLET ORAL EVERY 4 HOURS PRN
Qty: 20 TABLET | Refills: 0 | Status: SHIPPED | OUTPATIENT
Start: 2021-11-01 | End: 2022-12-14

## 2021-11-02 LAB — BACTERIA UR CULT: NO GROWTH

## 2021-11-03 NOTE — TELEPHONE ENCOUNTER
----- Message from Bia Mendiola sent at 10/22/2018  2:29 PM CDT -----  Pt states that she do not want to use cream, pt would like to use the pill. Pt can be reached at [phone].  
Alert and oriented to person, place and time/Patient baseline mental status/Symptoms improved

## 2021-11-09 ENCOUNTER — PATIENT OUTREACH (OUTPATIENT)
Dept: EMERGENCY MEDICINE | Facility: HOSPITAL | Age: 26
End: 2021-11-09
Payer: MEDICAID

## 2021-12-30 ENCOUNTER — HOSPITAL ENCOUNTER (EMERGENCY)
Facility: HOSPITAL | Age: 26
Discharge: HOME | End: 2021-12-30
Attending: EMERGENCY MEDICINE
Payer: MEDICAID

## 2021-12-30 VITALS
DIASTOLIC BLOOD PRESSURE: 67 MMHG | OXYGEN SATURATION: 100 % | RESPIRATION RATE: 18 BRPM | BODY MASS INDEX: 42.38 KG/M2 | HEART RATE: 86 BPM | TEMPERATURE: 98 F | HEIGHT: 67 IN | WEIGHT: 270 LBS | SYSTOLIC BLOOD PRESSURE: 147 MMHG

## 2021-12-30 DIAGNOSIS — J06.9 VIRAL URI: Primary | ICD-10-CM

## 2021-12-30 DIAGNOSIS — Z34.90 PREGNANCY, UNSPECIFIED GESTATIONAL AGE: ICD-10-CM

## 2021-12-30 LAB
B-HCG UR QL: POSITIVE
BILIRUB UR QL STRIP: NEGATIVE
CLARITY UR: CLEAR
COLOR UR: YELLOW
CTP QC/QA: YES
GLUCOSE UR QL STRIP: NEGATIVE
HGB UR QL STRIP: NEGATIVE
INFLUENZA A, MOLECULAR: NEGATIVE
INFLUENZA B, MOLECULAR: NEGATIVE
KETONES UR QL STRIP: NEGATIVE
LEUKOCYTE ESTERASE UR QL STRIP: NEGATIVE
NITRITE UR QL STRIP: NEGATIVE
PH UR STRIP: 6 [PH] (ref 5–8)
PROT UR QL STRIP: NEGATIVE
SP GR UR STRIP: 1.02 (ref 1–1.03)
SPECIMEN SOURCE: NORMAL
URN SPEC COLLECT METH UR: NORMAL
UROBILINOGEN UR STRIP-ACNC: 1 EU/DL

## 2021-12-30 PROCEDURE — 25000003 PHARM REV CODE 250: Performed by: NURSE PRACTITIONER

## 2021-12-30 PROCEDURE — U0003 INFECTIOUS AGENT DETECTION BY NUCLEIC ACID (DNA OR RNA); SEVERE ACUTE RESPIRATORY SYNDROME CORONAVIRUS 2 (SARS-COV-2) (CORONAVIRUS DISEASE [COVID-19]), AMPLIFIED PROBE TECHNIQUE, MAKING USE OF HIGH THROUGHPUT TECHNOLOGIES AS DESCRIBED BY CMS-2020-01-R: HCPCS | Performed by: EMERGENCY MEDICINE

## 2021-12-30 PROCEDURE — 81003 URINALYSIS AUTO W/O SCOPE: CPT | Performed by: NURSE PRACTITIONER

## 2021-12-30 PROCEDURE — U0005 INFEC AGEN DETEC AMPLI PROBE: HCPCS | Performed by: EMERGENCY MEDICINE

## 2021-12-30 PROCEDURE — 87502 INFLUENZA DNA AMP PROBE: CPT | Performed by: NURSE PRACTITIONER

## 2021-12-30 PROCEDURE — 81025 URINE PREGNANCY TEST: CPT | Performed by: EMERGENCY MEDICINE

## 2021-12-30 PROCEDURE — 99283 EMERGENCY DEPT VISIT LOW MDM: CPT

## 2021-12-30 RX ORDER — ACETAMINOPHEN 500 MG
1000 TABLET ORAL
Status: COMPLETED | OUTPATIENT
Start: 2021-12-30 | End: 2021-12-30

## 2021-12-30 RX ADMIN — ACETAMINOPHEN 1000 MG: 500 TABLET ORAL at 03:12

## 2021-12-30 NOTE — ED NOTES
Went to pt room to evaluate patient fetal heart sounds. Pt still asking about rapid COVID test to be done. Ayala QUIROGA notified. Pt refusing requested pelvic exam and fetal heart rate assessment if she does not get COVID screening done. Pt became anxious and verbally aggressive with primary RN. Calmed down with reinforcement teaching and Dada RN.  Reyna Hennessy  12/30/2021

## 2021-12-30 NOTE — ED PROVIDER NOTES
"Encounter Date: 12/30/2021    SCRIBE #1 NOTE: I, Alyson Varner, am scribing for, and in the presence of, FLORI Vera.       History     Chief Complaint   Patient presents with    COVID-19 Concerns     Cough and congestion since this morning. Exposed to friend.     Abdominal Pain     Lower abd pain this morning. Currently 18wks pregnant. Denies vaginal bleeding.      Time seen by provider: 1:31 PM on 12/30/2021    Manisha Bishop is a 26 y.o. female who presents to the ED for evaluation of COVID-19 concerns. The patient complains of productive cough, runny nose, and nasal congestion onset 4 days ago. She had exposure to someone who is positive for COVID. She is concerned because she is 18 weeks pregnant. She had COVID-19 in august of this year. Patient is also complaining of "severe" pelvic pain. Patient is requesting an ultrasound of her abdomen. She has an appointment with her OBGYN on January 4th. She has had multiple IUP confirming ultrasounds. She denies any vaginal bleeding or discharge. Recent STD checks were negative she reports. The patient denies SOB, fever, vomiting, diarrhea or any other symptoms at this time.   PMHx of bipolar disorder 1, anxiety, and obesity.  No pertinent PSHx.    The history is provided by the patient.     Review of patient's allergies indicates:   Allergen Reactions    Iodine and iodide containing products Hives    Ketorolac Hives     Pt verbalized I dont like it doesn't work on it.    Vitamin e (d-alpha tocopherol)      Hives (skin)^Black eyed peas and oatmeal causede hives     Past Medical History:   Diagnosis Date    Anxiety     Bipolar 1 disorder     COVID-19 08/25/2021    GERD (gastroesophageal reflux disease)     Miscarriage 05/2018    pt reported     Obese      Past Surgical History:   Procedure Laterality Date    LEG SURGERY       Family History   Problem Relation Age of Onset    No Known Problems Mother     No Known Problems Father     Breast cancer " Neg Hx     Colon cancer Neg Hx     Ovarian cancer Neg Hx      Social History     Tobacco Use    Smoking status: Never Smoker    Smokeless tobacco: Never Used   Substance Use Topics    Alcohol use: Not Currently     Comment: occasionally    Drug use: No     Review of Systems   Constitutional: Negative for activity change, appetite change, fatigue and fever.   HENT: Positive for congestion and rhinorrhea. Negative for sore throat.    Eyes: Negative for visual disturbance.   Respiratory: Positive for cough. Negative for chest tightness, shortness of breath and wheezing.    Cardiovascular: Negative for chest pain and palpitations.   Gastrointestinal: Negative for diarrhea, nausea and vomiting.   Genitourinary: Positive for pelvic pain.   Musculoskeletal: Negative for arthralgias and myalgias.   Skin: Negative for rash.   Neurological: Negative for weakness, light-headedness, numbness and headaches.       Physical Exam     Initial Vitals [12/30/21 1152]   BP Pulse Resp Temp SpO2   (!) 147/67 (!) 112 18 98.2 °F (36.8 °C) 99 %      MAP       --         Physical Exam    Nursing note and vitals reviewed.  Constitutional: Vital signs are normal. She appears well-developed and well-nourished.   HENT:   Head: Normocephalic and atraumatic.   Eyes: Pupils are equal, round, and reactive to light.   Neck: Neck supple.   Cardiovascular: Normal rate, regular rhythm, normal heart sounds and intact distal pulses. Exam reveals no gallop and no friction rub.    No murmur heard.  Pulmonary/Chest: Breath sounds normal. She has no wheezes. She has no rhonchi. She has no rales.   Abdominal: Abdomen is soft. Normal appearance and bowel sounds are normal. There is no abdominal tenderness.   Genitourinary:    Vagina normal.      Pelvic exam was performed with patient supine.   No labial fusion. There is no rash, tenderness, lesion or injury on the right labia. There is no rash, tenderness, lesion or injury on the left labia. Uterus is  enlarged. Uterus is not deviated, not fixed and not tender. Cervix exhibits discharge. Cervix exhibits no motion tenderness. Right adnexum displays no mass and no tenderness. Left adnexum displays no mass and no tenderness.    Genitourinary Comments: White nonodorous moderate amount of discharge.  Cervical os closed.     Musculoskeletal:      Cervical back: Neck supple.     Neurological: She is alert and oriented to person, place, and time. She has normal strength.   Skin: Skin is warm, dry and intact.   Psychiatric: She has a normal mood and affect. Her speech is normal and behavior is normal.         ED Course   Procedures  Labs Reviewed   POCT URINE PREGNANCY - Abnormal; Notable for the following components:       Result Value    POC Preg Test, Ur Positive (*)     All other components within normal limits   INFLUENZA A & B BY MOLECULAR   URINALYSIS, REFLEX TO URINE CULTURE    Narrative:     Specimen Source->Urine   SARS-COV-2 (COVID-19) QUALITATIVE PCR          Imaging Results    None          Medications   acetaminophen tablet 1,000 mg (1,000 mg Oral Given 12/30/21 8120)     Medical Decision Making:   History:   Old Medical Records: I decided to obtain old medical records.  Differential Diagnosis:   Viral URI  Sinusitis  miscarriage  Clinical Tests:   Lab Tests: Ordered and Reviewed       APC / Resident Notes:   Patient is a 26 y.o. female who presents to the ED 12/30/2021 who underwent emergent evaluation for COVID-19 concerns abdominal pain.  Patient requesting specifically an ultrasound of her baby and a COVID-19 test that is rapid.  I recommended COVID-19 PCR test as it is the preferred test the patient does not meet any criteria for admission at this time.  Patient becomes argumentative.  She is yelling at staff.  She has no abdominal tenderness.   exam unremarkable.  I do not think PID.  Do not think other emergent intra-abdominal process such as appendicitis present.  Bedside ultrasound reveals single  "live intrauterine pregnancy with 145 BPM heart tones per Dr. Cai at bedside.  Positive fetal movement noted on ultrasound as well.  Vital signs normal with the exception of mild hypertension.  Bilateral breath sounds clear respirations even nonlabored.  Patient states she has had a recent "cold".  Influenza testing negative.  I do not think any bacterial sinusitis, bronchitis, or pneumonia requiring antibiotics at this time.  Routine COVID-19 test is pending. Based on my clinical evaluation, I do not appreciate any immediate, emergent, or life threatening condition or etiology that warrants additional workup today and feel that the patient can be discharged with close follow up care. Case discussed with Dr. Cai who also evaluated patient and who is agreeable to plan of care. Follow up and return precautions discussed; patient verbalized understanding and is agreeable to plan of care. Patient discharged home in stable condition.            Scribe Attestation:   Scribe #1: I performed the above scribed service and the documentation accurately describes the services I performed. I attest to the accuracy of the note.    Attending Attestation:     Physician Attestation Statement for NP/PA:   I have conducted a face to face encounter with this patient in addition to the NP/PA, due to Medical Complexity    Other NP/PA Attestation Additions:    History of Present Illness: 26-year-old female presented with multiple complaints.    Medical Decision Making: Initial differential diagnosis included but not limited to COVID, influenza, and UTI.  The patient's influenza swab was noted to be negative.  The patient's routine COVID swab is pending here in the emergency department.  The patient's urine shows no signs of infection.  A bedside ultrasound done shows a good fetal heart tone with good fetal movement, as done by myself.  She is stable for discharge home.  Her diagnosis is a suspected COVID infection.  She is to " otherwise follow up with her PCP for further care.  I am in agreement with the nurse practitioner's assessment, treatment, and plan of care.       Physician Attestation for Scribe:  Physician Attestation Statement for Scribe #1: I, Brigette Urrutia, reviewed documentation, as scribed by in my presence, and it is both accurate and complete.     Comments: I, Brigette Urrutia NP-C, personally performed the services described in this documentation. All medical record entries made by the scribe were at my direction and in my presence.  I have reviewed the chart and agree that the record reflects my personal performance and is accurate and complete. FLORI Vera.  6:30 PM 12/30/2021                     Clinical Impression:   Final diagnoses:  [J06.9] Viral URI (Primary)  [Z34.90] Pregnancy, unspecified gestational age          ED Disposition Condition    Discharge Stable        ED Prescriptions     None        Follow-up Information     Follow up With Specialties Details Why Contact Info    Dino Smith MD Obstetrics, Obstetrics and Gynecology In 3 days  [address] [place] [id number]  [phone]      [facility] Emergency Dept Emergency Medicine  As needed [address] [place] [id number] [phone]           Brigette Urrutia NP  12/30/21 1905       Carlos Cai MD  12/30/21 2015

## 2021-12-30 NOTE — ED NOTES
Pelvic exam completed by Brigette NAILS. Primary RN chaperoned. Warm hygenic joanie wipes provided for cleaning. Safety and fall precautions maintained. Reyna Hennessy  12/30/2021

## 2021-12-30 NOTE — FIRST PROVIDER EVALUATION
Emergency Department TeleTriage Encounter Note      CHIEF COMPLAINT    Chief Complaint   Patient presents with    COVID-19 Concerns     Cough and congestion since this morning. Exposed to friend.     Abdominal Pain     Lower abd pain this morning. Currently 18wks pregnant. Denies vaginal bleeding.        VITAL SIGNS   Initial Vitals [21 1152]   BP Pulse Resp Temp SpO2   (!) 147/67 (!) 112 18 98.2 °F (36.8 °C) 99 %      MAP       --            ALLERGIES    Review of patient's allergies indicates:   Allergen Reactions    Iodine and iodide containing products Hives    Ketorolac Hives     Pt verbalized I dont like it doesn't work on it.    Vitamin e (d-alpha tocopherol)      Hives (skin)^Black eyed peas and oatmeal causede hives       PROVIDER TRIAGE NOTE  This is a teletriage evaluation of a 26 y.o. female presenting to the ED complaining of cough and nasal congestion starting this morning.  She reports possible exposure to COVID.  Denies CP and SOB.  No fever.     Pt is also 18 weeks pregnant.  Reports lower abd pain starting this morning.  Denies vaginal bleeding and discharge.  States that she is established with OB and this pregnancy is healthy.  .     Pt is very insistent that I order an ultrasound to evaluate the fetus.  I do not feel that an ultrasound is necessary at this time based upon my evaluation.  I have ordered FHT to be obtained by doppler.  UA due to report of lower abd pain.  Will have pt set up for pelvic exam.     Initial orders will be placed and care will be transferred to an alternate provider when patient is roomed for a full evaluation. Any additional orders and the final disposition will be determined by that provider.           ORDERS  Labs Reviewed   URINALYSIS, REFLEX TO URINE CULTURE       ED Orders (720h ago, onward)    Start Ordered     Status Ordering Provider    21 1206 21 1206  Urinalysis, Reflex to Urine Culture Urine, Clean Catch  STAT         Ordered  ALEXX DOMINGUEZ    Unscheduled 12/30/21 1206  Assess fetal heart tones  Use PRN       Ordered ALEXX DOMINGUEZ            Virtual Visit Note: The provider triage portion of this emergency department evaluation and documentation was performed via [employer], a HIPAA-compliant telemedicine application, in concert with a tele-presenter in the room. A face to face patient evaluation with one of my colleagues will occur once the patient is placed in an emergency department room.      DISCLAIMER: This note was prepared with [employer] voice recognition transcription software. Garbled syntax, mangled pronouns, and other bizarre constructions may be attributed to that software system.

## 2021-12-30 NOTE — ED NOTES
Patient lying on stretcher. Reviewed medical history. Assessment and screenings completed. Labs collected. Teaching provided for fall prevention, treatment plan of care and safety precautions. Pt verbalized understanding. Reyna Hennessy  12/30/2021

## 2021-12-30 NOTE — DISCHARGE INSTRUCTIONS
Your COVID-19 test is pending.  The results will come up to your MyChart within the next 48 hours.

## 2022-01-01 LAB
SARS-COV-2 RNA RESP QL NAA+PROBE: NOT DETECTED
SARS-COV-2- CYCLE NUMBER: NORMAL

## 2022-04-27 NOTE — ED TRIAGE NOTES
23 y.o. Female presents to the ED with chief complaint of chest pain. Patient states she was here x2 days ago for similar symptoms and was told she had anxiety. Patient currently states mid sternal CP is constant. Pain is at 10/10. Patient reports having history of anxiety and does take her anxiety medications. Patient notes that she is going through personal stuff that she does not want to talk about, but does feel safe at home. Patient resting in bed in NAD. Side rails up x2.    none

## 2022-05-05 ENCOUNTER — HOSPITAL ENCOUNTER (OUTPATIENT)
Facility: HOSPITAL | Age: 27
Discharge: HOME | End: 2022-05-05
Attending: OBSTETRICS & GYNECOLOGY | Admitting: OBSTETRICS & GYNECOLOGY
Payer: MEDICAID

## 2022-05-05 VITALS
RESPIRATION RATE: 18 BRPM | TEMPERATURE: 99 F | SYSTOLIC BLOOD PRESSURE: 123 MMHG | DIASTOLIC BLOOD PRESSURE: 67 MMHG | HEART RATE: 97 BPM | OXYGEN SATURATION: 100 %

## 2022-05-05 DIAGNOSIS — O09.33 LIMITED PRENATAL CARE IN THIRD TRIMESTER: ICD-10-CM

## 2022-05-05 DIAGNOSIS — O47.00 PREMATURE UTERINE CONTRACTIONS: ICD-10-CM

## 2022-05-05 LAB
ABO + RH BLD: NORMAL
BASOPHILS # BLD AUTO: 0.06 K/UL (ref 0–0.2)
BASOPHILS NFR BLD: 0.6 % (ref 0–1.9)
BLD GP AB SCN CELLS X3 SERPL QL: NORMAL
DIFFERENTIAL METHOD: ABNORMAL
EOSINOPHIL # BLD AUTO: 0.1 K/UL (ref 0–0.5)
EOSINOPHIL NFR BLD: 1.1 % (ref 0–8)
ERYTHROCYTE [DISTWIDTH] IN BLOOD BY AUTOMATED COUNT: 14.4 % (ref 11.5–14.5)
HCT VFR BLD AUTO: 30.9 % (ref 37–48.5)
HGB BLD-MCNC: 9.5 G/DL (ref 12–16)
HIV1+2 IGG SERPL QL IA.RAPID: NORMAL
IMM GRANULOCYTES # BLD AUTO: 0.07 K/UL (ref 0–0.04)
IMM GRANULOCYTES NFR BLD AUTO: 0.8 % (ref 0–0.5)
LYMPHOCYTES # BLD AUTO: 1.5 K/UL (ref 1–4.8)
LYMPHOCYTES NFR BLD: 16.3 % (ref 18–48)
MCH RBC QN AUTO: 25.8 PG (ref 27–31)
MCHC RBC AUTO-ENTMCNC: 30.7 G/DL (ref 32–36)
MCV RBC AUTO: 84 FL (ref 82–98)
MONOCYTES # BLD AUTO: 0.7 K/UL (ref 0.3–1)
MONOCYTES NFR BLD: 7.3 % (ref 4–15)
NEUTROPHILS # BLD AUTO: 6.8 K/UL (ref 1.8–7.7)
NEUTROPHILS NFR BLD: 73.9 % (ref 38–73)
NRBC BLD-RTO: 0 /100 WBC
PLATELET # BLD AUTO: 339 K/UL (ref 150–450)
PMV BLD AUTO: 9.5 FL (ref 9.2–12.9)
RBC # BLD AUTO: 3.68 M/UL (ref 4–5.4)
RUPTURE OF MEMBRANE: NEGATIVE
SARS-COV-2 RDRP RESP QL NAA+PROBE: NEGATIVE
WBC # BLD AUTO: 9.24 K/UL (ref 3.9–12.7)

## 2022-05-05 PROCEDURE — 59025 FETAL NON-STRESS TEST: CPT | Mod: 26,,, | Performed by: OBSTETRICS & GYNECOLOGY

## 2022-05-05 PROCEDURE — 86703 HIV-1/HIV-2 1 RESULT ANTBDY: CPT | Performed by: OBSTETRICS & GYNECOLOGY

## 2022-05-05 PROCEDURE — 86592 SYPHILIS TEST NON-TREP QUAL: CPT | Performed by: OBSTETRICS & GYNECOLOGY

## 2022-05-05 PROCEDURE — 86762 RUBELLA ANTIBODY: CPT | Performed by: OBSTETRICS & GYNECOLOGY

## 2022-05-05 PROCEDURE — 85025 COMPLETE CBC W/AUTO DIFF WBC: CPT | Performed by: OBSTETRICS & GYNECOLOGY

## 2022-05-05 PROCEDURE — 99214 PR OFFICE/OUTPT VISIT, EST, LEVL IV, 30-39 MIN: ICD-10-PCS | Mod: TH,25,, | Performed by: OBSTETRICS & GYNECOLOGY

## 2022-05-05 PROCEDURE — 99214 OFFICE O/P EST MOD 30 MIN: CPT | Mod: TH,25,, | Performed by: OBSTETRICS & GYNECOLOGY

## 2022-05-05 PROCEDURE — 25000003 PHARM REV CODE 250: Performed by: OBSTETRICS & GYNECOLOGY

## 2022-05-05 PROCEDURE — 84112 EVAL AMNIOTIC FLUID PROTEIN: CPT | Performed by: OBSTETRICS & GYNECOLOGY

## 2022-05-05 PROCEDURE — U0002 COVID-19 LAB TEST NON-CDC: HCPCS | Performed by: OBSTETRICS & GYNECOLOGY

## 2022-05-05 PROCEDURE — 86901 BLOOD TYPING SEROLOGIC RH(D): CPT | Performed by: OBSTETRICS & GYNECOLOGY

## 2022-05-05 PROCEDURE — 59025 PR FETAL 2N-STRESS TEST: ICD-10-PCS | Mod: 26,,, | Performed by: OBSTETRICS & GYNECOLOGY

## 2022-05-05 PROCEDURE — 87340 HEPATITIS B SURFACE AG IA: CPT | Performed by: OBSTETRICS & GYNECOLOGY

## 2022-05-05 PROCEDURE — 86803 HEPATITIS C AB TEST: CPT | Performed by: OBSTETRICS & GYNECOLOGY

## 2022-05-05 RX ORDER — ONDANSETRON 8 MG/1
8 TABLET, ORALLY DISINTEGRATING ORAL EVERY 8 HOURS PRN
Status: DISCONTINUED | OUTPATIENT
Start: 2022-05-05 | End: 2022-05-05 | Stop reason: HOSPADM

## 2022-05-05 RX ORDER — ACETAMINOPHEN 500 MG
500 TABLET ORAL EVERY 6 HOURS PRN
Status: DISCONTINUED | OUTPATIENT
Start: 2022-05-05 | End: 2022-05-05 | Stop reason: HOSPADM

## 2022-05-05 RX ORDER — DIPHENHYDRAMINE HCL 25 MG
25 CAPSULE ORAL EVERY 6 HOURS PRN
Status: DISCONTINUED | OUTPATIENT
Start: 2022-05-05 | End: 2022-05-05 | Stop reason: HOSPADM

## 2022-05-05 RX ORDER — PROCHLORPERAZINE EDISYLATE 5 MG/ML
5 INJECTION INTRAMUSCULAR; INTRAVENOUS EVERY 6 HOURS PRN
Status: DISCONTINUED | OUTPATIENT
Start: 2022-05-05 | End: 2022-05-05 | Stop reason: HOSPADM

## 2022-05-05 RX ORDER — SODIUM CHLORIDE, SODIUM LACTATE, POTASSIUM CHLORIDE, CALCIUM CHLORIDE 600; 310; 30; 20 MG/100ML; MG/100ML; MG/100ML; MG/100ML
INJECTION, SOLUTION INTRAVENOUS CONTINUOUS
Status: DISCONTINUED | OUTPATIENT
Start: 2022-05-05 | End: 2022-05-05 | Stop reason: HOSPADM

## 2022-05-05 RX ADMIN — DIPHENHYDRAMINE HYDROCHLORIDE 25 MG: 25 CAPSULE ORAL at 07:05

## 2022-05-05 NOTE — NURSING
"Patient is a 26 year old G 2 P 0 with limited prenatal care & an ultrasound LINH of 6/13/2022 despite pt claiming adamantly that her due date is 5/9. Pt states that she has "never had an ultrasound this pregnancy". Upon further questioning patient admits to paying for 4D ultrasound and there are multiple dating ultrasounds in Epic results. Ultrasounds date patient at approx 37 weeks.   Patient arrived to unit from ED with c/o ctxs and pelvic pain.  History/complications of Bipolar 1 disorder, anxiety, SABx1, Covid+ in 8/25/2021, GERD, obesity, and +for benzo and marijuana on 1/29/22. Denies vaginal bleeding, reports leaking fluid. Fetus: fetal heart tones 135bpm, + fetal movements. Pt reports contractions but none noted on monitor and none palpated. Abdomen soft/nontender. Vital signs WNL. ROM+ collected and held. SVE performed by Isabel ROLON. Cervix: closed per GONZALES Hall. Covid swab collected and held. Urine sample collected and held. Educated on electronic fetal monitoring and assessments. Questions answered. Will update on call, Dr. Tipton.   "

## 2022-05-05 NOTE — PROGRESS NOTES
"[facility] - Labor & Delivery  Obstetrics   Labor and Delivery Triage    SUBJECTIVE:     Patient Info:  Manisha Bishop         26 y.o.    female    1995     Chief Complaint/Reason for Admission: Contractions    History of Present Illness:  Patient presents at 34 and 3/7 weeks gestation with EDC 2022 who presents with complaint of contractions.     This pregnancy has been complicated by no prenatal care. In review of her chart, she did have a dating ultrasound at [facility] at 8w0d which showed an LINH of 2022.  The patient reports never being seen in [place] and did not have an ultrasound - identification confirmed. She states her due date is May 10th based on a 10 week ultrasound with another provider. She states that provider discharged her from care due to a "bad attitude". She states she has not been able to establish care with another provider as no one will take her under care due to her advanced gestational age. She also has a history of bipolar disorder.     She reports positive fetal movement no vaginal bleeding. She reports abdominal pain that comes are goes that is in her lower abdomen and radiating to her back. She reports possible leaking. She also reports nasal congestion over the last couple of days.     OB History:   OB History        2    Para        Term                AB   1    Living           SAB   1    IAB        Ectopic        Multiple        Live Births                       Estimated Date of Delivery: 22     Gestational Age:  34w3d    Past Medical History:  Past Medical History:   Diagnosis Date    Anxiety     Bipolar 1 disorder     COVID-19 2021    GERD (gastroesophageal reflux disease)     Miscarriage 2018    pt reported     Obese         Past Surgical History:  Past Surgical History:   Procedure Laterality Date    LEG SURGERY         Social History:   Alcohol/Tobacco:  Social History     Tobacco Use    Smoking status: Never " Smoker    Smokeless tobacco: Never Used   Substance Use Topics    Alcohol use: Not Currently     Comment: occasionally      Drug Use:  Social History     Substance and Sexual Activity   Drug Use No       Family History:  Family History   Problem Relation Age of Onset    No Known Problems Mother     No Known Problems Father     Breast cancer Neg Hx     Colon cancer Neg Hx     Ovarian cancer Neg Hx        Allergies:  Review of patient's allergies indicates:   Allergen Reactions    Iodine and iodide containing products Hives    Ketorolac Hives     Pt verbalized I dont like it doesn't work on it.    Vitamin e (d-alpha tocopherol)      Hives (skin)^Black eyed peas and oatmeal causede hives       Meds Prior to Arrival:  Prior to Admission medications    Medication Sig Start Date End Date Taking? Authorizing Provider   hyoscyamine (ANASPAZ,LEVSIN) 0.125 mg Tab Take 3 tablets (375 mcg total) by mouth every 6 (six) hours as needed (Abdominal pain). 8/25/21 9/24/21  Eric Mcrae III, MD   ondansetron (ZOFRAN-ODT) 4 MG TbDL Take 1 tablet (4 mg total) by mouth every 8 (eight) hours as needed (nausea, vomiting). 8/5/21   Karlo Rogers MD   promethazine (PHENERGAN) 25 MG tablet Take 1 tablet (25 mg total) by mouth every 4 (four) hours as needed for Nausea. 11/1/21   Maria D Baker CNM   pyridoxine, vitamin B6, (B-6) 25 MG Tab Take 1 tablet (25 mg total) by mouth 2 (two) times daily as needed (Nausea). 10/13/21   Christine Bass MD   senna-docusate 8.6-50 mg (SENNA WITH DOCUSATE SODIUM) 8.6-50 mg per tablet Take 1 tablet by mouth once daily. 7/19/21   Shukri Ruelas MD   albuterol (PROVENTIL/VENTOLIN HFA) 90 mcg/actuation inhaler Inhale 1-2 puffs into the lungs every 6 (six) hours as needed for Wheezing or Shortness of Breath. Rescue  Patient not taking: Reported on 7/19/2021 7/13/21 9/21/21  Georgina Garcia PA-C        Review of Systems:  Constitutional: no fever or chills  ENT: positive for  nasal congestion  Respiratory: no cough or shortness of breath  Cardiovascular: no chest pain or palpitations  Gastrointestinal: no nausea or vomiting, tolerating diet  Genitourinary: no hematuria or dysuria  Musculoskeletal: no arthralgias or myalgias  Neurological: no seizures or tremors    OBJECTIVE:     Recent Vitals:  /76   Pulse 100   Temp 98.8 °F (37.1 °C) (Oral)   Resp 18   LMP 09/29/2021     Exam:    closed, firm and high    General: well developed, well nourished  Lungs:  normal respiratory effort  Heart: regular rate and rhythm  Abdomen: soft, non-tender non-distented; bowel sounds normal; no masses,  no organomegaly and gravid  Extremities: no cyanosis or edema, or clubbing    Lab Results:  Recent Results (from the past 36 hour(s))   CBC with Auto Differential    Collection Time: 05/05/22  5:55 PM   Result Value Ref Range    WBC 9.24 3.90 - 12.70 K/uL    RBC 3.68 (L) 4.00 - 5.40 M/uL    Hemoglobin 9.5 (L) 12.0 - 16.0 g/dL    Hematocrit 30.9 (L) 37.0 - 48.5 %    MCV 84 82 - 98 fL    MCH 25.8 (L) 27.0 - 31.0 pg    MCHC 30.7 (L) 32.0 - 36.0 g/dL    RDW 14.4 11.5 - 14.5 %    Platelets 339 150 - 450 K/uL    MPV 9.5 9.2 - 12.9 fL    Immature Granulocytes 0.8 (H) 0.0 - 0.5 %    Gran # (ANC) 6.8 1.8 - 7.7 K/uL    Immature Grans (Abs) 0.07 (H) 0.00 - 0.04 K/uL    Lymph # 1.5 1.0 - 4.8 K/uL    Mono # 0.7 0.3 - 1.0 K/uL    Eos # 0.1 0.0 - 0.5 K/uL    Baso # 0.06 0.00 - 0.20 K/uL    nRBC 0 0 /100 WBC    Gran % 73.9 (H) 38.0 - 73.0 %    Lymph % 16.3 (L) 18.0 - 48.0 %    Mono % 7.3 4.0 - 15.0 %    Eosinophil % 1.1 0.0 - 8.0 %    Basophil % 0.6 0.0 - 1.9 %    Differential Method Automated      Lab Results   Component Value Date    GROUPTRH O POS 10/13/2021          Diagnostic Studies:    Baseline: 130 bpm, Cat 1  TOCO: no contractions, some irritability.    radiology:   Ultrasound:   FINDINGS:  There is a single, transverse intrauterine gestation with a combined biometric derived estimated gestational age of  35 weeks and 3 days and an LINH of 06/06/2022.  Estimated fetal weight is 2489g +/- 363g with a Hadlock percentile of 52%.  The individual biometric parameters are as follows:     BPD: 8.8 cm, 35 week, 3 days.     Head circumference: 33.0 cm, 37 week, 4 days.     Abdominal circumference: 30.7 cm 34 week, 5 days.     Femur length: 6.7 cm, 34 week, 3 days.     Amniotic fluid volume is within normal limits with an EMILY of 16.3 mm.  Fetal cardiac activity is seen with a rate of 140 beats per minute on M-mode imaging.  Normal fetal activity is noted.     The cervix appears closed and there is no evidence placenta previa.  The placenta is anterior.  The cervical length is 3.9 cm (suboptimally visualized).     Impression:     Single, transverse, intrauterine gestation with a combine biometric derived estimated gestational age of 35 weeks and 3 days and an LINH of 06/06/2022.     Amniotic fluid volume is within normal limits and there is no evidence of placenta previa.         ASSESSMENT/PLAN:     Dx:Premature uterine contractions [O47.00]  There are no hospital problems to display for this patient.          Diagnostic Plan/Orders:  Orders Placed This Encounter   Procedures    US OB Limited 1 Or More Gestations    RAPID HIV    CBC with Auto Differential    Rubella antibody, IgG    RPR    Hepatitis B Surface Antigen    Hepatitis C antibody    Fetal Fibronectin 34w3d    Rupture of Membrane    COVID-19 Rapid Screening    Diet Adult Regular (IDDSI Level 7)    Vital signs    Vital Signs (Specify Frequency)    Fetal monitoring    Continuous tocometry    Discharge Criteria    Notify clinical provider    Notify clinical provider    Administer saline nasal spray    Full code    Airborne and Contact and Droplet Isolation Status    Type and Screen    Place in Outpatient      COVID test is negative  ROM is negative  PNL within normal limits but CBC shows anemia    Final diagnoses:  [O47.00] Premature uterine  contractions    No cervical change noted after many hours. No contractions seen.     Patient discharged to home with bleeding, rom, fmc, labor precautions. Will schedule follow-up in clinic

## 2022-05-06 LAB
HBV SURFACE AG SERPL QL IA: NEGATIVE
HCV AB SERPL QL IA: NEGATIVE
RPR SER QL: NORMAL
RUBV IGG SER-ACNC: 15.5 IU/ML
RUBV IGG SER-IMP: REACTIVE

## 2022-05-06 NOTE — NURSING
"Pt walked out of triage room and into hallway. Pt yelling, "I'm ready to go home before I get heated." Pt informed of US and GA per ultrasound. Pt states "Radiologist is stupid and doesn't know what he's talking about." Pt irate and demanding to go home. Pt given discharge papers. Pt states, "I am going to have this baby when I want to. No one is going to tell me how far I am." Pt refuses to listen to RN. Pt demanding to be wheeled off unit.Pt wheeled off unit by surgical tech.   "

## 2022-05-06 NOTE — NURSING
"Pt calling out to speak to doctor. Informed pt that doctor is on call and we will call her when everything is resulted. Pt is denying GA given by physician according to previous ultrasounds in chart. Pt denies ever getting ultrasounds performed. States "I know when I got pregnant." When attempting to educate pt on policies, pt refusing to listen. Pt states, "This is my body and my baby and I am going to have it one way or another." RN attempted to educate pt again about GA and due date, pt refused to listen again. Informed pt that she is not kraig on monitor, pt states, "My back and my hooha hurts". Educated pt about back pain in pregnancy and the difference in contractions. Showed pt fetal monitoring strip. Pt again yelling about GA and that she is 39 weeks and she wants to be induced. Informed pt that we cannot induce her based off her word. Pt states, "I'm not going to let the doctor push my dates back! That is dangerous for me and my baby!" Informed pt that she needs to call the clinic and make an appt with an OBGYN to set up an induction date if that's what she desires.    Called Dr. Tipton and informed her about pt.     Pt has reactive strip, no ctx on monitor or palpated. Abdomen soft and nontender.   "

## 2022-05-06 NOTE — NURSING
Dr. Tipton wanted STAT ultrasound done on pt. US was ordered routine. Called house supervisor to have radiologist contacted to read US.

## 2022-05-06 NOTE — DISCHARGE INSTRUCTIONS
Prenatal Concerns and Teaching    1) Severe headache not relieved with a dose of Tylenol    2) Blurry vision or seeing spots before your eyes    3) Sudden swelling in your face or hands    4) Sudden weight gain in only a few days    5) Severe stomach pains or cramps    6) Vomiting lasting more than 24 hours    7) Fever greater than 100.4 degrees    8) Vaginal bleeding that is more than just spotting    9) Excessive and unusual vaginal discharge    10) A gush or flow of watery fluid from your vagina    11) Significant decrease or absence of baby's movement (starting at 24-36 weeks)    12)  (less than 37 weeks): More than 4 contractions in an hour for 2 hours    13) Term (greater than 37 weeks): Contractions every 5 minutes for 2 hours    14) Increased fluid intake to 8-10 glasses daily    Follow up with MD as scheduled.

## 2022-05-09 ENCOUNTER — TELEPHONE (OUTPATIENT)
Dept: OBSTETRICS AND GYNECOLOGY | Facility: CLINIC | Age: 27
End: 2022-05-09
Payer: MEDICAID

## 2022-05-09 NOTE — TELEPHONE ENCOUNTER
Can you schedule her for an OB appointment with a provider that is open to taking her? She's 35 weeks.

## 2022-11-09 ENCOUNTER — TELEPHONE (OUTPATIENT)
Dept: OBSTETRICS AND GYNECOLOGY | Facility: CLINIC | Age: 27
End: 2022-11-09
Payer: MEDICAID

## 2022-11-09 NOTE — TELEPHONE ENCOUNTER
----- Message from Kristopher Colon sent at 11/9/2022 11:51 AM CST -----  Contact: self  Type: Sooner Appointment Request        Caller is requesting a sooner appointment. Caller declined first available appointment listed below. Caller will not accept being placed on the waitlist and is requesting a message be sent to doctor.        Name of Caller: Patient   Best Call Back Number: [phone]  Additional Information: Patient has  trying to be seen in [place] by Dr. Tera moreland call and get her scheduled pt has medicaid as well. Annual exam. Thanks

## 2022-11-10 NOTE — TELEPHONE ENCOUNTER
Contacted pt to let her know Dr. Haley doesn't have anything soon for an annual exam, but [facility] would be able to see her. Pt is coming in Tuesday at 2 pm. Pt voiced understanding.

## 2022-11-12 ENCOUNTER — HOSPITAL ENCOUNTER (EMERGENCY)
Facility: HOSPITAL | Age: 27
Discharge: LEFT BEFORE DISCHARGE | End: 2022-11-12
Attending: EMERGENCY MEDICINE
Payer: MEDICAID

## 2022-11-12 VITALS
OXYGEN SATURATION: 99 % | DIASTOLIC BLOOD PRESSURE: 80 MMHG | HEART RATE: 101 BPM | WEIGHT: 260 LBS | SYSTOLIC BLOOD PRESSURE: 144 MMHG | HEIGHT: 67 IN | TEMPERATURE: 98 F | RESPIRATION RATE: 18 BRPM | BODY MASS INDEX: 40.81 KG/M2

## 2022-11-12 DIAGNOSIS — R55 SYNCOPE: ICD-10-CM
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
## 2022-11-12 LAB
ALBUMIN SERPL BCP-MCNC: 4.2 G/DL (ref 3.5–5.2)
ALP SERPL-CCNC: 78 U/L (ref 55–135)
ALT SERPL W/O P-5'-P-CCNC: 15 U/L (ref 10–44)
ANION GAP SERPL CALC-SCNC: 12 MMOL/L (ref 8–16)
AST SERPL-CCNC: 18 U/L (ref 10–40)
BASOPHILS # BLD AUTO: 0.05 K/UL (ref 0–0.2)
BASOPHILS NFR BLD: 0.6 % (ref 0–1.9)
BILIRUB SERPL-MCNC: 0.4 MG/DL (ref 0.1–1)
BUN SERPL-MCNC: 7 MG/DL (ref 6–20)
CALCIUM SERPL-MCNC: 9 MG/DL (ref 8.7–10.5)
CHLORIDE SERPL-SCNC: 108 MMOL/L (ref 95–110)
CO2 SERPL-SCNC: 22 MMOL/L (ref 23–29)
CREAT SERPL-MCNC: 0.9 MG/DL (ref 0.5–1.4)
DIFFERENTIAL METHOD: ABNORMAL
EOSINOPHIL # BLD AUTO: 0 K/UL (ref 0–0.5)
EOSINOPHIL NFR BLD: 0.2 % (ref 0–8)
ERYTHROCYTE [DISTWIDTH] IN BLOOD BY AUTOMATED COUNT: 16.1 % (ref 11.5–14.5)
EST. GFR  (NO RACE VARIABLE): >60 ML/MIN/1.73 M^2
GLUCOSE SERPL-MCNC: 86 MG/DL (ref 70–110)
HCT VFR BLD AUTO: 37.5 % (ref 37–48.5)
HCV AB SERPL QL IA: NORMAL
HGB BLD-MCNC: 11.3 G/DL (ref 12–16)
HIV 1+2 AB+HIV1 P24 AG SERPL QL IA: NORMAL
IMM GRANULOCYTES # BLD AUTO: 0.02 K/UL (ref 0–0.04)
IMM GRANULOCYTES NFR BLD AUTO: 0.2 % (ref 0–0.5)
LYMPHOCYTES # BLD AUTO: 1.2 K/UL (ref 1–4.8)
LYMPHOCYTES NFR BLD: 13.6 % (ref 18–48)
MCH RBC QN AUTO: 23.3 PG (ref 27–31)
MCHC RBC AUTO-ENTMCNC: 30.1 G/DL (ref 32–36)
MCV RBC AUTO: 77 FL (ref 82–98)
MONOCYTES # BLD AUTO: 0.5 K/UL (ref 0.3–1)
MONOCYTES NFR BLD: 5.2 % (ref 4–15)
NEUTROPHILS # BLD AUTO: 6.9 K/UL (ref 1.8–7.7)
NEUTROPHILS NFR BLD: 80.2 % (ref 38–73)
NRBC BLD-RTO: 0 /100 WBC
PLATELET # BLD AUTO: 388 K/UL (ref 150–450)
PMV BLD AUTO: 9.9 FL (ref 9.2–12.9)
POTASSIUM SERPL-SCNC: 3.5 MMOL/L (ref 3.5–5.1)
PROT SERPL-MCNC: 8 G/DL (ref 6–8.4)
RBC # BLD AUTO: 4.85 M/UL (ref 4–5.4)
SODIUM SERPL-SCNC: 142 MMOL/L (ref 136–145)
WBC # BLD AUTO: 8.65 K/UL (ref 3.9–12.7)

## 2022-11-12 PROCEDURE — 25000003 PHARM REV CODE 250: Performed by: EMERGENCY MEDICINE

## 2022-11-12 PROCEDURE — 93005 ELECTROCARDIOGRAM TRACING: CPT

## 2022-11-12 PROCEDURE — 99285 EMERGENCY DEPT VISIT HI MDM: CPT | Mod: 25

## 2022-11-12 PROCEDURE — 87389 HIV-1 AG W/HIV-1&-2 AB AG IA: CPT | Performed by: EMERGENCY MEDICINE

## 2022-11-12 PROCEDURE — 80053 COMPREHEN METABOLIC PANEL: CPT | Performed by: EMERGENCY MEDICINE

## 2022-11-12 PROCEDURE — 93010 ELECTROCARDIOGRAM REPORT: CPT | Mod: ,,, | Performed by: INTERNAL MEDICINE

## 2022-11-12 PROCEDURE — 86803 HEPATITIS C AB TEST: CPT | Performed by: EMERGENCY MEDICINE

## 2022-11-12 PROCEDURE — 85025 COMPLETE CBC W/AUTO DIFF WBC: CPT | Performed by: EMERGENCY MEDICINE

## 2022-11-12 PROCEDURE — 36415 COLL VENOUS BLD VENIPUNCTURE: CPT | Performed by: EMERGENCY MEDICINE

## 2022-11-12 PROCEDURE — 93010 EKG 12-LEAD: ICD-10-PCS | Mod: ,,, | Performed by: INTERNAL MEDICINE

## 2022-11-12 RX ORDER — ACETAMINOPHEN 325 MG/1
650 TABLET ORAL
Status: COMPLETED | OUTPATIENT
Start: 2022-11-12 | End: 2022-11-12

## 2022-11-12 RX ADMIN — ACETAMINOPHEN 650 MG: 325 TABLET ORAL at 06:11
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
## 2022-11-13 NOTE — ED NOTES
"Pt on the phone yelling cursing at someone. Requested Pt step outside if they wish to curse as there are other patients, including children, around. Pt stated, "fuck you get out of my face". Again, reiterated to the Pt they need to step outside if they want to speak like that as it will not be tolerated in the lobby. Pt again began yelling at this nurse. Requested registration get the detail officer and  to intervene. Detail officer and security then addressed the issue with the Pt. Pt seen leaving the premises after.  "

## 2022-11-13 NOTE — ED NOTES
"Pt given tylenol for c/o headache and neck pain. Offered pt an ice pack for reported swelling to neck and head. Pt stated she would not be able to use ice pack because "it will make me cold." Pt requesting unlimited amount of warm blankets. Pt currently has 3.   "

## 2022-12-12 ENCOUNTER — TELEPHONE (OUTPATIENT)
Dept: OBSTETRICS AND GYNECOLOGY | Facility: CLINIC | Age: 27
End: 2022-12-12

## 2022-12-12 NOTE — TELEPHONE ENCOUNTER
Pt wanted to come in for annual exam and STD testing. I informed her Dr. Haley doesn't have anything soon, but [place] has something available this upcoming Wednesday. Pt is coming in at 2 pm. Gave directions to our office, pt voiced understanding.

## 2022-12-14 ENCOUNTER — LAB VISIT (OUTPATIENT)
Dept: LAB | Facility: HOSPITAL | Age: 27
End: 2022-12-14
Attending: STUDENT IN AN ORGANIZED HEALTH CARE EDUCATION/TRAINING PROGRAM
Payer: MEDICAID

## 2022-12-14 ENCOUNTER — OFFICE VISIT (OUTPATIENT)
Dept: OBSTETRICS AND GYNECOLOGY | Facility: CLINIC | Age: 27
End: 2022-12-14
Payer: MEDICAID

## 2022-12-14 VITALS
HEART RATE: 88 BPM | WEIGHT: 293 LBS | BODY MASS INDEX: 45.99 KG/M2 | HEIGHT: 67 IN | SYSTOLIC BLOOD PRESSURE: 132 MMHG | DIASTOLIC BLOOD PRESSURE: 85 MMHG

## 2022-12-14 DIAGNOSIS — Z20.89 CONTACT WITH AND (SUSPECTED) EXPOSURE TO OTHER COMMUNICABLE DISEASES: ICD-10-CM

## 2022-12-14 DIAGNOSIS — Z01.419 WELL WOMAN EXAM WITH ROUTINE GYNECOLOGICAL EXAM: Primary | ICD-10-CM

## 2022-12-14 DIAGNOSIS — Z71.85 HPV VACCINE COUNSELING: ICD-10-CM

## 2022-12-14 DIAGNOSIS — Z12.4 SCREENING FOR MALIGNANT NEOPLASM OF CERVIX: ICD-10-CM

## 2022-12-14 DIAGNOSIS — M54.50 ACUTE MIDLINE LOW BACK PAIN, UNSPECIFIED WHETHER SCIATICA PRESENT: ICD-10-CM

## 2022-12-14 PROBLEM — Z32.01 PREGNANCY EXAMINATION OR TEST, POSITIVE RESULT: Status: RESOLVED | Noted: 2021-11-01 | Resolved: 2022-12-14

## 2022-12-14 LAB
HBV SURFACE AG SERPL QL IA: NORMAL
HCV AB SERPL QL IA: NORMAL

## 2022-12-14 PROCEDURE — 3075F PR MOST RECENT SYSTOLIC BLOOD PRESS GE 130-139MM HG: ICD-10-PCS | Mod: CPTII,,, | Performed by: STUDENT IN AN ORGANIZED HEALTH CARE EDUCATION/TRAINING PROGRAM

## 2022-12-14 PROCEDURE — 87340 HEPATITIS B SURFACE AG IA: CPT | Performed by: STUDENT IN AN ORGANIZED HEALTH CARE EDUCATION/TRAINING PROGRAM

## 2022-12-14 PROCEDURE — 99395 PR PREVENTIVE VISIT,EST,18-39: ICD-10-PCS | Mod: S$PBB,,, | Performed by: STUDENT IN AN ORGANIZED HEALTH CARE EDUCATION/TRAINING PROGRAM

## 2022-12-14 PROCEDURE — 87389 HIV-1 AG W/HIV-1&-2 AB AG IA: CPT | Performed by: STUDENT IN AN ORGANIZED HEALTH CARE EDUCATION/TRAINING PROGRAM

## 2022-12-14 PROCEDURE — 87529 HSV DNA AMP PROBE: CPT | Performed by: STUDENT IN AN ORGANIZED HEALTH CARE EDUCATION/TRAINING PROGRAM

## 2022-12-14 PROCEDURE — 99999 PR PBB SHADOW E&M-EST. PATIENT-LVL III: CPT | Mod: PBBFAC,,, | Performed by: STUDENT IN AN ORGANIZED HEALTH CARE EDUCATION/TRAINING PROGRAM

## 2022-12-14 PROCEDURE — 3008F BODY MASS INDEX DOCD: CPT | Mod: CPTII,,, | Performed by: STUDENT IN AN ORGANIZED HEALTH CARE EDUCATION/TRAINING PROGRAM

## 2022-12-14 PROCEDURE — 3075F SYST BP GE 130 - 139MM HG: CPT | Mod: CPTII,,, | Performed by: STUDENT IN AN ORGANIZED HEALTH CARE EDUCATION/TRAINING PROGRAM

## 2022-12-14 PROCEDURE — 86694 HERPES SIMPLEX NES ANTBDY: CPT | Performed by: STUDENT IN AN ORGANIZED HEALTH CARE EDUCATION/TRAINING PROGRAM

## 2022-12-14 PROCEDURE — 87591 N.GONORRHOEAE DNA AMP PROB: CPT | Performed by: STUDENT IN AN ORGANIZED HEALTH CARE EDUCATION/TRAINING PROGRAM

## 2022-12-14 PROCEDURE — 99999 PR PBB SHADOW E&M-EST. PATIENT-LVL III: ICD-10-PCS | Mod: PBBFAC,,, | Performed by: STUDENT IN AN ORGANIZED HEALTH CARE EDUCATION/TRAINING PROGRAM

## 2022-12-14 PROCEDURE — 87491 CHLMYD TRACH DNA AMP PROBE: CPT | Performed by: STUDENT IN AN ORGANIZED HEALTH CARE EDUCATION/TRAINING PROGRAM

## 2022-12-14 PROCEDURE — 86803 HEPATITIS C AB TEST: CPT | Performed by: STUDENT IN AN ORGANIZED HEALTH CARE EDUCATION/TRAINING PROGRAM

## 2022-12-14 PROCEDURE — 1160F RVW MEDS BY RX/DR IN RCRD: CPT | Mod: CPTII,,, | Performed by: STUDENT IN AN ORGANIZED HEALTH CARE EDUCATION/TRAINING PROGRAM

## 2022-12-14 PROCEDURE — 3079F PR MOST RECENT DIASTOLIC BLOOD PRESSURE 80-89 MM HG: ICD-10-PCS | Mod: CPTII,,, | Performed by: STUDENT IN AN ORGANIZED HEALTH CARE EDUCATION/TRAINING PROGRAM

## 2022-12-14 PROCEDURE — 1159F MED LIST DOCD IN RCRD: CPT | Mod: CPTII,,, | Performed by: STUDENT IN AN ORGANIZED HEALTH CARE EDUCATION/TRAINING PROGRAM

## 2022-12-14 PROCEDURE — 99395 PREV VISIT EST AGE 18-39: CPT | Mod: S$PBB,,, | Performed by: STUDENT IN AN ORGANIZED HEALTH CARE EDUCATION/TRAINING PROGRAM

## 2022-12-14 PROCEDURE — 86592 SYPHILIS TEST NON-TREP QUAL: CPT | Performed by: STUDENT IN AN ORGANIZED HEALTH CARE EDUCATION/TRAINING PROGRAM

## 2022-12-14 PROCEDURE — 99213 OFFICE O/P EST LOW 20 MIN: CPT | Mod: PBBFAC,PO | Performed by: STUDENT IN AN ORGANIZED HEALTH CARE EDUCATION/TRAINING PROGRAM

## 2022-12-14 PROCEDURE — 1160F PR REVIEW ALL MEDS BY PRESCRIBER/CLIN PHARMACIST DOCUMENTED: ICD-10-PCS | Mod: CPTII,,, | Performed by: STUDENT IN AN ORGANIZED HEALTH CARE EDUCATION/TRAINING PROGRAM

## 2022-12-14 PROCEDURE — 3008F PR BODY MASS INDEX (BMI) DOCUMENTED: ICD-10-PCS | Mod: CPTII,,, | Performed by: STUDENT IN AN ORGANIZED HEALTH CARE EDUCATION/TRAINING PROGRAM

## 2022-12-14 PROCEDURE — 1159F PR MEDICATION LIST DOCUMENTED IN MEDICAL RECORD: ICD-10-PCS | Mod: CPTII,,, | Performed by: STUDENT IN AN ORGANIZED HEALTH CARE EDUCATION/TRAINING PROGRAM

## 2022-12-14 PROCEDURE — 81514 NFCT DS BV&VAGINITIS DNA ALG: CPT | Performed by: STUDENT IN AN ORGANIZED HEALTH CARE EDUCATION/TRAINING PROGRAM

## 2022-12-14 PROCEDURE — 3079F DIAST BP 80-89 MM HG: CPT | Mod: CPTII,,, | Performed by: STUDENT IN AN ORGANIZED HEALTH CARE EDUCATION/TRAINING PROGRAM

## 2022-12-14 PROCEDURE — 87624 HPV HI-RISK TYP POOLED RSLT: CPT | Performed by: STUDENT IN AN ORGANIZED HEALTH CARE EDUCATION/TRAINING PROGRAM

## 2022-12-14 PROCEDURE — 36415 COLL VENOUS BLD VENIPUNCTURE: CPT | Performed by: STUDENT IN AN ORGANIZED HEALTH CARE EDUCATION/TRAINING PROGRAM

## 2022-12-14 PROCEDURE — 86696 HERPES SIMPLEX TYPE 2 TEST: CPT | Performed by: STUDENT IN AN ORGANIZED HEALTH CARE EDUCATION/TRAINING PROGRAM

## 2022-12-14 PROCEDURE — 88175 CYTOPATH C/V AUTO FLUID REDO: CPT | Performed by: STUDENT IN AN ORGANIZED HEALTH CARE EDUCATION/TRAINING PROGRAM

## 2022-12-14 NOTE — PROGRESS NOTES
" [facility]    Subjective:   Chief Complaint:    Chief Complaint   Patient presents with    Annual Exam    STD CHECK       Patient's last menstrual period was 2022 (exact date).  Contraception: None.  HRT: None.    Manisha Bishop is a 27 y.o.  who presents for an annual exam.  She does want STD screening.  She participates in regular exercise:  No.  She does not smoke.  She wears seatbelts.  She is taking a multivitamin.  She denies any domestic violence.    She complains of constant lower back pain over the last month.  She describes the pain as shock, like something is weighing on my back".  Pain is 10/10 and keeps her awake at night.  Denies any fever, chronic back issues, trauma or changes in her bowel movements and urination.  The only other time she is experiences kind of pain was at the end of her last pregnancy.  She knows she is not pregnant due to recently getting off of her menstrual cycle.  She thinks the pain is due to her epidural that she received when she gave birth to her daughter in May of this year.  She does not see a back specialist or a primary care provider.  She tried warm compresses and ibuprofen without relief.    She also request herpes testing.  She reports that her boyfriend told her that his ex-girlfriend tested positive for herpes.  Boyfriend has not been checked for herpes.  Patient wants to be checked for herpes to make sure she is not transmitting anything to her baby.  Denies any recent rashes or bumps in the vaginal area.    Last Pap:  1-2 years ago. Denies any history of abnormal pap smears.  Last mammogram: N/A.     FH:  Breast cancer: none.  Colon cancer: none.  Endometrial cancer: none.  Ovarian cancer: none.      OB History    Para Term  AB Living   1 1 1   0 1   SAB IAB Ectopic Multiple Live Births   0       1      # Outcome Date GA Lbr Ilia/2nd Weight Sex Delivery Anes PTL Lv   1 Term 22 38w1d   F CS-Unspec EPI N "       Obstetric Comments    section x 1.        Past Medical History:   Diagnosis Date    Anxiety     Bipolar 1 disorder     COVID-19 2021    GERD (gastroesophageal reflux disease)     Obese      Past Surgical History:   Procedure Laterality Date     SECTION  2022    LEG SURGERY       Review of patient's allergies indicates:   Allergen Reactions    Iodine and iodide containing products Hives    Ketorolac Hives     Pt verbalized I dont like it doesn't work on it.    Vitamin e (d-alpha tocopherol)      Hives (skin)^Black eyed peas and oatmeal causede hives       Social History     Socioeconomic History    Marital status: Single   Tobacco Use    Smoking status: Never    Smokeless tobacco: Never   Substance and Sexual Activity    Alcohol use: Yes     Comment: occasionally    Drug use: No    Sexual activity: Yes     Partners: Male     Birth control/protection: None       Family History   Problem Relation Age of Onset    No Known Problems Mother     No Known Problems Father     Breast cancer Neg Hx     Colon cancer Neg Hx     Ovarian cancer Neg Hx        Medications:  Current Outpatient Medications on File Prior to Visit   Medication Sig Dispense Refill Last Dose    hyoscyamine (ANASPAZ,LEVSIN) 0.125 mg Tab Take 3 tablets (375 mcg total) by mouth every 6 (six) hours as needed (Abdominal pain). 20 tablet 1     [DISCONTINUED] albuterol (PROVENTIL/VENTOLIN HFA) 90 mcg/actuation inhaler Inhale 1-2 puffs into the lungs every 6 (six) hours as needed for Wheezing or Shortness of Breath. Rescue (Patient not taking: Reported on 2021) 8 g 0     [DISCONTINUED] ondansetron (ZOFRAN-ODT) 4 MG TbDL Take 1 tablet (4 mg total) by mouth every 8 (eight) hours as needed (nausea, vomiting). (Patient not taking: Reported on 2022) 12 tablet 0 Not Taking    [DISCONTINUED] promethazine (PHENERGAN) 25 MG tablet Take 1 tablet (25 mg total) by mouth every 4 (four) hours as needed for Nausea. (Patient not  "taking: Reported on 12/14/2022) 20 tablet 0 Not Taking    [DISCONTINUED] pyridoxine, vitamin B6, (B-6) 25 MG Tab Take 1 tablet (25 mg total) by mouth 2 (two) times daily as needed (Nausea). (Patient not taking: Reported on 12/14/2022) 20 tablet 0 Not Taking    [DISCONTINUED] senna-docusate 8.6-50 mg (SENNA WITH DOCUSATE SODIUM) 8.6-50 mg per tablet Take 1 tablet by mouth once daily. (Patient not taking: Reported on 12/14/2022) 30 tablet 1 Not Taking       Review of Systems   Constitutional: Negative for appetite change, fever and unexpected weight change.   Respiratory: Negative for cough and shortness of breath.    Cardiovascular: Negative for chest pain and palpitations.   Gastrointestinal: Negative for abdominal distention, constipation, nausea and vomiting.   Genitourinary: Negative for dyspareunia, dysuria, hematuria and pelvic pain.        GYN ROS per HPI.   Musculoskeletal: Negative for gait problem and myalgias.   Skin: Negative for rash.   Neurological: Negative for dizziness, light-headedness and headaches.   Psychiatric/Behavioral: The patient is not nervous/anxious.      Objective:   /85 (BP Location: Left arm, Patient Position: Sitting, BP Method: Large (Automatic))   Pulse 88   Ht 5' 7" (1.702 m)   Wt 134.9 kg (297 lb 6.4 oz)   LMP 12/08/2022 (Exact Date)   Breastfeeding No   BMI 46.58 kg/m²     Physical Exam  Vitals reviewed. Exam conducted with a chaperone present.   HENT:      Head: Normocephalic and atraumatic.   Cardiovascular:      Rate and Rhythm: Normal rate and regular rhythm.   Pulmonary:      Effort: Pulmonary effort is normal. No accessory muscle usage or respiratory distress.   Chest:      Chest wall: No tenderness.   Breasts:     Breasts are symmetrical.      Right: No inverted nipple, mass, nipple discharge, skin change or tenderness.      Left: No inverted nipple, mass, nipple discharge, skin change or tenderness.   Abdominal:      General: Abdomen is flat.      Tenderness: " There is no abdominal tenderness. There is no guarding.   Genitourinary:     General: Normal vulva.      Labia:         Right: No rash or tenderness.         Left: No rash or tenderness.       Urethra: No prolapse.      Vagina: Normal. No tenderness.      Cervix: No cervical motion tenderness, erythema or cervical bleeding.      Uterus: Not tender.       Adnexa:         Right: No mass or tenderness.          Left: No mass or tenderness.        Comments: Exam limited due to body habitus.  No rash or vesicles noted in pubic area or labia.  Musculoskeletal:      Lumbar back: Tenderness (Moderate TTP over midline lower back.) present. No swelling, deformity, signs of trauma or lacerations.      Right lower leg: No edema.      Left lower leg: No edema.      Comments: Exam limited due to body habitus.   Lymphadenopathy:      Upper Body:      Right upper body: No axillary adenopathy.      Left upper body: No axillary adenopathy.   Neurological:      General: No focal deficit present.      Mental Status: She is alert. Mental status is at baseline.        Assessment:     1. Well woman exam with routine gynecological exam    2. Screening for malignant neoplasm of cervix    3. Contact with and (suspected) exposure to other communicable diseases    4. Acute midline low back pain, unspecified whether sciatica present    5. HPV vaccine counseling      Plan:     27 y.o. female presents today for annual pap smear and exam.     1. Well woman exam with routine gynecological exam    2. Screening for malignant neoplasm of cervix  - Liquid-Based Pap Smear, Screening  - HPV High Risk Genotypes, PCR    3. Contact with and (suspected) exposure to other communicable diseases  - C. trachomatis/N. gonorrhoeae by AMP DNA [place]; Cervicovaginal  - Vaginosis Screen by DNA Probe  - HIV 1/2 Ag/Ab (4th Gen); Future  - RPR; Future  - Hepatitis C Antibody; Future  - Hepatitis B Surface Antigen; Future  - HERPES SIMPLEX 1 & 2 IGM; Future  - HERPES  SIMPLEX 1&2 IGG; Future  - HERPES SIMPLEX VIRUS (HSV) TYPE 1 & 2 DNA BY PCR; Future    4. Acute midline low back pain, unspecified whether sciatica present  - Ambulatory referral/consult to [facility]; Future    5. HPV vaccine counseling      Follow up in about 1 year (around 12/14/2023) for annual exam or sooner if any GYN issues arise.    The above was reviewed and discussed with the patient.    Annual exam and screening issues based on the patient's age and family history were discussed.     Discussed that we do not handle back pain in our clinic.  Recommended going to back and spine and informed her that I placed a referral to their clinic.  She was instructed to go to the ED for evaluation if she experiences any of the following:  severe headache, vision changes, fever, palpitations, shortness of breath, numbness in any of her extremities, or bowel/bladder incontinence.    We discussed that people typically do not spread herpes if they are not asymptomatic or having a current outbreak.  If she begins to experience any vulvar rash or pain in the vaginal area, she was instructed to contact the clinic for further evaluation.      - Pap and HPV testing performed.   - Mammogram N/A.  - Colonoscopy N/A.  - Tobacco cessation N/A.  - DEXA N/A.  - Counseled to continue taking daily multivitamin.    STI testing.  Back and spine referral for evaluation of acute midline lower back pain.    The patient was provided with GARDASIL 9 vaccine information.    She  the GARDASIL / Human papilloma virus vaccine.       The patient's questions were answered, and she agrees with the current plan.     The patient was counseled today on the new ACS guidelines for cervical cytology screening as well as the current recommendations for breast cancer screening. She was counseled to follow up with her PCP for other routine health maintenance.       Brooklyn Gross PA-C  12/14/2022

## 2022-12-15 DIAGNOSIS — N76.0 BACTERIAL VAGINOSIS: Primary | ICD-10-CM

## 2022-12-15 DIAGNOSIS — B96.89 BACTERIAL VAGINOSIS: Primary | ICD-10-CM

## 2022-12-15 LAB
BACTERIAL VAGINOSIS DNA: POSITIVE
C TRACH DNA SPEC QL NAA+PROBE: NOT DETECTED
CANDIDA GLABRATA DNA: NEGATIVE
CANDIDA KRUSEI DNA: NEGATIVE
CANDIDA RRNA VAG QL PROBE: NEGATIVE
HIV 1+2 AB+HIV1 P24 AG SERPL QL IA: NORMAL
N GONORRHOEA DNA SPEC QL NAA+PROBE: NOT DETECTED
RPR SER QL: NORMAL
T VAGINALIS RRNA GENITAL QL PROBE: NEGATIVE

## 2022-12-15 RX ORDER — METRONIDAZOLE 500 MG/1
500 TABLET ORAL EVERY 12 HOURS
Qty: 14 TABLET | Refills: 0 | Status: ON HOLD | OUTPATIENT
Start: 2022-12-15 | End: 2022-12-19

## 2022-12-15 RX ORDER — METRONIDAZOLE 7.5 MG/G
GEL VAGINAL
Qty: 70 G | Refills: 0 | Status: SHIPPED | OUTPATIENT
Start: 2022-12-15 | End: 2022-12-15 | Stop reason: SDUPTHER

## 2022-12-17 LAB
HSV-1 DNA BY PCR: NEGATIVE
HSV-2 DNA BY PCR: NEGATIVE
HSV1 GG IGG SER-ACNC: 20.1 IV
HSV2 GG IGG SER-ACNC: 0.06 IV

## 2022-12-18 ENCOUNTER — HOSPITAL ENCOUNTER (OUTPATIENT)
Facility: HOSPITAL | Age: 27
Discharge: HOME | End: 2022-12-20
Attending: EMERGENCY MEDICINE | Admitting: FAMILY MEDICINE
Payer: MEDICAID

## 2022-12-18 DIAGNOSIS — R07.9 CHEST PAIN: ICD-10-CM

## 2022-12-18 DIAGNOSIS — K80.20 CALCULUS OF GALLBLADDER WITHOUT CHOLECYSTITIS WITHOUT OBSTRUCTION: ICD-10-CM

## 2022-12-18 DIAGNOSIS — K37 APPENDICITIS, UNSPECIFIED APPENDICITIS TYPE: Primary | ICD-10-CM

## 2022-12-18 PROCEDURE — 99285 EMERGENCY DEPT VISIT HI MDM: CPT

## 2022-12-18 PROCEDURE — 81025 URINE PREGNANCY TEST: CPT | Performed by: STUDENT IN AN ORGANIZED HEALTH CARE EDUCATION/TRAINING PROGRAM

## 2022-12-19 ENCOUNTER — TELEPHONE (OUTPATIENT)
Dept: OBSTETRICS AND GYNECOLOGY | Facility: CLINIC | Age: 27
End: 2022-12-19

## 2022-12-19 ENCOUNTER — ANESTHESIA (OUTPATIENT)
Dept: SURGERY | Facility: HOSPITAL | Age: 27
End: 2022-12-19
Payer: MEDICAID

## 2022-12-19 ENCOUNTER — ANESTHESIA EVENT (OUTPATIENT)
Dept: SURGERY | Facility: HOSPITAL | Age: 27
End: 2022-12-19
Payer: MEDICAID

## 2022-12-19 PROBLEM — K35.80 ACUTE APPENDICITIS: Status: ACTIVE | Noted: 2022-12-19

## 2022-12-19 LAB
ALBUMIN SERPL BCP-MCNC: 4.1 G/DL (ref 3.5–5.2)
ALP SERPL-CCNC: 68 U/L (ref 55–135)
ALT SERPL W/O P-5'-P-CCNC: 13 U/L (ref 10–44)
ANION GAP SERPL CALC-SCNC: 8 MMOL/L (ref 8–16)
AST SERPL-CCNC: 15 U/L (ref 10–40)
B-HCG UR QL: NEGATIVE
BASOPHILS # BLD AUTO: 0.06 K/UL (ref 0–0.2)
BASOPHILS NFR BLD: 0.5 % (ref 0–1.9)
BILIRUB SERPL-MCNC: 1.1 MG/DL (ref 0.1–1)
BILIRUB UR QL STRIP: NEGATIVE
BUN SERPL-MCNC: 11 MG/DL (ref 6–20)
CALCIUM SERPL-MCNC: 8.7 MG/DL (ref 8.7–10.5)
CHLORIDE SERPL-SCNC: 100 MMOL/L (ref 95–110)
CLARITY UR: ABNORMAL
CO2 SERPL-SCNC: 26 MMOL/L (ref 23–29)
COLOR UR: YELLOW
CREAT SERPL-MCNC: 0.8 MG/DL (ref 0.5–1.4)
CREAT SERPL-MCNC: 0.8 MG/DL (ref 0.5–1.4)
CTP QC/QA: YES
DIFFERENTIAL METHOD: ABNORMAL
EOSINOPHIL # BLD AUTO: 0 K/UL (ref 0–0.5)
EOSINOPHIL NFR BLD: 0.3 % (ref 0–8)
ERYTHROCYTE [DISTWIDTH] IN BLOOD BY AUTOMATED COUNT: 16.8 % (ref 11.5–14.5)
EST. GFR  (NO RACE VARIABLE): >60 ML/MIN/1.73 M^2
GLUCOSE SERPL-MCNC: 111 MG/DL (ref 70–110)
GLUCOSE UR QL STRIP: NEGATIVE
HCT VFR BLD AUTO: 35.8 % (ref 37–48.5)
HGB BLD-MCNC: 11 G/DL (ref 12–16)
HGB UR QL STRIP: NEGATIVE
HSV AB, IGM BY EIA: 0.33 INDEX
IMM GRANULOCYTES # BLD AUTO: 0.04 K/UL (ref 0–0.04)
IMM GRANULOCYTES NFR BLD AUTO: 0.3 % (ref 0–0.5)
KETONES UR QL STRIP: NEGATIVE
LEUKOCYTE ESTERASE UR QL STRIP: NEGATIVE
LIPASE SERPL-CCNC: 25 U/L (ref 4–60)
LYMPHOCYTES # BLD AUTO: 1.2 K/UL (ref 1–4.8)
LYMPHOCYTES NFR BLD: 10.4 % (ref 18–48)
MCH RBC QN AUTO: 24 PG (ref 27–31)
MCHC RBC AUTO-ENTMCNC: 30.7 G/DL (ref 32–36)
MCV RBC AUTO: 78 FL (ref 82–98)
MONOCYTES # BLD AUTO: 0.8 K/UL (ref 0.3–1)
MONOCYTES NFR BLD: 7.2 % (ref 4–15)
NEUTROPHILS # BLD AUTO: 9.4 K/UL (ref 1.8–7.7)
NEUTROPHILS NFR BLD: 81.3 % (ref 38–73)
NITRITE UR QL STRIP: NEGATIVE
NRBC BLD-RTO: 0 /100 WBC
PH UR STRIP: 6 [PH] (ref 5–8)
PLATELET # BLD AUTO: 325 K/UL (ref 150–450)
PMV BLD AUTO: 9.6 FL (ref 9.2–12.9)
POTASSIUM SERPL-SCNC: 3.4 MMOL/L (ref 3.5–5.1)
PROT SERPL-MCNC: 7.8 G/DL (ref 6–8.4)
PROT UR QL STRIP: ABNORMAL
RBC # BLD AUTO: 4.58 M/UL (ref 4–5.4)
SAMPLE: NORMAL
SODIUM SERPL-SCNC: 134 MMOL/L (ref 136–145)
SP GR UR STRIP: 1.03 (ref 1–1.03)
URN SPEC COLLECT METH UR: ABNORMAL
UROBILINOGEN UR STRIP-ACNC: NEGATIVE EU/DL
WBC # BLD AUTO: 11.62 K/UL (ref 3.9–12.7)

## 2022-12-19 PROCEDURE — 25000003 PHARM REV CODE 250: Performed by: NURSE ANESTHETIST, CERTIFIED REGISTERED

## 2022-12-19 PROCEDURE — 80053 COMPREHEN METABOLIC PANEL: CPT | Performed by: STUDENT IN AN ORGANIZED HEALTH CARE EDUCATION/TRAINING PROGRAM

## 2022-12-19 PROCEDURE — 37000008 HC ANESTHESIA 1ST 15 MINUTES: Performed by: SURGERY

## 2022-12-19 PROCEDURE — 44970 PR LAP,APPENDECTOMY: ICD-10-PCS | Mod: ,,, | Performed by: SURGERY

## 2022-12-19 PROCEDURE — 71000033 HC RECOVERY, INTIAL HOUR: Performed by: SURGERY

## 2022-12-19 PROCEDURE — 25000003 PHARM REV CODE 250: Performed by: ANESTHESIOLOGY

## 2022-12-19 PROCEDURE — 44970 LAPAROSCOPY APPENDECTOMY: CPT | Mod: ,,, | Performed by: SURGERY

## 2022-12-19 PROCEDURE — 85025 COMPLETE CBC W/AUTO DIFF WBC: CPT | Performed by: STUDENT IN AN ORGANIZED HEALTH CARE EDUCATION/TRAINING PROGRAM

## 2022-12-19 PROCEDURE — 71000039 HC RECOVERY, EACH ADD'L HOUR: Performed by: SURGERY

## 2022-12-19 PROCEDURE — G0378 HOSPITAL OBSERVATION PER HR: HCPCS

## 2022-12-19 PROCEDURE — 27201423 OPTIME MED/SURG SUP & DEVICES STERILE SUPPLY: Performed by: SURGERY

## 2022-12-19 PROCEDURE — 81003 URINALYSIS AUTO W/O SCOPE: CPT | Performed by: STUDENT IN AN ORGANIZED HEALTH CARE EDUCATION/TRAINING PROGRAM

## 2022-12-19 PROCEDURE — 96361 HYDRATE IV INFUSION ADD-ON: CPT | Mod: 59

## 2022-12-19 PROCEDURE — 96366 THER/PROPH/DIAG IV INF ADDON: CPT | Mod: 59

## 2022-12-19 PROCEDURE — 25000003 PHARM REV CODE 250: Performed by: SURGERY

## 2022-12-19 PROCEDURE — D9220A PRA ANESTHESIA: Mod: ,,, | Performed by: ANESTHESIOLOGY

## 2022-12-19 PROCEDURE — 96376 TX/PRO/DX INJ SAME DRUG ADON: CPT

## 2022-12-19 PROCEDURE — 96361 HYDRATE IV INFUSION ADD-ON: CPT

## 2022-12-19 PROCEDURE — 63600175 PHARM REV CODE 636 W HCPCS: Performed by: EMERGENCY MEDICINE

## 2022-12-19 PROCEDURE — 63600175 PHARM REV CODE 636 W HCPCS: Performed by: SURGERY

## 2022-12-19 PROCEDURE — 63600175 PHARM REV CODE 636 W HCPCS: Performed by: FAMILY MEDICINE

## 2022-12-19 PROCEDURE — 63600175 PHARM REV CODE 636 W HCPCS: Performed by: ANESTHESIOLOGY

## 2022-12-19 PROCEDURE — 27201107 HC STYLET, STANDARD: Performed by: ANESTHESIOLOGY

## 2022-12-19 PROCEDURE — 36000709 HC OR TIME LEV III EA ADD 15 MIN: Performed by: SURGERY

## 2022-12-19 PROCEDURE — 96375 TX/PRO/DX INJ NEW DRUG ADDON: CPT

## 2022-12-19 PROCEDURE — D9220A PRA ANESTHESIA: ICD-10-PCS | Mod: ,,, | Performed by: ANESTHESIOLOGY

## 2022-12-19 PROCEDURE — 63600175 PHARM REV CODE 636 W HCPCS: Performed by: NURSE ANESTHETIST, CERTIFIED REGISTERED

## 2022-12-19 PROCEDURE — 83690 ASSAY OF LIPASE: CPT | Performed by: STUDENT IN AN ORGANIZED HEALTH CARE EDUCATION/TRAINING PROGRAM

## 2022-12-19 PROCEDURE — 00840 ANES IPER PX LOWER ABD NOS: CPT | Performed by: SURGERY

## 2022-12-19 PROCEDURE — 37000009 HC ANESTHESIA EA ADD 15 MINS: Performed by: SURGERY

## 2022-12-19 PROCEDURE — 27000671 HC TUBING MICROBORE EXT: Performed by: ANESTHESIOLOGY

## 2022-12-19 PROCEDURE — 63600175 PHARM REV CODE 636 W HCPCS: Performed by: STUDENT IN AN ORGANIZED HEALTH CARE EDUCATION/TRAINING PROGRAM

## 2022-12-19 PROCEDURE — 27000673 HC TUBING BLOOD Y: Performed by: ANESTHESIOLOGY

## 2022-12-19 PROCEDURE — 25000003 PHARM REV CODE 250: Performed by: FAMILY MEDICINE

## 2022-12-19 PROCEDURE — 36000708 HC OR TIME LEV III 1ST 15 MIN: Performed by: SURGERY

## 2022-12-19 PROCEDURE — 96365 THER/PROPH/DIAG IV INF INIT: CPT

## 2022-12-19 PROCEDURE — 27202107 HC XP QUATRO SENSOR: Performed by: ANESTHESIOLOGY

## 2022-12-19 PROCEDURE — 25000003 PHARM REV CODE 250: Performed by: EMERGENCY MEDICINE

## 2022-12-19 RX ORDER — ONDANSETRON 2 MG/ML
4 INJECTION INTRAMUSCULAR; INTRAVENOUS EVERY 8 HOURS PRN
Status: DISCONTINUED | OUTPATIENT
Start: 2022-12-19 | End: 2022-12-20 | Stop reason: HOSPADM

## 2022-12-19 RX ORDER — SODIUM CHLORIDE 0.9 % (FLUSH) 0.9 %
10 SYRINGE (ML) INJECTION EVERY 12 HOURS PRN
Status: DISCONTINUED | OUTPATIENT
Start: 2022-12-19 | End: 2022-12-20 | Stop reason: HOSPADM

## 2022-12-19 RX ORDER — ONDANSETRON 2 MG/ML
INJECTION INTRAMUSCULAR; INTRAVENOUS
Status: DISCONTINUED | OUTPATIENT
Start: 2022-12-19 | End: 2022-12-19

## 2022-12-19 RX ORDER — SODIUM CHLORIDE, SODIUM LACTATE, POTASSIUM CHLORIDE, CALCIUM CHLORIDE 600; 310; 30; 20 MG/100ML; MG/100ML; MG/100ML; MG/100ML
INJECTION, SOLUTION INTRAVENOUS CONTINUOUS
Status: DISCONTINUED | OUTPATIENT
Start: 2022-12-19 | End: 2022-12-20 | Stop reason: HOSPADM

## 2022-12-19 RX ORDER — LIDOCAINE HCL/PF 100 MG/5ML
SYRINGE (ML) INTRAVENOUS
Status: DISCONTINUED | OUTPATIENT
Start: 2022-12-19 | End: 2022-12-19

## 2022-12-19 RX ORDER — HYDROCODONE BITARTRATE AND ACETAMINOPHEN 5; 325 MG/1; MG/1
1 TABLET ORAL EVERY 6 HOURS PRN
Status: DISCONTINUED | OUTPATIENT
Start: 2022-12-19 | End: 2022-12-19

## 2022-12-19 RX ORDER — ONDANSETRON 2 MG/ML
4 INJECTION INTRAMUSCULAR; INTRAVENOUS
Status: COMPLETED | OUTPATIENT
Start: 2022-12-19 | End: 2022-12-19

## 2022-12-19 RX ORDER — IBUPROFEN 200 MG
24 TABLET ORAL
Status: DISCONTINUED | OUTPATIENT
Start: 2022-12-19 | End: 2022-12-20 | Stop reason: HOSPADM

## 2022-12-19 RX ORDER — IBUPROFEN 200 MG
16 TABLET ORAL
Status: DISCONTINUED | OUTPATIENT
Start: 2022-12-19 | End: 2022-12-20 | Stop reason: HOSPADM

## 2022-12-19 RX ORDER — ONDANSETRON 2 MG/ML
4 INJECTION INTRAMUSCULAR; INTRAVENOUS DAILY PRN
Status: DISCONTINUED | OUTPATIENT
Start: 2022-12-19 | End: 2022-12-19 | Stop reason: HOSPADM

## 2022-12-19 RX ORDER — HYDROMORPHONE HYDROCHLORIDE 1 MG/ML
0.5 INJECTION, SOLUTION INTRAMUSCULAR; INTRAVENOUS; SUBCUTANEOUS EVERY 4 HOURS PRN
Status: DISCONTINUED | OUTPATIENT
Start: 2022-12-19 | End: 2022-12-19

## 2022-12-19 RX ORDER — PROPOFOL 10 MG/ML
VIAL (ML) INTRAVENOUS
Status: DISCONTINUED | OUTPATIENT
Start: 2022-12-19 | End: 2022-12-19

## 2022-12-19 RX ORDER — SODIUM CHLORIDE, SODIUM LACTATE, POTASSIUM CHLORIDE, CALCIUM CHLORIDE 600; 310; 30; 20 MG/100ML; MG/100ML; MG/100ML; MG/100ML
INJECTION, SOLUTION INTRAVENOUS CONTINUOUS PRN
Status: DISCONTINUED | OUTPATIENT
Start: 2022-12-19 | End: 2022-12-19

## 2022-12-19 RX ORDER — LIDOCAINE HYDROCHLORIDE 20 MG/ML
JELLY TOPICAL
Status: DISCONTINUED | OUTPATIENT
Start: 2022-12-19 | End: 2022-12-19

## 2022-12-19 RX ORDER — HYDROMORPHONE HYDROCHLORIDE 1 MG/ML
0.2 INJECTION, SOLUTION INTRAMUSCULAR; INTRAVENOUS; SUBCUTANEOUS EVERY 5 MIN PRN
Status: DISCONTINUED | OUTPATIENT
Start: 2022-12-19 | End: 2022-12-19 | Stop reason: HOSPADM

## 2022-12-19 RX ORDER — NALOXONE HCL 0.4 MG/ML
0.02 VIAL (ML) INJECTION
Status: DISCONTINUED | OUTPATIENT
Start: 2022-12-19 | End: 2022-12-20 | Stop reason: HOSPADM

## 2022-12-19 RX ORDER — MIDAZOLAM HYDROCHLORIDE 1 MG/ML
INJECTION INTRAMUSCULAR; INTRAVENOUS
Status: DISCONTINUED | OUTPATIENT
Start: 2022-12-19 | End: 2022-12-19

## 2022-12-19 RX ORDER — HYDROMORPHONE HYDROCHLORIDE 1 MG/ML
1 INJECTION, SOLUTION INTRAMUSCULAR; INTRAVENOUS; SUBCUTANEOUS
Status: COMPLETED | OUTPATIENT
Start: 2022-12-19 | End: 2022-12-19

## 2022-12-19 RX ORDER — MORPHINE SULFATE 4 MG/ML
4 INJECTION, SOLUTION INTRAMUSCULAR; INTRAVENOUS
Status: COMPLETED | OUTPATIENT
Start: 2022-12-19 | End: 2022-12-19

## 2022-12-19 RX ORDER — HYDROMORPHONE HYDROCHLORIDE 1 MG/ML
0.5 INJECTION, SOLUTION INTRAMUSCULAR; INTRAVENOUS; SUBCUTANEOUS
Status: DISCONTINUED | OUTPATIENT
Start: 2022-12-19 | End: 2022-12-19

## 2022-12-19 RX ORDER — BUPIVACAINE HYDROCHLORIDE AND EPINEPHRINE 5; 5 MG/ML; UG/ML
INJECTION, SOLUTION EPIDURAL; INTRACAUDAL; PERINEURAL
Status: DISCONTINUED | OUTPATIENT
Start: 2022-12-19 | End: 2022-12-19 | Stop reason: HOSPADM

## 2022-12-19 RX ORDER — DIPHENHYDRAMINE HYDROCHLORIDE 50 MG/ML
INJECTION INTRAMUSCULAR; INTRAVENOUS
Status: DISCONTINUED | OUTPATIENT
Start: 2022-12-19 | End: 2022-12-19

## 2022-12-19 RX ORDER — ACETAMINOPHEN 10 MG/ML
INJECTION, SOLUTION INTRAVENOUS
Status: DISCONTINUED | OUTPATIENT
Start: 2022-12-19 | End: 2022-12-19

## 2022-12-19 RX ORDER — PROCHLORPERAZINE EDISYLATE 5 MG/ML
5 INJECTION INTRAMUSCULAR; INTRAVENOUS EVERY 6 HOURS PRN
Status: DISCONTINUED | OUTPATIENT
Start: 2022-12-19 | End: 2022-12-20 | Stop reason: HOSPADM

## 2022-12-19 RX ORDER — OXYCODONE HYDROCHLORIDE 5 MG/1
5 TABLET ORAL
Status: DISCONTINUED | OUTPATIENT
Start: 2022-12-19 | End: 2022-12-19 | Stop reason: HOSPADM

## 2022-12-19 RX ORDER — ACETAMINOPHEN 325 MG/1
650 TABLET ORAL EVERY 4 HOURS PRN
Status: DISCONTINUED | OUTPATIENT
Start: 2022-12-19 | End: 2022-12-20 | Stop reason: HOSPADM

## 2022-12-19 RX ORDER — DIPHENHYDRAMINE HYDROCHLORIDE 50 MG/ML
12.5 INJECTION INTRAMUSCULAR; INTRAVENOUS ONCE AS NEEDED
Status: DISCONTINUED | OUTPATIENT
Start: 2022-12-19 | End: 2022-12-19 | Stop reason: HOSPADM

## 2022-12-19 RX ORDER — OXYCODONE AND ACETAMINOPHEN 5; 325 MG/1; MG/1
1 TABLET ORAL EVERY 4 HOURS PRN
Status: DISCONTINUED | OUTPATIENT
Start: 2022-12-19 | End: 2022-12-20

## 2022-12-19 RX ORDER — ROCURONIUM BROMIDE 10 MG/ML
INJECTION, SOLUTION INTRAVENOUS
Status: DISCONTINUED | OUTPATIENT
Start: 2022-12-19 | End: 2022-12-19

## 2022-12-19 RX ORDER — FAMOTIDINE 10 MG/ML
INJECTION INTRAVENOUS
Status: DISCONTINUED | OUTPATIENT
Start: 2022-12-19 | End: 2022-12-19

## 2022-12-19 RX ORDER — DEXAMETHASONE SODIUM PHOSPHATE 4 MG/ML
INJECTION, SOLUTION INTRA-ARTICULAR; INTRALESIONAL; INTRAMUSCULAR; INTRAVENOUS; SOFT TISSUE
Status: DISCONTINUED | OUTPATIENT
Start: 2022-12-19 | End: 2022-12-19

## 2022-12-19 RX ORDER — GLUCAGON 1 MG
1 KIT INJECTION
Status: DISCONTINUED | OUTPATIENT
Start: 2022-12-19 | End: 2022-12-20 | Stop reason: HOSPADM

## 2022-12-19 RX ORDER — SUCCINYLCHOLINE CHLORIDE 20 MG/ML
INJECTION INTRAMUSCULAR; INTRAVENOUS
Status: DISCONTINUED | OUTPATIENT
Start: 2022-12-19 | End: 2022-12-19

## 2022-12-19 RX ORDER — FENTANYL CITRATE 50 UG/ML
INJECTION, SOLUTION INTRAMUSCULAR; INTRAVENOUS
Status: DISCONTINUED | OUTPATIENT
Start: 2022-12-19 | End: 2022-12-19

## 2022-12-19 RX ORDER — HYDROMORPHONE HYDROCHLORIDE 1 MG/ML
0.5 INJECTION, SOLUTION INTRAMUSCULAR; INTRAVENOUS; SUBCUTANEOUS
Status: DISCONTINUED | OUTPATIENT
Start: 2022-12-19 | End: 2022-12-20

## 2022-12-19 RX ADMIN — ONDANSETRON 4 MG: 2 INJECTION INTRAMUSCULAR; INTRAVENOUS at 12:12

## 2022-12-19 RX ADMIN — HYDROMORPHONE HYDROCHLORIDE 0.5 MG: 1 INJECTION, SOLUTION INTRAMUSCULAR; INTRAVENOUS; SUBCUTANEOUS at 09:12

## 2022-12-19 RX ADMIN — PIPERACILLIN SODIUM AND TAZOBACTAM SODIUM 3.38 G: 3; .375 INJECTION, POWDER, LYOPHILIZED, FOR SOLUTION INTRAVENOUS at 11:12

## 2022-12-19 RX ADMIN — LIDOCAINE HYDROCHLORIDE 3 ML: 20 JELLY TOPICAL at 12:12

## 2022-12-19 RX ADMIN — ROCURONIUM BROMIDE 30 MG: 10 INJECTION, SOLUTION INTRAVENOUS at 12:12

## 2022-12-19 RX ADMIN — OXYCODONE AND ACETAMINOPHEN 1 TABLET: 325; 5 TABLET ORAL at 07:12

## 2022-12-19 RX ADMIN — HYDROMORPHONE HYDROCHLORIDE 1 MG: 1 INJECTION, SOLUTION INTRAMUSCULAR; INTRAVENOUS; SUBCUTANEOUS at 02:12

## 2022-12-19 RX ADMIN — OXYCODONE HYDROCHLORIDE 5 MG: 5 TABLET ORAL at 01:12

## 2022-12-19 RX ADMIN — DIPHENHYDRAMINE HYDROCHLORIDE 6.25 MG: 50 INJECTION INTRAMUSCULAR; INTRAVENOUS at 12:12

## 2022-12-19 RX ADMIN — ONDANSETRON 4 MG: 2 INJECTION INTRAMUSCULAR; INTRAVENOUS at 11:12

## 2022-12-19 RX ADMIN — HYDROMORPHONE HYDROCHLORIDE 0.2 MG: 1 INJECTION, SOLUTION INTRAMUSCULAR; INTRAVENOUS; SUBCUTANEOUS at 01:12

## 2022-12-19 RX ADMIN — PROPOFOL 160 MG: 10 INJECTION, EMULSION INTRAVENOUS at 12:12

## 2022-12-19 RX ADMIN — LIDOCAINE HYDROCHLORIDE 75 MG: 20 INJECTION, SOLUTION INTRAVENOUS at 12:12

## 2022-12-19 RX ADMIN — ROCURONIUM BROMIDE 10 MG: 10 INJECTION, SOLUTION INTRAVENOUS at 12:12

## 2022-12-19 RX ADMIN — PIPERACILLIN SODIUM AND TAZOBACTAM SODIUM 4.5 G: 4; .5 INJECTION, POWDER, LYOPHILIZED, FOR SOLUTION INTRAVENOUS at 02:12

## 2022-12-19 RX ADMIN — SODIUM CHLORIDE, SODIUM LACTATE, POTASSIUM CHLORIDE, AND CALCIUM CHLORIDE: .6; .31; .03; .02 INJECTION, SOLUTION INTRAVENOUS at 11:12

## 2022-12-19 RX ADMIN — SODIUM CHLORIDE 1000 ML: 0.9 INJECTION, SOLUTION INTRAVENOUS at 12:12

## 2022-12-19 RX ADMIN — FAMOTIDINE 20 MG: 10 INJECTION, SOLUTION INTRAVENOUS at 11:12

## 2022-12-19 RX ADMIN — SUGAMMADEX 200 MG: 100 INJECTION, SOLUTION INTRAVENOUS at 12:12

## 2022-12-19 RX ADMIN — OXYCODONE AND ACETAMINOPHEN 1 TABLET: 325; 5 TABLET ORAL at 11:12

## 2022-12-19 RX ADMIN — PIPERACILLIN SODIUM AND TAZOBACTAM SODIUM 3.38 G: 3; .375 INJECTION, POWDER, LYOPHILIZED, FOR SOLUTION INTRAVENOUS at 07:12

## 2022-12-19 RX ADMIN — HYDROMORPHONE HYDROCHLORIDE 0.5 MG: 1 INJECTION, SOLUTION INTRAMUSCULAR; INTRAVENOUS; SUBCUTANEOUS at 08:12

## 2022-12-19 RX ADMIN — MIDAZOLAM HYDROCHLORIDE 2 MG: 1 INJECTION, SOLUTION INTRAMUSCULAR; INTRAVENOUS at 11:12

## 2022-12-19 RX ADMIN — Medication 160 MG: at 12:12

## 2022-12-19 RX ADMIN — HYDROMORPHONE HYDROCHLORIDE 0.5 MG: 1 INJECTION, SOLUTION INTRAMUSCULAR; INTRAVENOUS; SUBCUTANEOUS at 03:12

## 2022-12-19 RX ADMIN — SODIUM CHLORIDE, SODIUM LACTATE, POTASSIUM CHLORIDE, AND CALCIUM CHLORIDE: .6; .31; .03; .02 INJECTION, SOLUTION INTRAVENOUS at 04:12

## 2022-12-19 RX ADMIN — HYDROMORPHONE HYDROCHLORIDE 0.5 MG: 1 INJECTION, SOLUTION INTRAMUSCULAR; INTRAVENOUS; SUBCUTANEOUS at 07:12

## 2022-12-19 RX ADMIN — MORPHINE SULFATE 4 MG: 4 INJECTION, SOLUTION INTRAMUSCULAR; INTRAVENOUS at 12:12

## 2022-12-19 RX ADMIN — ACETAMINOPHEN 1000 MG: 10 INJECTION, SOLUTION INTRAVENOUS at 12:12

## 2022-12-19 RX ADMIN — DEXAMETHASONE SODIUM PHOSPHATE 8 MG: 4 INJECTION, SOLUTION INTRAMUSCULAR; INTRAVENOUS at 12:12

## 2022-12-19 RX ADMIN — FENTANYL CITRATE 100 MCG: 50 INJECTION INTRAMUSCULAR; INTRAVENOUS at 12:12

## 2022-12-19 RX ADMIN — OXYCODONE AND ACETAMINOPHEN 1 TABLET: 325; 5 TABLET ORAL at 05:12

## 2022-12-19 NOTE — ANESTHESIA PROCEDURE NOTES
Intubation    Date/Time: 12/19/2022 12:08 PM  Performed by: Sudhir Girard CRNA  Authorized by: Vazquez Garland MD     Intubation:     Induction:  Intravenous    Intubated:  Postinduction    Mask Ventilation:  Easy mask    Attempts:  1    Attempted By:  CRNA    Method of Intubation:  Video laryngoscopy    Blade:  Brown 3    Laryngeal View Grade: Grade I - full view of cords      Difficult Airway Encountered?: No      Complications:  None    Airway Device:  Oral endotracheal tube    Airway Device Size:  7.5    Style/Cuff Inflation:  Cuffed (inflated to minimal occlusive pressure)    Tube secured:  22    Secured at:  The lips    Placement Verified By:  Capnometry    Complicating Factors:  None    Findings Post-Intubation:  BS equal bilateral and atraumatic/condition of teeth unchanged
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
## 2022-12-19 NOTE — ANESTHESIA POSTPROCEDURE EVALUATION
Anesthesia Post Evaluation    Patient: Manisha Bishop    Procedure(s) Performed: Procedure(s) (LRB):  APPENDECTOMY, LAPAROSCOPIC (N/A)    Final Anesthesia Type: general      Patient location during evaluation: PACU  Patient participation: Yes- Able to Participate  Level of consciousness: awake and alert, oriented and awake  Post-procedure vital signs: reviewed and stable  Pain management: adequate  Airway patency: patent    PONV status at discharge: No PONV  Anesthetic complications: no      Cardiovascular status: blood pressure returned to baseline, hemodynamically stable and stable  Respiratory status: unassisted, spontaneous ventilation and room air  Hydration status: euvolemic  Follow-up not needed.          Vitals Value Taken Time   /71 12/19/22 1400   Temp 36.9 °C (98.4 °F) 12/19/22 1345   Pulse 82 12/19/22 1402   Resp 22 12/19/22 1402   SpO2 97 % 12/19/22 1402   Vitals shown include unvalidated device data.      No case tracking events are documented in the log.      Pain/Heather Score: Pain Rating Prior to Med Admin: 7 (12/19/2022  1:35 PM)  Pain Rating Post Med Admin: 4 (12/19/2022 10:31 AM)  Heather Score: 10 (12/19/2022  2:00 PM)

## 2022-12-19 NOTE — PLAN OF CARE
Problem: Adult Inpatient Plan of Care  Goal: Plan of Care Review  12/19/2022 0549 by Luis Enrique Hameed RN  Outcome: Ongoing, Progressing  12/19/2022 0547 by Luis Enrique Hameed RN  Outcome: Ongoing, Not Progressing  Goal: Patient-Specific Goal (Individualized)  12/19/2022 0549 by Luis Enrique Hameed RN  Outcome: Ongoing, Progressing  12/19/2022 0547 by Luis Enrique Hameed RN  Outcome: Ongoing, Not Progressing  Goal: Absence of Hospital-Acquired Illness or Injury  12/19/2022 0549 by Luis Enrique Hameed RN  Outcome: Ongoing, Progressing  12/19/2022 0547 by Luis Enrique Hameed RN  Outcome: Ongoing, Not Progressing  Goal: Optimal Comfort and Wellbeing  12/19/2022 0549 by Luis Enrique Hameed RN  Outcome: Ongoing, Progressing  12/19/2022 0547 by Luis Enrique Hameed RN  Outcome: Ongoing, Not Progressing  Goal: Readiness for Transition of Care  12/19/2022 0549 by Luis Enrique Hameed RN  Outcome: Ongoing, Progressing  12/19/2022 0547 by Luis Enrique Hameed RN  Outcome: Ongoing, Not Progressing     Problem: Bariatric Environmental Safety  Goal: Safety Maintained with Care  12/19/2022 0549 by Luis Enrique Hameed RN  Outcome: Ongoing, Progressing  12/19/2022 0547 by Luis Enrique Hameed RN  Outcome: Ongoing, Not Progressing     Problem: Bleeding (Appendectomy)  Goal: Absence of Bleeding  Outcome: Ongoing, Progressing     Problem: Bowel Motility Impaired (Appendectomy)  Goal: Effective Bowel Elimination  Outcome: Ongoing, Progressing     Problem: Fluid and Electrolyte Imbalance (Appendectomy)  Goal: Fluid and Electrolyte Balance  Outcome: Ongoing, Progressing     Problem: Infection (Appendectomy)  Goal: Absence of Infection Signs and Symptoms  Outcome: Ongoing, Progressing     Problem: Ongoing Anesthesia Effects (Appendectomy)  Goal: Anesthesia/Sedation Recovery  Outcome: Ongoing, Progressing     Problem: Pain (Appendectomy)  Goal: Acceptable Pain Control  Outcome: Ongoing, Progressing     Problem: Postoperative Nausea and Vomiting (Appendectomy)  Goal: Nausea and Vomiting Relief  Outcome:  Ongoing, Progressing     Problem: Postoperative Urinary Retention (Appendectomy)  Goal: Effective Urinary Elimination  Outcome: Ongoing, Progressing     Problem: Respiratory Compromise (Appendectomy)  Goal: Effective Oxygenation and Ventilation  Outcome: Ongoing, Progressing     Problem: Pain Acute  Goal: Acceptable Pain Control and Functional Ability  Outcome: Ongoing, Progressing

## 2022-12-19 NOTE — PROGRESS NOTES
"Work note written per pt request; "pt had surgery on 12/19" and admit date included in note in addition to standard information per pts very persistent demands; safety intact   "

## 2022-12-19 NOTE — HPI
27-year-old female with history of GERD, obesity, bipolar 1 disorder, anxiety, 8 months postpartum.   who presents to the ER due to generalized abdominal pain she reports pain is constant sharp and stabbing.  She reports pain began yesterday but worsened today.  She reports nausea with 1 episode of vomiting.  She also reports feels dizzy.  Last bowel movement today.  No diarrhea or constipation.  She denies urinary symptoms.  Reports last menstrual period was 3-4 days ago.  She denies any vaginal bleeding or discharge.  No fevers chills or sweats.  She reports pain is 10/10.    Currently still in pain with nausea.

## 2022-12-19 NOTE — PLAN OF CARE
Former[facility]  Initial Discharge Assessment       Primary Care Provider: Primary Doctor No    Admission Diagnosis: Appendicitis, unspecified appendicitis type [K37]    Admission Date: 12/18/2022  Expected Discharge Date: 12/21/2022    CM met with patient at bedside.  Verified information on face sheet.  No dialysis, no coumadin, no nebulizer.  Patient reported that her friend will transport her home upon discharge. No needs identified at this time.    Patient does not have a pcp, she's not interested in an [facility] pcp said she will find her own doctor.        Discharge Barriers Identified: None    Payor: MEDICAID / Plan: [facility] / Product Type: Managed Medicaid /     Extended Emergency Contact Information  Primary Emergency Contact: contact, no  Relation: Other   needed? No    Discharge Plan A: Home with family  Discharge Plan B: Home with family      [facility] [address] [facility] [place] [facility] [address]  [address]  [place] [address]  Phone: [phone] Fax: [phone]    [id number] [place] [facility] [address]  [address] [place] [address]  Phone: [phone] Fax: [phone]    [id number] [place]  [address] AT [facility] [address]  [address]  [facility] [address]  Phone: [phone] Fax: [phone]    [id number] - [place] - [address]  [address]  [facility] [address]  Phone: [phone] Fax: [phone]      Initial Assessment (most recent)       Adult Discharge Assessment - 12/19/22 1040          Discharge Assessment    Assessment Type Discharge Planning Assessment     Confirmed/corrected address, phone number and insurance Yes     Confirmed Demographics Correct on Facesheet     Source of Information patient     Does  patient/caregiver understand observation status --   n/a    Communicated LINH with patient/caregiver Date not available/Unable to determine     Reason For Admission No active principal problem     People in Home child(saritha), dependent;significant other     Facility Arrived From: home     Do you expect to return to your current living situation? Yes     Do you have help at home or someone to help you manage your care at home? No     Prior to hospitilization cognitive status: Unable to Assess     Current cognitive status: Alert/Oriented     Walking or Climbing Stairs --   no problems before admission    Mobility Management take her time     Dressing/Bathing --   none    Home Layout Able to live on 1st floor     Equipment Currently Used at Home none     Patient currently being followed by outpatient case management? No     Do you currently have service(s) that help you manage your care at home? No     Do you take prescription medications? No     Do you have prescription coverage? Yes     Coverage Payor:  MEDICAID - [facility]     Do you have any problems affording any of your prescribed medications? --   n/a    Is the patient taking medications as prescribed? --   n/a    Who is going to help you get home at discharge? friend     How do you get to doctors appointments? car, drives self     Are you on dialysis? No     Do you take coumadin? No     Discharge Plan A Home with family     Discharge Plan B Home with family     DME Needed Upon Discharge  none     Discharge Plan discussed with: Patient     Discharge Barriers Identified None

## 2022-12-19 NOTE — ED PROVIDER NOTES
Encounter Date: 2022       History     Chief Complaint   Patient presents with    Abdominal Pain     Pt states bilateral upper abdominal that has increased in severity since yesterday. + nausea, vomiting, and dizziness. Last bm this am, regular      Emergent evaluation of a 27-year-old female with history of GERD, obesity, bipolar 1 disorder, anxiety, 8 months postpartum.   who presents to the ER due to generalized abdominal pain she reports pain is constant sharp and stabbing.  She reports pain began yesterday but worsened today.  She reports nausea with 1 episode of vomiting.  She also reports feels dizzy.  Last bowel movement today.  No diarrhea or constipation.  She denies urinary symptoms.  Reports last menstrual period was 3-4 days ago.  She denies any vaginal bleeding or discharge.  No fevers chills or sweats.  She reports pain is 10/10.  Decreased appetite today    Review of patient's allergies indicates:   Allergen Reactions    Iodine and iodide containing products Hives    Ketorolac Hives     Pt verbalized I dont like it doesn't work on it.    Vitamin e (d-alpha tocopherol)      Hives (skin)^Black eyed peas and oatmeal causede hives     Past Medical History:   Diagnosis Date    Anxiety     Bipolar 1 disorder     COVID-19 2021    GERD (gastroesophageal reflux disease)     Obese      Past Surgical History:   Procedure Laterality Date     SECTION  2022    LEG SURGERY      Broke her left lower leg.     Family History   Problem Relation Age of Onset    No Known Problems Mother     No Known Problems Father     Breast cancer Neg Hx     Colon cancer Neg Hx     Ovarian cancer Neg Hx      Social History     Tobacco Use    Smoking status: Never    Smokeless tobacco: Never   Substance Use Topics    Alcohol use: Yes     Comment: occasionally    Drug use: No     Review of Systems   Constitutional:  Positive for appetite change. Negative for activity change, chills, diaphoresis, fatigue and  fever.   HENT:  Negative for sore throat.    Respiratory:  Negative for cough, chest tightness and shortness of breath.    Cardiovascular:  Negative for chest pain and palpitations.   Gastrointestinal:  Positive for abdominal pain, nausea and vomiting. Negative for abdominal distention, blood in stool, constipation and diarrhea.   Genitourinary:  Negative for dysuria, flank pain, frequency, hematuria, urgency, vaginal bleeding, vaginal discharge and vaginal pain.   Musculoskeletal:  Negative for arthralgias, back pain, myalgias and neck pain.   Skin:  Negative for rash.   Neurological:  Positive for dizziness. Negative for syncope, weakness, light-headedness and headaches.   Hematological:  Does not bruise/bleed easily.   Psychiatric/Behavioral:  Negative for confusion. The patient is not nervous/anxious.    All other systems reviewed and are negative.    Physical Exam     Initial Vitals [12/18/22 2358]   BP Pulse Resp Temp SpO2   (!) 142/84 90 18 99.1 °F (37.3 °C) 100 %      MAP       --         Physical Exam    Nursing note and vitals reviewed.  Constitutional: She appears well-developed and well-nourished. She is not diaphoretic. No distress.   Temp 99.1°   HENT:   Head: Normocephalic and atraumatic.   Right Ear: External ear normal.   Left Ear: External ear normal.   Nose: Nose normal.   Mouth/Throat: Oropharynx is clear and moist.   Eyes: Conjunctivae and EOM are normal. Pupils are equal, round, and reactive to light.   Neck: Neck supple. No tracheal deviation present.   Normal range of motion.  Cardiovascular:  Normal rate, regular rhythm, normal heart sounds and intact distal pulses.     Exam reveals no gallop and no friction rub.       No murmur heard.  Blood pressure 142/82 heart rate 90   Pulmonary/Chest: Breath sounds normal. No stridor. No respiratory distress. She has no wheezes. She has no rhonchi. She has no rales. She exhibits no tenderness.   Sats 100% on room air respirations 16 clear breath sounds  bilaterally   Abdominal: Abdomen is soft. Bowel sounds are normal. She exhibits no distension and no mass. There is abdominal tenderness.    Normal bowel sounds  Patient reports severe tenderness on palpation of the entire abdomen.  With no rebound or guarding There is no rebound and no guarding.   Musculoskeletal:         General: No edema. Normal range of motion.      Cervical back: Normal range of motion and neck supple.     Neurological: She is alert and oriented to person, place, and time. She has normal strength. No cranial nerve deficit or sensory deficit.   Skin: Skin is warm and dry. No rash noted. No erythema. No pallor.   Psychiatric: She has a normal mood and affect. Her behavior is normal. Judgment and thought content normal.       ED Course   Procedures  Labs Reviewed   CBC W/ AUTO DIFFERENTIAL - Abnormal; Notable for the following components:       Result Value    Hemoglobin 11.0 (*)     Hematocrit 35.8 (*)     MCV 78 (*)     MCH 24.0 (*)     MCHC 30.7 (*)     RDW 16.8 (*)     Gran # (ANC) 9.4 (*)     Gran % 81.3 (*)     Lymph % 10.4 (*)     All other components within normal limits   COMPREHENSIVE METABOLIC PANEL - Abnormal; Notable for the following components:    Sodium 134 (*)     Potassium 3.4 (*)     Glucose 111 (*)     Total Bilirubin 1.1 (*)     All other components within normal limits   URINALYSIS, REFLEX TO URINE CULTURE - Abnormal; Notable for the following components:    Appearance, UA Hazy (*)     Protein, UA Trace (*)     All other components within normal limits    Narrative:     Specimen Source->Urine   LIPASE   POCT URINE PREGNANCY   ISTAT CREATININE   POCT CREATININE          Imaging Results              CT Abdomen Pelvis  Without Contrast (Final result)  Result time 12/19/22 01:44:25   Procedure changed from CT Abdomen Pelvis With Contrast     Final result by Bobo Ferris MD (12/19/22 01:44:25)                   Narrative:    EXAM: CT Abdomen and Pelvis without  contrast    INDICATION: RLQ abdominal pain (Age >= 14y)    TECHNIQUE: Helical CT of the abdomen and pelvis was obtained from the diaphragm through the ischial tuberosities without contrast. 5 mm axial images were created as were coronal and sagittal reformats.    Dose reduction techniques were used including automated exposure control and/or adjustment of the mA and/or kV according to patient size.    COMPARISON: CT 10/3/2020    The lack of IV contrast significantly decreases the sensitivity of this study for the evaluation of solid abdominal organs, hollow viscera and vascular structures.    FINDINGS:  LUNG BASES: No significant abnormality.    STOMACH: Small hiatal hernia.    LIVER: No significant abnormality.    BILIARY: Cholelithiasis without other CT evidence to suggest acute cholecystitis. No biliary ductal dilation.    PANCREAS: No significant abnormality.    SPLEEN: No significant abnormality.    ADRENAL GLANDS: No significant abnormality.    KIDNEYS/BLADDER:  Punctate nonobstructive right renal calculus. No hydronephrosis. Duplicated left renal collecting system without evidence of obstruction. No left-sided urinary tract calculi.    VESSELS: Nonaneurysmal abdominal aorta.    LYMPH NODES: No enlarged abdominal or pelvic lymph nodes.    OTHER PELVIC: No free pelvic fluid.    GI TRACT: The appendix appears upper limits of normal/minimally dilated with question subtle adjacent stranding at the peritoneum. No bowel obstruction or other acute enteric finding.    ABDOMINAL WALL: Small fat-containing umbilical hernia.    PERITONEUM: No ascites or pneumoperitoneum.    BONES/SPINE: No acute abnormality.      IMPRESSION:    The appendix appears upper limits of normal/minimally dilated with question very subtle adjacent stranding. An early acute appendicitis is not entirely excluded.    No other acute finding in the abdomen or pelvis.    INCIDENTAL FINDINGS:    Punctate nonobstructive right renal  calculus.    Cholelithiasis without other CT evidence to suggest acute cholecystitis.      Electronically signed by:  Bobo Ferris MD  2022 1:44 AM CST Workstation: [id number]                                     Medications   piperacillin-tazobactam 4.5 g in dextrose 5 % 100 mL IVPB (ready to mix system) (has no administration in time range)   HYDROmorphone injection 1 mg (has no administration in time range)   morphine injection 4 mg (4 mg Intravenous Given 22)   ondansetron injection 4 mg (4 mg Intravenous Given 22)   sodium chloride 0.9% bolus 1,000 mL 1,000 mL (0 mLs Intravenous Stopped 22)     Medical Decision Making:   Clinical Tests:   Lab Tests: Ordered and Reviewed  The following lab test(s) were unremarkable: UPT       <> Summary of Lab: H&H 11 and 35.8  Radiological Study: Ordered and Reviewed  ED Management:  Emergent evaluation of a 27-year-old female with history of GERD, obesity, bipolar 1 disorder, anxiety, 8 months postpartum.   who presents to the ER due to generalized abdominal pain she reports pain is constant sharp and stabbing.  She reports pain began yesterday but worsened today.  She reports nausea with 1 episode of vomiting.  She also reports feels dizzy.  Last bowel movement today.  No diarrhea or constipation.  She denies urinary symptoms.  Reports last menstrual period was 3-4 days ago.  She denies any vaginal bleeding or discharge.  No fevers chills or sweats.  She reports pain is 10/10.  Decreased appetite today  On physical exam blood pressure mildly elevated other vitals are normal patient is afebrile.  The patient was 1st brought to the room in order to get a urine sample a bedside commode was brought to the room for the patient.  It was obtained UPT was negative patient was able to ambulate to the restroom.  She appears in a great amount of pain.  She reports severe abdominal pain on palpation of the entire abdomen There is no rebound  or guarding.  Normal cardiac and lung exam.  No pallor.  No jaundice.  On chart review it appears patient wanted saw a OBGYN NP Dave Agrawal ON 12/14/22 for a normal routine exam.  And on wet prep was found the next day to have bacterial vaginosis and was called in Flagyl.  The patient denies ever having seen this person on December 14th and reports she is not taking Flagyl and that we must have the wrong person.  I am unsure what to make of this.  IV was established she was given 4 mg of morphine and 4 of Zofran with 1 L fluids.  Lab work is pending so far she has the negative UPT a urinalysis that reveals trace protein otherwise normal, a CBC with a normal white count an ANC H&H of 11 and 35.8.  MCV of 78 patient has history of iron deficiency anemia, lipase is normal point of care creatinine 0.8 CMP with a sodium 134 potassium 3.4 glucose 111 liver function and bilirubin are pending.  Patient will have CT scan of the abdomen pelvis without contrast due to a contrast dye allergy.  She is NPO    CT read returned with signs of a pearly appendicitis patient has a pending status at the upper limit of normal size versus minimally dilated and subtle adjacent stranding.  Cholelithiasis no cholecystitis.  Will give Zosyn 4.5 g IV, will give Dilaudid 1 mg IV for ongoing pain consult general surgeon, Dr. Parnell AND hOSITALIST FOR ADMIT.    Marisela Yoo M.D.  2:49 AM 12/19/2022                          Clinical Impression:   Final diagnoses:  [K37] Appendicitis, unspecified appendicitis type (Primary)  [K80.20] Calculus of gallbladder without cholecystitis without obstruction        ED Disposition Condition    Admit Stable                Marisela Yoo MD  12/19/22 0250

## 2022-12-19 NOTE — ASSESSMENT & PLAN NOTE
Antibiotics  Lap appy today  Will look at gallbladder and decide intra operatively if inflamed and warrants removal.      Dicussed above with patient  Spoke with er doc about patient

## 2022-12-19 NOTE — OP NOTE
Laparoscopic appendectomy Procedure Note    Date of procedure:   12/19/2022    Indications: 26 y/o with ss cw appendicitis- confirmed with CT scan    Pre-operative Diagnosis: acute appendicitis    Post-operative Diagnosis: Same    Surgeon: Yuniel Goldman MD    Assistants: Estella Loza CFA    Anesthesia: General endotracheal anesthesia    ASA Class: 3    Procedure Details   The patient was seen in the Holding Room. The risks, benefits, complications, treatment options, and expected outcomes were discussed with the patient. The possibilities of reaction to medication, pulmonary aspiration, perforation of viscus, bleeding, recurrent infection, the need for additional procedures, failure to diagnose a condition, and creating a complication requiring transfusion or operation were discussed with the patient. The patient concurred with the proposed plan, giving informed consent. The site of surgery properly noted/marked. The patient was taken to Operating Room 4 identified as Manisha Bishop and the procedure verified as Laparoscopic appendectomy. A Time Out was held and the above information confirmed.    Full general anesthesia was induced with orotracheal intubation. The patient was prepped and draped in a supine position. Appropriate antibiotics were given intravenously. Arms were tucked.    Periumbilical incision was created through which the cautery was used to expose the linea alba which was transected.  A trocar was inserted into the peritoneal cavity and CO2 insufflation was started.  Other trocars were then placed under direct visualization without damaging any surrounding organs.  The patient was appropriately positioned.  The appendix was visualized in the right lower quadrant anterior to the cecum.  It did appear to be inflamed and had some fibrinous exudate surrounding it.  It was grasped at its tip and elevated.  The mesoappendix was then transected using the LigaSure and the base of the appendix was  identified.  This was transected between vessel loops.  The appendix was then placed in Endo-Catch bag and removed via the umbilicus.  The rest abdomen was reinspected and no abnormalities were seen.  Of note the gallbladder did not appear inflamed although the CT report did indicate gallstones.    Trocars removed CO2 insufflation was stopped and skin was closed with Monocryl.  Dressings were placed.      Instrument, sponge, and needle counts were correct prior to wound closure and at the conclusion of the case.     Findings:  acute appendicitis    Estimated Blood Loss: 15.0 cc    Drains: none    Total IV Fluids: see anesthesia    Specimens: appendix    Implants: none    Complications:  None; patient tolerated the procedure well.    Disposition: PACU - hemodynamically stable.    Condition: stable    Attending Attestation: I was present and scrubbed for the entire procedure.

## 2022-12-19 NOTE — CONSULTS
[facility]  General Surgery  Consult Note    Patient Name: Manisha Bishop  MRN: [id number]  Code Status: Full Code  Admission Date: 2022  Hospital Length of Stay: 0 days  Attending Physician: Andreas Smith MD  Primary Care Provider: Primary Doctor No    Patient information was obtained from patient and ER records.     Consults  Subjective:     Principal Problem: <principal problem not specified>    History of Present Illness: 27-year-old female with history of GERD, obesity, bipolar 1 disorder, anxiety, 8 months postpartum.   who presents to the ER due to generalized abdominal pain she reports pain is constant sharp and stabbing.  She reports pain began yesterday but worsened today.  She reports nausea with 1 episode of vomiting.  She also reports feels dizzy.  Last bowel movement today.  No diarrhea or constipation.  She denies urinary symptoms.  Reports last menstrual period was 3-4 days ago.  She denies any vaginal bleeding or discharge.  No fevers chills or sweats.  She reports pain is 10/10.    Currently still in pain with nausea.      No current facility-administered medications on file prior to encounter.     Current Outpatient Medications on File Prior to Encounter   Medication Sig    hyoscyamine (ANASPAZ,LEVSIN) 0.125 mg Tab Take 3 tablets (375 mcg total) by mouth every 6 (six) hours as needed (Abdominal pain).    [DISCONTINUED] albuterol (PROVENTIL/VENTOLIN HFA) 90 mcg/actuation inhaler Inhale 1-2 puffs into the lungs every 6 (six) hours as needed for Wheezing or Shortness of Breath. Rescue (Patient not taking: Reported on 2021)    [DISCONTINUED] metroNIDAZOLE (FLAGYL) 500 MG tablet Take 1 tablet (500 mg total) by mouth every 12 (twelve) hours. for 7 days       Review of patient's allergies indicates:   Allergen Reactions    Iodine and iodide containing products Hives    Ketorolac Hives     Pt verbalized I dont like it doesn't work on it.    Vitamin e (d-alpha  tocopherol)      Hives (skin)^Black eyed peas and oatmeal causede hives       Past Medical History:   Diagnosis Date    Anxiety     Bipolar 1 disorder     COVID-19 2021    GERD (gastroesophageal reflux disease)     Obese      Past Surgical History:   Procedure Laterality Date     SECTION  2022    LEG SURGERY      Broke her left lower leg.     Family History       Problem Relation (Age of Onset)    No Known Problems Mother, Father          Tobacco Use    Smoking status: Never    Smokeless tobacco: Never   Substance and Sexual Activity    Alcohol use: Yes     Comment: occasionally    Drug use: No    Sexual activity: Yes     Partners: Male     Birth control/protection: None     Review of Systems   Constitutional:  Positive for chills and fever.   Gastrointestinal:  Positive for abdominal pain, nausea and vomiting.   All other systems reviewed and are negative.  Objective:     Vital Signs (Most Recent):  Temp: 98.4 °F (36.9 °C) (22 07)  Pulse: 89 (22 07)  Resp: 16 (22 0934)  BP: 114/71 (22 07)  SpO2: 97 % (22) Vital Signs (24h Range):  Temp:  [98.4 °F (36.9 °C)-99.1 °F (37.3 °C)] 98.4 °F (36.9 °C)  Pulse:  [80-90] 89  Resp:  [16-18] 16  SpO2:  [97 %-100 %] 97 %  BP: ()/(63-87) 114/71     Weight: (!) 137.7 kg (303 lb 9.2 oz)  Body mass index is 47.55 kg/m².    Physical Exam  Vitals and nursing note reviewed.   Constitutional:       General: She is not in acute distress.     Appearance: She is well-developed. She is not diaphoretic.   HENT:      Head: Normocephalic and atraumatic.      Mouth/Throat:      Pharynx: No oropharyngeal exudate.   Eyes:      General: No scleral icterus.     Conjunctiva/sclera: Conjunctivae normal.      Pupils: Pupils are equal, round, and reactive to light.   Neck:      Thyroid: No thyromegaly.      Vascular: No JVD.      Trachea: No tracheal deviation.   Cardiovascular:      Rate and Rhythm: Normal rate and regular  rhythm.      Heart sounds: Normal heart sounds. No murmur heard.    No friction rub. No gallop.   Pulmonary:      Effort: Pulmonary effort is normal. No respiratory distress.      Breath sounds: Normal breath sounds. No stridor. No wheezing or rales.   Chest:      Chest wall: No tenderness.   Abdominal:      General: Bowel sounds are normal. There is no distension.      Palpations: Abdomen is soft. There is no mass.      Tenderness: There is abdominal tenderness. There is guarding. There is no rebound.   Musculoskeletal:         General: No tenderness. Normal range of motion.      Cervical back: Normal range of motion and neck supple.   Lymphadenopathy:      Cervical: No cervical adenopathy.   Skin:     General: Skin is warm and dry.      Findings: No erythema or rash.   Neurological:      Mental Status: She is alert and oriented to person, place, and time.      Cranial Nerves: No cranial nerve deficit.   Psychiatric:         Behavior: Behavior normal.       Significant Labs:  I have reviewed all pertinent lab results within the past 24 hours.  CBC:   Recent Labs   Lab 12/19/22  0036   WBC 11.62   RBC 4.58   HGB 11.0*   HCT 35.8*      MCV 78*   MCH 24.0*   MCHC 30.7*     BMP:   Recent Labs   Lab 12/19/22  0036   *   *   K 3.4*      CO2 26   BUN 11   CREATININE 0.8   CALCIUM 8.7       Significant Diagnostics:  I have reviewed all pertinent imaging results/findings within the past 24 hours.  CT: I have reviewed all pertinent results/findings within the past 24 hours and my personal findings are:  mildly dilated       Assessment/Plan:     Acute appendicitis  Antibiotics  Lap appy today  Will look at gallbladder and decide intra operatively if inflamed and warrants removal.      Dicussed above with patient  Spoke with er doc about patient      VTE Risk Mitigation (From admission, onward)         Ordered     IP VTE HIGH RISK PATIENT  Once         12/19/22 0413     Place sequential compression  device  Until discontinued         12/19/22 0413                Thank you for your consult. I will follow-up with patient. Please contact us if you have any additional questions.    Yuniel Goldman MD  General Surgery  [facility]

## 2022-12-19 NOTE — ANESTHESIA PREPROCEDURE EVALUATION
2022  Manisha Bishop is a 27 y.o., female.      Patient Active Problem List   Diagnosis    Bipolar 1 disorder    Umbilical hernia    Premature uterine contractions    Limited prenatal care in third trimester    Acute appendicitis       Past Surgical History:   Procedure Laterality Date     SECTION  2022    LEG SURGERY      Broke her left lower leg.        Tobacco Use:  The patient  reports that she has never smoked. She has never used smokeless tobacco.     Results for orders placed or performed during the hospital encounter of 22   EKG 12-lead    Collection Time: 22  5:41 PM    Narrative    Test Reason : R55,    Vent. Rate : 088 BPM     Atrial Rate : 088 BPM     P-R Int : 162 ms          QRS Dur : 094 ms      QT Int : 374 ms       P-R-T Axes : 035 044 002 degrees     QTc Int : 452 ms    Normal sinus rhythm  Normal ECG  When compared with ECG of 21-SEP-2021 02:26,  Minimal criteria for Anterior infarct are no longer Present  Confirmed by Raphael James MD ([id number]) on 11/15/2022 8:20:57 PM    Referred By: [email or web address]   SELF           Confirmed By:Raphael James MD        Imaging Results          CT Abdomen Pelvis  Without Contrast (Final result)  Result time 22 01:44:25   Procedure changed from CT Abdomen Pelvis With Contrast     Final result by Bobo Ferris MD (22 01:44:25)                 Narrative:    EXAM: CT Abdomen and Pelvis without contrast    INDICATION: RLQ abdominal pain (Age >= 14y)    TECHNIQUE: Helical CT of the abdomen and pelvis was obtained from the diaphragm through the ischial tuberosities without contrast. 5 mm axial images were created as were coronal and sagittal reformats.    Dose reduction techniques were used including automated exposure control and/or adjustment of the mA and/or kV according to patient size.    COMPARISON:  CT 10/3/2020    The lack of IV contrast significantly decreases the sensitivity of this study for the evaluation of solid abdominal organs, hollow viscera and vascular structures.    FINDINGS:  LUNG BASES: No significant abnormality.    STOMACH: Small hiatal hernia.    LIVER: No significant abnormality.    BILIARY: Cholelithiasis without other CT evidence to suggest acute cholecystitis. No biliary ductal dilation.    PANCREAS: No significant abnormality.    SPLEEN: No significant abnormality.    ADRENAL GLANDS: No significant abnormality.    KIDNEYS/BLADDER:  Punctate nonobstructive right renal calculus. No hydronephrosis. Duplicated left renal collecting system without evidence of obstruction. No left-sided urinary tract calculi.    VESSELS: Nonaneurysmal abdominal aorta.    LYMPH NODES: No enlarged abdominal or pelvic lymph nodes.    OTHER PELVIC: No free pelvic fluid.    GI TRACT: The appendix appears upper limits of normal/minimally dilated with question subtle adjacent stranding at the peritoneum. No bowel obstruction or other acute enteric finding.    ABDOMINAL WALL: Small fat-containing umbilical hernia.    PERITONEUM: No ascites or pneumoperitoneum.    BONES/SPINE: No acute abnormality.      IMPRESSION:    The appendix appears upper limits of normal/minimally dilated with question very subtle adjacent stranding. An early acute appendicitis is not entirely excluded.    No other acute finding in the abdomen or pelvis.    INCIDENTAL FINDINGS:    Punctate nonobstructive right renal calculus.    Cholelithiasis without other CT evidence to suggest acute cholecystitis.      Electronically signed by:  Bobo Ferris MD  12/19/2022 1:44 AM CST Workstation: [id number]                               Lab Results   Component Value Date    WBC 11.62 12/19/2022    HGB 11.0 (L) 12/19/2022    HCT 35.8 (L) 12/19/2022    MCV 78 (L) 12/19/2022     12/19/2022     BMP  Lab Results   Component Value Date     (L)  12/19/2022    K 3.4 (L) 12/19/2022     12/19/2022    CO2 26 12/19/2022    BUN 11 12/19/2022    CREATININE 0.8 12/19/2022    CALCIUM 8.7 12/19/2022    ANIONGAP 8 12/19/2022     (H) 12/19/2022    GLU 86 11/12/2022    GLU 86 10/13/2021        Latest Reference Range & Units 12/19/22 00:40   Preg Test, Ur Negative  Negative           Pre-op Assessment    I have reviewed the Patient Summary Reports.     I have reviewed the Nursing Notes. I have reviewed the NPO Status.   I have reviewed the Medications.     Review of Systems  Anesthesia Hx:  No problems with previous Anesthesia Denies Hx of Anesthetic complications  Denies Family Hx of Anesthesia complications.   Denies Personal Hx of Anesthesia complications.   Social:  Alcohol Use, Non-Smoker    Hematology/Oncology:  Hematology Normal   Oncology Normal     EENT/Dental:EENT/Dental Normal   Cardiovascular:  Cardiovascular Normal  ECG has been reviewed.    Pulmonary:  Pulmonary Normal    Renal/:  Renal/ Normal     Hepatic/GI:   GERD, well controlled Acute appendicitis   Musculoskeletal:  Musculoskeletal Normal    Neurological:  Neurology Normal    Endocrine:  Endocrine Normal  Morbid Obesity / BMI > 40  Dermatological:  Skin Normal    Psych:   Psychiatric History (bipolar) anxiety  Psychotic Disorder and Bipolar disorder.          Physical Exam  General: Well nourished, Cooperative, Alert and Oriented    Chest/Lungs:  Clear to auscultation, Normal Respiratory Rate    Heart:  Rate: Normal  Rhythm: Regular Rhythm  Sounds: Normal        Anesthesia Plan  Type of Anesthesia, risks & benefits discussed:    Anesthesia Type: Gen ETT  Intra-op Monitoring Plan: Standard ASA Monitors  Post Op Pain Control Plan: multimodal analgesia and IV/PO Opioids PRN  Induction:  IV and rapid sequence  Airway Plan: Direct and Video, Post-Induction  Informed Consent: Informed consent signed with the Patient and all parties understand the risks and agree with anesthesia plan.  All  questions answered.   ASA Score: 3  Anesthesia Plan Notes:   GETA  RSI  Benadryl 6.25 mg iv, Decadron 8 mg, iv Zofran 4 mg iv, Pepcid 20 mg iv   Ofirmev 1000 mg iv, Scopolamine Patch, Sugammadex     Ready For Surgery From Anesthesia Perspective.     .

## 2022-12-19 NOTE — PROGRESS NOTES
Seen on rounds this morning.  Patient with acute appendicitis.  Adjusting pain control.  Plans to go to the operating room later this morning.  Maintain NPO.  Will follow-up after surgery.  Discharge disposition pending surgical treatment and plans thereafter.

## 2022-12-19 NOTE — SUBJECTIVE & OBJECTIVE
No current facility-administered medications on file prior to encounter.     Current Outpatient Medications on File Prior to Encounter   Medication Sig    hyoscyamine (ANASPAZ,LEVSIN) 0.125 mg Tab Take 3 tablets (375 mcg total) by mouth every 6 (six) hours as needed (Abdominal pain).    [DISCONTINUED] albuterol (PROVENTIL/VENTOLIN HFA) 90 mcg/actuation inhaler Inhale 1-2 puffs into the lungs every 6 (six) hours as needed for Wheezing or Shortness of Breath. Rescue (Patient not taking: Reported on 2021)    [DISCONTINUED] metroNIDAZOLE (FLAGYL) 500 MG tablet Take 1 tablet (500 mg total) by mouth every 12 (twelve) hours. for 7 days       Review of patient's allergies indicates:   Allergen Reactions    Iodine and iodide containing products Hives    Ketorolac Hives     Pt verbalized I dont like it doesn't work on it.    Vitamin e (d-alpha tocopherol)      Hives (skin)^Black eyed peas and oatmeal causede hives       Past Medical History:   Diagnosis Date    Anxiety     Bipolar 1 disorder     COVID-19 2021    GERD (gastroesophageal reflux disease)     Obese      Past Surgical History:   Procedure Laterality Date     SECTION  2022    LEG SURGERY      Broke her left lower leg.     Family History       Problem Relation (Age of Onset)    No Known Problems Mother, Father          Tobacco Use    Smoking status: Never    Smokeless tobacco: Never   Substance and Sexual Activity    Alcohol use: Yes     Comment: occasionally    Drug use: No    Sexual activity: Yes     Partners: Male     Birth control/protection: None     Review of Systems   Constitutional:  Positive for chills and fever.   Gastrointestinal:  Positive for abdominal pain, nausea and vomiting.   All other systems reviewed and are negative.  Objective:     Vital Signs (Most Recent):  Temp: 98.4 °F (36.9 °C) (22)  Pulse: 89 (22)  Resp: 16 (22 0934)  BP: 114/71 (22)  SpO2: 97 % (22) Vital Signs  (24h Range):  Temp:  [98.4 °F (36.9 °C)-99.1 °F (37.3 °C)] 98.4 °F (36.9 °C)  Pulse:  [80-90] 89  Resp:  [16-18] 16  SpO2:  [97 %-100 %] 97 %  BP: ()/(63-87) 114/71     Weight: (!) 137.7 kg (303 lb 9.2 oz)  Body mass index is 47.55 kg/m².    Physical Exam  Vitals and nursing note reviewed.   Constitutional:       General: She is not in acute distress.     Appearance: She is well-developed. She is not diaphoretic.   HENT:      Head: Normocephalic and atraumatic.      Mouth/Throat:      Pharynx: No oropharyngeal exudate.   Eyes:      General: No scleral icterus.     Conjunctiva/sclera: Conjunctivae normal.      Pupils: Pupils are equal, round, and reactive to light.   Neck:      Thyroid: No thyromegaly.      Vascular: No JVD.      Trachea: No tracheal deviation.   Cardiovascular:      Rate and Rhythm: Normal rate and regular rhythm.      Heart sounds: Normal heart sounds. No murmur heard.    No friction rub. No gallop.   Pulmonary:      Effort: Pulmonary effort is normal. No respiratory distress.      Breath sounds: Normal breath sounds. No stridor. No wheezing or rales.   Chest:      Chest wall: No tenderness.   Abdominal:      General: Bowel sounds are normal. There is no distension.      Palpations: Abdomen is soft. There is no mass.      Tenderness: There is abdominal tenderness. There is guarding. There is no rebound.   Musculoskeletal:         General: No tenderness. Normal range of motion.      Cervical back: Normal range of motion and neck supple.   Lymphadenopathy:      Cervical: No cervical adenopathy.   Skin:     General: Skin is warm and dry.      Findings: No erythema or rash.   Neurological:      Mental Status: She is alert and oriented to person, place, and time.      Cranial Nerves: No cranial nerve deficit.   Psychiatric:         Behavior: Behavior normal.       Significant Labs:  I have reviewed all pertinent lab results within the past 24 hours.  CBC:   Recent Labs   Lab 12/19/22  0036   WBC  11.62   RBC 4.58   HGB 11.0*   HCT 35.8*      MCV 78*   MCH 24.0*   MCHC 30.7*     BMP:   Recent Labs   Lab 12/19/22  0036   *   *   K 3.4*      CO2 26   BUN 11   CREATININE 0.8   CALCIUM 8.7       Significant Diagnostics:  I have reviewed all pertinent imaging results/findings within the past 24 hours.  CT: I have reviewed all pertinent results/findings within the past 24 hours and my personal findings are:  mildly dilated

## 2022-12-19 NOTE — TRANSFER OF CARE
"Anesthesia Transfer of Care Note    Patient: Manisha Bishop    Procedure(s) Performed: Procedure(s) (LRB):  APPENDECTOMY, LAPAROSCOPIC (N/A)    Patient location: PACU    Anesthesia Type: general    Transport from OR: Transported from OR on room air with adequate spontaneous ventilation    Post pain: adequate analgesia    Post assessment: no apparent anesthetic complications    Post vital signs: stable    Level of consciousness: awake and alert    Nausea/Vomiting: no nausea/vomiting    Complications: none    Transfer of care protocol was followed      Last vitals:   Visit Vitals  /71   Pulse 89   Temp 36.9 °C (98.4 °F)   Resp 16   Ht 5' 7" (1.702 m)   Wt (!) 137.7 kg (303 lb 9.2 oz)   LMP 12/08/2022 (Exact Date)   SpO2 97%   Breastfeeding No   BMI 47.55 kg/m²     "

## 2022-12-20 VITALS
HEIGHT: 67 IN | BODY MASS INDEX: 45.99 KG/M2 | TEMPERATURE: 99 F | SYSTOLIC BLOOD PRESSURE: 130 MMHG | OXYGEN SATURATION: 96 % | HEART RATE: 92 BPM | WEIGHT: 293 LBS | RESPIRATION RATE: 16 BRPM | DIASTOLIC BLOOD PRESSURE: 72 MMHG

## 2022-12-20 LAB
ALBUMIN SERPL BCP-MCNC: 3.4 G/DL (ref 3.5–5.2)
ALP SERPL-CCNC: 60 U/L (ref 55–135)
ALT SERPL W/O P-5'-P-CCNC: 25 U/L (ref 10–44)
ANION GAP SERPL CALC-SCNC: 7 MMOL/L (ref 8–16)
AST SERPL-CCNC: 26 U/L (ref 10–40)
BILIRUB SERPL-MCNC: 0.7 MG/DL (ref 0.1–1)
BUN SERPL-MCNC: 9 MG/DL (ref 6–20)
CALCIUM SERPL-MCNC: 8.7 MG/DL (ref 8.7–10.5)
CHLORIDE SERPL-SCNC: 103 MMOL/L (ref 95–110)
CO2 SERPL-SCNC: 26 MMOL/L (ref 23–29)
CREAT SERPL-MCNC: 0.6 MG/DL (ref 0.5–1.4)
ERYTHROCYTE [DISTWIDTH] IN BLOOD BY AUTOMATED COUNT: 16.4 % (ref 11.5–14.5)
EST. GFR  (NO RACE VARIABLE): >60 ML/MIN/1.73 M^2
GLUCOSE SERPL-MCNC: 108 MG/DL (ref 70–110)
HCT VFR BLD AUTO: 32.5 % (ref 37–48.5)
HGB BLD-MCNC: 10 G/DL (ref 12–16)
MAGNESIUM SERPL-MCNC: 1.8 MG/DL (ref 1.6–2.6)
MCH RBC QN AUTO: 24 PG (ref 27–31)
MCHC RBC AUTO-ENTMCNC: 30.8 G/DL (ref 32–36)
MCV RBC AUTO: 78 FL (ref 82–98)
PLATELET # BLD AUTO: 333 K/UL (ref 150–450)
PMV BLD AUTO: 9.5 FL (ref 9.2–12.9)
POTASSIUM SERPL-SCNC: 3.6 MMOL/L (ref 3.5–5.1)
POTASSIUM SERPL-SCNC: 3.7 MMOL/L (ref 3.5–5.1)
PROT SERPL-MCNC: 7.4 G/DL (ref 6–8.4)
RBC # BLD AUTO: 4.17 M/UL (ref 4–5.4)
SODIUM SERPL-SCNC: 136 MMOL/L (ref 136–145)
WBC # BLD AUTO: 8.08 K/UL (ref 3.9–12.7)

## 2022-12-20 PROCEDURE — 84132 ASSAY OF SERUM POTASSIUM: CPT | Performed by: STUDENT IN AN ORGANIZED HEALTH CARE EDUCATION/TRAINING PROGRAM

## 2022-12-20 PROCEDURE — 63600175 PHARM REV CODE 636 W HCPCS: Performed by: STUDENT IN AN ORGANIZED HEALTH CARE EDUCATION/TRAINING PROGRAM

## 2022-12-20 PROCEDURE — 96366 THER/PROPH/DIAG IV INF ADDON: CPT

## 2022-12-20 PROCEDURE — 80053 COMPREHEN METABOLIC PANEL: CPT | Performed by: STUDENT IN AN ORGANIZED HEALTH CARE EDUCATION/TRAINING PROGRAM

## 2022-12-20 PROCEDURE — 63600175 PHARM REV CODE 636 W HCPCS: Performed by: SURGERY

## 2022-12-20 PROCEDURE — G0378 HOSPITAL OBSERVATION PER HR: HCPCS

## 2022-12-20 PROCEDURE — 25000003 PHARM REV CODE 250: Performed by: STUDENT IN AN ORGANIZED HEALTH CARE EDUCATION/TRAINING PROGRAM

## 2022-12-20 PROCEDURE — 83735 ASSAY OF MAGNESIUM: CPT | Performed by: STUDENT IN AN ORGANIZED HEALTH CARE EDUCATION/TRAINING PROGRAM

## 2022-12-20 PROCEDURE — 96376 TX/PRO/DX INJ SAME DRUG ADON: CPT

## 2022-12-20 PROCEDURE — 25000003 PHARM REV CODE 250: Performed by: SURGERY

## 2022-12-20 PROCEDURE — 36415 COLL VENOUS BLD VENIPUNCTURE: CPT | Performed by: STUDENT IN AN ORGANIZED HEALTH CARE EDUCATION/TRAINING PROGRAM

## 2022-12-20 PROCEDURE — 96361 HYDRATE IV INFUSION ADD-ON: CPT

## 2022-12-20 PROCEDURE — 85027 COMPLETE CBC AUTOMATED: CPT | Performed by: STUDENT IN AN ORGANIZED HEALTH CARE EDUCATION/TRAINING PROGRAM

## 2022-12-20 RX ORDER — ALPRAZOLAM 0.5 MG/1
2 TABLET ORAL 3 TIMES DAILY PRN
Status: DISCONTINUED | OUTPATIENT
Start: 2022-12-20 | End: 2022-12-20 | Stop reason: HOSPADM

## 2022-12-20 RX ORDER — OXYCODONE AND ACETAMINOPHEN 5; 325 MG/1; MG/1
1 TABLET ORAL EVERY 6 HOURS PRN
Status: DISCONTINUED | OUTPATIENT
Start: 2022-12-20 | End: 2022-12-20 | Stop reason: HOSPADM

## 2022-12-20 RX ORDER — AMOXICILLIN AND CLAVULANATE POTASSIUM 875; 125 MG/1; MG/1
1 TABLET, FILM COATED ORAL EVERY 12 HOURS
Qty: 10 TABLET | Refills: 0 | Status: SHIPPED | OUTPATIENT
Start: 2022-12-20 | End: 2022-12-20 | Stop reason: SDUPTHER

## 2022-12-20 RX ORDER — OXYCODONE AND ACETAMINOPHEN 10; 325 MG/1; MG/1
1 TABLET ORAL EVERY 6 HOURS PRN
Qty: 10 TABLET | Refills: 0 | Status: SHIPPED | OUTPATIENT
Start: 2022-12-20 | End: 2022-12-22 | Stop reason: SDUPTHER

## 2022-12-20 RX ORDER — MAG HYDROX/ALUMINUM HYD/SIMETH 200-200-20
30 SUSPENSION, ORAL (FINAL DOSE FORM) ORAL
Status: DISCONTINUED | OUTPATIENT
Start: 2022-12-20 | End: 2022-12-20

## 2022-12-20 RX ORDER — ONDANSETRON 4 MG/1
4 TABLET, ORALLY DISINTEGRATING ORAL EVERY 6 HOURS PRN
Qty: 10 TABLET | Refills: 0 | Status: SHIPPED | OUTPATIENT
Start: 2022-12-20 | End: 2022-12-20 | Stop reason: SDUPTHER

## 2022-12-20 RX ORDER — OXYCODONE AND ACETAMINOPHEN 5; 325 MG/1; MG/1
2 TABLET ORAL EVERY 4 HOURS PRN
Status: DISCONTINUED | OUTPATIENT
Start: 2022-12-20 | End: 2022-12-20

## 2022-12-20 RX ORDER — OXYCODONE AND ACETAMINOPHEN 10; 325 MG/1; MG/1
1 TABLET ORAL ONCE
Status: COMPLETED | OUTPATIENT
Start: 2022-12-20 | End: 2022-12-20

## 2022-12-20 RX ORDER — SIMETHICONE 80 MG
1 TABLET,CHEWABLE ORAL
Status: DISCONTINUED | OUTPATIENT
Start: 2022-12-20 | End: 2022-12-20 | Stop reason: HOSPADM

## 2022-12-20 RX ORDER — OXYCODONE AND ACETAMINOPHEN 7.5; 325 MG/1; MG/1
1 TABLET ORAL EVERY 6 HOURS PRN
Qty: 10 TABLET | Refills: 0 | Status: SHIPPED | OUTPATIENT
Start: 2022-12-20 | End: 2022-12-20 | Stop reason: HOSPADM

## 2022-12-20 RX ORDER — SIMETHICONE 80 MG
1 TABLET,CHEWABLE ORAL
Status: DISCONTINUED | OUTPATIENT
Start: 2022-12-20 | End: 2022-12-20

## 2022-12-20 RX ORDER — AMOXICILLIN AND CLAVULANATE POTASSIUM 875; 125 MG/1; MG/1
1 TABLET, FILM COATED ORAL EVERY 12 HOURS
Qty: 10 TABLET | Refills: 0 | Status: SHIPPED | OUTPATIENT
Start: 2022-12-20 | End: 2022-12-25

## 2022-12-20 RX ORDER — MORPHINE SULFATE 2 MG/ML
2 INJECTION, SOLUTION INTRAMUSCULAR; INTRAVENOUS EVERY 4 HOURS PRN
Status: DISCONTINUED | OUTPATIENT
Start: 2022-12-20 | End: 2022-12-20 | Stop reason: HOSPADM

## 2022-12-20 RX ORDER — ONDANSETRON 4 MG/1
4 TABLET, ORALLY DISINTEGRATING ORAL EVERY 6 HOURS PRN
Qty: 10 TABLET | Refills: 0 | Status: SHIPPED | OUTPATIENT
Start: 2022-12-20 | End: 2022-12-22 | Stop reason: SDUPTHER

## 2022-12-20 RX ADMIN — SIMETHICONE 80 MG: 80 TABLET, CHEWABLE ORAL at 09:12

## 2022-12-20 RX ADMIN — HYDROMORPHONE HYDROCHLORIDE 0.5 MG: 1 INJECTION, SOLUTION INTRAMUSCULAR; INTRAVENOUS; SUBCUTANEOUS at 12:12

## 2022-12-20 RX ADMIN — OXYCODONE AND ACETAMINOPHEN 2 TABLET: 325; 5 TABLET ORAL at 07:12

## 2022-12-20 RX ADMIN — PIPERACILLIN SODIUM AND TAZOBACTAM SODIUM 3.38 G: 3; .375 INJECTION, POWDER, LYOPHILIZED, FOR SOLUTION INTRAVENOUS at 11:12

## 2022-12-20 RX ADMIN — OXYCODONE AND ACETAMINOPHEN 1 TABLET: 325; 5 TABLET ORAL at 03:12

## 2022-12-20 RX ADMIN — ALPRAZOLAM 2 MG: 0.5 TABLET ORAL at 11:12

## 2022-12-20 RX ADMIN — MORPHINE SULFATE 2 MG: 2 INJECTION, SOLUTION INTRAMUSCULAR; INTRAVENOUS at 10:12

## 2022-12-20 RX ADMIN — HYDROMORPHONE HYDROCHLORIDE 0.5 MG: 1 INJECTION, SOLUTION INTRAMUSCULAR; INTRAVENOUS; SUBCUTANEOUS at 06:12

## 2022-12-20 RX ADMIN — OXYCODONE HYDROCHLORIDE AND ACETAMINOPHEN 1 TABLET: 10; 325 TABLET ORAL at 04:12

## 2022-12-20 RX ADMIN — SODIUM CHLORIDE, SODIUM LACTATE, POTASSIUM CHLORIDE, AND CALCIUM CHLORIDE: .6; .31; .03; .02 INJECTION, SOLUTION INTRAVENOUS at 07:12

## 2022-12-20 RX ADMIN — PIPERACILLIN SODIUM AND TAZOBACTAM SODIUM 3.38 G: 3; .375 INJECTION, POWDER, LYOPHILIZED, FOR SOLUTION INTRAVENOUS at 03:12

## 2022-12-20 NOTE — PROGRESS NOTES
Progress Note  Gen Surg    Admit Date: 12/18/2022  Attending: Jones  S/P: Procedure(s) (LRB):  APPENDECTOMY, LAPAROSCOPIC (N/A)    Post-operative Day: 1 Day Post-Op    Hospital Day: 3    SUBJECTIVE:     Doing well  Still having pain  Recent scan showed only post surgical changes     OBJECTIVE:     Vital Signs (Most Recent)  Temp: 98.1 °F (36.7 °C) (12/20/22 1140)  Pulse: 83 (12/20/22 1140)  Resp: 16 (12/20/22 1547)  BP: 111/78 (12/20/22 1140)  SpO2: 96 % (12/20/22 1140)    Vital Signs Range (Last 24H):  Temp:  [98.1 °F (36.7 °C)-98.5 °F (36.9 °C)]   Pulse:  [72-85]   Resp:  [16-18]   BP: (111-165)/(75-80)   SpO2:  [96 %-99 %]     I & O (Last 24H):  Intake/Output Summary (Last 24 hours) at 12/20/2022 1606  Last data filed at 12/20/2022 1125  Gross per 24 hour   Intake 2068 ml   Output --   Net 2068 ml       Scheduled medications:   piperacillin-tazobactam (ZOSYN) IVPB  3.375 g Intravenous Q8H    simethicone  1 tablet Oral TID WM       Physical Exam:  General: no distress  Lungs:  clear to auscultation bilaterally and normal respiratory effort  Heart: regular rate and rhythm, S1, S2 normal, no murmur, rub or gallop  Abdomen: soft, non-tender non-distented; bowel sounds normal; no masses,  no organomegaly    Wound/Incision:  clean, dry, intact    Laboratory:  Labs within the past 24 hours have been reviewed.    ASSESSMENT/PLAN:     Doing well   Clearly appendicitis clinically and intra operatively  Ok to dc with pain meds and course of antibiotics      Yuniel Goldman MD

## 2022-12-20 NOTE — PLAN OF CARE
DC orders and chart reviewed. No discharge needs noted.  Patient cleared for discharge from .  Patient discharging to home.  Patient stated she would find her own PCP to follow up with.        12/20/22 1542   Final Note   Assessment Type Final Discharge Note   Anticipated Discharge Disposition Home   What phone number can be called within the next 1-3 days to see how you are doing after discharge? [phone]   Hospital Resources/Appts/Education Provided Patient refused appointment set-up

## 2022-12-20 NOTE — PLAN OF CARE
Problem: Adult Inpatient Plan of Care  Goal: Plan of Care Review  12/20/2022 0203 by Luis Enrique Hameed RN  Outcome: Ongoing, Progressing  12/20/2022 0157 by Luis Enrique Hameed RN  Outcome: Ongoing, Progressing  Goal: Patient-Specific Goal (Individualized)  12/20/2022 0203 by Luis Enrique Hameed RN  Outcome: Ongoing, Progressing  12/20/2022 0157 by Luis Enrique Hameed RN  Outcome: Ongoing, Progressing  Goal: Absence of Hospital-Acquired Illness or Injury  12/20/2022 0203 by Luis Enrique Hameed RN  Outcome: Ongoing, Progressing  12/20/2022 0157 by Luis Enrique Hameed RN  Outcome: Ongoing, Progressing  Goal: Optimal Comfort and Wellbeing  12/20/2022 0203 by Luis Enrique Hameed RN  Outcome: Ongoing, Progressing  12/20/2022 0157 by Luis Enrique Hameed RN  Outcome: Ongoing, Progressing  Goal: Readiness for Transition of Care  12/20/2022 0203 by Luis Enrique Hameed RN  Outcome: Ongoing, Progressing  12/20/2022 0157 by Luis Enrique Hameed RN  Outcome: Ongoing, Progressing     Problem: Bariatric Environmental Safety  Goal: Safety Maintained with Care  12/20/2022 0203 by Luis Enrique Hameed RN  Outcome: Ongoing, Progressing  12/20/2022 0157 by Luis Enrique Hameed RN  Outcome: Ongoing, Progressing     Problem: Bleeding (Appendectomy)  Goal: Absence of Bleeding  12/20/2022 0203 by Luis Enrique Hameed RN  Outcome: Ongoing, Progressing  12/20/2022 0157 by Luis Enrique Hameed RN  Outcome: Ongoing, Progressing     Problem: Bowel Motility Impaired (Appendectomy)  Goal: Effective Bowel Elimination  12/20/2022 0203 by Luis Enrique Hameed RN  Outcome: Ongoing, Progressing  12/20/2022 0157 by Luis Enrique Hameed RN  Outcome: Ongoing, Progressing     Problem: Fluid and Electrolyte Imbalance (Appendectomy)  Goal: Fluid and Electrolyte Balance  12/20/2022 0203 by Luis Enrique Hameed RN  Outcome: Ongoing, Progressing  12/20/2022 0157 by Luis Enrique Hameed RN  Outcome: Ongoing, Progressing     Problem: Infection (Appendectomy)  Goal: Absence of Infection Signs and Symptoms  12/20/2022 0203 by Luis Enrique Hameed RN  Outcome: Ongoing,  Progressing  12/20/2022 0157 by Luis Enrique Hameed RN  Outcome: Ongoing, Progressing     Problem: Ongoing Anesthesia Effects (Appendectomy)  Goal: Anesthesia/Sedation Recovery  12/20/2022 0203 by Luis Enrique Hameed RN  Outcome: Ongoing, Progressing  12/20/2022 0157 by Luis Enrique Hameed RN  Outcome: Ongoing, Progressing     Problem: Pain (Appendectomy)  Goal: Acceptable Pain Control  12/20/2022 0203 by Luis Enrique Hameed RN  Outcome: Ongoing, Progressing  12/20/2022 0157 by Luis Enrique Hameed RN  Outcome: Ongoing, Progressing     Problem: Postoperative Nausea and Vomiting (Appendectomy)  Goal: Nausea and Vomiting Relief  12/20/2022 0203 by Luis Enrique Hameed RN  Outcome: Ongoing, Progressing  12/20/2022 0157 by Luis Enrique Hameed RN  Outcome: Ongoing, Progressing     Problem: Postoperative Urinary Retention (Appendectomy)  Goal: Effective Urinary Elimination  12/20/2022 0203 by Luis Enrique Hameed RN  Outcome: Ongoing, Progressing  12/20/2022 0157 by Luis Enrique Hameed RN  Outcome: Ongoing, Progressing     Problem: Respiratory Compromise (Appendectomy)  Goal: Effective Oxygenation and Ventilation  12/20/2022 0203 by Luis Enrique Hameed RN  Outcome: Ongoing, Progressing  12/20/2022 0157 by Luis Enrique Hameed RN  Outcome: Ongoing, Progressing     Problem: Pain Acute  Goal: Acceptable Pain Control and Functional Ability  12/20/2022 0203 by Luis Enrique Hameed RN  Outcome: Ongoing, Progressing  12/20/2022 0157 by Luis Enrique Hameed RN  Outcome: Ongoing, Progressing

## 2022-12-20 NOTE — DISCHARGE SUMMARY
Critical access hospital Medicine  Discharge Summary      Patient Name: Manisha Bishop  MRN: [id number]  LINDSAY: [id number]  Patient Class: OP- Observation  Admission Date: 2022  Hospital Length of Stay: 0 days  Discharge Date and Time:  2022 3:43 PM  Attending Physician: Butch Garcia MD   Discharging Provider: Butch Garcia MD  Primary Care Provider: Primary Doctor No    Primary Care Team: Networked reference to record PCT     HPI:   Manisha Bishop is a 27 y.o. Black or  female   With PMH of bipolar, GERD, obesity, 8 months postpartum.   presenting with 1 day history of generalized abdominal pain that is constant sharp and stabbing.  His worsened over the past 24 hours.  Has had some nausea with single episode of non hematemesis vomiting.  Some associated dizziness.  No diarrhea or constipation.  No urinary symptoms.  Reports pain 10/10, but lying comfortably in bed.  Status post 0.5 mg of Dilaudid.  ED evaluation significant for normal CBC.  Trivial hyponatremia.  Potassium 3.4.    CT abdomen/pelvis demonstrates findings consistent with early appendicitis.  Cholelithiasis without evidence of cholecystitis.  Received 1 L IV fluids in ED.  Received 1 dose of Zosyn in ED.  Dr. Goldman was consulted by ED    Procedure(s) (LRB):  APPENDECTOMY, LAPAROSCOPIC (N/A)      Hospital Course:   Patient had appendectomy done, once cleared by surgeon was subsequently discharged to follow-up outpatient.    Physical examination on discharge:  Constitutional: No distress.   HENT: NC  Head: Atraumatic.   Cardiovascular: Normal rate, regular rhythm and normal heart sounds.   Pulmonary/Chest: Effort normal. No wheezes.   Abdominal: Soft. Bowel sounds are normal. No distension and no mass. No tenderness  Neurological: Alert.   Skin: Skin is warm and dry.   Psych: Appropriate mood and affect    I have seen the patient on the day of discharge and reviewed the discharge instructions  as outlined.       Goals of Care Treatment Preferences:  Code Status: Full Code      Consults:   Consults (From admission, onward)        Status Ordering Provider     Inpatient consult to General Surgery  Once        Provider:  Cheko Marina MD    Completed JESICA FLANAGAN     Inpatient consult to Hospitalist  Once        Provider:  Jesica Flanagan MD    Acknowledged CHEKO MARINA     Inpatient consult to General surgery  Once        Provider:  Cheko Marina MD    Completed JESICA FLANAGAN          No new Assessment & Plan notes have been filed under this hospital service since the last note was generated.  Service: Hospital Medicine    Final Active Diagnoses:    Diagnosis Date Noted POA    PRINCIPAL PROBLEM:  Acute appendicitis [K35.80] 12/19/2022 Yes    Bipolar 1 disorder [F31.9] 06/16/2016 Yes      Problems Resolved During this Admission:       Discharged Condition: good    Disposition: Home or Self Care    Follow Up:   Follow-up Information     Primary Doctor No Follow up in 1 week(s).                     Patient Instructions:      Activity as tolerated       Significant Diagnostic Studies: Labs:   BMP:   Recent Labs   Lab 12/19/22  0036 12/20/22  0728 12/20/22  0956   *  --  108   *  --  136   K 3.4* 3.7 3.6     --  103   CO2 26  --  26   BUN 11  --  9   CREATININE 0.8  --  0.6   CALCIUM 8.7  --  8.7   MG  --   --  1.8   , CMP   Recent Labs   Lab 12/19/22  0036 12/20/22  0728 12/20/22  0956   *  --  136   K 3.4* 3.7 3.6     --  103   CO2 26  --  26   *  --  108   BUN 11  --  9   CREATININE 0.8  --  0.6   CALCIUM 8.7  --  8.7   PROT 7.8  --  7.4   ALBUMIN 4.1  --  3.4*   BILITOT 1.1*  --  0.7   ALKPHOS 68  --  60   AST 15  --  26   ALT 13  --  25   ANIONGAP 8  --  7*   , CBC   Recent Labs   Lab 12/19/22 0036 12/20/22  0956   WBC 11.62 8.08   HGB 11.0* 10.0*   HCT 35.8* 32.5*    333    and All labs within the past 24 hours have been  reviewed  Microbiology:   Blood Culture   Lab Results   Component Value Date    LABBLOO No growth after 5 days. 11/25/2016     Radiology: X-Ray: CXR: X-Ray Chest 1 View (CXR): No results found for this visit on 12/18/22. and X-Ray Chest PA and Lateral (CXR): No results found for this visit on 12/18/22.  CT scan: CT ABDOMEN PELVIS WITH CONTRAST: No results found for this visit on 12/18/22. and CT ABDOMEN PELVIS WITHOUT CONTRAST:   Results for orders placed or performed during the hospital encounter of 12/18/22   CT Abdomen Pelvis  Without Contrast    Narrative    CT ABDOMEN AND PELVIS WITHOUT CONTRAST    HISTORY: Postop, abdominal pain    CMS MANDATED QUALITY DATA - CT RADIATION  436    All CT scans at this facility utilize dose modulation, iterative reconstruction, and/or weight based dosing when appropriate to reduce radiation dose to as low as reasonably achievable    FINDINGS: Noncontrast axial images were obtained. The lack of intravenous contrast limits assessment, most notably in regards to solid organs and vascular structures.    Comparison is made to December 19.    There is mild atelectasis at both lung bases.    Images of the abdomen demonstrate few tiny intraperitoneal air bubbles, felt to be iatrogenic in a patient who is recently postop. Small air collection in the right periumbilical abdominal wall and at the umbilicus are also felt to be iatrogenic.    The liver has a normal noncontrast appearance. Small gallstones are noted. There is no evidence of cholecystitis or obstruction. The spleen is enlarged measuring 15.4 cm in longitudinal dimension, unchanged. The pancreas and adrenal glands are normal. The bilateral kidneys are unremarkable. The abdominal aorta is normal in caliber.    There is no pathologic bowel wall thickening or evidence of obstruction. No pathologic postoperative fluid collections are identified. There is minimal edema near the base of the cecum, where a tiny air bubble is also  noted, both presumed postoperative. No significant free fluid is identified.    Images of the pelvis demonstrate a tiny amount of simple appearing free fluid. A small air bubble is noted within the urinary bladder lumen. Correlate with any recent catheterization.    IMPRESSION:      1. Several small bubbles of intraperitoneal free air, presumed iatrogenic in a patient who is recently postop. Small air collections in the right periumbilical anterior abdominal wall and at the umbilicus are likely postoperative in nature as well. No pathologic postoperative fluid collections or other complicating processes are identified.  2. Tiny gallstones.  3. Trace amount of simple appearing pelvic free fluid.  4. Minimal air within the urinary bladder lumen, likely iatrogenic. Correlate with any recent catheterization.    Electronically signed by:  Mauricio Bolden MD  12/20/2022 2:43 PM CST Workstation: [id number]       Pending Diagnostic Studies:     Procedure Component Value Units Date/Time    Specimen to Pathology - Surgery [[id number]] Collected: 12/19/22 1245    Order Status: Sent Lab Status: No result     Specimen: Tissue          Medications:  Reconciled Home Medications:      Medication List      START taking these medications    amoxicillin-clavulanate 875-125mg 875-125 mg per tablet  Commonly known as: AUGMENTIN  Take 1 tablet by mouth every 12 (twelve) hours. for 5 days     ondansetron 4 MG Tbdl  Commonly known as: ZOFRAN-ODT  Take 1 tablet (4 mg total) by mouth every 6 (six) hours as needed.     oxyCODONE-acetaminophen 7.5-325 mg per tablet  Commonly known as: PERCOCET  Take 1 tablet by mouth every 6 (six) hours as needed for Pain.        CONTINUE taking these medications    hyoscyamine 0.125 mg Tab  Commonly known as: ANASPAZ,LEVSIN  Take 3 tablets (375 mcg total) by mouth every 6 (six) hours as needed (Abdominal pain).            Indwelling Lines/Drains at time of discharge:   Lines/Drains/Airways     None                  Time spent on the discharge of patient: 35 minutes         Butch Garcia MD  Department of Hospital Medicine  [facility]

## 2022-12-21 NOTE — NURSING
[facility] in [place] out of percocets and unable to fill prescriptions. Called [facility] on [address] with no answer. [facility] [address] and verified stock of percocet. Notified Dr. Marr who wrote out prescriptions.  Pt notified and pt picked up prescriptions for augmentin, zofran, percocet.

## 2022-12-21 NOTE — PROGRESS NOTES
Discharge instructions given to pt, pt verbalized understanding. PIV removed. Pt to  meds from pharmacy. Pt leaving with personal belongings. Pt's significant other providing ride home.

## 2022-12-22 DIAGNOSIS — R11.0 NAUSEA: Primary | ICD-10-CM

## 2022-12-22 DIAGNOSIS — G89.18 POST-OP PAIN: Primary | ICD-10-CM

## 2022-12-22 DIAGNOSIS — G89.18 POST-OP PAIN: ICD-10-CM

## 2022-12-22 RX ORDER — OXYCODONE AND ACETAMINOPHEN 10; 325 MG/1; MG/1
1 TABLET ORAL EVERY 6 HOURS PRN
Qty: 10 TABLET | Refills: 0 | Status: SHIPPED | OUTPATIENT
Start: 2022-12-22 | End: 2022-12-22 | Stop reason: RX

## 2022-12-22 RX ORDER — ONDANSETRON 4 MG/1
4 TABLET, ORALLY DISINTEGRATING ORAL EVERY 6 HOURS PRN
Qty: 10 TABLET | Refills: 0 | Status: ON HOLD | OUTPATIENT
Start: 2022-12-22 | End: 2023-01-15 | Stop reason: SDUPTHER

## 2022-12-22 RX ORDER — OXYCODONE AND ACETAMINOPHEN 10; 325 MG/1; MG/1
1 TABLET ORAL EVERY 6 HOURS PRN
Qty: 10 TABLET | Refills: 0 | Status: ON HOLD | OUTPATIENT
Start: 2022-12-22 | End: 2023-01-15 | Stop reason: SDUPTHER

## 2022-12-22 NOTE — TELEPHONE ENCOUNTER
----- Message from Saumya Harmon sent at 12/22/2022 10:14 AM CST -----  Contact: self  Type:  RX Refill Request    Who Called:  patient  Refill or New Rx:  refill  RX Name and Strength:  oxyCODONE-acetaminophen (PERCOCET)  mg per tablet  How is the patient currently taking it? (ex. 1XDay):  as directed  Is this a 30 day or 90 day RX:  30  Preferred Pharmacy with phone number:    [facility]/pharmacy #[id number] - [place] - [address]  [address] [address]  Phone: [phone] Fax: [phone]  Local or Mail Order:  local  Ordering Provider:  Dr Jones Card Call Back Number:  [phone]  Additional Information:  Please call patient back at [phone].  Patient states she is in more pain today than after the surgery. Asked on scale of 1 to 10 and she states it is a 10.  Moved around more yesterday so not sure if she over did it but wants comfort for holidays.  Thanks

## 2022-12-22 NOTE — TELEPHONE ENCOUNTER
Returned call to pt, she requested refill sent to [facility] due to [facility] not having percocet in stock. Rx cancelled at [facility] and refill request sent to [facility].

## 2022-12-28 ENCOUNTER — TELEPHONE (OUTPATIENT)
Dept: SURGERY | Facility: HOSPITAL | Age: 27
End: 2022-12-28

## 2023-01-13 ENCOUNTER — TELEPHONE (OUTPATIENT)
Dept: SURGERY | Facility: HOSPITAL | Age: 28
End: 2023-01-13

## 2023-01-13 ENCOUNTER — PATIENT MESSAGE (OUTPATIENT)
Dept: SURGERY | Facility: HOSPITAL | Age: 28
End: 2023-01-13

## 2023-01-13 ENCOUNTER — ANESTHESIA EVENT (OUTPATIENT)
Dept: SURGERY | Facility: HOSPITAL | Age: 28
End: 2023-01-13
Payer: MEDICAID

## 2023-01-13 ENCOUNTER — ANESTHESIA (OUTPATIENT)
Dept: SURGERY | Facility: HOSPITAL | Age: 28
End: 2023-01-13
Payer: MEDICAID

## 2023-01-13 ENCOUNTER — HOSPITAL ENCOUNTER (OUTPATIENT)
Facility: HOSPITAL | Age: 28
Discharge: HOME | End: 2023-01-15
Attending: SURGERY | Admitting: STUDENT IN AN ORGANIZED HEALTH CARE EDUCATION/TRAINING PROGRAM
Payer: MEDICAID

## 2023-01-13 DIAGNOSIS — K81.9 CHOLECYSTITIS: ICD-10-CM

## 2023-01-13 DIAGNOSIS — R11.0 NAUSEA: ICD-10-CM

## 2023-01-13 DIAGNOSIS — K80.01 CALCULUS OF GALLBLADDER WITH ACUTE CHOLECYSTITIS AND OBSTRUCTION: Primary | ICD-10-CM

## 2023-01-13 DIAGNOSIS — G89.18 POST-OP PAIN: ICD-10-CM

## 2023-01-13 DIAGNOSIS — K81.9 CHOLECYSTITIS: Primary | ICD-10-CM

## 2023-01-13 PROBLEM — E66.01 SEVERE OBESITY (BMI >= 40): Status: ACTIVE | Noted: 2023-01-13

## 2023-01-13 PROBLEM — K35.80 ACUTE APPENDICITIS: Status: RESOLVED | Noted: 2022-12-19 | Resolved: 2023-01-13

## 2023-01-13 LAB
B-HCG UR QL: NEGATIVE
CTP QC/QA: YES

## 2023-01-13 PROCEDURE — 36000709 HC OR TIME LEV III EA ADD 15 MIN: Performed by: SURGERY

## 2023-01-13 PROCEDURE — 71000039 HC RECOVERY, EACH ADD'L HOUR: Performed by: SURGERY

## 2023-01-13 PROCEDURE — 36000708 HC OR TIME LEV III 1ST 15 MIN: Performed by: SURGERY

## 2023-01-13 PROCEDURE — D9220A PRA ANESTHESIA: ICD-10-PCS | Mod: CRNA,,, | Performed by: NURSE ANESTHETIST, CERTIFIED REGISTERED

## 2023-01-13 PROCEDURE — 37000008 HC ANESTHESIA 1ST 15 MINUTES: Performed by: SURGERY

## 2023-01-13 PROCEDURE — 00790 ANES IPER UPR ABD NOS: CPT | Performed by: SURGERY

## 2023-01-13 PROCEDURE — D9220A PRA ANESTHESIA: ICD-10-PCS | Mod: ANES,,, | Performed by: ANESTHESIOLOGY

## 2023-01-13 PROCEDURE — 99900104 DSU ONLY-NO CHARGE-EA ADD'L HR (STAT): Performed by: SURGERY

## 2023-01-13 PROCEDURE — 25000003 PHARM REV CODE 250: Performed by: SURGERY

## 2023-01-13 PROCEDURE — 47562 PR LAP,CHOLECYSTECTOMY: ICD-10-PCS | Mod: 79,,, | Performed by: SURGERY

## 2023-01-13 PROCEDURE — 88304 TISSUE EXAM BY PATHOLOGIST: CPT | Performed by: PATHOLOGY

## 2023-01-13 PROCEDURE — 88304 PR  SURG PATH,LEVEL III: ICD-10-PCS | Mod: 26,,, | Performed by: PATHOLOGY

## 2023-01-13 PROCEDURE — 81025 URINE PREGNANCY TEST: CPT | Performed by: ANESTHESIOLOGY

## 2023-01-13 PROCEDURE — 63600175 PHARM REV CODE 636 W HCPCS: Performed by: ANESTHESIOLOGY

## 2023-01-13 PROCEDURE — 25000003 PHARM REV CODE 250: Performed by: NURSE ANESTHETIST, CERTIFIED REGISTERED

## 2023-01-13 PROCEDURE — 63600175 PHARM REV CODE 636 W HCPCS: Performed by: STUDENT IN AN ORGANIZED HEALTH CARE EDUCATION/TRAINING PROGRAM

## 2023-01-13 PROCEDURE — D9220A PRA ANESTHESIA: Mod: CRNA,,, | Performed by: NURSE ANESTHETIST, CERTIFIED REGISTERED

## 2023-01-13 PROCEDURE — D9220A PRA ANESTHESIA: Mod: ANES,,, | Performed by: ANESTHESIOLOGY

## 2023-01-13 PROCEDURE — 47562 LAPAROSCOPIC CHOLECYSTECTOMY: CPT | Mod: 79,,, | Performed by: SURGERY

## 2023-01-13 PROCEDURE — 99900103 DSU ONLY-NO CHARGE-INITIAL HR (STAT): Performed by: SURGERY

## 2023-01-13 PROCEDURE — 88304 TISSUE EXAM BY PATHOLOGIST: CPT | Mod: 26,,, | Performed by: PATHOLOGY

## 2023-01-13 PROCEDURE — 63600175 PHARM REV CODE 636 W HCPCS: Performed by: SURGERY

## 2023-01-13 PROCEDURE — 63600175 PHARM REV CODE 636 W HCPCS: Performed by: NURSE PRACTITIONER

## 2023-01-13 PROCEDURE — 25000003 PHARM REV CODE 250: Performed by: ANESTHESIOLOGY

## 2023-01-13 PROCEDURE — 27201423 OPTIME MED/SURG SUP & DEVICES STERILE SUPPLY: Performed by: SURGERY

## 2023-01-13 PROCEDURE — 37000009 HC ANESTHESIA EA ADD 15 MINS: Performed by: SURGERY

## 2023-01-13 PROCEDURE — 63600175 PHARM REV CODE 636 W HCPCS: Performed by: NURSE ANESTHETIST, CERTIFIED REGISTERED

## 2023-01-13 PROCEDURE — 71000033 HC RECOVERY, INTIAL HOUR: Performed by: SURGERY

## 2023-01-13 RX ORDER — PROPOFOL 10 MG/ML
VIAL (ML) INTRAVENOUS
Status: DISCONTINUED | OUTPATIENT
Start: 2023-01-13 | End: 2023-01-13

## 2023-01-13 RX ORDER — ONDANSETRON 2 MG/ML
4 INJECTION INTRAMUSCULAR; INTRAVENOUS ONCE AS NEEDED
Status: COMPLETED | OUTPATIENT
Start: 2023-01-13 | End: 2023-01-13

## 2023-01-13 RX ORDER — ONDANSETRON HYDROCHLORIDE 2 MG/ML
INJECTION, SOLUTION INTRAMUSCULAR; INTRAVENOUS
Status: DISCONTINUED | OUTPATIENT
Start: 2023-01-13 | End: 2023-01-13

## 2023-01-13 RX ORDER — FENTANYL CITRATE 50 UG/ML
INJECTION, SOLUTION INTRAMUSCULAR; INTRAVENOUS
Status: DISCONTINUED | OUTPATIENT
Start: 2023-01-13 | End: 2023-01-13

## 2023-01-13 RX ORDER — OXYCODONE AND ACETAMINOPHEN 5; 325 MG/1; MG/1
2 TABLET ORAL EVERY 4 HOURS PRN
Status: DISCONTINUED | OUTPATIENT
Start: 2023-01-13 | End: 2023-01-14

## 2023-01-13 RX ORDER — HYDROMORPHONE HYDROCHLORIDE 1 MG/ML
1 INJECTION, SOLUTION INTRAMUSCULAR; INTRAVENOUS; SUBCUTANEOUS ONCE
Status: COMPLETED | OUTPATIENT
Start: 2023-01-13 | End: 2023-01-13

## 2023-01-13 RX ORDER — ENOXAPARIN SODIUM 100 MG/ML
40 INJECTION SUBCUTANEOUS EVERY 24 HOURS
Status: DISCONTINUED | OUTPATIENT
Start: 2023-01-14 | End: 2023-01-15 | Stop reason: HOSPADM

## 2023-01-13 RX ORDER — SODIUM CHLORIDE, SODIUM LACTATE, POTASSIUM CHLORIDE, CALCIUM CHLORIDE 600; 310; 30; 20 MG/100ML; MG/100ML; MG/100ML; MG/100ML
INJECTION, SOLUTION INTRAVENOUS CONTINUOUS
Status: DISCONTINUED | OUTPATIENT
Start: 2023-01-13 | End: 2023-01-15 | Stop reason: HOSPADM

## 2023-01-13 RX ORDER — OXYCODONE HYDROCHLORIDE 5 MG/1
5 TABLET ORAL ONCE AS NEEDED
Status: COMPLETED | OUTPATIENT
Start: 2023-01-13 | End: 2023-01-13

## 2023-01-13 RX ORDER — ACETAMINOPHEN 10 MG/ML
INJECTION, SOLUTION INTRAVENOUS
Status: DISCONTINUED | OUTPATIENT
Start: 2023-01-13 | End: 2023-01-13

## 2023-01-13 RX ORDER — DEXMEDETOMIDINE HYDROCHLORIDE 100 UG/ML
INJECTION, SOLUTION INTRAVENOUS
Status: DISCONTINUED | OUTPATIENT
Start: 2023-01-13 | End: 2023-01-13

## 2023-01-13 RX ORDER — OXCARBAZEPINE 150 MG/1
300 TABLET, FILM COATED ORAL 2 TIMES DAILY
Status: DISCONTINUED | OUTPATIENT
Start: 2023-01-13 | End: 2023-01-15 | Stop reason: HOSPADM

## 2023-01-13 RX ORDER — BUPIVACAINE HYDROCHLORIDE AND EPINEPHRINE 5; 5 MG/ML; UG/ML
INJECTION, SOLUTION EPIDURAL; INTRACAUDAL; PERINEURAL CODE/TRAUMA/SEDATION MEDICATION
Status: DISCONTINUED | OUTPATIENT
Start: 2023-01-13 | End: 2023-01-13 | Stop reason: HOSPADM

## 2023-01-13 RX ORDER — MIDAZOLAM HYDROCHLORIDE 1 MG/ML
INJECTION INTRAMUSCULAR; INTRAVENOUS
Status: DISCONTINUED | OUTPATIENT
Start: 2023-01-13 | End: 2023-01-13

## 2023-01-13 RX ORDER — DROPERIDOL 2.5 MG/ML
1.25 INJECTION, SOLUTION INTRAMUSCULAR; INTRAVENOUS ONCE
Status: COMPLETED | OUTPATIENT
Start: 2023-01-13 | End: 2023-01-13

## 2023-01-13 RX ORDER — LIDOCAINE HYDROCHLORIDE 10 MG/ML
1 INJECTION, SOLUTION EPIDURAL; INFILTRATION; INTRACAUDAL; PERINEURAL ONCE
Status: COMPLETED | OUTPATIENT
Start: 2023-01-13 | End: 2023-01-13

## 2023-01-13 RX ORDER — HYDROMORPHONE HYDROCHLORIDE 2 MG/ML
1 INJECTION, SOLUTION INTRAMUSCULAR; INTRAVENOUS; SUBCUTANEOUS EVERY 6 HOURS PRN
Status: DISCONTINUED | OUTPATIENT
Start: 2023-01-13 | End: 2023-01-13

## 2023-01-13 RX ORDER — ONDANSETRON 2 MG/ML
4 INJECTION INTRAMUSCULAR; INTRAVENOUS EVERY 6 HOURS PRN
Status: DISCONTINUED | OUTPATIENT
Start: 2023-01-13 | End: 2023-01-15 | Stop reason: HOSPADM

## 2023-01-13 RX ORDER — BUPROPION HYDROCHLORIDE 150 MG/1
300 TABLET ORAL DAILY
Status: DISCONTINUED | OUTPATIENT
Start: 2023-01-14 | End: 2023-01-15

## 2023-01-13 RX ORDER — PROMETHAZINE HYDROCHLORIDE 25 MG/ML
INJECTION, SOLUTION INTRAMUSCULAR; INTRAVENOUS
Status: DISCONTINUED | OUTPATIENT
Start: 2023-01-13 | End: 2023-01-13

## 2023-01-13 RX ORDER — HYDROMORPHONE HYDROCHLORIDE 1 MG/ML
1 INJECTION, SOLUTION INTRAMUSCULAR; INTRAVENOUS; SUBCUTANEOUS EVERY 4 HOURS PRN
Status: DISCONTINUED | OUTPATIENT
Start: 2023-01-13 | End: 2023-01-15

## 2023-01-13 RX ORDER — DEXAMETHASONE SODIUM PHOSPHATE 4 MG/ML
INJECTION, SOLUTION INTRA-ARTICULAR; INTRALESIONAL; INTRAMUSCULAR; INTRAVENOUS; SOFT TISSUE
Status: DISCONTINUED | OUTPATIENT
Start: 2023-01-13 | End: 2023-01-13

## 2023-01-13 RX ORDER — SUCCINYLCHOLINE CHLORIDE 20 MG/ML
INJECTION INTRAMUSCULAR; INTRAVENOUS
Status: DISCONTINUED | OUTPATIENT
Start: 2023-01-13 | End: 2023-01-13

## 2023-01-13 RX ORDER — LIDOCAINE HCL/PF 100 MG/5ML
SYRINGE (ML) INTRAVENOUS
Status: DISCONTINUED | OUTPATIENT
Start: 2023-01-13 | End: 2023-01-13

## 2023-01-13 RX ORDER — OXYCODONE AND ACETAMINOPHEN 5; 325 MG/1; MG/1
1 TABLET ORAL EVERY 4 HOURS PRN
Status: DISCONTINUED | OUTPATIENT
Start: 2023-01-13 | End: 2023-01-13

## 2023-01-13 RX ORDER — FENTANYL CITRATE 50 UG/ML
25 INJECTION, SOLUTION INTRAMUSCULAR; INTRAVENOUS EVERY 5 MIN PRN
Status: COMPLETED | OUTPATIENT
Start: 2023-01-13 | End: 2023-01-13

## 2023-01-13 RX ORDER — ROCURONIUM BROMIDE 10 MG/ML
INJECTION, SOLUTION INTRAVENOUS
Status: DISCONTINUED | OUTPATIENT
Start: 2023-01-13 | End: 2023-01-13

## 2023-01-13 RX ORDER — CEFAZOLIN SODIUM 2 G/50ML
2 SOLUTION INTRAVENOUS
Status: COMPLETED | OUTPATIENT
Start: 2023-01-13 | End: 2023-01-13

## 2023-01-13 RX ADMIN — FENTANYL CITRATE 100 MCG: 50 INJECTION, SOLUTION INTRAMUSCULAR; INTRAVENOUS at 04:01

## 2023-01-13 RX ADMIN — HYDROMORPHONE HYDROCHLORIDE 1 MG: 2 INJECTION INTRAMUSCULAR; INTRAVENOUS; SUBCUTANEOUS at 06:01

## 2023-01-13 RX ADMIN — LIDOCAINE HYDROCHLORIDE 100 MG: 20 INJECTION INTRAVENOUS at 04:01

## 2023-01-13 RX ADMIN — MIDAZOLAM HYDROCHLORIDE 2 MG: 1 INJECTION, SOLUTION INTRAMUSCULAR; INTRAVENOUS at 04:01

## 2023-01-13 RX ADMIN — FENTANYL CITRATE 25 MCG: 50 INJECTION INTRAMUSCULAR; INTRAVENOUS at 05:01

## 2023-01-13 RX ADMIN — DEXAMETHASONE SODIUM PHOSPHATE 8 MG: 4 INJECTION, SOLUTION INTRA-ARTICULAR; INTRALESIONAL; INTRAMUSCULAR; INTRAVENOUS; SOFT TISSUE at 04:01

## 2023-01-13 RX ADMIN — PROPOFOL 200 MG: 10 INJECTION, EMULSION INTRAVENOUS at 04:01

## 2023-01-13 RX ADMIN — LIDOCAINE HYDROCHLORIDE 10 MG: 10 INJECTION, SOLUTION EPIDURAL; INFILTRATION; INTRACAUDAL; PERINEURAL at 02:01

## 2023-01-13 RX ADMIN — GLYCOPYRROLATE 0.2 MG: 0.2 INJECTION, SOLUTION INTRAMUSCULAR; INTRAVITREAL at 04:01

## 2023-01-13 RX ADMIN — FENTANYL CITRATE 25 MCG: 50 INJECTION INTRAMUSCULAR; INTRAVENOUS at 06:01

## 2023-01-13 RX ADMIN — SUCCINYLCHOLINE CHLORIDE 120 MG: 20 INJECTION, SOLUTION INTRAMUSCULAR; INTRAVENOUS; PARENTERAL at 04:01

## 2023-01-13 RX ADMIN — ONDANSETRON 8 MG: 2 INJECTION INTRAMUSCULAR; INTRAVENOUS at 04:01

## 2023-01-13 RX ADMIN — DEXMEDETOMIDINE HYDROCHLORIDE 12 MCG: 100 INJECTION, SOLUTION INTRAVENOUS at 04:01

## 2023-01-13 RX ADMIN — ROCURONIUM BROMIDE 25 MG: 10 INJECTION, SOLUTION INTRAVENOUS at 04:01

## 2023-01-13 RX ADMIN — SODIUM CHLORIDE, SODIUM GLUCONATE, SODIUM ACETATE, POTASSIUM CHLORIDE AND MAGNESIUM CHLORIDE: 526; 502; 368; 37; 30 INJECTION, SOLUTION INTRAVENOUS at 03:01

## 2023-01-13 RX ADMIN — FENTANYL CITRATE 50 MCG: 50 INJECTION, SOLUTION INTRAMUSCULAR; INTRAVENOUS at 05:01

## 2023-01-13 RX ADMIN — HYDROMORPHONE HYDROCHLORIDE 1 MG: 1 INJECTION, SOLUTION INTRAMUSCULAR; INTRAVENOUS; SUBCUTANEOUS at 08:01

## 2023-01-13 RX ADMIN — CEFAZOLIN SODIUM 2 G: 2 SOLUTION INTRAVENOUS at 04:01

## 2023-01-13 RX ADMIN — SUGAMMADEX 400 MG: 100 INJECTION, SOLUTION INTRAVENOUS at 05:01

## 2023-01-13 RX ADMIN — OXYCODONE 5 MG: 5 TABLET ORAL at 05:01

## 2023-01-13 RX ADMIN — SODIUM CHLORIDE, POTASSIUM CHLORIDE, SODIUM LACTATE AND CALCIUM CHLORIDE: 600; 310; 30; 20 INJECTION, SOLUTION INTRAVENOUS at 06:01

## 2023-01-13 RX ADMIN — ACETAMINOPHEN 1000 MG: 10 INJECTION, SOLUTION INTRAVENOUS at 04:01

## 2023-01-13 RX ADMIN — DROPERIDOL 1.25 MG: 2.5 INJECTION, SOLUTION INTRAMUSCULAR; INTRAVENOUS at 08:01

## 2023-01-13 RX ADMIN — ONDANSETRON HYDROCHLORIDE 4 MG: 2 SOLUTION INTRAMUSCULAR; INTRAVENOUS at 05:01

## 2023-01-13 RX ADMIN — ROCURONIUM BROMIDE 5 MG: 10 INJECTION, SOLUTION INTRAVENOUS at 04:01

## 2023-01-13 RX ADMIN — PROMETHAZINE HYDROCHLORIDE 12.5 MG: 25 INJECTION INTRAMUSCULAR; INTRAVENOUS at 04:01

## 2023-01-13 RX ADMIN — SODIUM CHLORIDE, SODIUM GLUCONATE, SODIUM ACETATE, POTASSIUM CHLORIDE AND MAGNESIUM CHLORIDE: 526; 502; 368; 37; 30 INJECTION, SOLUTION INTRAVENOUS at 05:01

## 2023-01-13 RX ADMIN — DEXMEDETOMIDINE HYDROCHLORIDE 12 MCG: 100 INJECTION, SOLUTION INTRAVENOUS at 05:01

## 2023-01-13 NOTE — SUBJECTIVE & OBJECTIVE
Past Medical History:   Diagnosis Date    Anxiety     Bipolar 1 disorder     COVID-19 2021    GERD (gastroesophageal reflux disease)     Obese        Past Surgical History:   Procedure Laterality Date     SECTION  2022    LAPAROSCOPIC APPENDECTOMY N/A 2022    Procedure: APPENDECTOMY, LAPAROSCOPIC;  Surgeon: Yuniel Goldman MD;  Location: [facility];  Service: General;  Laterality: N/A;    LEG SURGERY      Broke her left lower leg.       Review of patient's allergies indicates:   Allergen Reactions    Iodine and iodide containing products Hives    Ketorolac Hives     Pt verbalized I dont like it doesn't work on it.    Vitamin e (d-alpha tocopherol)      Hives (skin)^Black eyed peas and oatmeal causede hives       No current facility-administered medications on file prior to encounter.     Current Outpatient Medications on File Prior to Encounter   Medication Sig    ALPRAZolam (XANAX) 2 MG Tab     buPROPion (WELLBUTRIN XL) 300 MG 24 hr tablet Take 300 mg by mouth every morning.    dextroamphetamine-amphetamine 30 mg Tab Take 1 tablet by mouth 4 (four) times daily.    hyoscyamine (ANASPAZ,LEVSIN) 0.125 mg Tab Take 3 tablets (375 mcg total) by mouth every 6 (six) hours as needed (Abdominal pain).    ondansetron (ZOFRAN) 4 MG tablet Take 4 mg by mouth every 6 (six) hours as needed.    ondansetron (ZOFRAN-ODT) 4 MG TbDL Take 1 tablet (4 mg total) by mouth every 6 (six) hours as needed.    OXcarbazepine (TRILEPTAL) 300 MG Tab SMARTSI Tablet(s) By Mouth Morning-Evening    oxyCODONE-acetaminophen (PERCOCET)  mg per tablet Take 1 tablet by mouth every 6 (six) hours as needed for Pain.    [DISCONTINUED] albuterol (PROVENTIL/VENTOLIN HFA) 90 mcg/actuation inhaler Inhale 1-2 puffs into the lungs every 6 (six) hours as needed for Wheezing or Shortness of Breath. Rescue (Patient not taking: Reported on 2021)     Family History       Problem Relation (Age of Onset)    No Known Problems  Mother, Father          Tobacco Use    Smoking status: Never    Smokeless tobacco: Never   Substance and Sexual Activity    Alcohol use: Yes     Comment: occasionally    Drug use: No    Sexual activity: Yes     Partners: Male     Birth control/protection: None     Review of Systems   Unable to perform ROS: Acuity of condition   Objective:     Vital Signs (Most Recent):  Temp: 97.6 °F (36.4 °C) (01/13/23 1721)  Pulse: 72 (01/13/23 1417)  Resp: 17 (01/13/23 1417)  BP: 135/77 (01/13/23 1417)  SpO2: 100 % (01/13/23 1417) Vital Signs (24h Range):  Temp:  [97.6 °F (36.4 °C)-98.6 °F (37 °C)] 97.6 °F (36.4 °C)  Pulse:  [72] 72  Resp:  [17] 17  SpO2:  [100 %] 100 %  BP: (135)/(77) 135/77     Weight: 117.9 kg (260 lb)  Body mass index is 40.72 kg/m².    Physical Exam  Vitals and nursing note reviewed.   Constitutional:       General: She is not in acute distress.     Appearance: She is obese.      Comments: Lethargic secondary to anesthesia.   HENT:      Head: Normocephalic and atraumatic.      Right Ear: External ear normal.      Left Ear: External ear normal.      Nose: Nose normal.      Mouth/Throat:      Mouth: Mucous membranes are moist.   Eyes:      Extraocular Movements: Extraocular movements intact.      Conjunctiva/sclera: Conjunctivae normal.   Cardiovascular:      Rate and Rhythm: Regular rhythm.      Pulses: Normal pulses.      Heart sounds: Normal heart sounds.   Pulmonary:      Effort: Pulmonary effort is normal.      Breath sounds: Normal breath sounds.   Abdominal:      General: Bowel sounds are normal. There is no distension.      Palpations: Abdomen is soft.      Tenderness: There is no abdominal tenderness.      Comments: Dressings clean, dry and intact.   Musculoskeletal:      Cervical back: Neck supple.      Right lower leg: No edema.      Left lower leg: No edema.   Skin:     General: Skin is warm and dry.   Neurological:      Mental Status: She is disoriented.           Significant Labs: All pertinent  labs within the past 24 hours have been reviewed.    Significant Imaging: I have reviewed all pertinent imaging results/findings within the past 24 hours.

## 2023-01-13 NOTE — SUBJECTIVE & OBJECTIVE
No current facility-administered medications on file prior to encounter.     Current Outpatient Medications on File Prior to Encounter   Medication Sig    ALPRAZolam (XANAX) 2 MG Tab     buPROPion (WELLBUTRIN XL) 300 MG 24 hr tablet Take 300 mg by mouth every morning.    dextroamphetamine-amphetamine 30 mg Tab Take 1 tablet by mouth 4 (four) times daily.    hyoscyamine (ANASPAZ,LEVSIN) 0.125 mg Tab Take 3 tablets (375 mcg total) by mouth every 6 (six) hours as needed (Abdominal pain).    ondansetron (ZOFRAN) 4 MG tablet Take 4 mg by mouth every 6 (six) hours as needed.    ondansetron (ZOFRAN-ODT) 4 MG TbDL Take 1 tablet (4 mg total) by mouth every 6 (six) hours as needed.    OXcarbazepine (TRILEPTAL) 300 MG Tab SMARTSI Tablet(s) By Mouth Morning-Evening    oxyCODONE-acetaminophen (PERCOCET)  mg per tablet Take 1 tablet by mouth every 6 (six) hours as needed for Pain.    [DISCONTINUED] albuterol (PROVENTIL/VENTOLIN HFA) 90 mcg/actuation inhaler Inhale 1-2 puffs into the lungs every 6 (six) hours as needed for Wheezing or Shortness of Breath. Rescue (Patient not taking: Reported on 2021)       Review of patient's allergies indicates:   Allergen Reactions    Iodine and iodide containing products Hives    Ketorolac Hives     Pt verbalized I dont like it doesn't work on it.    Vitamin e (d-alpha tocopherol)      Hives (skin)^Black eyed peas and oatmeal causede hives       Past Medical History:   Diagnosis Date    Anxiety     Bipolar 1 disorder     COVID-19 2021    GERD (gastroesophageal reflux disease)     Obese      Past Surgical History:   Procedure Laterality Date     SECTION  2022    LAPAROSCOPIC APPENDECTOMY N/A 2022    Procedure: APPENDECTOMY, LAPAROSCOPIC;  Surgeon: Yuniel Goldman MD;  Location: [facility];  Service: General;  Laterality: N/A;    LEG SURGERY      Broke her left lower leg.     Family History       Problem Relation (Age of Onset)    No Known Problems Mother,  Father          Tobacco Use    Smoking status: Never    Smokeless tobacco: Never   Substance and Sexual Activity    Alcohol use: Yes     Comment: occasionally    Drug use: No    Sexual activity: Yes     Partners: Male     Birth control/protection: None     Review of Systems   Gastrointestinal:  Positive for abdominal pain.   All other systems reviewed and are negative.  Objective:     Vital Signs (Most Recent):  Temp: 98.6 °F (37 °C) (01/13/23 1417)  Pulse: 72 (01/13/23 1417)  Resp: 17 (01/13/23 1417)  BP: 135/77 (01/13/23 1417)  SpO2: 100 % (01/13/23 1417) Vital Signs (24h Range):  Temp:  [98.6 °F (37 °C)] 98.6 °F (37 °C)  Pulse:  [72] 72  Resp:  [17] 17  SpO2:  [100 %] 100 %  BP: (135)/(77) 135/77     Weight: 117.9 kg (260 lb)  Body mass index is 40.72 kg/m².    Physical Exam  Vitals and nursing note reviewed.   Constitutional:       General: She is not in acute distress.     Appearance: She is well-developed. She is not diaphoretic.   HENT:      Head: Normocephalic and atraumatic.      Mouth/Throat:      Pharynx: No oropharyngeal exudate.   Eyes:      General: No scleral icterus.     Conjunctiva/sclera: Conjunctivae normal.      Pupils: Pupils are equal, round, and reactive to light.   Neck:      Thyroid: No thyromegaly.      Vascular: No JVD.      Trachea: No tracheal deviation.   Cardiovascular:      Rate and Rhythm: Normal rate and regular rhythm.      Heart sounds: Normal heart sounds. No murmur heard.    No friction rub. No gallop.   Pulmonary:      Effort: Pulmonary effort is normal. No respiratory distress.      Breath sounds: Normal breath sounds. No stridor. No wheezing or rales.   Chest:      Chest wall: No tenderness.   Abdominal:      General: Bowel sounds are normal. There is no distension.      Palpations: Abdomen is soft. There is no mass.      Tenderness: There is abdominal tenderness. There is no guarding or rebound.      Comments: Positive Segura sign right upper quadrant   Musculoskeletal:          General: No tenderness. Normal range of motion.      Cervical back: Normal range of motion and neck supple.   Lymphadenopathy:      Cervical: No cervical adenopathy.   Skin:     General: Skin is warm and dry.      Findings: No erythema or rash.   Neurological:      Mental Status: She is alert and oriented to person, place, and time.      Cranial Nerves: No cranial nerve deficit.   Psychiatric:         Behavior: Behavior normal.       Significant Labs:  I have reviewed all pertinent lab results within the past 24 hours.  CBC: No results for input(s): WBC, RBC, HGB, HCT, PLT, MCV, MCH, MCHC in the last 168 hours.  CMP: No results for input(s): GLU, CALCIUM, ALBUMIN, PROT, NA, K, CO2, CL, BUN, CREATININE, ALKPHOS, ALT, AST, BILITOT in the last 168 hours.    Significant Diagnostics:  I have reviewed all pertinent imaging results/findings within the past 24 hours.  U/S: I have reviewed all pertinent results/findings within the past 24 hours and my personal findings are:  Gallstones gallbladder sludge positive Segura sign

## 2023-01-13 NOTE — HPI
28 y/o recent appendectomy with me for appendicitis.  Her pain restarted about a week later mainly in the right upper side.  Associated nausea, constant with intermittent worsening episdoes and anorexia.  This persisted and she eventually went to the ER in multiple hospitals.  She was eventually seen in [place] diagnosed with cholecystitis and discharge with a follow-up to see me for a cholecystectomy.  I have reviewed her records which include an ultrasound from [place] and a CT scan.  The ultrasound shows gallstones and has a positive Segura's sign.  The CT scan shows postsurgical changes of an appendectomy.  She has pain today and reports this is the same pain she is had since it started somewhat worse.

## 2023-01-13 NOTE — PLAN OF CARE
Report to shelbie. Patient denies pain, no nausea dressing to right hip dry intact no drainage. Vs stable resting comfortably, daughter at bedside/

## 2023-01-13 NOTE — TELEPHONE ENCOUNTER
Finally spoke with patient today.  She is still having right sided pain, eventually ended up at [facility].  She reports she was told she had cholecystitis. She says she had us and hida and was discharged.    I will obtain records today and plan for lap lexi this afternoon once records reviewed.  She has not eaten today.

## 2023-01-13 NOTE — CONSULTS
[facility]  General Surgery  Consult Note    Patient Name: Manisha Bishop  MRN: [id number]  Code Status: Prior  Admission Date: 2023  Hospital Length of Stay: 0 days  Attending Physician: Yuniel Goldman MD  Primary Care Provider: Primary Doctor No    Patient information was obtained from patient and ER records.     Consults  Subjective:     Principal Problem: <principal problem not specified>    History of Present Illness: 26 y/o recent appendectomy with me for appendicitis.  Her pain restarted about a week later mainly in the right upper side.  Associated nausea, constant with intermittent worsening episdoes and anorexia.  This persisted and she eventually went to the ER in multiple hospitals.  She was eventually seen in [place] diagnosed with cholecystitis and discharge with a follow-up to see me for a cholecystectomy.  I have reviewed her records which include an ultrasound from [place] and a CT scan.  The ultrasound shows gallstones and has a positive Segura's sign.  The CT scan shows postsurgical changes of an appendectomy.  She has pain today and reports this is the same pain she is had since it started somewhat worse.        No current facility-administered medications on file prior to encounter.     Current Outpatient Medications on File Prior to Encounter   Medication Sig    ALPRAZolam (XANAX) 2 MG Tab     buPROPion (WELLBUTRIN XL) 300 MG 24 hr tablet Take 300 mg by mouth every morning.    dextroamphetamine-amphetamine 30 mg Tab Take 1 tablet by mouth 4 (four) times daily.    hyoscyamine (ANASPAZ,LEVSIN) 0.125 mg Tab Take 3 tablets (375 mcg total) by mouth every 6 (six) hours as needed (Abdominal pain).    ondansetron (ZOFRAN) 4 MG tablet Take 4 mg by mouth every 6 (six) hours as needed.    ondansetron (ZOFRAN-ODT) 4 MG TbDL Take 1 tablet (4 mg total) by mouth every 6 (six) hours as needed.    OXcarbazepine (TRILEPTAL) 300 MG Tab SMARTSI Tablet(s) By Mouth  Morning-Evening    oxyCODONE-acetaminophen (PERCOCET)  mg per tablet Take 1 tablet by mouth every 6 (six) hours as needed for Pain.    [DISCONTINUED] albuterol (PROVENTIL/VENTOLIN HFA) 90 mcg/actuation inhaler Inhale 1-2 puffs into the lungs every 6 (six) hours as needed for Wheezing or Shortness of Breath. Rescue (Patient not taking: Reported on 2021)       Review of patient's allergies indicates:   Allergen Reactions    Iodine and iodide containing products Hives    Ketorolac Hives     Pt verbalized I dont like it doesn't work on it.    Vitamin e (d-alpha tocopherol)      Hives (skin)^Black eyed peas and oatmeal causede hives       Past Medical History:   Diagnosis Date    Anxiety     Bipolar 1 disorder     COVID-19 2021    GERD (gastroesophageal reflux disease)     Obese      Past Surgical History:   Procedure Laterality Date     SECTION  2022    LAPAROSCOPIC APPENDECTOMY N/A 2022    Procedure: APPENDECTOMY, LAPAROSCOPIC;  Surgeon: Yuniel Goldman MD;  Location: [facility];  Service: General;  Laterality: N/A;    LEG SURGERY      Broke her left lower leg.     Family History       Problem Relation (Age of Onset)    No Known Problems Mother, Father          Tobacco Use    Smoking status: Never    Smokeless tobacco: Never   Substance and Sexual Activity    Alcohol use: Yes     Comment: occasionally    Drug use: No    Sexual activity: Yes     Partners: Male     Birth control/protection: None     Review of Systems   Gastrointestinal:  Positive for abdominal pain.   All other systems reviewed and are negative.  Objective:     Vital Signs (Most Recent):  Temp: 98.6 °F (37 °C) (23)  Pulse: 72 (23)  Resp: 17 (23)  BP: 135/77 (23)  SpO2: 100 % (23) Vital Signs (24h Range):  Temp:  [98.6 °F (37 °C)] 98.6 °F (37 °C)  Pulse:  [72] 72  Resp:  [17] 17  SpO2:  [100 %] 100 %  BP: (135)/(77) 135/77     Weight: 117.9 kg  (260 lb)  Body mass index is 40.72 kg/m².    Physical Exam  Vitals and nursing note reviewed.   Constitutional:       General: She is not in acute distress.     Appearance: She is well-developed. She is not diaphoretic.   HENT:      Head: Normocephalic and atraumatic.      Mouth/Throat:      Pharynx: No oropharyngeal exudate.   Eyes:      General: No scleral icterus.     Conjunctiva/sclera: Conjunctivae normal.      Pupils: Pupils are equal, round, and reactive to light.   Neck:      Thyroid: No thyromegaly.      Vascular: No JVD.      Trachea: No tracheal deviation.   Cardiovascular:      Rate and Rhythm: Normal rate and regular rhythm.      Heart sounds: Normal heart sounds. No murmur heard.    No friction rub. No gallop.   Pulmonary:      Effort: Pulmonary effort is normal. No respiratory distress.      Breath sounds: Normal breath sounds. No stridor. No wheezing or rales.   Chest:      Chest wall: No tenderness.   Abdominal:      General: Bowel sounds are normal. There is no distension.      Palpations: Abdomen is soft. There is no mass.      Tenderness: There is abdominal tenderness. There is no guarding or rebound.      Comments: Positive Segura sign right upper quadrant   Musculoskeletal:         General: No tenderness. Normal range of motion.      Cervical back: Normal range of motion and neck supple.   Lymphadenopathy:      Cervical: No cervical adenopathy.   Skin:     General: Skin is warm and dry.      Findings: No erythema or rash.   Neurological:      Mental Status: She is alert and oriented to person, place, and time.      Cranial Nerves: No cranial nerve deficit.   Psychiatric:         Behavior: Behavior normal.       Significant Labs:  I have reviewed all pertinent lab results within the past 24 hours.  CBC: No results for input(s): WBC, RBC, HGB, HCT, PLT, MCV, MCH, MCHC in the last 168 hours.  CMP: No results for input(s): GLU, CALCIUM, ALBUMIN, PROT, NA, K, CO2, CL, BUN, CREATININE, ALKPHOS, ALT,  AST, BILITOT in the last 168 hours.    Significant Diagnostics:  I have reviewed all pertinent imaging results/findings within the past 24 hours.  U/S: I have reviewed all pertinent results/findings within the past 24 hours and my personal findings are:  Gallstones gallbladder sludge positive Segura sign      Assessment/Plan:     Calculus of gallbladder with acute cholecystitis and obstruction  Ultrasound along with signs and symptoms likely consistent with acute cholecystitis.  We will plan for laparoscopic cholecystectomy.  We will plan to observe her at least overnight for pain control as she would difficulty with pain on her last admission.  Surgeries ordered   Will discuss with medicine team for admission      VTE Risk Mitigation (From admission, onward)         Ordered     IP VTE HIGH RISK PATIENT  Once         01/13/23 1401     Place LUCY hose  Until discontinued         01/13/23 1401     Place sequential compression device  Until discontinued         01/13/23 1401                Thank you for your consult. I will follow-up with patient. Please contact us if you have any additional questions.    Yuniel Goldman MD  General Surgery  [facility]

## 2023-01-13 NOTE — ASSESSMENT & PLAN NOTE
Ultrasound along with signs and symptoms likely consistent with acute cholecystitis.  We will plan for laparoscopic cholecystectomy.  We will plan to observe her at least overnight for pain control as she would difficulty with pain on her last admission.  Surgeries ordered   Will discuss with medicine team for admission

## 2023-01-13 NOTE — ANESTHESIA PREPROCEDURE EVALUATION
01/13/2023  Manisha Bishop is a 27 y.o., female.      Pre-op Assessment    I have reviewed the Patient Summary Reports.     I have reviewed the Nursing Notes. I have reviewed the NPO Status.   I have reviewed the Medications.     Review of Systems  Anesthesia Hx:  No problems with previous Anesthesia    Social:  Non-Smoker, Social Alcohol Use    Hematology/Oncology:  Hematology Normal   Oncology Normal     EENT/Dental:EENT/Dental Normal   Cardiovascular:  Cardiovascular Normal     Pulmonary:  Pulmonary Normal    Renal/:  Renal/ Normal     Hepatic/GI:   GERD    Musculoskeletal:  Musculoskeletal Normal    Neurological:  Neurology Normal    Endocrine:  Morbid Obesity / BMI > 40  Psych:   Psychiatric History anxiety          Physical Exam  General: Cooperative, Alert and Oriented    Airway:  Mallampati: II   Mouth Opening: Normal  TM Distance: Normal  Neck ROM: Normal ROM    Dental:  Braces, Intact        Anesthesia Plan  Type of Anesthesia, risks & benefits discussed:    Anesthesia Type: Gen ETT  Intra-op Monitoring Plan: Standard ASA Monitors  Post Op Pain Control Plan: multimodal analgesia and IV/PO Opioids PRN  Induction:  IV  Airway Plan: Direct, Post-Induction  Informed Consent: Informed consent signed with the Patient and all parties understand the risks and agree with anesthesia plan.  All questions answered.   ASA Score: 3  Day of Surgery Review of History & Physical: H&P Update referred to the surgeon/provider.    Ready For Surgery From Anesthesia Perspective.     .

## 2023-01-13 NOTE — TRANSFER OF CARE
"Anesthesia Transfer of Care Note    Patient: Manisha Bishop    Procedure(s) Performed: Procedure(s) (LRB):  CHOLECYSTECTOMY, LAPAROSCOPIC (N/A)    Patient location: PACU    Anesthesia Type: general    Transport from OR: Transported from OR on 2-3 L/min O2 by NC with adequate spontaneous ventilation    Post pain: adequate analgesia    Post assessment: no apparent anesthetic complications and tolerated procedure well    Post vital signs: stable    Level of consciousness: sedated and responds to stimulation    Nausea/Vomiting: no nausea/vomiting    Complications: none    Transfer of care protocol was followed      Last vitals:   Visit Vitals  /77 (BP Location: Right arm, Patient Position: Lying)   Pulse 72   Temp 37 °C (98.6 °F) (Temporal)   Resp 17   Ht 5' 7" (1.702 m)   Wt 117.9 kg (260 lb)   LMP 01/13/2023   SpO2 100%   Breastfeeding No   BMI 40.72 kg/m²     "

## 2023-01-13 NOTE — HPI
Manisha Bishop is a 27 year old female with a past medical history of obesity, CK, bipolar disorder and ADHD with recent appendectomy for presumed appendicitis who presents for elective laparoscopic cholecystectomy per Dr. Goldman. The patient had been seen at an outside hospital for persistent RUQ pain, nausea and decreased PO intake. Imaging was consistent with an acute cholecystitis which presumably was treated non-surgically. The patient's symptoms persisted, which prompted Dr. Goldman to contact the patient. She was able to present to [facility] to undergo surgery. She will be monitored overnight and her pain will be controlled.

## 2023-01-13 NOTE — ANESTHESIA PROCEDURE NOTES
Intubation    Date/Time: 1/13/2023 4:24 PM  Performed by: Gonzalez Nelson CRNA  Authorized by: Vazquez Andrews MD     Intubation:     Induction:  Intravenous    Intubated:  Postinduction    Mask Ventilation:  Easy mask    Attempts:  1    Attempted By:  CRNA    Method of Intubation:  Video laryngoscopy    Blade:  Brown 3    Laryngeal View Grade: Grade I - full view of cords      Difficult Airway Encountered?: No      Complications:  None    Airway Device:  Oral endotracheal tube    Airway Device Size:  7.5    Style/Cuff Inflation:  Cuffed (inflated to minimal occlusive pressure)    Tube secured:  23    Secured at:  The lips    Placement Verified By:  Capnometry    Complicating Factors:  None    Findings Post-Intubation:  BS equal bilateral and atraumatic/condition of teeth unchanged

## 2023-01-14 LAB
ALBUMIN SERPL BCP-MCNC: 3.6 G/DL (ref 3.5–5.2)
ALP SERPL-CCNC: 66 U/L (ref 55–135)
ALT SERPL W/O P-5'-P-CCNC: 19 U/L (ref 10–44)
ANION GAP SERPL CALC-SCNC: 9 MMOL/L (ref 8–16)
AST SERPL-CCNC: 25 U/L (ref 10–40)
BASOPHILS # BLD AUTO: 0.02 K/UL (ref 0–0.2)
BASOPHILS NFR BLD: 0.3 % (ref 0–1.9)
BILIRUB SERPL-MCNC: 0.5 MG/DL (ref 0.1–1)
BUN SERPL-MCNC: 7 MG/DL (ref 6–20)
CALCIUM SERPL-MCNC: 9 MG/DL (ref 8.7–10.5)
CHLORIDE SERPL-SCNC: 108 MMOL/L (ref 95–110)
CO2 SERPL-SCNC: 23 MMOL/L (ref 23–29)
CREAT SERPL-MCNC: 0.7 MG/DL (ref 0.5–1.4)
DIFFERENTIAL METHOD: ABNORMAL
EOSINOPHIL # BLD AUTO: 0 K/UL (ref 0–0.5)
EOSINOPHIL NFR BLD: 0 % (ref 0–8)
ERYTHROCYTE [DISTWIDTH] IN BLOOD BY AUTOMATED COUNT: 16.4 % (ref 11.5–14.5)
EST. GFR  (NO RACE VARIABLE): >60 ML/MIN/1.73 M^2
GLUCOSE SERPL-MCNC: 100 MG/DL (ref 70–110)
HCT VFR BLD AUTO: 34.8 % (ref 37–48.5)
HGB BLD-MCNC: 10.5 G/DL (ref 12–16)
IMM GRANULOCYTES # BLD AUTO: 0.02 K/UL (ref 0–0.04)
IMM GRANULOCYTES NFR BLD AUTO: 0.3 % (ref 0–0.5)
LYMPHOCYTES # BLD AUTO: 0.9 K/UL (ref 1–4.8)
LYMPHOCYTES NFR BLD: 12.9 % (ref 18–48)
MAGNESIUM SERPL-MCNC: 1.9 MG/DL (ref 1.6–2.6)
MCH RBC QN AUTO: 23.1 PG (ref 27–31)
MCHC RBC AUTO-ENTMCNC: 30.2 G/DL (ref 32–36)
MCV RBC AUTO: 77 FL (ref 82–98)
MONOCYTES # BLD AUTO: 0.4 K/UL (ref 0.3–1)
MONOCYTES NFR BLD: 4.9 % (ref 4–15)
NEUTROPHILS # BLD AUTO: 5.8 K/UL (ref 1.8–7.7)
NEUTROPHILS NFR BLD: 81.6 % (ref 38–73)
NRBC BLD-RTO: 0 /100 WBC
PHOSPHATE SERPL-MCNC: 3 MG/DL (ref 2.7–4.5)
PLATELET # BLD AUTO: 415 K/UL (ref 150–450)
PMV BLD AUTO: 9.7 FL (ref 9.2–12.9)
POTASSIUM SERPL-SCNC: 4.1 MMOL/L (ref 3.5–5.1)
PROT SERPL-MCNC: 7.1 G/DL (ref 6–8.4)
RBC # BLD AUTO: 4.55 M/UL (ref 4–5.4)
SODIUM SERPL-SCNC: 140 MMOL/L (ref 136–145)
WBC # BLD AUTO: 7.15 K/UL (ref 3.9–12.7)

## 2023-01-14 PROCEDURE — 83735 ASSAY OF MAGNESIUM: CPT | Performed by: SURGERY

## 2023-01-14 PROCEDURE — 25000003 PHARM REV CODE 250: Performed by: SURGERY

## 2023-01-14 PROCEDURE — 36415 COLL VENOUS BLD VENIPUNCTURE: CPT | Performed by: SURGERY

## 2023-01-14 PROCEDURE — 84100 ASSAY OF PHOSPHORUS: CPT | Performed by: SURGERY

## 2023-01-14 PROCEDURE — 99900035 HC TECH TIME PER 15 MIN (STAT)

## 2023-01-14 PROCEDURE — 63600175 PHARM REV CODE 636 W HCPCS: Performed by: NURSE PRACTITIONER

## 2023-01-14 PROCEDURE — 85025 COMPLETE CBC W/AUTO DIFF WBC: CPT | Performed by: SURGERY

## 2023-01-14 PROCEDURE — 80053 COMPREHEN METABOLIC PANEL: CPT | Performed by: SURGERY

## 2023-01-14 PROCEDURE — 94799 UNLISTED PULMONARY SVC/PX: CPT

## 2023-01-14 PROCEDURE — 94761 N-INVAS EAR/PLS OXIMETRY MLT: CPT

## 2023-01-14 PROCEDURE — 63600175 PHARM REV CODE 636 W HCPCS: Performed by: SURGERY

## 2023-01-14 RX ORDER — HYDROMORPHONE HYDROCHLORIDE 1 MG/ML
0.5 INJECTION, SOLUTION INTRAMUSCULAR; INTRAVENOUS; SUBCUTANEOUS ONCE
Status: COMPLETED | OUTPATIENT
Start: 2023-01-14 | End: 2023-01-14

## 2023-01-14 RX ORDER — OXYCODONE HYDROCHLORIDE 10 MG/1
10 TABLET ORAL EVERY 4 HOURS PRN
Status: DISCONTINUED | OUTPATIENT
Start: 2023-01-14 | End: 2023-01-15 | Stop reason: HOSPADM

## 2023-01-14 RX ORDER — DROPERIDOL 2.5 MG/ML
1.25 INJECTION, SOLUTION INTRAMUSCULAR; INTRAVENOUS ONCE
Status: COMPLETED | OUTPATIENT
Start: 2023-01-14 | End: 2023-01-14

## 2023-01-14 RX ORDER — OXYCODONE AND ACETAMINOPHEN 10; 325 MG/1; MG/1
1 TABLET ORAL EVERY 6 HOURS PRN
Status: DISCONTINUED | OUTPATIENT
Start: 2023-01-14 | End: 2023-01-14

## 2023-01-14 RX ADMIN — ENOXAPARIN SODIUM 40 MG: 100 INJECTION SUBCUTANEOUS at 04:01

## 2023-01-14 RX ADMIN — ONDANSETRON HYDROCHLORIDE 4 MG: 2 SOLUTION INTRAMUSCULAR; INTRAVENOUS at 09:01

## 2023-01-14 RX ADMIN — BUPROPION HYDROCHLORIDE 300 MG: 150 TABLET, FILM COATED, EXTENDED RELEASE ORAL at 09:01

## 2023-01-14 RX ADMIN — SODIUM CHLORIDE, POTASSIUM CHLORIDE, SODIUM LACTATE AND CALCIUM CHLORIDE: 600; 310; 30; 20 INJECTION, SOLUTION INTRAVENOUS at 07:01

## 2023-01-14 RX ADMIN — HYDROMORPHONE HYDROCHLORIDE 0.5 MG: 1 INJECTION, SOLUTION INTRAMUSCULAR; INTRAVENOUS; SUBCUTANEOUS at 09:01

## 2023-01-14 RX ADMIN — ONDANSETRON HYDROCHLORIDE 4 MG: 2 SOLUTION INTRAMUSCULAR; INTRAVENOUS at 06:01

## 2023-01-14 RX ADMIN — OXYCODONE HYDROCHLORIDE AND ACETAMINOPHEN 2 TABLET: 5; 325 TABLET ORAL at 11:01

## 2023-01-14 RX ADMIN — OXYCODONE HYDROCHLORIDE 10 MG: 10 TABLET ORAL at 03:01

## 2023-01-14 RX ADMIN — OXYCODONE HYDROCHLORIDE 10 MG: 10 TABLET ORAL at 11:01

## 2023-01-14 RX ADMIN — HYDROMORPHONE HYDROCHLORIDE 1 MG: 1 INJECTION, SOLUTION INTRAMUSCULAR; INTRAVENOUS; SUBCUTANEOUS at 12:01

## 2023-01-14 RX ADMIN — SODIUM CHLORIDE, POTASSIUM CHLORIDE, SODIUM LACTATE AND CALCIUM CHLORIDE: 600; 310; 30; 20 INJECTION, SOLUTION INTRAVENOUS at 02:01

## 2023-01-14 RX ADMIN — OXYCODONE HYDROCHLORIDE 10 MG: 10 TABLET ORAL at 07:01

## 2023-01-14 RX ADMIN — HYDROMORPHONE HYDROCHLORIDE 1 MG: 1 INJECTION, SOLUTION INTRAMUSCULAR; INTRAVENOUS; SUBCUTANEOUS at 06:01

## 2023-01-14 RX ADMIN — HYDROMORPHONE HYDROCHLORIDE 1 MG: 1 INJECTION, SOLUTION INTRAMUSCULAR; INTRAVENOUS; SUBCUTANEOUS at 05:01

## 2023-01-14 RX ADMIN — ONDANSETRON HYDROCHLORIDE 4 MG: 2 SOLUTION INTRAMUSCULAR; INTRAVENOUS at 12:01

## 2023-01-14 RX ADMIN — DROPERIDOL 1.25 MG: 2.5 INJECTION, SOLUTION INTRAMUSCULAR; INTRAVENOUS at 09:01

## 2023-01-14 RX ADMIN — HYDROMORPHONE HYDROCHLORIDE 1 MG: 1 INJECTION, SOLUTION INTRAMUSCULAR; INTRAVENOUS; SUBCUTANEOUS at 09:01

## 2023-01-14 RX ADMIN — HYDROMORPHONE HYDROCHLORIDE 1 MG: 1 INJECTION, SOLUTION INTRAMUSCULAR; INTRAVENOUS; SUBCUTANEOUS at 01:01

## 2023-01-14 RX ADMIN — OXYCODONE HYDROCHLORIDE AND ACETAMINOPHEN 2 TABLET: 5; 325 TABLET ORAL at 07:01

## 2023-01-14 NOTE — ASSESSMENT & PLAN NOTE
Body mass index is 40.85 kg/m². Morbid obesity complicates all aspects of disease management from diagnostic modalities to treatment.

## 2023-01-14 NOTE — H&P
[facility] Medicine  History & Physical    Patient Name: Manisha Bishop  MRN: [id number]  Patient Class: OP- Outpatient Recovery  Admission Date: 2023  Attending Physician: Junior Tello MD  Primary Care Provider: Primary Doctor No         Patient information was obtained from past medical records and ER records.     Subjective:     Principal Problem:Calculus of gallbladder with acute cholecystitis and obstruction    Chief Complaint: No chief complaint on file.       HPI: Manisha Bishop is a 27 year old female with a past medical history of obesity, CK, bipolar disorder and ADHD with recent appendectomy for presumed appendicitis who presents for elective laparoscopic cholecystectomy per Dr. Goldman. The patient had been seen at an outside hospital for persistent RUQ pain, nausea and decreased PO intake. Imaging was consistent with an acute cholecystitis which presumably was treated non-surgically. The patient's symptoms persisted, which prompted Dr. Goldman to contact the patient. She was able to present to [facility] to undergo surgery. She will be monitored overnight and her pain will be controlled.      Past Medical History:   Diagnosis Date    Anxiety     Bipolar 1 disorder     COVID-19 2021    GERD (gastroesophageal reflux disease)     Obese        Past Surgical History:   Procedure Laterality Date     SECTION  2022    LAPAROSCOPIC APPENDECTOMY N/A 2022    Procedure: APPENDECTOMY, LAPAROSCOPIC;  Surgeon: Yuniel Goldman MD;  Location: [facility];  Service: General;  Laterality: N/A;    LEG SURGERY      Broke her left lower leg.       Review of patient's allergies indicates:   Allergen Reactions    Iodine and iodide containing products Hives    Ketorolac Hives     Pt verbalized I dont like it doesn't work on it.    Vitamin e (d-alpha tocopherol)      Hives (skin)^Black eyed peas and oatmeal causede hives       No current  facility-administered medications on file prior to encounter.     Current Outpatient Medications on File Prior to Encounter   Medication Sig    ALPRAZolam (XANAX) 2 MG Tab     buPROPion (WELLBUTRIN XL) 300 MG 24 hr tablet Take 300 mg by mouth every morning.    dextroamphetamine-amphetamine 30 mg Tab Take 1 tablet by mouth 4 (four) times daily.    hyoscyamine (ANASPAZ,LEVSIN) 0.125 mg Tab Take 3 tablets (375 mcg total) by mouth every 6 (six) hours as needed (Abdominal pain).    ondansetron (ZOFRAN) 4 MG tablet Take 4 mg by mouth every 6 (six) hours as needed.    ondansetron (ZOFRAN-ODT) 4 MG TbDL Take 1 tablet (4 mg total) by mouth every 6 (six) hours as needed.    OXcarbazepine (TRILEPTAL) 300 MG Tab SMARTSI Tablet(s) By Mouth Morning-Evening    oxyCODONE-acetaminophen (PERCOCET)  mg per tablet Take 1 tablet by mouth every 6 (six) hours as needed for Pain.    [DISCONTINUED] albuterol (PROVENTIL/VENTOLIN HFA) 90 mcg/actuation inhaler Inhale 1-2 puffs into the lungs every 6 (six) hours as needed for Wheezing or Shortness of Breath. Rescue (Patient not taking: Reported on 2021)     Family History       Problem Relation (Age of Onset)    No Known Problems Mother, Father          Tobacco Use    Smoking status: Never    Smokeless tobacco: Never   Substance and Sexual Activity    Alcohol use: Yes     Comment: occasionally    Drug use: No    Sexual activity: Yes     Partners: Male     Birth control/protection: None     Review of Systems   Unable to perform ROS: Acuity of condition   Objective:     Vital Signs (Most Recent):  Temp: 97.6 °F (36.4 °C) (23 1721)  Pulse: 72 (23 1417)  Resp: 17 (23 1417)  BP: 135/77 (23 141)  SpO2: 100 % (23 141) Vital Signs (24h Range):  Temp:  [97.6 °F (36.4 °C)-98.6 °F (37 °C)] 97.6 °F (36.4 °C)  Pulse:  [72] 72  Resp:  [17] 17  SpO2:  [100 %] 100 %  BP: (135)/(77) 135/77     Weight: 117.9 kg (260 lb)  Body mass index is 40.72  kg/m².    Physical Exam  Vitals and nursing note reviewed.   Constitutional:       General: She is not in acute distress.     Appearance: She is obese.      Comments: Lethargic secondary to anesthesia.   HENT:      Head: Normocephalic and atraumatic.      Right Ear: External ear normal.      Left Ear: External ear normal.      Nose: Nose normal.      Mouth/Throat:      Mouth: Mucous membranes are moist.   Eyes:      Extraocular Movements: Extraocular movements intact.      Conjunctiva/sclera: Conjunctivae normal.   Cardiovascular:      Rate and Rhythm: Regular rhythm.      Pulses: Normal pulses.      Heart sounds: Normal heart sounds.   Pulmonary:      Effort: Pulmonary effort is normal.      Breath sounds: Normal breath sounds.   Abdominal:      General: Bowel sounds are normal. There is no distension.      Palpations: Abdomen is soft.      Tenderness: There is no abdominal tenderness.      Comments: Dressings clean, dry and intact.   Musculoskeletal:      Cervical back: Neck supple.      Right lower leg: No edema.      Left lower leg: No edema.   Skin:     General: Skin is warm and dry.   Neurological:      Mental Status: She is disoriented.           Significant Labs: All pertinent labs within the past 24 hours have been reviewed.    Significant Imaging: I have reviewed all pertinent imaging results/findings within the past 24 hours.    Assessment/Plan:     * Calculus of gallbladder with acute cholecystitis and obstruction  -S/p laparoscopic cholecystectomy per Dr. Goldman  -PRN analgesics and antiemetics  -CLQ diet; advance as tolerated  -IV fluids with    -Lovenox DVT chemical prophylaxis      Severe obesity (BMI >= 40)  Body mass index is 40.72 kg/m². Morbid obesity complicates all aspects of disease management from diagnostic modalities to treatment.      Bipolar 1 disorder  -Continue home Wellbutrin and Trileptal        VTE Risk Mitigation (From admission, onward)         Ordered     IP VTE HIGH RISK  PATIENT  Once         01/13/23 1401     Place LUCY hose  Until discontinued         01/13/23 1401     Place sequential compression device  Until discontinued         01/13/23 1401                   Junior Tello MD  Department of Hospital Medicine   [facility]

## 2023-01-14 NOTE — PLAN OF CARE
Plan of care reviewed with patient. Patient verbalized complete understanding. Patient c/o pain to abdomen. PRN medications administered. Afebrile throughout shift. Antibiotics administered as scheduled. All fall precautions maintained. Bed in lowest position, locked, call light within reach. Side rails up x's 2. Slip resistant socks maintained.

## 2023-01-14 NOTE — ASSESSMENT & PLAN NOTE
Body mass index is 40.72 kg/m². Morbid obesity complicates all aspects of disease management from diagnostic modalities to treatment.

## 2023-01-14 NOTE — OP NOTE
[facility]  Cholecystectomy  Procedure Note    SUMMARY     Date of Procedure: 1/13/2023     Procedure: laparoscopic cholecystectomy    Provider: Yuniel Goldman MD    Assisting Provider: none    Indications: This patient presents with symptomatic gallbladder disease and will undergo laparoscopic cholecystectomy.    Pre-Operative Diagnosis: Calculus of gallbladder with acute cholecystitis, without mention of obstruction    Post-Operative Diagnosis: Same    Anesthesia: GETA     Technical Procedures Used: laparoscopic      Description of the Findings of the Procedure:     The patient was seen again in the Holding Room. The risks, benefits, complications, treatment options, and expected outcomes were discussed with the patient. The possibilities of reaction to medication, pulmonary aspiration, perforation of viscus, bleeding, recurrent infection, finding a normal gallbladder, the need for additional procedures, failure to diagnose a condition, the possible need to convert to an open procedure, and creating a complication requiring transfusion or operation were discussed with the patient. The patient and/or family concurred with the proposed plan, giving informed consent. The site of surgery properly noted/marked. The patient was taken to Operating Room, identified as Manisha Bishop and the procedure verified as Laparoscopic Cholecystectomy.  A Time Out was held and the above information confirmed.    Prior to the induction of general anesthesia, antibiotic prophylaxis was administered. General endotracheal anesthesia was then administered and tolerated well. After the induction, the abdomen was prepped in the usual sterile fashion. The patient was positioned in the supine position with the left arm comfortably tucked, along with some reverse Trendelenburg.      Local anesthetic agent was injected into the skin near the right upper quadrant and an incision made. 5 mm optiview trocar was used to  enter safely into the peritoneal cavity.  Pneumoperitoneum was then created with CO2 and tolerated well without any adverse changes in the patient's vital signs. Additional trocars were introduced under direct vision. All skin incisions were infiltrated with a local anesthetic agent before making the incision and placing the trocars.     The previous appendectomy site had some adhesions but appeared to be healing well.  IN the pelvis the left ovary appeared enlarged with a large cystic structure.  It appeared to be a simple cyst.      The gallbladder was identified, the fundus grasped and retracted cephalad. Adhesions were lysed bluntly and with the electrocautery where indicated, taking care not to injure any adjacent organs or viscus. The infundibulum was grasped and retracted laterally, exposing the peritoneum overlying the triangle of Calot. This was then divided and exposed in a blunt fashion. The cystic duct was clearly identified and bluntly dissected circumferentially. The junctions of the gallbladder, cystic duct and common bile duct were clearly identified prior to the division of any linear structure and photo documented.     The cystic duct was then doubly ligated with surgical clips on the patient side and singly clipped on the gallbladder side and divided. The cystic artery was identified, dissected free, ligated with clips and divided as well.     The gallbladder was dissected from the liver bed in retrograde fashion with the electrocautery. The gallbladder was removed. The liver bed was irrigated and inspected. Hemostasis was achieved with the electrocautery. Copious irrigation was utilized and was repeatedly aspirated until clear all particulate matter.    Pneumoperitoneum was completely reduced after viewing removal of the trocars under direct vision. The wound was thoroughly irrigated and the fascia was then closed with a figure of eight suture; the skin was then closed with monocryl and a sterile  dressing was applied.    Instrument, sponge, and needle counts were correct at closure and at the conclusion of the case.     Cholecystitis with Cholelithiasis    LEFT OVARIAN CYST           Significant Surgical Tasks Conducted by the Assistant(s), if Applicable: none    Complications: None; patient tolerated the procedure well.    Total IV Fluids: see anesthesia    Estimated Blood Loss (EBL): less than 50 mL     Drains: none            Implants: none    Specimens: Gallbladder           Condition: stable    Disposition: PACU - hemodynamically stable.    Attestation: I was present and scrubbed for the entire procedure.

## 2023-01-14 NOTE — PROGRESS NOTES
"[facility] Medicine  Progress Note    Patient Name: Manisha Bishop  MRN: [id number]  Patient Class: OP- Outpatient Recovery   Admission Date: 1/13/2023  Length of Stay: 0 days  Attending Physician: Yuniel Goldman MD  Primary Care Provider: Primary Doctor No        Subjective:     Principal Problem:Calculus of gallbladder with acute cholecystitis and obstruction        HPI:  Manisha Bishop is a 27 year old female with a past medical history of obesity, CK, bipolar disorder and ADHD with recent appendectomy for presumed appendicitis who presents for elective laparoscopic cholecystectomy per Dr. Goldman. The patient had been seen at an outside hospital for persistent RUQ pain, nausea and decreased PO intake. Imaging was consistent with an acute cholecystitis which presumably was treated non-surgically. The patient's symptoms persisted, which prompted Dr. Goldman to contact the patient. She was able to present to [facility] to undergo surgery. She will be monitored overnight and her pain will be controlled.      Overview/Hospital Course:  Manisha Bishop 27 year old female with a past medical history of obesity, CK, bipolar disorder, ADHD, and recent appendectomy for presumed appendicitis who presented for elective laparoscopic cholecystectomy per Dr. Goldman. She tolerated the procedure well and there has been no perioperative complications. Her pain is aggressively being controlled at this time.      Interval History: see "Hospital Course"    Review of Systems   Gastrointestinal:  Positive for abdominal pain.   Skin:  Positive for wound.   All other systems reviewed and are negative.  Objective:     Vital Signs (Most Recent):  Temp: 97.8 °F (36.6 °C) (01/14/23 1207)  Pulse: 80 (01/14/23 1207)  Resp: 18 (01/14/23 1207)  BP: 125/75 (01/14/23 1207)  SpO2: 100 % (01/14/23 1207) Vital Signs (24h Range):  Temp:  [97.2 °F (36.2 °C)-98.6 °F (37 °C)] 97.8 °F (36.6 °C)  Pulse:  [72-98] " 80  Resp:  [] 18  SpO2:  [98 %-100 %] 100 %  BP: (123-141)/(68-91) 125/75     Weight: 118.3 kg (260 lb 12.9 oz)  Body mass index is 40.85 kg/m².    Intake/Output Summary (Last 24 hours) at 1/14/2023 1220  Last data filed at 1/14/2023 0650  Gross per 24 hour   Intake 2898.94 ml   Output 1 ml   Net 2897.94 ml      Physical Exam  Vitals and nursing note reviewed.   Constitutional:       General: She is not in acute distress.     Appearance: She is obese.   HENT:      Head: Normocephalic and atraumatic.      Right Ear: External ear normal.      Left Ear: External ear normal.      Nose: Nose normal.      Mouth/Throat:      Mouth: Mucous membranes are moist.   Eyes:      Extraocular Movements: Extraocular movements intact.      Conjunctiva/sclera: Conjunctivae normal.   Cardiovascular:      Rate and Rhythm: Regular rhythm.      Pulses: Normal pulses.      Heart sounds: Normal heart sounds.   Pulmonary:      Effort: Pulmonary effort is normal.      Breath sounds: Normal breath sounds.   Abdominal:      General: Bowel sounds are normal. There is no distension.      Palpations: Abdomen is soft.      Tenderness: There is no abdominal tenderness.      Comments: Dressings clean, dry and intact.   Musculoskeletal:      Cervical back: Neck supple.      Right lower leg: No edema.      Left lower leg: No edema.   Skin:     General: Skin is warm and dry.   Neurological:      Mental Status: She is alert. Mental status is at baseline.   Psychiatric:         Mood and Affect: Mood normal.         Behavior: Behavior normal.       Significant Labs: All pertinent labs within the past 24 hours have been reviewed.    Significant Imaging: I have reviewed all pertinent imaging results/findings within the past 24 hours.      Assessment/Plan:      * Calculus of gallbladder with acute cholecystitis and obstruction  POD 1.  -S/p laparoscopic cholecystectomy per Dr. Goldman  -PRN analgesics and antiemetics  -Regular diet  -IV fluids with LR  125   -Lovenox DVT chemical prophylaxis  -Encourage ambulation  -Incentive spirometry      Severe obesity (BMI >= 40)  Body mass index is 40.85 kg/m². Morbid obesity complicates all aspects of disease management from diagnostic modalities to treatment.      Bipolar 1 disorder  -Continue home Wellbutrin and Trileptal        VTE Risk Mitigation (From admission, onward)         Ordered     enoxaparin injection 40 mg  Daily         01/13/23 5403                Discharge Planning   LINH: 1/15/2023    Code Status: Prior   Is the patient medically ready for discharge?:     Reason for patient still in hospital (select all that apply): Patient trending condition                     Junior Tello MD  Department of Hospital Medicine   [facility]

## 2023-01-14 NOTE — ASSESSMENT & PLAN NOTE
POD 1.  -S/p laparoscopic cholecystectomy per Dr. Goldman  -PRN analgesics and antiemetics  -Regular diet  -IV fluids with    -Lovenox DVT chemical prophylaxis  -Encourage ambulation  -Incentive spirometry

## 2023-01-14 NOTE — SUBJECTIVE & OBJECTIVE
"Interval History: see "Hospital Course"    Review of Systems   Gastrointestinal:  Positive for abdominal pain.   Skin:  Positive for wound.   All other systems reviewed and are negative.  Objective:     Vital Signs (Most Recent):  Temp: 97.8 °F (36.6 °C) (01/14/23 1207)  Pulse: 80 (01/14/23 1207)  Resp: 18 (01/14/23 1207)  BP: 125/75 (01/14/23 1207)  SpO2: 100 % (01/14/23 1207) Vital Signs (24h Range):  Temp:  [97.2 °F (36.2 °C)-98.6 °F (37 °C)] 97.8 °F (36.6 °C)  Pulse:  [72-98] 80  Resp:  [] 18  SpO2:  [98 %-100 %] 100 %  BP: (123-141)/(68-91) 125/75     Weight: 118.3 kg (260 lb 12.9 oz)  Body mass index is 40.85 kg/m².    Intake/Output Summary (Last 24 hours) at 1/14/2023 1220  Last data filed at 1/14/2023 0650  Gross per 24 hour   Intake 2898.94 ml   Output 1 ml   Net 2897.94 ml      Physical Exam  Vitals and nursing note reviewed.   Constitutional:       General: She is not in acute distress.     Appearance: She is obese.   HENT:      Head: Normocephalic and atraumatic.      Right Ear: External ear normal.      Left Ear: External ear normal.      Nose: Nose normal.      Mouth/Throat:      Mouth: Mucous membranes are moist.   Eyes:      Extraocular Movements: Extraocular movements intact.      Conjunctiva/sclera: Conjunctivae normal.   Cardiovascular:      Rate and Rhythm: Regular rhythm.      Pulses: Normal pulses.      Heart sounds: Normal heart sounds.   Pulmonary:      Effort: Pulmonary effort is normal.      Breath sounds: Normal breath sounds.   Abdominal:      General: Bowel sounds are normal. There is no distension.      Palpations: Abdomen is soft.      Tenderness: There is no abdominal tenderness.      Comments: Dressings clean, dry and intact.   Musculoskeletal:      Cervical back: Neck supple.      Right lower leg: No edema.      Left lower leg: No edema.   Skin:     General: Skin is warm and dry.   Neurological:      Mental Status: She is alert. Mental status is at baseline.   Psychiatric:    "      Mood and Affect: Mood normal.         Behavior: Behavior normal.       Significant Labs: All pertinent labs within the past 24 hours have been reviewed.    Significant Imaging: I have reviewed all pertinent imaging results/findings within the past 24 hours.

## 2023-01-14 NOTE — PROGRESS NOTES
"[facility]  Adult Nutrition  Progress Note    SUMMARY       Recommendations  1) Continue regular diet   2) weigh weekly    Goals: 1) PO intakes > 75% of most meals at f/u  Nutrition Goal Status: new  Communication of RD Recs:  (POC, sticky note)    Assessment and Plan    Inadequate energy intake  R/t N/V and abd. Pain  As Evidenced by PO intakes < 75% of needs x at least 1 week  Intervention: general healthful diet  new     Malnutrition Assessment     Skin (Micronutrient):  (Hansel = 19, abd. incision)   Micronutrient Evaluation: suspected deficiency  Micronutrient Evaluation Comments: check iron   Energy Intake (Malnutrition): less than 75% for greater than 7 days           Edema (Fluid Accumulation): 0-->no edema present             Reason for Assessment    Reason For Assessment: identified at risk by screening criteria  Diagnosis:  (calculus of gallbladder)  Relevant Medical History: obesity, CK, bipolar, ADHD  Interdisciplinary Rounds: did not attend (none today)    General Information Comments: 26 y/o female admitted with gallbladder obstruction s/p abd. pain and N/V despite recent appendectomy. POD 1 LAP Angelita. Regular diet ordered. NFPE not done, noted to appear obese, well-nourished. No significant wt loss per chart review.    Nutrition Discharge Planning: To be determined- Regular diet    Nutrition Risk Screen    Nutrition Risk Screen: no indicators present    Nutrition/Diet History    Spiritual, Cultural Beliefs, [employer]ism Practices, Values that Affect Care: no  Food Allergies: NKFA  Factors Affecting Nutritional Intake: nausea/vomiting, abdominal pain    Anthropometrics    Temp: 97.8 °F (36.6 °C)  Height Method: Measured  Height: 5' 7"  Height (inches): 67 in  Weight Method: Bed Scale  Weight: 118.3 kg (260 lb 12.9 oz)  Weight (lb): 260.81 lb  Ideal Body Weight (IBW), Female: 135 lb  % Ideal Body Weight, Female (lb): 193.19 %  BMI (Calculated): 40.8  BMI Grade: greater than 40 - " morbid obesity  Usual Body Weight (UBW), k kg (22)  % Usual Body Weight: 93.34  % Weight Change From Usual Weight: -6.85 %       Lab/Procedures/Meds    Pertinent Labs Reviewed: reviewed  BMP  Lab Results   Component Value Date     2023    K 4.1 2023     2023    CO2 23 2023    BUN 7 2023    CREATININE 0.7 2023    CALCIUM 9.0 2023    ANIONGAP 9 2023    EGFRNORACEVR >60 2023     Lab Results   Component Value Date    IRON 13 (L) 2016    TRANSFERRIN 209 2016    TIBC 272 2016    FESATURATED 5 (L) 2016        Pertinent Medications Reviewed: reviewed  Pertinent Medications Comments: lactated ringers @ 125 ml/hr, zofran    Estimated/Assessed Needs    Weight Used For Calorie Calculations: 118.3 kg (260 lb 12.9 oz)  Energy Calorie Requirements (kcal): MSj ( no AF obesity) = 1940 kcal  Energy Need Method: [facility]  Protein Requirements: 0.8-1 g protein/kg (  g)  Weight Used For Protein Calculations: 118.3 kg (260 lb 12.9 oz)  Fluid Requirements (mL): 1950 ml or per MD  Estimated Fluid Requirement Method: RDA Method  CHO Requirement: N/A      Nutrition Prescription Ordered    Current Diet Order: regular diet    Evaluation of Received Nutrient/Fluid Intake    Energy Calories Required: other (see comments)  Protein Required: other (see comments)  Fluid Required: other (see comments)  Comments: just advanced  Tolerance: tolerating  % Intake of Estimated Energy Needs: just advanced  % Meal Intake: just advanced    Nutrition Risk    Level of Risk/Frequency of Follow-up:  (2 x weekly)     Monitor and Evaluation    Food and Nutrient Intake: energy intake, food and beverage intake  Food and Nutrient Adminstration: diet order  Anthropometric Measurements: weight  Biochemical Data, Medical Tests and Procedures: electrolyte and renal panel, gastrointestinal profile  Nutrition-Focused Physical Findings: overall appearance      Nutrition Follow-Up    RD Follow-up?: Yes

## 2023-01-14 NOTE — PLAN OF CARE
Recommendations  1) Continue regular diet   2) weigh weekly    Goals: 1) PO intakes > 75% of most meals at f/u  Nutrition Goal Status: new  Communication of RD Recs:  (POC, sticky note)

## 2023-01-14 NOTE — PROGRESS NOTES
[facility]  General Surgery  Progress Note    Subjective:     Interval History:  Postop day 1 laparoscopic cholecystectomy.  No acute events overnight.  LFTs within normal limits today.  Afebrile.  Reports nausea this morning without vomiting.  Tolerating diet today.    Post-Op Info:  Procedure(s) (LRB):  CHOLECYSTECTOMY, LAPAROSCOPIC (N/A)   1 Day Post-Op      Medications:  Continuous Infusions:   lactated ringers 125 mL/hr at 01/14/23 0702     Scheduled Meds:   buPROPion  300 mg Oral Daily    enoxaparin  40 mg Subcutaneous Daily    OXcarbazepine  300 mg Oral BID     PRN Meds:HYDROmorphone, ondansetron, oxyCODONE     Objective:     Vital Signs (Most Recent):  Temp: 97.8 °F (36.6 °C) (01/14/23 1207)  Pulse: 80 (01/14/23 1207)  Resp: 16 (01/14/23 1331)  BP: 125/75 (01/14/23 1207)  SpO2: 100 % (01/14/23 1207) Vital Signs (24h Range):  Temp:  [97.2 °F (36.2 °C)-97.9 °F (36.6 °C)] 97.8 °F (36.6 °C)  Pulse:  [72-98] 80  Resp:  [] 16  SpO2:  [98 %-100 %] 100 %  BP: (123-141)/(68-91) 125/75       Intake/Output Summary (Last 24 hours) at 1/14/2023 1442  Last data filed at 1/14/2023 0650  Gross per 24 hour   Intake 2898.94 ml   Output 1 ml   Net 2897.94 ml       Physical Exam  Constitutional:       General: She is not in acute distress.  Pulmonary:      Effort: Pulmonary effort is normal.   Abdominal:      Comments: Incision intact  No hematoma or evidence of infection.    Neurological:      Mental Status: She is alert and oriented to person, place, and time.       Significant Labs:  CBC:   Recent Labs   Lab 01/14/23  0512   WBC 7.15   RBC 4.55   HGB 10.5*   HCT 34.8*      MCV 77*   MCH 23.1*   MCHC 30.2*     CMP:   Recent Labs   Lab 01/14/23  0512      CALCIUM 9.0   ALBUMIN 3.6   PROT 7.1      K 4.1   CO2 23      BUN 7   CREATININE 0.7   ALKPHOS 66   ALT 19   AST 25   BILITOT 0.5       Significant Diagnostics:  I have reviewed all pertinent imaging results/findings within  the past 24 hours.    Assessment/Plan:     Active Diagnoses:    Diagnosis Date Noted POA    PRINCIPAL PROBLEM:  Calculus of gallbladder with acute cholecystitis and obstruction [K80.01] 01/13/2023 Yes    Severe obesity (BMI >= 40) [E66.01] 01/13/2023 Yes    Bipolar 1 disorder [F31.9] 06/16/2016 Yes      Problems Resolved During this Admission:     -postop day 1 laparoscopic cholecystectomy.  Okay to discharge from surgery standpoint the next 24 hours.  Follow-up 2 weeks.    Carlos Espinoza III, MD  General Surgery  [facility]

## 2023-01-14 NOTE — HOSPITAL COURSE
Manisha Bishop 27 year old female with a past medical history of obesity, CK, bipolar disorder, ADHD, and recent appendectomy for presumed appendicitis who presented for elective laparoscopic cholecystectomy per Dr. Goldman. She tolerated the procedure well and there has been no perioperative complications. Her pain was well controlled. She was able to tolerate PO without difficulty. She was discharged 1/15/2023 and will follow up with General Surgery in the outpatient setting.

## 2023-01-14 NOTE — ASSESSMENT & PLAN NOTE
-S/p laparoscopic cholecystectomy per Dr. Goldman  -PRN analgesics and antiemetics  -CLQ diet; advance as tolerated  -IV fluids with    -Lovenox DVT chemical prophylaxis

## 2023-01-14 NOTE — ANESTHESIA POSTPROCEDURE EVALUATION
Anesthesia Post Evaluation    Patient: Manisha Bishop    Procedure(s) Performed: Procedure(s) (LRB):  CHOLECYSTECTOMY, LAPAROSCOPIC (N/A)    Final Anesthesia Type: general      Patient location during evaluation: PACU  Patient participation: Yes- Able to Participate  Level of consciousness: awake and alert  Post-procedure vital signs: reviewed and stable  Pain management: adequate  Airway patency: patent    PONV status at discharge: No PONV  Anesthetic complications: no      Cardiovascular status: hemodynamically stable  Respiratory status: unassisted and room air  Hydration status: euvolemic  Follow-up not needed.          Vitals Value Taken Time   /89 01/13/23 1809   Temp 36.4 °C (97.6 °F) 01/13/23 1721   Pulse 84 01/13/23 1812   Resp 21 01/13/23 1812   SpO2 100 % 01/13/23 1812   Vitals shown include unvalidated device data.      No case tracking events are documented in the log.      Pain/Heather Score: Pain Rating Prior to Med Admin: 5 (1/13/2023  6:00 PM)  Pain Rating Post Med Admin: 5 (1/13/2023  5:50 PM)  Heather Score: 9 (1/13/2023  5:50 PM)

## 2023-01-14 NOTE — PLAN OF CARE
"[facility]  Initial Discharge Assessment       Primary Care Provider: Primary Doctor No    Admission Diagnosis: Cholecystitis [K81.9]    Admission Date: 1/13/2023  Expected Discharge Date:     DC assessment completed with pt at bedside. Pt confirms demographics are current. Pt lives at listed address. Pt does not have a PCP but stated "I will find one myself". Pharmacy [facility] [place]. No DME. Denies HD/DM/coumadin. Denies POA/LW. Pt with recent admit to [facility] on 12/18/22. Boyfriend to give ride home. No new needs anticipated at DC. Pt declined adding any emergency contacts to facesheet. CM following.           Payor: MEDICAID / Plan: [employer] [facility] / Product Type: Managed Medicaid /     Extended Emergency Contact Information  Primary Emergency Contact: contact, no  Relation: Other   needed? No    Discharge Plan A: Home with family  Discharge Plan B: Home      [facility] - [place] - [address] [address]  [address] [address]  Phone: [phone] Fax: [phone]      Initial Assessment (most recent)       Adult Discharge Assessment - 01/14/23 1242          Discharge Assessment    Assessment Type Discharge Planning Assessment     Confirmed/corrected address, phone number and insurance Yes     Confirmed Demographics Correct on Facesheet     Source of Information patient     Do you expect to return to your current living situation? Yes     Prior to hospitilization cognitive status: Alert/Oriented     Current cognitive status: Alert/Oriented     Readmission within 30 days? No     Patient currently being followed by outpatient case management? No     Do you currently have service(s) that help you manage your care at home? No     Do you take prescription medications? Yes     Do you have prescription coverage? Yes     Do you have any problems affording any of your prescribed medications? No     Is the patient taking medications as prescribed? " yes     How do you get to doctors appointments? family or friend will provide;car, drives self     Are you on dialysis? No     Do you take coumadin? No     Discharge Plan A Home with family     Discharge Plan B Home     DME Needed Upon Discharge  none     Discharge Plan discussed with: Patient

## 2023-01-14 NOTE — PLAN OF CARE
Plan of care reviewed with pt. Pt verbalized understanding. Patient is alert and oriented x 4, able to make needs known. A-febrile throughout the shift. Meds given per MAR. IVF infusing as ordered. Up to BSC with x2 assist. Repositions self independently. Complaints of pain and discomfort, PRN pain medication given. Purposeful hourly/q2hr rounding done during shift to promote patient safety. NAD noted. Safety maintained with side rails up x3, bed wheels locked, bed in lowest positioned, call light in reach. Patient educated to call for assistance with ambulation if needed, verbalized understanding. Pt remains free of falls. No further needs expressed at this time. Will continue to monitor.

## 2023-01-14 NOTE — PLAN OF CARE
Report to María. Patient in pain, received pain medication on floor on arrival, moved from stretcher to bed with assist of four nurses, no problems noticed. No nausea noted, dressing to abdomen dry intact no drainage, vs stable, resting, no one at hospital with patient.

## 2023-01-15 PROBLEM — K80.01 CALCULUS OF GALLBLADDER WITH ACUTE CHOLECYSTITIS AND OBSTRUCTION: Status: RESOLVED | Noted: 2023-01-13 | Resolved: 2023-01-15

## 2023-01-15 LAB
ALBUMIN SERPL BCP-MCNC: 3.2 G/DL (ref 3.5–5.2)
ALP SERPL-CCNC: 55 U/L (ref 55–135)
ALT SERPL W/O P-5'-P-CCNC: 15 U/L (ref 10–44)
ANION GAP SERPL CALC-SCNC: 7 MMOL/L (ref 8–16)
AST SERPL-CCNC: 17 U/L (ref 10–40)
BASOPHILS # BLD AUTO: 0.06 K/UL (ref 0–0.2)
BASOPHILS NFR BLD: 0.8 % (ref 0–1.9)
BILIRUB SERPL-MCNC: 0.3 MG/DL (ref 0.1–1)
BUN SERPL-MCNC: 11 MG/DL (ref 6–20)
CALCIUM SERPL-MCNC: 8.2 MG/DL (ref 8.7–10.5)
CHLORIDE SERPL-SCNC: 104 MMOL/L (ref 95–110)
CO2 SERPL-SCNC: 27 MMOL/L (ref 23–29)
CREAT SERPL-MCNC: 0.8 MG/DL (ref 0.5–1.4)
DIFFERENTIAL METHOD: ABNORMAL
EOSINOPHIL # BLD AUTO: 0.1 K/UL (ref 0–0.5)
EOSINOPHIL NFR BLD: 0.8 % (ref 0–8)
ERYTHROCYTE [DISTWIDTH] IN BLOOD BY AUTOMATED COUNT: 17.1 % (ref 11.5–14.5)
EST. GFR  (NO RACE VARIABLE): >60 ML/MIN/1.73 M^2
GLUCOSE SERPL-MCNC: 84 MG/DL (ref 70–110)
HCT VFR BLD AUTO: 32.7 % (ref 37–48.5)
HGB BLD-MCNC: 9.6 G/DL (ref 12–16)
IMM GRANULOCYTES # BLD AUTO: 0.04 K/UL (ref 0–0.04)
IMM GRANULOCYTES NFR BLD AUTO: 0.6 % (ref 0–0.5)
LYMPHOCYTES # BLD AUTO: 2.6 K/UL (ref 1–4.8)
LYMPHOCYTES NFR BLD: 36.8 % (ref 18–48)
MAGNESIUM SERPL-MCNC: 1.6 MG/DL (ref 1.6–2.6)
MCH RBC QN AUTO: 23.3 PG (ref 27–31)
MCHC RBC AUTO-ENTMCNC: 29.4 G/DL (ref 32–36)
MCV RBC AUTO: 79 FL (ref 82–98)
MONOCYTES # BLD AUTO: 0.5 K/UL (ref 0.3–1)
MONOCYTES NFR BLD: 6.8 % (ref 4–15)
NEUTROPHILS # BLD AUTO: 3.9 K/UL (ref 1.8–7.7)
NEUTROPHILS NFR BLD: 54.2 % (ref 38–73)
NRBC BLD-RTO: 0 /100 WBC
PHOSPHATE SERPL-MCNC: 3.8 MG/DL (ref 2.7–4.5)
PLATELET # BLD AUTO: 321 K/UL (ref 150–450)
PMV BLD AUTO: 9.9 FL (ref 9.2–12.9)
POTASSIUM SERPL-SCNC: 3.7 MMOL/L (ref 3.5–5.1)
PROT SERPL-MCNC: 6.4 G/DL (ref 6–8.4)
RBC # BLD AUTO: 4.12 M/UL (ref 4–5.4)
SODIUM SERPL-SCNC: 138 MMOL/L (ref 136–145)
WBC # BLD AUTO: 7.1 K/UL (ref 3.9–12.7)

## 2023-01-15 PROCEDURE — 84100 ASSAY OF PHOSPHORUS: CPT | Performed by: SURGERY

## 2023-01-15 PROCEDURE — 94761 N-INVAS EAR/PLS OXIMETRY MLT: CPT

## 2023-01-15 PROCEDURE — 94799 UNLISTED PULMONARY SVC/PX: CPT

## 2023-01-15 PROCEDURE — 25000003 PHARM REV CODE 250: Performed by: SURGERY

## 2023-01-15 PROCEDURE — 63600175 PHARM REV CODE 636 W HCPCS: Performed by: SURGERY

## 2023-01-15 PROCEDURE — 36415 COLL VENOUS BLD VENIPUNCTURE: CPT | Performed by: SURGERY

## 2023-01-15 PROCEDURE — 63600175 PHARM REV CODE 636 W HCPCS: Performed by: STUDENT IN AN ORGANIZED HEALTH CARE EDUCATION/TRAINING PROGRAM

## 2023-01-15 PROCEDURE — 83735 ASSAY OF MAGNESIUM: CPT | Performed by: SURGERY

## 2023-01-15 PROCEDURE — 85025 COMPLETE CBC W/AUTO DIFF WBC: CPT | Performed by: SURGERY

## 2023-01-15 PROCEDURE — 80053 COMPREHEN METABOLIC PANEL: CPT | Performed by: SURGERY

## 2023-01-15 PROCEDURE — 25000003 PHARM REV CODE 250: Performed by: STUDENT IN AN ORGANIZED HEALTH CARE EDUCATION/TRAINING PROGRAM

## 2023-01-15 RX ORDER — AMOXICILLIN 250 MG
1 CAPSULE ORAL 2 TIMES DAILY PRN
Qty: 20 TABLET | Refills: 0 | Status: SHIPPED | OUTPATIENT
Start: 2023-01-15 | End: 2023-01-15 | Stop reason: SDUPTHER

## 2023-01-15 RX ORDER — OXYCODONE AND ACETAMINOPHEN 10; 325 MG/1; MG/1
1 TABLET ORAL EVERY 6 HOURS PRN
Qty: 28 TABLET | Refills: 0 | Status: SHIPPED | OUTPATIENT
Start: 2023-01-15 | End: 2023-01-15 | Stop reason: HOSPADM

## 2023-01-15 RX ORDER — AMOXICILLIN 250 MG
1 CAPSULE ORAL 2 TIMES DAILY PRN
Qty: 20 TABLET | Refills: 0 | Status: ON HOLD | OUTPATIENT
Start: 2023-01-15 | End: 2023-08-31 | Stop reason: HOSPADM

## 2023-01-15 RX ORDER — POLYETHYLENE GLYCOL 3350 17 G/17G
17 POWDER, FOR SOLUTION ORAL DAILY
Qty: 10 EACH | Refills: 0 | Status: SHIPPED | OUTPATIENT
Start: 2023-01-15 | End: 2023-01-15 | Stop reason: HOSPADM

## 2023-01-15 RX ORDER — ONDANSETRON 4 MG/1
4 TABLET, ORALLY DISINTEGRATING ORAL EVERY 6 HOURS PRN
Qty: 20 TABLET | Refills: 0 | Status: ON HOLD | OUTPATIENT
Start: 2023-01-15 | End: 2023-08-31 | Stop reason: HOSPADM

## 2023-01-15 RX ORDER — AMOXICILLIN 250 MG
1 CAPSULE ORAL 2 TIMES DAILY
Status: DISCONTINUED | OUTPATIENT
Start: 2023-01-15 | End: 2023-01-15 | Stop reason: HOSPADM

## 2023-01-15 RX ORDER — ONDANSETRON 4 MG/1
4 TABLET, ORALLY DISINTEGRATING ORAL EVERY 6 HOURS PRN
Qty: 20 TABLET | Refills: 0 | Status: SHIPPED | OUTPATIENT
Start: 2023-01-15 | End: 2023-01-15 | Stop reason: SDUPTHER

## 2023-01-15 RX ORDER — OXYCODONE HYDROCHLORIDE 10 MG/1
10 TABLET ORAL EVERY 4 HOURS PRN
Qty: 28 TABLET | Refills: 0 | Status: SHIPPED | OUTPATIENT
Start: 2023-01-15 | End: 2023-01-17

## 2023-01-15 RX ORDER — HYDROMORPHONE HYDROCHLORIDE 1 MG/ML
0.5 INJECTION, SOLUTION INTRAMUSCULAR; INTRAVENOUS; SUBCUTANEOUS EVERY 4 HOURS PRN
Status: DISCONTINUED | OUTPATIENT
Start: 2023-01-15 | End: 2023-01-15 | Stop reason: HOSPADM

## 2023-01-15 RX ORDER — OXYCODONE HYDROCHLORIDE 10 MG/1
10 TABLET ORAL EVERY 4 HOURS PRN
Qty: 28 TABLET | Refills: 0 | Status: SHIPPED | OUTPATIENT
Start: 2023-01-15 | End: 2023-01-15 | Stop reason: SDUPTHER

## 2023-01-15 RX ADMIN — OXYCODONE HYDROCHLORIDE 10 MG: 10 TABLET ORAL at 04:01

## 2023-01-15 RX ADMIN — HYDROMORPHONE HYDROCHLORIDE 1 MG: 1 INJECTION, SOLUTION INTRAMUSCULAR; INTRAVENOUS; SUBCUTANEOUS at 01:01

## 2023-01-15 RX ADMIN — OXYCODONE HYDROCHLORIDE 10 MG: 10 TABLET ORAL at 12:01

## 2023-01-15 RX ADMIN — DOCUSATE SODIUM AND SENNOSIDES 1 TABLET: 8.6; 5 TABLET, FILM COATED ORAL at 10:01

## 2023-01-15 RX ADMIN — HYDROMORPHONE HYDROCHLORIDE 0.5 MG: 1 INJECTION, SOLUTION INTRAMUSCULAR; INTRAVENOUS; SUBCUTANEOUS at 01:01

## 2023-01-15 RX ADMIN — HYDROMORPHONE HYDROCHLORIDE 0.5 MG: 1 INJECTION, SOLUTION INTRAMUSCULAR; INTRAVENOUS; SUBCUTANEOUS at 11:01

## 2023-01-15 RX ADMIN — OXYCODONE HYDROCHLORIDE 10 MG: 10 TABLET ORAL at 08:01

## 2023-01-15 RX ADMIN — ONDANSETRON HYDROCHLORIDE 4 MG: 2 SOLUTION INTRAMUSCULAR; INTRAVENOUS at 11:01

## 2023-01-15 RX ADMIN — HYDROMORPHONE HYDROCHLORIDE 1 MG: 1 INJECTION, SOLUTION INTRAMUSCULAR; INTRAVENOUS; SUBCUTANEOUS at 07:01

## 2023-01-15 NOTE — PLAN OF CARE
Pt clear for discharge from CM standpoint once seen by [facility] medicine        01/15/23 0909   Final Note   Assessment Type Final Discharge Note   Anticipated Discharge Disposition Home

## 2023-01-15 NOTE — DISCHARGE SUMMARY
[facility] Medicine  Discharge Summary      Patient Name: Manisha Bishop  MRN: [id number]  LINDSAY: [id number]  Patient Class: OP- Outpatient Recovery  Admission Date: 1/13/2023  Hospital Length of Stay: 0 days  Discharge Date and Time: No discharge date for patient encounter.  Attending Physician: Yuniel Goldman MD   Discharging Provider: Junior Tello MD  Primary Care Provider: Primary Doctor Tamy    Primary Care Team: Networked reference to record PCT     HPI:   Manisha Bishop is a 27 year old female with a past medical history of obesity, CK, bipolar disorder and ADHD with recent appendectomy for presumed appendicitis who presents for elective laparoscopic cholecystectomy per Dr. Goldman. The patient had been seen at an outside hospital for persistent RUQ pain, nausea and decreased PO intake. Imaging was consistent with an acute cholecystitis which presumably was treated non-surgically. The patient's symptoms persisted, which prompted Dr. Goldman to contact the patient. She was able to present to [facility] to undergo surgery. She will be monitored overnight and her pain will be controlled.      Procedure(s) (LRB):  CHOLECYSTECTOMY, LAPAROSCOPIC (N/A)      Hospital Course:   Manisha Bishop 27 year old female with a past medical history of obesity, CK, bipolar disorder, ADHD, and recent appendectomy for presumed appendicitis who presented for elective laparoscopic cholecystectomy per Dr. Goldman. She tolerated the procedure well and there has been no perioperative complications. Her pain was well controlled. She was able to tolerate PO without difficulty. She was discharged 1/15/2023 and will follow up with General Surgery in the outpatient setting.       Goals of Care Treatment Preferences:  Code Status: Full Code      Consults:     Severe obesity (BMI >= 40)  Body mass index is 40.85 kg/m². Morbid obesity complicates all aspects of disease management from diagnostic modalities to  treatment.      Bipolar 1 disorder  -Continue home Trileptal        Final Active Diagnoses:    Diagnosis Date Noted POA    Severe obesity (BMI >= 40) [E66.01] 01/13/2023 Yes    Bipolar 1 disorder [F31.9] 06/16/2016 Yes      Problems Resolved During this Admission:    Diagnosis Date Noted Date Resolved POA    PRINCIPAL PROBLEM:  Calculus of gallbladder with acute cholecystitis and obstruction [K80.01] 01/13/2023 01/15/2023 Yes       Discharged Condition: stable    Disposition: Home or Self Care    Follow Up:   Follow-up Information     Yuniel Goldman MD Follow up in 2 week(s).    Specialties: General Surgery, Bariatrics, Surgery  Contact information:  [address]  [facility] [id number] [phone]                       Patient Instructions:      Diet Adult Regular     Notify your health care provider if you experience any of the following:  increased confusion or weakness     Notify your health care provider if you experience any of the following:  persistent dizziness, light-headedness, or visual disturbances     Notify your health care provider if you experience any of the following:  severe persistent headache     Notify your health care provider if you experience any of the following:  difficulty breathing or increased cough     Notify your health care provider if you experience any of the following:  redness, tenderness, or signs of infection (pain, swelling, redness, odor or green/yellow discharge around incision site)     Notify your health care provider if you experience any of the following:  severe uncontrolled pain     Notify your health care provider if you experience any of the following:  persistent nausea and vomiting or diarrhea     Notify your health care provider if you experience any of the following:  temperature >100.4     Activity as tolerated       Significant Diagnostic Studies: Labs: All labs within the past 24 hours have been reviewed    Pending Diagnostic Studies:      Procedure Component Value Units Date/Time    Specimen to Pathology, Surgery General Surgery [[id number]] Collected: 23    Order Status: Sent Lab Status: In process Updated: 23    Specimen: Tissue     Specimen to Pathology, Surgery General Surgery [[id number]] Collected: 23    Order Status: Sent Lab Status: In process Updated: 23    Specimen: Tissue          Medications:  Reconciled Home Medications:      Medication List      START taking these medications    polyethylene glycol 17 gram Pwpk  Commonly known as: GLYCOLAX  Take 17 g by mouth once daily. for 7 days        CONTINUE taking these medications    ALPRAZolam 2 MG Tab  Commonly known as: XANAX     dextroamphetamine-amphetamine 30 mg Tab  Take 1 tablet by mouth 4 (four) times daily.     hyoscyamine 0.125 mg Tab  Commonly known as: ANASPAZ,LEVSIN  Take 3 tablets (375 mcg total) by mouth every 6 (six) hours as needed (Abdominal pain).     ondansetron 4 MG tablet  Commonly known as: ZOFRAN  Take 4 mg by mouth every 6 (six) hours as needed.     ondansetron 4 MG Tbdl  Commonly known as: ZOFRAN-ODT  Take 1 tablet (4 mg total) by mouth every 6 (six) hours as needed.     OXcarbazepine 300 MG Tab  Commonly known as: TRILEPTAL  SMARTSI Tablet(s) By Mouth Morning-Evening     oxyCODONE-acetaminophen  mg per tablet  Commonly known as: PERCOCET  Take 1 tablet by mouth every 6 (six) hours as needed for Pain.        STOP taking these medications    buPROPion 300 MG 24 hr tablet  Commonly known as: WELLBUTRIN XL            Indwelling Lines/Drains at time of discharge:   Lines/Drains/Airways     None                 Time spent on the discharge of patient: 33 minutes         Junior Tello MD  Department of Hospital Medicine  [facility]

## 2023-01-15 NOTE — PLAN OF CARE
Plan of care reviewed with patient. Patient verbalized complete understanding. Pt awake,alert, and oriented. Pt complaining of pain, meds given. Pt not complaining of any nausea. Pt able to get up to BSC. Pt lap sites are CDI.   All fall precautions maintained, bed in lowest position, locked, call light within reach. Side rails up times 2. Slip resistant socks maintained.

## 2023-01-15 NOTE — DISCHARGE SUMMARY
[facility] Medicine  Discharge Summary      Patient Name: Manisha Bishop  MRN: [id number]  LINDSAY: [id number]  Patient Class: OP- Outpatient Recovery  Admission Date: 1/13/2023  Hospital Length of Stay: 0 days  Discharge Date and Time: No discharge date for patient encounter.  Attending Physician: Yuniel Goldman MD   Discharging Provider: Junior Tello MD  Primary Care Provider: Primary Doctor Tamy    Primary Care Team: Networked reference to record PCT     HPI:   Manisha Bishop is a 27 year old female with a past medical history of obesity, CK, bipolar disorder and ADHD with recent appendectomy for presumed appendicitis who presents for elective laparoscopic cholecystectomy per Dr. Goldman. The patient had been seen at an outside hospital for persistent RUQ pain, nausea and decreased PO intake. Imaging was consistent with an acute cholecystitis which presumably was treated non-surgically. The patient's symptoms persisted, which prompted Dr. Goldman to contact the patient. She was able to present to [facility] to undergo surgery. She will be monitored overnight and her pain will be controlled.      Procedure(s) (LRB):  CHOLECYSTECTOMY, LAPAROSCOPIC (N/A)      Hospital Course:   Manisha Bishop 27 year old female with a past medical history of obesity, CK, bipolar disorder, ADHD, and recent appendectomy for presumed appendicitis who presented for elective laparoscopic cholecystectomy per Dr. Goldman. She tolerated the procedure well and there has been no perioperative complications. Her pain was well controlled. She was able to tolerate PO without difficulty. She was discharged 1/15/2023 and will follow up with General Surgery in the outpatient setting.       Goals of Care Treatment Preferences:  Code Status: Full Code      Consults:     Severe obesity (BMI >= 40)  Body mass index is 40.85 kg/m². Morbid obesity complicates all aspects of disease management from diagnostic modalities to  treatment.      Bipolar 1 disorder  -Continue home Wellbutrin and Trileptal        Final Active Diagnoses:    Diagnosis Date Noted POA    Severe obesity (BMI >= 40) [E66.01] 01/13/2023 Yes    Bipolar 1 disorder [F31.9] 06/16/2016 Yes      Problems Resolved During this Admission:    Diagnosis Date Noted Date Resolved POA    PRINCIPAL PROBLEM:  Calculus of gallbladder with acute cholecystitis and obstruction [K80.01] 01/13/2023 01/15/2023 Yes       Discharged Condition: stable    Disposition: Home or Self Care    Follow Up:   Follow-up Information     Yuniel Goldman MD Follow up in 2 week(s).    Specialties: General Surgery, Bariatrics, Surgery  Contact information:  [address]  [facility] [id number] [phone]                       Patient Instructions:      Diet Adult Regular     Notify your health care provider if you experience any of the following:  increased confusion or weakness     Notify your health care provider if you experience any of the following:  persistent dizziness, light-headedness, or visual disturbances     Notify your health care provider if you experience any of the following:  severe persistent headache     Notify your health care provider if you experience any of the following:  difficulty breathing or increased cough     Notify your health care provider if you experience any of the following:  redness, tenderness, or signs of infection (pain, swelling, redness, odor or green/yellow discharge around incision site)     Notify your health care provider if you experience any of the following:  severe uncontrolled pain     Notify your health care provider if you experience any of the following:  persistent nausea and vomiting or diarrhea     Notify your health care provider if you experience any of the following:  temperature >100.4     Activity as tolerated       Significant Diagnostic Studies: Labs: All labs within the past 24 hours have been reviewed    Pending Diagnostic  Studies:     Procedure Component Value Units Date/Time    Specimen to Pathology, Surgery General Surgery [[id number]] Collected: 23    Order Status: Sent Lab Status: In process Updated: 23    Specimen: Tissue     Specimen to Pathology, Surgery General Surgery [[id number]] Collected: 23    Order Status: Sent Lab Status: In process Updated: 23    Specimen: Tissue          Medications:  Reconciled Home Medications:      Medication List      START taking these medications    polyethylene glycol 17 gram Pwpk  Commonly known as: GLYCOLAX  Take 17 g by mouth once daily. for 7 days        CONTINUE taking these medications    ALPRAZolam 2 MG Tab  Commonly known as: XANAX     buPROPion 300 MG 24 hr tablet  Commonly known as: WELLBUTRIN XL  Take 300 mg by mouth every morning.     dextroamphetamine-amphetamine 30 mg Tab  Take 1 tablet by mouth 4 (four) times daily.     hyoscyamine 0.125 mg Tab  Commonly known as: ANASPAZ,LEVSIN  Take 3 tablets (375 mcg total) by mouth every 6 (six) hours as needed (Abdominal pain).     ondansetron 4 MG tablet  Commonly known as: ZOFRAN  Take 4 mg by mouth every 6 (six) hours as needed.     ondansetron 4 MG Tbdl  Commonly known as: ZOFRAN-ODT  Take 1 tablet (4 mg total) by mouth every 6 (six) hours as needed.     OXcarbazepine 300 MG Tab  Commonly known as: TRILEPTAL  SMARTSI Tablet(s) By Mouth Morning-Evening     oxyCODONE-acetaminophen  mg per tablet  Commonly known as: PERCOCET  Take 1 tablet by mouth every 6 (six) hours as needed for Pain.            Indwelling Lines/Drains at time of discharge:   Lines/Drains/Airways     None                 Time spent on the discharge of patient: 33 minutes         Junior Tello MD  Department of Hospital Medicine  [facility]

## 2023-01-15 NOTE — CARE UPDATE
01/15/23 0727   Patient Assessment/Suction   Level of Consciousness (AVPU) alert   Respiratory Effort Unlabored;Normal   Expansion/Accessory Muscles/Retractions no use of accessory muscles;no retractions;expansion symmetric   Rhythm/Pattern, Respiratory unlabored;pattern regular;depth regular   PRE-TX-O2   Device (Oxygen Therapy) room air   SpO2 99 %   Pulse Oximetry Type Intermittent   $ Pulse Oximetry - Multiple Charge Pulse Oximetry - Multiple   Pulse 69   Resp 18   Incentive Spirometer   $ Incentive Spirometer Charges done with encouragement   Incentive Spirometer Predicted Level (mL) 2240   Administration (IS) instruction provided, follow-up   Number of Repetitions (IS) 5   Level Incentive Spirometer (mL) 1000   Patient Tolerance (IS) good

## 2023-01-17 ENCOUNTER — TELEPHONE (OUTPATIENT)
Dept: BARIATRICS | Facility: CLINIC | Age: 28
End: 2023-01-17

## 2023-01-17 ENCOUNTER — TELEPHONE (OUTPATIENT)
Dept: SURGERY | Facility: HOSPITAL | Age: 28
End: 2023-01-17

## 2023-01-17 VITALS
OXYGEN SATURATION: 97 % | WEIGHT: 260.81 LBS | TEMPERATURE: 96 F | DIASTOLIC BLOOD PRESSURE: 73 MMHG | HEIGHT: 67 IN | BODY MASS INDEX: 40.93 KG/M2 | SYSTOLIC BLOOD PRESSURE: 133 MMHG | RESPIRATION RATE: 16 BRPM | HEART RATE: 88 BPM

## 2023-01-17 RX ORDER — HYDROCODONE BITARTRATE AND ACETAMINOPHEN 10; 325 MG/1; MG/1
1 TABLET ORAL EVERY 6 HOURS PRN
Qty: 20 TABLET | Refills: 0 | Status: SHIPPED | OUTPATIENT
Start: 2023-01-17 | End: 2023-01-20 | Stop reason: SDUPTHER

## 2023-01-17 RX ORDER — PROMETHAZINE HYDROCHLORIDE 12.5 MG/1
12.5 TABLET ORAL 4 TIMES DAILY
Qty: 15 TABLET | Refills: 0 | Status: ON HOLD | OUTPATIENT
Start: 2023-01-17 | End: 2023-03-31 | Stop reason: HOSPADM

## 2023-01-17 NOTE — TELEPHONE ENCOUNTER
Spoke with patient over the phone. Reports the nausea has been difficult to control, but felt like the Zofran really wasn't working. She has been tolerating some liquids and some regular diet. She is also constipated. She's been advised to start a stool, stimulant and softener. She reports that her pain is mainly around the belly button. This is the second time we had to use this incision for surgery. I suspect her pain medicines are not working well as she has had trouble with oxycodone working well in the past. We talked about trying hydrocodone for her pain control. I will call the center pharmacy. For her nausea I will also give her Phenergan. If her symptoms are not relieved, if she has any severe symptoms, she needs to go to the emergency department.I'm

## 2023-01-17 NOTE — PROGRESS NOTES
Pleased with care given but having difficulty at home with pain relief and nausea - RN instructed on proper use of ODT zofran - and need to eat and take pain meds with food, take comfort foods or soup or protein shakes - not eating, may be constipated, not taking Senna-colace as instructed.

## 2023-01-17 NOTE — OR NURSING
7101  Called patient to relay instructions from Dr. Goldman that if she is really having pain at 10/10 she needs to go to the ER to be evaluated. She has taken her Oxycodone and had some crackers but still in pain but refused to give it a number.  She thinks she did too much activity yesterday and wanted to speak to Dr. Goldman himself. Had already secure message MD and office and told her again come to ER if pain is truly out of control.  Made suggestions for better pain relief, nausea, positioning, taking meds for constipation, increase fluids and oral intake - Patient stated she will go to ER if not better by tonight.

## 2023-01-20 DIAGNOSIS — G89.18 POST-OP PAIN: Primary | ICD-10-CM

## 2023-01-20 LAB
FINAL PATHOLOGIC DIAGNOSIS: NORMAL
GROSS: NORMAL
Lab: NORMAL

## 2023-01-20 RX ORDER — HYDROCODONE BITARTRATE AND ACETAMINOPHEN 10; 325 MG/1; MG/1
1 TABLET ORAL EVERY 8 HOURS PRN
Qty: 20 TABLET | Refills: 0 | Status: SHIPPED | OUTPATIENT
Start: 2023-01-20 | End: 2023-01-25

## 2023-01-20 NOTE — TELEPHONE ENCOUNTER
Patient called stating that she is out of pain medicine and her pain still persists.  She says it is mainly around the incisions including the epigastric and umbilical incisions.  She denies any fevers chills nausea vomiting diarrhea or constipation.  I have asked her to report to the emergency department but she feels like the pain medicines helped to control the pain.  She is returning to see me on wed. I will ask her to proceed to ER.

## 2023-01-21 ENCOUNTER — HOSPITAL ENCOUNTER (EMERGENCY)
Facility: HOSPITAL | Age: 28
Discharge: HOME | End: 2023-01-21
Attending: EMERGENCY MEDICINE
Payer: MEDICAID

## 2023-01-21 VITALS
BODY MASS INDEX: 40.81 KG/M2 | HEIGHT: 67 IN | TEMPERATURE: 98 F | HEART RATE: 102 BPM | DIASTOLIC BLOOD PRESSURE: 77 MMHG | WEIGHT: 260 LBS | SYSTOLIC BLOOD PRESSURE: 136 MMHG | OXYGEN SATURATION: 100 % | RESPIRATION RATE: 16 BRPM

## 2023-01-21 DIAGNOSIS — Z90.49 S/P LAPAROSCOPIC CHOLECYSTECTOMY: Primary | ICD-10-CM

## 2023-01-21 DIAGNOSIS — R10.84 GENERALIZED ABDOMINAL PAIN: ICD-10-CM

## 2023-01-21 LAB
ALBUMIN SERPL BCP-MCNC: 3.6 G/DL (ref 3.5–5.2)
ALP SERPL-CCNC: 145 U/L (ref 55–135)
ALT SERPL W/O P-5'-P-CCNC: 133 U/L (ref 10–44)
ANION GAP SERPL CALC-SCNC: 8 MMOL/L (ref 8–16)
AST SERPL-CCNC: 19 U/L (ref 10–40)
B-HCG UR QL: NEGATIVE
BASOPHILS # BLD AUTO: 0.04 K/UL (ref 0–0.2)
BASOPHILS NFR BLD: 0.5 % (ref 0–1.9)
BILIRUB SERPL-MCNC: 0.6 MG/DL (ref 0.1–1)
BILIRUB UR QL STRIP: NEGATIVE
BUN SERPL-MCNC: 8 MG/DL (ref 6–20)
CALCIUM SERPL-MCNC: 8.6 MG/DL (ref 8.7–10.5)
CHLORIDE SERPL-SCNC: 106 MMOL/L (ref 95–110)
CLARITY UR: CLEAR
CO2 SERPL-SCNC: 23 MMOL/L (ref 23–29)
COLOR UR: YELLOW
CREAT SERPL-MCNC: 0.7 MG/DL (ref 0.5–1.4)
DIFFERENTIAL METHOD: ABNORMAL
EOSINOPHIL # BLD AUTO: 0.1 K/UL (ref 0–0.5)
EOSINOPHIL NFR BLD: 1.7 % (ref 0–8)
ERYTHROCYTE [DISTWIDTH] IN BLOOD BY AUTOMATED COUNT: 17.2 % (ref 11.5–14.5)
EST. GFR  (NO RACE VARIABLE): >60 ML/MIN/1.73 M^2
GLUCOSE SERPL-MCNC: 93 MG/DL (ref 70–110)
GLUCOSE UR QL STRIP: NEGATIVE
HCT VFR BLD AUTO: 35.8 % (ref 37–48.5)
HGB BLD-MCNC: 10.8 G/DL (ref 12–16)
HGB UR QL STRIP: NEGATIVE
IMM GRANULOCYTES # BLD AUTO: 0.03 K/UL (ref 0–0.04)
IMM GRANULOCYTES NFR BLD AUTO: 0.4 % (ref 0–0.5)
KETONES UR QL STRIP: NEGATIVE
LEUKOCYTE ESTERASE UR QL STRIP: NEGATIVE
LIPASE SERPL-CCNC: 20 U/L (ref 4–60)
LYMPHOCYTES # BLD AUTO: 0.7 K/UL (ref 1–4.8)
LYMPHOCYTES NFR BLD: 8.7 % (ref 18–48)
MCH RBC QN AUTO: 23.6 PG (ref 27–31)
MCHC RBC AUTO-ENTMCNC: 30.2 G/DL (ref 32–36)
MCV RBC AUTO: 78 FL (ref 82–98)
MONOCYTES # BLD AUTO: 0.4 K/UL (ref 0.3–1)
MONOCYTES NFR BLD: 5.4 % (ref 4–15)
NEUTROPHILS # BLD AUTO: 6.4 K/UL (ref 1.8–7.7)
NEUTROPHILS NFR BLD: 83.3 % (ref 38–73)
NITRITE UR QL STRIP: NEGATIVE
NRBC BLD-RTO: 0 /100 WBC
PH UR STRIP: 8 [PH] (ref 5–8)
PLATELET # BLD AUTO: 294 K/UL (ref 150–450)
PMV BLD AUTO: 9.6 FL (ref 9.2–12.9)
POTASSIUM SERPL-SCNC: 3.9 MMOL/L (ref 3.5–5.1)
PROT SERPL-MCNC: 7.1 G/DL (ref 6–8.4)
PROT UR QL STRIP: NEGATIVE
RBC # BLD AUTO: 4.57 M/UL (ref 4–5.4)
SODIUM SERPL-SCNC: 137 MMOL/L (ref 136–145)
SP GR UR STRIP: 1 (ref 1–1.03)
URN SPEC COLLECT METH UR: NORMAL
UROBILINOGEN UR STRIP-ACNC: NEGATIVE EU/DL
WBC # BLD AUTO: 7.73 K/UL (ref 3.9–12.7)

## 2023-01-21 PROCEDURE — 81003 URINALYSIS AUTO W/O SCOPE: CPT | Performed by: PHYSICIAN ASSISTANT

## 2023-01-21 PROCEDURE — 85025 COMPLETE CBC W/AUTO DIFF WBC: CPT | Performed by: PHYSICIAN ASSISTANT

## 2023-01-21 PROCEDURE — 96375 TX/PRO/DX INJ NEW DRUG ADDON: CPT

## 2023-01-21 PROCEDURE — 25000003 PHARM REV CODE 250: Performed by: PHYSICIAN ASSISTANT

## 2023-01-21 PROCEDURE — 63600175 PHARM REV CODE 636 W HCPCS: Performed by: PHYSICIAN ASSISTANT

## 2023-01-21 PROCEDURE — 96365 THER/PROPH/DIAG IV INF INIT: CPT

## 2023-01-21 PROCEDURE — 36415 COLL VENOUS BLD VENIPUNCTURE: CPT | Performed by: PHYSICIAN ASSISTANT

## 2023-01-21 PROCEDURE — 25500020 PHARM REV CODE 255

## 2023-01-21 PROCEDURE — 80053 COMPREHEN METABOLIC PANEL: CPT | Performed by: PHYSICIAN ASSISTANT

## 2023-01-21 PROCEDURE — 81025 URINE PREGNANCY TEST: CPT | Performed by: EMERGENCY MEDICINE

## 2023-01-21 PROCEDURE — 96361 HYDRATE IV INFUSION ADD-ON: CPT

## 2023-01-21 PROCEDURE — 83690 ASSAY OF LIPASE: CPT | Performed by: PHYSICIAN ASSISTANT

## 2023-01-21 PROCEDURE — 99284 EMERGENCY DEPT VISIT MOD MDM: CPT | Mod: 25

## 2023-01-21 RX ORDER — DOCUSATE SODIUM 100 MG/1
100 CAPSULE, LIQUID FILLED ORAL 2 TIMES DAILY PRN
Qty: 60 CAPSULE | Refills: 0 | Status: ON HOLD | OUTPATIENT
Start: 2023-01-21 | End: 2023-08-31 | Stop reason: HOSPADM

## 2023-01-21 RX ORDER — FAMOTIDINE 20 MG/1
20 TABLET, FILM COATED ORAL 2 TIMES DAILY PRN
Qty: 60 TABLET | Refills: 0 | Status: ON HOLD | OUTPATIENT
Start: 2023-01-21 | End: 2023-08-31 | Stop reason: HOSPADM

## 2023-01-21 RX ORDER — DOCUSATE SODIUM 100 MG/1
100 CAPSULE, LIQUID FILLED ORAL ONCE
Status: COMPLETED | OUTPATIENT
Start: 2023-01-21 | End: 2023-01-21

## 2023-01-21 RX ORDER — FAMOTIDINE 10 MG/ML
20 INJECTION INTRAVENOUS
Status: COMPLETED | OUTPATIENT
Start: 2023-01-21 | End: 2023-01-21

## 2023-01-21 RX ORDER — HYDROCODONE BITARTRATE AND ACETAMINOPHEN 5; 325 MG/1; MG/1
1 TABLET ORAL
Status: DISCONTINUED | OUTPATIENT
Start: 2023-01-21 | End: 2023-01-21 | Stop reason: HOSPADM

## 2023-01-21 RX ORDER — DOCUSATE SODIUM 100 MG/1
100 CAPSULE, LIQUID FILLED ORAL DAILY
Status: DISCONTINUED | OUTPATIENT
Start: 2023-01-22 | End: 2023-01-21

## 2023-01-21 RX ORDER — MORPHINE SULFATE 2 MG/ML
6 INJECTION, SOLUTION INTRAMUSCULAR; INTRAVENOUS
Status: COMPLETED | OUTPATIENT
Start: 2023-01-21 | End: 2023-01-21

## 2023-01-21 RX ORDER — HYDROMORPHONE HYDROCHLORIDE 2 MG/ML
0.5 INJECTION, SOLUTION INTRAMUSCULAR; INTRAVENOUS; SUBCUTANEOUS
Status: COMPLETED | OUTPATIENT
Start: 2023-01-21 | End: 2023-01-21

## 2023-01-21 RX ORDER — DROPERIDOL 2.5 MG/ML
1.25 INJECTION, SOLUTION INTRAMUSCULAR; INTRAVENOUS ONCE
Status: COMPLETED | OUTPATIENT
Start: 2023-01-21 | End: 2023-01-21

## 2023-01-21 RX ORDER — DIPHENHYDRAMINE HYDROCHLORIDE 50 MG/ML
25 INJECTION INTRAMUSCULAR; INTRAVENOUS
Status: COMPLETED | OUTPATIENT
Start: 2023-01-21 | End: 2023-01-21

## 2023-01-21 RX ADMIN — HYDROMORPHONE HYDROCHLORIDE 0.5 MG: 2 INJECTION, SOLUTION INTRAMUSCULAR; INTRAVENOUS; SUBCUTANEOUS at 04:01

## 2023-01-21 RX ADMIN — SODIUM CHLORIDE 1000 ML: 9 INJECTION, SOLUTION INTRAVENOUS at 01:01

## 2023-01-21 RX ADMIN — DIPHENHYDRAMINE HYDROCHLORIDE 25 MG: 50 INJECTION INTRAMUSCULAR; INTRAVENOUS at 02:01

## 2023-01-21 RX ADMIN — DOCUSATE SODIUM 100 MG: 100 CAPSULE, LIQUID FILLED ORAL at 04:01

## 2023-01-21 RX ADMIN — FAMOTIDINE 20 MG: 10 INJECTION INTRAVENOUS at 01:01

## 2023-01-21 RX ADMIN — MORPHINE SULFATE 6 MG: 2 INJECTION, SOLUTION INTRAMUSCULAR; INTRAVENOUS at 12:01

## 2023-01-21 RX ADMIN — PROMETHAZINE HYDROCHLORIDE 12.5 MG: 25 INJECTION INTRAMUSCULAR; INTRAVENOUS at 01:01

## 2023-01-21 RX ADMIN — DROPERIDOL 1.25 MG: 2.5 INJECTION, SOLUTION INTRAMUSCULAR; INTRAVENOUS at 03:01

## 2023-01-21 RX ADMIN — IOHEXOL 100 ML: 350 INJECTION, SOLUTION INTRAVENOUS at 03:01

## 2023-01-21 NOTE — ED PROVIDER NOTES
Encounter Date: 2023    SCRIBE #1 NOTE: I, Asya Landry, am scribing for, and in the presence of,  Rosa Carrera PA-C.     History     Chief Complaint   Patient presents with    Abdominal Pain     2 recent surgeries / no bm since last friday    Nausea     Time seen by provider: 12:20 PM on 2023    Manisha Bishop is a 27 y.o. female who presents to the ED with an onset of burning abdominal pain that worsened in the last 3 days. She also reports nausea, vomiting, and constipation. Her last bowel movent was prior to surgery. Patient had a cholecystectomy done 8 days ago by Dr. Yuniel Goldman. She called Dr. Goldman yesterday who recommended she go to the ED. Patient was on Oxycodone but swapped to Norco. The patient denies fever, burning with urination or any other symptoms at this time. She has a PMHx of obesity and GERD. She has a PSHx of  and appendectomy.    The history is provided by the patient.   Review of patient's allergies indicates:   Allergen Reactions    Iodine and iodide containing products Hives    Ketorolac Hives     Pt verbalized I dont like it doesn't work on it.    Vitamin e (d-alpha tocopherol)      Hives (skin)^Black eyed peas and oatmeal causede hives     Past Medical History:   Diagnosis Date    Anxiety     Bipolar 1 disorder     COVID-19 2021    GERD (gastroesophageal reflux disease)     Obese      Past Surgical History:   Procedure Laterality Date     SECTION  2022    LAPAROSCOPIC APPENDECTOMY N/A 2022    Procedure: APPENDECTOMY, LAPAROSCOPIC;  Surgeon: Yuniel Goldman MD;  Location: [facility] OR;  Service: General;  Laterality: N/A;    LAPAROSCOPIC CHOLECYSTECTOMY N/A 2023    Procedure: CHOLECYSTECTOMY, LAPAROSCOPIC;  Surgeon: Yuniel Goldman MD;  Location: [facility] OR;  Service: General;  Laterality: N/A;    LEG SURGERY      Broke her left lower leg.     Family History   Problem Relation Age of Onset    No Known Problems Mother      No Known Problems Father     Breast cancer Neg Hx     Colon cancer Neg Hx     Ovarian cancer Neg Hx      Social History     Tobacco Use    Smoking status: Never    Smokeless tobacco: Never   Substance Use Topics    Alcohol use: Yes     Comment: occasionally    Drug use: No     Review of Systems   Constitutional:  Negative for chills and fever.   Respiratory:  Negative for cough, chest tightness, shortness of breath and wheezing.    Cardiovascular:  Negative for chest pain and palpitations.   Gastrointestinal:  Positive for abdominal pain, constipation, nausea and vomiting. Negative for diarrhea.   Genitourinary:  Negative for dysuria and hematuria.   Musculoskeletal:  Negative for arthralgias, back pain, joint swelling, myalgias, neck pain and neck stiffness.   Skin:  Negative for color change, pallor, rash and wound.   Neurological:  Negative for dizziness, syncope, weakness, light-headedness, numbness and headaches.   Hematological:  Does not bruise/bleed easily.   Psychiatric/Behavioral:  The patient is not nervous/anxious.    All other systems reviewed and are negative.    Physical Exam     Initial Vitals [01/21/23 1207]   BP Pulse Resp Temp SpO2   127/78 98 20 97.8 °F (36.6 °C) 100 %      MAP       --         Physical Exam    Nursing note and vitals reviewed.  Constitutional: She appears well-developed and well-nourished. She is not diaphoretic. No distress.   HENT:   Head: Normocephalic and atraumatic.   Mouth/Throat: Oropharynx is clear and moist.   Eyes: Conjunctivae are normal.   Neck: Neck supple.   Normal range of motion.  Cardiovascular:  Normal rate, regular rhythm, normal heart sounds and intact distal pulses.     Exam reveals no gallop and no friction rub.       No murmur heard.  Pulmonary/Chest: Breath sounds normal. No respiratory distress. She has no wheezes. She has no rhonchi. She has no rales.   Abdominal: Abdomen is soft. She exhibits no distension and no mass. There is abdominal tenderness.    Generalized abdominal tenderness noted with mild reproducible TTP across upper abdomen and left sided abdomen.  Intact, healing surgical incisions noted.  No distension.  No induration, erythema, bleeding or discharge.    Musculoskeletal:         General: No tenderness or edema. Normal range of motion.      Cervical back: Normal range of motion and neck supple.     Neurological: She is alert and oriented to person, place, and time. She has normal strength. No sensory deficit.   Skin: Skin is warm and dry. No rash and no abscess noted. No erythema.   Psychiatric: She has a normal mood and affect.       ED Course   Procedures  Labs Reviewed   CBC W/ AUTO DIFFERENTIAL - Abnormal; Notable for the following components:       Result Value    Hemoglobin 10.8 (*)     Hematocrit 35.8 (*)     MCV 78 (*)     MCH 23.6 (*)     MCHC 30.2 (*)     RDW 17.2 (*)     Lymph # 0.7 (*)     Gran % 83.3 (*)     Lymph % 8.7 (*)     All other components within normal limits   COMPREHENSIVE METABOLIC PANEL - Abnormal; Notable for the following components:    Calcium 8.6 (*)     Alkaline Phosphatase 145 (*)      (*)     All other components within normal limits   LIPASE   URINALYSIS, REFLEX TO URINE CULTURE    Narrative:     Specimen Source->Urine   PREGNANCY TEST, URINE RAPID    Narrative:     Specimen Source->Urine          Imaging Results              CT Abdomen Pelvis With Contrast (Final result)  Result time 01/21/23 15:33:51      Final result by Arpita Reyna MD (01/21/23 15:33:51)                   Impression:      The patient is status post cholecystectomy with a minimal amount of fluid seen adjacent to the cholecystectomy site, this is within normal limits for what would be expected 8 postoperative from the surgery.  There is no evidence discrete intra-abdominal abscess.      Electronically signed by: Arpita Reyna MD  Date:    01/21/2023  Time:    15:33               Narrative:    EXAMINATION:  CT ABDOMEN PELVIS WITH  CONTRAST    CLINICAL HISTORY:  Abdominal abscess/infection suspected;    TECHNIQUE:  Low dose axial images, sagittal and coronal reformations were obtained from the lung bases to the pubic symphysis following the IV administration of 100 mL of Omnipaque 350 and the oral administration of 30 mL of Omnipaque 350.    COMPARISON:  12/20/2022    FINDINGS:  The lung bases are clear.  The liver, spleen, adrenal glands, kidneys and pancreas show normal contrasted appearance.  The patient is status post cholecystectomy.  A minimal amount of fluid is seen adjacent to the site without evidence defined, fluid collection.  The osseous structures are unremarkable.    There is no evidence bowel obstruction.  Motion does obscure portions of the abdomen.  The patient is status post appendectomy.  Bladder is distended.  There is no evidence of abdominal nor pelvic lymphadenopathy.                                       Medications   HYDROcodone-acetaminophen 5-325 mg per tablet 1 tablet (1 tablet Oral Not Given 1/21/23 1545)   morphine injection 6 mg (6 mg Intravenous Given 1/21/23 1256)   famotidine (PF) injection 20 mg (20 mg Intravenous Given 1/21/23 1311)   sodium chloride 0.9% bolus 1,000 mL 1,000 mL (0 mLs Intravenous Stopped 1/21/23 1401)   promethazine (PHENERGAN) 12.5 mg in dextrose 5 % 50 mL IVPB (0 mg Intravenous Stopped 1/21/23 1320)   iohexoL (OMNIPAQUE 350) 350 mg iodine/mL injection (100 mLs Intravenous Given 1/21/23 1517)   diphenhydrAMINE injection 25 mg (25 mg Intravenous Given 1/21/23 1452)   droperidoL injection 1.25 mg (1.25 mg Intravenous Given 1/21/23 1556)   HYDROmorphone (PF) injection 0.5 mg (0.5 mg Intravenous Given 1/21/23 1600)   docusate sodium capsule 100 mg (100 mg Oral Given 1/21/23 1628)     Medical Decision Making:   History:   Old Medical Records: I decided to obtain old medical records.  Old Records Summarized: records from clinic visits and records from previous admission(s).  Differential  Diagnosis:   Post-Op Pain   SBO   Anastomotic leak   Abscess  Perforation   Constipation   UTI     Clinical Tests:   Lab Tests: Ordered and Reviewed  ED Management:  Pt emergently evaluated for post-op abdominal pain.  Labs stable without elevated WBCs.  UA negative for infection.  Normal electrolytes.  Normal renal function.  Normal lipase.  Mildly elevated alk phos and ALT, but AST and Bilirubin normal.  CT abdomen/pelvis shows no evidence for abscess, perforation with minimal amount of fluid seen adjacent to the cholecystectomy site which radiologist believes to be normal limits for post-op day 8.  Pt states she has not had a BM since before her surgery and there may be a component of constipation contributing to her pain.  She has had symptomatic improvement with pain medication and anti-emetics here in the ED.  She is afebrile and vitals stable. Case is discussed with Dr. Reid (on-call for Dr. Goldman) and he doesn't feel any further emergent evaluation or admission is warranted at this time.  Pt is made aware of the findings.  She is encouraged to add Pepcid and Colace to her regimen at home. According to the , she was recently prescribed two different narcotic pain medications by her surgeon. If she is running low, she is encouraged to follow-up with general surgery on Monday for refill consideration.         Scribe Attestation:   Scribe #1: I performed the above scribed service and the documentation accurately describes the services I performed. I attest to the accuracy of the note.      ED Course as of 01/21/23 1704   Sat Jan 21, 2023   1339 Review of  show multiple recent opioid prescription with patient having adequate supply remaining with appropriate use. [MR]   7458 Spoke with Dr. Reid, on-call surgery, and he doesn't feel any further emergent surgical evaluation is indicated at this time.   [HS]      ED Course User Index  [HS] Rosa Carrera PA-C  [MR] Karol Rogers MD                I, Rosa Carrera PA-C, personally performed the services described in this documentation. All medical record entries made by the scribe were at my direction and in my presence.  I have reviewed the chart and agree that the record reflects my personal performance and is accurate and complete. Rosa Carrera PA-C.  5:05 PM 01/21/2023    Clinical Impression:   Final diagnoses:  [Z90.49] S/P laparoscopic cholecystectomy (Primary)  [R10.84] Generalized abdominal pain        ED Disposition Condition    Discharge Stable          ED Prescriptions       Medication Sig Dispense Start Date End Date Auth. Provider    docusate sodium (COLACE) 100 MG capsule Take 1 capsule (100 mg total) by mouth 2 (two) times daily as needed for Constipation. 60 capsule 1/21/2023 -- Rosa Carrera PA-C    famotidine (PEPCID) 20 MG tablet Take 1 tablet (20 mg total) by mouth 2 (two) times daily as needed for Heartburn. 60 tablet 1/21/2023 1/21/2024 Rosa Carrera PA-C          Follow-up Information       Follow up With Specialties Details Why Contact Info    [facility] Emergency Dept Emergency Medicine  As needed, If symptoms worsen [address] [place] [id number] [phone]    Yuniel Goldman MD General Surgery, Bariatrics, Surgery  follow-up next week [address] [facility] [address]  [facility] [address]  [phone]               Rosa Carrera PA-C  01/21/23 2719

## 2023-01-25 ENCOUNTER — OFFICE VISIT (OUTPATIENT)
Dept: SURGERY | Facility: CLINIC | Age: 28
End: 2023-01-25
Payer: MEDICAID

## 2023-01-25 VITALS
WEIGHT: 287.81 LBS | HEART RATE: 85 BPM | HEIGHT: 67 IN | DIASTOLIC BLOOD PRESSURE: 76 MMHG | RESPIRATION RATE: 16 BRPM | SYSTOLIC BLOOD PRESSURE: 122 MMHG | BODY MASS INDEX: 45.17 KG/M2 | TEMPERATURE: 98 F

## 2023-01-25 DIAGNOSIS — R19.7 DIARRHEA, UNSPECIFIED TYPE: Primary | ICD-10-CM

## 2023-01-25 PROCEDURE — 3078F DIAST BP <80 MM HG: CPT | Mod: CPTII,,, | Performed by: SURGERY

## 2023-01-25 PROCEDURE — 3078F PR MOST RECENT DIASTOLIC BLOOD PRESSURE < 80 MM HG: ICD-10-PCS | Mod: CPTII,,, | Performed by: SURGERY

## 2023-01-25 PROCEDURE — 99214 OFFICE O/P EST MOD 30 MIN: CPT | Mod: PBBFAC,PN | Performed by: SURGERY

## 2023-01-25 PROCEDURE — 3074F PR MOST RECENT SYSTOLIC BLOOD PRESSURE < 130 MM HG: ICD-10-PCS | Mod: CPTII,,, | Performed by: SURGERY

## 2023-01-25 PROCEDURE — 1159F PR MEDICATION LIST DOCUMENTED IN MEDICAL RECORD: ICD-10-PCS | Mod: CPTII,,, | Performed by: SURGERY

## 2023-01-25 PROCEDURE — 99999 PR PBB SHADOW E&M-EST. PATIENT-LVL IV: ICD-10-PCS | Mod: PBBFAC,,, | Performed by: SURGERY

## 2023-01-25 PROCEDURE — 99999 PR PBB SHADOW E&M-EST. PATIENT-LVL IV: CPT | Mod: PBBFAC,,, | Performed by: SURGERY

## 2023-01-25 PROCEDURE — 3008F BODY MASS INDEX DOCD: CPT | Mod: CPTII,,, | Performed by: SURGERY

## 2023-01-25 PROCEDURE — 99024 POSTOP FOLLOW-UP VISIT: CPT | Mod: ,,, | Performed by: SURGERY

## 2023-01-25 PROCEDURE — 3008F PR BODY MASS INDEX (BMI) DOCUMENTED: ICD-10-PCS | Mod: CPTII,,, | Performed by: SURGERY

## 2023-01-25 PROCEDURE — 1159F MED LIST DOCD IN RCRD: CPT | Mod: CPTII,,, | Performed by: SURGERY

## 2023-01-25 PROCEDURE — 99024 PR POST-OP FOLLOW-UP VISIT: ICD-10-PCS | Mod: ,,, | Performed by: SURGERY

## 2023-01-25 PROCEDURE — 3074F SYST BP LT 130 MM HG: CPT | Mod: CPTII,,, | Performed by: SURGERY

## 2023-01-25 RX ORDER — OXYCODONE AND ACETAMINOPHEN 10; 325 MG/1; MG/1
1 TABLET ORAL EVERY 8 HOURS PRN
Qty: 14 TABLET | Refills: 0 | Status: ON HOLD | OUTPATIENT
Start: 2023-01-25 | End: 2023-03-31 | Stop reason: HOSPADM

## 2023-01-25 RX ORDER — CHOLESTYRAMINE 4 G/9G
4 POWDER, FOR SUSPENSION ORAL
Qty: 270 PACKET | Refills: 0 | Status: ON HOLD | OUTPATIENT
Start: 2023-01-25 | End: 2023-05-20

## 2023-01-25 RX ORDER — PANTOPRAZOLE SODIUM 20 MG/1
20 TABLET, DELAYED RELEASE ORAL DAILY
Qty: 90 TABLET | Refills: 0 | Status: ON HOLD | OUTPATIENT
Start: 2023-01-25 | End: 2023-08-31 | Stop reason: HOSPADM

## 2023-01-25 NOTE — PROGRESS NOTES
Still having pain mainly with movement- out of pain meds but feels the meds helped some  Having some diarrhea- up to 6 times per day since going to ER  Has heartburn and nausea/vomiting- recently since ER    Vitals:    01/25/23 1018   BP: 122/76   Pulse: 85   Resp: 16   Temp: 98.2 °F (36.8 °C)         Abdomen with well healed incisions and benign    A/P    Heartburn- protonix  Post op pain getting better but still present- refill rx  Anemia- pcp- start iron pill  Elevated lfts- likely resolving inflammation   Diarrhea- questran- see gi  CT reviewed- no obvious complication    Saw left ovarian cyst at cholecystectomy- should follow up with gyn- spoke with patient about this

## 2023-01-27 ENCOUNTER — TELEPHONE (OUTPATIENT)
Dept: BARIATRICS | Facility: CLINIC | Age: 28
End: 2023-01-27

## 2023-01-27 NOTE — TELEPHONE ENCOUNTER
Called pt in regard to message below. Provided pt with Access Team Escalation Line ([phone]). Pt verbalized understanding and appreciation.     Jess Hester, SHONDA Brice Staff  Hi all, thank you for the Gi referral, but at this time our panel is full for Ms. Bishop insurance. Please refer to access health escalation line.

## 2023-03-22 ENCOUNTER — OCCUPATIONAL HEALTH (OUTPATIENT)
Dept: URGENT CARE | Facility: CLINIC | Age: 28
End: 2023-03-22
Payer: MEDICAID

## 2023-03-30 ENCOUNTER — HOSPITAL ENCOUNTER (OUTPATIENT)
Facility: HOSPITAL | Age: 28
Discharge: HOME | End: 2023-03-31
Attending: EMERGENCY MEDICINE | Admitting: STUDENT IN AN ORGANIZED HEALTH CARE EDUCATION/TRAINING PROGRAM
Payer: MEDICAID

## 2023-03-30 DIAGNOSIS — R10.9 ACUTE ABDOMINAL PAIN: Primary | ICD-10-CM

## 2023-03-30 DIAGNOSIS — R07.9 CHEST PAIN: ICD-10-CM

## 2023-03-30 LAB
ALBUMIN SERPL BCP-MCNC: 3.9 G/DL (ref 3.5–5.2)
ALP SERPL-CCNC: 71 U/L (ref 55–135)
ALT SERPL W/O P-5'-P-CCNC: 10 U/L (ref 10–44)
ANION GAP SERPL CALC-SCNC: 11 MMOL/L (ref 8–16)
AST SERPL-CCNC: 17 U/L (ref 10–40)
B-HCG UR QL: NEGATIVE
BASOPHILS # BLD AUTO: 0.06 K/UL (ref 0–0.2)
BASOPHILS NFR BLD: 0.5 % (ref 0–1.9)
BILIRUB SERPL-MCNC: 0.6 MG/DL (ref 0.1–1)
BILIRUB UR QL STRIP: NEGATIVE
BUN SERPL-MCNC: 9 MG/DL (ref 6–20)
CALCIUM SERPL-MCNC: 8.7 MG/DL (ref 8.7–10.5)
CHLORIDE SERPL-SCNC: 100 MMOL/L (ref 95–110)
CLARITY UR: CLEAR
CO2 SERPL-SCNC: 24 MMOL/L (ref 23–29)
COLOR UR: YELLOW
CREAT SERPL-MCNC: 0.6 MG/DL (ref 0.5–1.4)
CTP QC/QA: YES
DIFFERENTIAL METHOD: ABNORMAL
EOSINOPHIL # BLD AUTO: 0.1 K/UL (ref 0–0.5)
EOSINOPHIL NFR BLD: 0.8 % (ref 0–8)
ERYTHROCYTE [DISTWIDTH] IN BLOOD BY AUTOMATED COUNT: 16.1 % (ref 11.5–14.5)
EST. GFR  (NO RACE VARIABLE): >60 ML/MIN/1.73 M^2
GLUCOSE SERPL-MCNC: 109 MG/DL (ref 70–110)
GLUCOSE UR QL STRIP: NEGATIVE
HCT VFR BLD AUTO: 34.7 % (ref 37–48.5)
HGB BLD-MCNC: 10.7 G/DL (ref 12–16)
HGB UR QL STRIP: NEGATIVE
IMM GRANULOCYTES # BLD AUTO: 0.05 K/UL (ref 0–0.04)
IMM GRANULOCYTES NFR BLD AUTO: 0.4 % (ref 0–0.5)
KETONES UR QL STRIP: NEGATIVE
LACTATE SERPL-SCNC: 1.9 MMOL/L (ref 0.5–1.9)
LEUKOCYTE ESTERASE UR QL STRIP: NEGATIVE
LIPASE SERPL-CCNC: 31 U/L (ref 4–60)
LYMPHOCYTES # BLD AUTO: 1.1 K/UL (ref 1–4.8)
LYMPHOCYTES NFR BLD: 8.8 % (ref 18–48)
MAGNESIUM SERPL-MCNC: 1.6 MG/DL (ref 1.6–2.6)
MCH RBC QN AUTO: 24.2 PG (ref 27–31)
MCHC RBC AUTO-ENTMCNC: 30.8 G/DL (ref 32–36)
MCV RBC AUTO: 79 FL (ref 82–98)
MONOCYTES # BLD AUTO: 0.8 K/UL (ref 0.3–1)
MONOCYTES NFR BLD: 6.8 % (ref 4–15)
NEUTROPHILS # BLD AUTO: 9.9 K/UL (ref 1.8–7.7)
NEUTROPHILS NFR BLD: 82.7 % (ref 38–73)
NITRITE UR QL STRIP: NEGATIVE
NRBC BLD-RTO: 0 /100 WBC
PH UR STRIP: 7 [PH] (ref 5–8)
PLATELET # BLD AUTO: 322 K/UL (ref 150–450)
PMV BLD AUTO: 9.3 FL (ref 9.2–12.9)
POTASSIUM SERPL-SCNC: 3.7 MMOL/L (ref 3.5–5.1)
PROT SERPL-MCNC: 7.7 G/DL (ref 6–8.4)
PROT UR QL STRIP: NEGATIVE
RBC # BLD AUTO: 4.42 M/UL (ref 4–5.4)
SODIUM SERPL-SCNC: 135 MMOL/L (ref 136–145)
SP GR UR STRIP: 1.02 (ref 1–1.03)
URN SPEC COLLECT METH UR: NORMAL
UROBILINOGEN UR STRIP-ACNC: NEGATIVE EU/DL
WBC # BLD AUTO: 11.96 K/UL (ref 3.9–12.7)

## 2023-03-30 PROCEDURE — 83735 ASSAY OF MAGNESIUM: CPT | Performed by: EMERGENCY MEDICINE

## 2023-03-30 PROCEDURE — 96375 TX/PRO/DX INJ NEW DRUG ADDON: CPT

## 2023-03-30 PROCEDURE — 63600175 PHARM REV CODE 636 W HCPCS: Performed by: STUDENT IN AN ORGANIZED HEALTH CARE EDUCATION/TRAINING PROGRAM

## 2023-03-30 PROCEDURE — 93010 ELECTROCARDIOGRAM REPORT: CPT | Mod: ,,, | Performed by: SPECIALIST

## 2023-03-30 PROCEDURE — G0378 HOSPITAL OBSERVATION PER HR: HCPCS

## 2023-03-30 PROCEDURE — 25000003 PHARM REV CODE 250: Performed by: EMERGENCY MEDICINE

## 2023-03-30 PROCEDURE — 93010 EKG 12-LEAD: ICD-10-PCS | Mod: ,,, | Performed by: SPECIALIST

## 2023-03-30 PROCEDURE — 63600175 PHARM REV CODE 636 W HCPCS: Performed by: EMERGENCY MEDICINE

## 2023-03-30 PROCEDURE — 99285 EMERGENCY DEPT VISIT HI MDM: CPT | Mod: 25

## 2023-03-30 PROCEDURE — C9113 INJ PANTOPRAZOLE SODIUM, VIA: HCPCS | Performed by: STUDENT IN AN ORGANIZED HEALTH CARE EDUCATION/TRAINING PROGRAM

## 2023-03-30 PROCEDURE — 96376 TX/PRO/DX INJ SAME DRUG ADON: CPT

## 2023-03-30 PROCEDURE — 96374 THER/PROPH/DIAG INJ IV PUSH: CPT

## 2023-03-30 PROCEDURE — 81003 URINALYSIS AUTO W/O SCOPE: CPT | Performed by: EMERGENCY MEDICINE

## 2023-03-30 PROCEDURE — 80053 COMPREHEN METABOLIC PANEL: CPT | Performed by: EMERGENCY MEDICINE

## 2023-03-30 PROCEDURE — 85025 COMPLETE CBC W/AUTO DIFF WBC: CPT | Performed by: EMERGENCY MEDICINE

## 2023-03-30 PROCEDURE — 25000003 PHARM REV CODE 250: Performed by: HOSPITALIST

## 2023-03-30 PROCEDURE — 25000003 PHARM REV CODE 250: Performed by: STUDENT IN AN ORGANIZED HEALTH CARE EDUCATION/TRAINING PROGRAM

## 2023-03-30 PROCEDURE — 81025 URINE PREGNANCY TEST: CPT | Performed by: EMERGENCY MEDICINE

## 2023-03-30 PROCEDURE — 93005 ELECTROCARDIOGRAM TRACING: CPT | Performed by: SPECIALIST

## 2023-03-30 PROCEDURE — 83605 ASSAY OF LACTIC ACID: CPT | Performed by: STUDENT IN AN ORGANIZED HEALTH CARE EDUCATION/TRAINING PROGRAM

## 2023-03-30 PROCEDURE — 83690 ASSAY OF LIPASE: CPT | Performed by: EMERGENCY MEDICINE

## 2023-03-30 RX ORDER — OXYCODONE AND ACETAMINOPHEN 10; 325 MG/1; MG/1
1 TABLET ORAL EVERY 4 HOURS PRN
Status: DISCONTINUED | OUTPATIENT
Start: 2023-03-30 | End: 2023-03-31 | Stop reason: HOSPADM

## 2023-03-30 RX ORDER — POLYETHYLENE GLYCOL 3350 17 G/17G
17 POWDER, FOR SOLUTION ORAL DAILY
Status: DISCONTINUED | OUTPATIENT
Start: 2023-03-30 | End: 2023-03-31 | Stop reason: HOSPADM

## 2023-03-30 RX ORDER — ACETAMINOPHEN 325 MG/1
650 TABLET ORAL EVERY 8 HOURS PRN
Status: DISCONTINUED | OUTPATIENT
Start: 2023-03-30 | End: 2023-03-31 | Stop reason: HOSPADM

## 2023-03-30 RX ORDER — DOCUSATE SODIUM 100 MG/1
100 CAPSULE, LIQUID FILLED ORAL 2 TIMES DAILY PRN
Status: DISCONTINUED | OUTPATIENT
Start: 2023-03-30 | End: 2023-03-31 | Stop reason: HOSPADM

## 2023-03-30 RX ORDER — ONDANSETRON 2 MG/ML
4 INJECTION INTRAMUSCULAR; INTRAVENOUS EVERY 8 HOURS PRN
Status: DISCONTINUED | OUTPATIENT
Start: 2023-03-30 | End: 2023-03-31 | Stop reason: HOSPADM

## 2023-03-30 RX ORDER — HYDROMORPHONE HYDROCHLORIDE 1 MG/ML
1 INJECTION, SOLUTION INTRAMUSCULAR; INTRAVENOUS; SUBCUTANEOUS EVERY 6 HOURS PRN
Status: DISCONTINUED | OUTPATIENT
Start: 2023-03-30 | End: 2023-03-30

## 2023-03-30 RX ORDER — HYDROMORPHONE HYDROCHLORIDE 1 MG/ML
1 INJECTION, SOLUTION INTRAMUSCULAR; INTRAVENOUS; SUBCUTANEOUS ONCE
Status: COMPLETED | OUTPATIENT
Start: 2023-03-30 | End: 2023-03-30

## 2023-03-30 RX ORDER — SODIUM CHLORIDE 9 MG/ML
1000 INJECTION, SOLUTION INTRAVENOUS
Status: COMPLETED | OUTPATIENT
Start: 2023-03-30 | End: 2023-03-30

## 2023-03-30 RX ORDER — ONDANSETRON 2 MG/ML
4 INJECTION INTRAMUSCULAR; INTRAVENOUS
Status: COMPLETED | OUTPATIENT
Start: 2023-03-30 | End: 2023-03-30

## 2023-03-30 RX ORDER — HYDROMORPHONE HYDROCHLORIDE 1 MG/ML
1 INJECTION, SOLUTION INTRAMUSCULAR; INTRAVENOUS; SUBCUTANEOUS EVERY 4 HOURS PRN
Status: DISCONTINUED | OUTPATIENT
Start: 2023-03-30 | End: 2023-03-31 | Stop reason: HOSPADM

## 2023-03-30 RX ORDER — OXCARBAZEPINE 150 MG/1
300 TABLET, FILM COATED ORAL DAILY
Status: DISCONTINUED | OUTPATIENT
Start: 2023-03-30 | End: 2023-03-31 | Stop reason: HOSPADM

## 2023-03-30 RX ORDER — MORPHINE SULFATE 4 MG/ML
4 INJECTION, SOLUTION INTRAMUSCULAR; INTRAVENOUS
Status: COMPLETED | OUTPATIENT
Start: 2023-03-30 | End: 2023-03-30

## 2023-03-30 RX ORDER — PANTOPRAZOLE SODIUM 40 MG/10ML
40 INJECTION, POWDER, LYOPHILIZED, FOR SOLUTION INTRAVENOUS DAILY
Status: DISCONTINUED | OUTPATIENT
Start: 2023-03-30 | End: 2023-03-31 | Stop reason: HOSPADM

## 2023-03-30 RX ORDER — SODIUM CHLORIDE 0.9 % (FLUSH) 0.9 %
10 SYRINGE (ML) INJECTION
Status: DISCONTINUED | OUTPATIENT
Start: 2023-03-30 | End: 2023-03-31 | Stop reason: HOSPADM

## 2023-03-30 RX ORDER — CHOLESTYRAMINE 4 G/4.8G
4 POWDER, FOR SUSPENSION ORAL DAILY
Status: DISCONTINUED | OUTPATIENT
Start: 2023-03-30 | End: 2023-03-31 | Stop reason: HOSPADM

## 2023-03-30 RX ORDER — AMOXICILLIN 250 MG
1 CAPSULE ORAL 2 TIMES DAILY PRN
Status: DISCONTINUED | OUTPATIENT
Start: 2023-03-30 | End: 2023-03-31 | Stop reason: HOSPADM

## 2023-03-30 RX ORDER — PROMETHAZINE HYDROCHLORIDE 12.5 MG/1
12.5 TABLET ORAL EVERY 6 HOURS PRN
Status: DISCONTINUED | OUTPATIENT
Start: 2023-03-30 | End: 2023-03-31 | Stop reason: HOSPADM

## 2023-03-30 RX ORDER — TALC
6 POWDER (GRAM) TOPICAL NIGHTLY PRN
Status: DISCONTINUED | OUTPATIENT
Start: 2023-03-30 | End: 2023-03-31 | Stop reason: HOSPADM

## 2023-03-30 RX ORDER — OXYCODONE AND ACETAMINOPHEN 10; 325 MG/1; MG/1
1 TABLET ORAL EVERY 8 HOURS PRN
Status: DISCONTINUED | OUTPATIENT
Start: 2023-03-30 | End: 2023-03-30

## 2023-03-30 RX ADMIN — HYDROMORPHONE HYDROCHLORIDE 1 MG: 1 INJECTION, SOLUTION INTRAMUSCULAR; INTRAVENOUS; SUBCUTANEOUS at 06:03

## 2023-03-30 RX ADMIN — HYDROMORPHONE HYDROCHLORIDE 1 MG: 1 INJECTION, SOLUTION INTRAMUSCULAR; INTRAVENOUS; SUBCUTANEOUS at 12:03

## 2023-03-30 RX ADMIN — HYDROMORPHONE HYDROCHLORIDE 1 MG: 1 INJECTION, SOLUTION INTRAMUSCULAR; INTRAVENOUS; SUBCUTANEOUS at 02:03

## 2023-03-30 RX ADMIN — OXYCODONE HYDROCHLORIDE AND ACETAMINOPHEN 1 TABLET: 10; 325 TABLET ORAL at 09:03

## 2023-03-30 RX ADMIN — MORPHINE SULFATE 4 MG: 4 INJECTION, SOLUTION INTRAMUSCULAR; INTRAVENOUS at 09:03

## 2023-03-30 RX ADMIN — ONDANSETRON 4 MG: 2 INJECTION INTRAMUSCULAR; INTRAVENOUS at 07:03

## 2023-03-30 RX ADMIN — HYDROMORPHONE HYDROCHLORIDE 1 MG: 1 INJECTION, SOLUTION INTRAMUSCULAR; INTRAVENOUS; SUBCUTANEOUS at 10:03

## 2023-03-30 RX ADMIN — OXYCODONE HYDROCHLORIDE AND ACETAMINOPHEN 1 TABLET: 10; 325 TABLET ORAL at 04:03

## 2023-03-30 RX ADMIN — MORPHINE SULFATE 4 MG: 4 INJECTION, SOLUTION INTRAMUSCULAR; INTRAVENOUS at 07:03

## 2023-03-30 RX ADMIN — PANTOPRAZOLE SODIUM 40 MG: 40 INJECTION, POWDER, FOR SOLUTION INTRAVENOUS at 02:03

## 2023-03-30 RX ADMIN — SODIUM CHLORIDE 1000 ML: 0.9 INJECTION, SOLUTION INTRAVENOUS at 07:03

## 2023-03-30 RX ADMIN — ONDANSETRON 4 MG: 2 INJECTION INTRAMUSCULAR; INTRAVENOUS at 05:03

## 2023-03-30 RX ADMIN — PANTOPRAZOLE SODIUM 40 MG: 40 INJECTION, POWDER, FOR SOLUTION INTRAVENOUS at 10:03

## 2023-03-30 RX ADMIN — PROMETHAZINE HYDROCHLORIDE 12.5 MG: 12.5 TABLET ORAL at 07:03

## 2023-03-30 NOTE — NURSING
Patient arrived on unit in stable condition. Complaints of abdominal and pelvic pain. Will administer prn pain medication per orders. Patient noted to be agitated. All questions and concerns answered and thoughts acknowledged.

## 2023-03-30 NOTE — H&P
[facility] Medicine History & Physical Examination   Patient Name: Manisha Bishop  MRN: [id number]  Patient Class: OP- Observation   Admission Date: 3/30/2023  6:24 AM  Length of Stay: 0  Attending Physician: Riki Chavez MD  Primary Care Provider: Primary Doctor No  Face-to-Face encounter date: 2023  Code Status: Full Code  MPOA:  Chief Complaint: Abdominal Pain        HISTORY OF PRESENT ILLNESS:   Manisha Bishop is a 27 y.o. female with known history of appendectomy and cholecystectomy within the last 2-3 months, gerd, obesity, anxiety who is here with abdominal pain that is LLQ initially but now more diffuse.  The pain began yesterday, and worsened overnight.  Now patient has significant pain with any movement in bed, or light palpation.  She does not appear toxic.  Lab and imaging in the ER were unrevealing for any possible etiology.  Hospital medicine is consulted for admission mostly due to severity of symptoms.  History was obtained from the patient and ER physician Sign-out. Patient denies change in vision, hearing, headache, fever, cough, congestion, runny nose, chest pain, shortness of breath, palpitations, diarrhea, constipation, vomiting, dysuria, hematuria, joint pain and back pain.     REVIEW OF SYSTEMS:   10 Point Review of System was performed and was found to be negative except for that mentioned already in the HPI above.     PAST MEDICAL HISTORY:     Past Medical History:   Diagnosis Date    Anxiety     Bipolar 1 disorder     COVID-19 2021    GERD (gastroesophageal reflux disease)     Obese        PAST SURGICAL HISTORY:     Past Surgical History:   Procedure Laterality Date     SECTION  2022    LAPAROSCOPIC APPENDECTOMY N/A 2022    Procedure: APPENDECTOMY, LAPAROSCOPIC;  Surgeon: Yuniel Goldman MD;  Location: [facility];  Service: General;  Laterality: N/A;    LAPAROSCOPIC CHOLECYSTECTOMY N/A 2023    Procedure: CHOLECYSTECTOMY,  LAPAROSCOPIC;  Surgeon: Yuniel Goldman MD;  [facility];  Service: General;  Laterality: N/A;    LEG SURGERY      Broke her left lower leg.       ALLERGIES:   Iodine and iodide containing products, Ketorolac, and Vitamin e (d-alpha tocopherol)    FAMILY HISTORY:     Family History   Problem Relation Age of Onset    No Known Problems Mother     No Known Problems Father     Breast cancer Neg Hx     Colon cancer Neg Hx     Ovarian cancer Neg Hx        SOCIAL HISTORY:     Social History     Tobacco Use    Smoking status: Never    Smokeless tobacco: Never   Substance Use Topics    Alcohol use: Yes     Comment: occasionally        Social History     Substance and Sexual Activity   Sexual Activity Yes    Partners: Male    Birth control/protection: None        HOME MEDICATIONS:     Prior to Admission medications    Medication Sig Start Date End Date Taking? Authorizing Provider   ALPRAZolam (XANAX) 2 MG Tab  22   Historical Provider   cholestyramine (QUESTRAN) 4 gram packet Take 1 packet (4 g total) by mouth 3 (three) times daily with meals. 23  Yuniel Goldman MD   dextroamphetamine-amphetamine 30 mg Tab Take 1 tablet by mouth 4 (four) times daily. 22   Historical Provider   docusate sodium (COLACE) 100 MG capsule Take 1 capsule (100 mg total) by mouth 2 (two) times daily as needed for Constipation. 23   Rosa Carrera PA-C   famotidine (PEPCID) 20 MG tablet Take 1 tablet (20 mg total) by mouth 2 (two) times daily as needed for Heartburn. 23  Rosa Carrera PA-C   hyoscyamine (ANASPAZ,LEVSIN) 0.125 mg Tab Take 3 tablets (375 mcg total) by mouth every 6 (six) hours as needed (Abdominal pain). 21  Eric Mcrae III, MD   ondansetron (ZOFRAN-ODT) 4 MG TbDL Take 1 tablet (4 mg total) by mouth every 6 (six) hours as needed. 1/15/23   Junior Tello MD   OXcarbazepine (TRILEPTAL) 300 MG Tab SMARTSI Tablet(s) By Mouth Morning-Evening 22    "Historical Provider   oxyCODONE-acetaminophen (PERCOCET)  mg per tablet Take 1 tablet by mouth every 8 (eight) hours as needed for Pain. 1/25/23   Yuniel Goldman MD   pantoprazole (PROTONIX) 20 MG tablet Take 1 tablet (20 mg total) by mouth once daily. 1/25/23 4/25/23  Yuniel Goldman MD   promethazine (PHENERGAN) 12.5 MG Tab Take 1 tablet (12.5 mg total) by mouth 4 (four) times daily. 1/17/23   Yuniel Goldman MD   senna-docusate 8.6-50 mg (PERICOLACE) 8.6-50 mg per tablet Take 1 tablet by mouth 2 (two) times daily as needed for Constipation. 1/15/23   Junior Tello MD   albuterol (PROVENTIL/VENTOLIN HFA) 90 mcg/actuation inhaler Inhale 1-2 puffs into the lungs every 6 (six) hours as needed for Wheezing or Shortness of Breath. Rescue  Patient not taking: Reported on 7/19/2021 7/13/21 9/21/21  Georgina Garcia PA-C         PHYSICAL EXAM:   /63   Pulse 91   Temp 98.4 °F (36.9 °C) (Oral)   Resp 18   Ht 5' 7" (1.702 m)   Wt (!) 139.4 kg (307 lb 5.1 oz)   LMP 03/30/2023   SpO2 100%   Breastfeeding No   BMI 48.13 kg/m²   Vitals Reviewed  General appearance: uncomfortable  Skin: No Rash.   Neuro: Motor and sensory exams grossly intact. Good tone. Power in all 4 extremities 5/5.   HENT: Atraumatic head. Moist mucous membranes of oral cavity.  Eyes: Normal extraocular movements.   Lungs: Clear to auscultation bilaterally. No wheezing present.   Heart: Regular rate and rhythm. S1 and S2 present with no murmurs/gallop/rub. No pedal edema. No JVD present.   Abdomen: Soft, non-distended, +tender diffusely. Obese.  Bowel sounds are normal  Extremities: No cyanosis, clubbing.  Psych/mental status: Alert and oriented. Cooperative. Responds appropriately to questions.     EMERGENCY DEPARTMENT LABS AND IMAGING:     Labs Reviewed   CBC W/ AUTO DIFFERENTIAL - Abnormal; Notable for the following components:       Result Value    Hemoglobin 10.7 (*)     Hematocrit 34.7 (*)     MCV 79 (*)     " MCH 24.2 (*)     MCHC 30.8 (*)     RDW 16.1 (*)     Gran # (ANC) 9.9 (*)     Immature Grans (Abs) 0.05 (*)     Gran % 82.7 (*)     Lymph % 8.8 (*)     All other components within normal limits   COMPREHENSIVE METABOLIC PANEL - Abnormal; Notable for the following components:    Sodium 135 (*)     All other components within normal limits   LIPASE   MAGNESIUM   URINALYSIS, REFLEX TO URINE CULTURE    Narrative:     Specimen Source->Urine   LACTIC ACID, PLASMA   POCT URINE PREGNANCY       US Transvaginal Non OB   Final Result      CT Abdomen Pelvis  Without Contrast   Final Result          ASSESSMENT & PLAN:   Manisha Bishop is a 27 y.o. female admitted for abdominal pain with no obvious etiology    Active Hospital Problems    Diagnosis    *Abdominal pain          Plan    Abdominal pain  Sharp, initially LLQ now diffuse.    Abd/pelvis CT in ER is negative for acute problems  No fever, wbc is 12.  Baseline is around 7  Patient had a large cyst noted to left ovary during recent lap choley  Presentation is concerning for ovarian torsion  Will check pelvic US with doppler flow  Abd CT read state ovaries normal. ? Ruptured cyst  Follow up US  Consider OB/Gyn consult if torsion confirmed  Admit to med-tele for overnight monitoring  Patient appears fairly comfortable on initially entering room  She has also had many ER visits with abdominal pain, usually with no definite etiology.  Malingering is a possibility  Recently had appy and choley.    Prn pain and nausea meds  NPO pending US results  If patient decompensates overnight, consider repeat CT with contrast and surgery consult    Diet: npo    DVT Prophylaxis: SCD's while in bed and Encourage ambulation. OOB as tolerated.     Discharge Planning and Disposition:  Home independent    Expected LOS: 1-2    ________________________________________________________________________________    INPATIENT LIST OF MEDICATIONS     Current Facility-Administered Medications:      acetaminophen tablet 650 mg, 650 mg, Oral, Q8H PRN, Jose Chavez MD    acetaminophen tablet 650 mg, 650 mg, Oral, Q8H PRN, Jose Chavez MD    cholestyramine-aspartame 4 gram packet 4 g, 4 g, Oral, Daily, Jose Chavez MD    docusate sodium capsule 100 mg, 100 mg, Oral, BID PRN, Jose Chavez MD    HYDROmorphone injection 1 mg, 1 mg, Intravenous, Q4H PRN, Jose Chavez MD, 1 mg at 03/30/23 1811    melatonin tablet 6 mg, 6 mg, Oral, Nightly PRN, Jose Chavez MD    ondansetron injection 4 mg, 4 mg, Intravenous, Q8H PRN, Jose Chavez MD, 4 mg at 03/30/23 1756    OXcarbazepine tablet 300 mg, 300 mg, Oral, Daily, Jose Chavez MD    oxyCODONE-acetaminophen  mg per tablet 1 tablet, 1 tablet, Oral, Q8H PRN, Jose Chavez MD, 1 tablet at 03/30/23 1645    pantoprazole injection 40 mg, 40 mg, Intravenous, Daily, Jose Chavez MD, 40 mg at 03/30/23 1414    polyethylene glycol packet 17 g, 17 g, Oral, Daily, Jose Chavez MD    promethazine tablet 12.5 mg, 12.5 mg, Oral, Q6H PRN, Jose Chavez MD    senna-docusate 8.6-50 mg per tablet 1 tablet, 1 tablet, Oral, BID PRN, Jose Chavez MD    sodium chloride 0.9% flush 10 mL, 10 mL, Intravenous, PRN, Jose Chavez MD      Scheduled Meds:   cholestyramine-aspartame  4 g Oral Daily    OXcarbazepine  300 mg Oral Daily    pantoprazole  40 mg Intravenous Daily    polyethylene glycol  17 g Oral Daily     Continuous Infusions:  PRN Meds:.acetaminophen, acetaminophen, docusate sodium, HYDROmorphone, melatonin, ondansetron, oxyCODONE-acetaminophen, promethazine, senna-docusate 8.6-50 mg, sodium chloride 0.9%      Jose Chavez  [facility] Hospitalist  03/30/2023

## 2023-03-30 NOTE — ED NOTES
Awake/alert c/o 10/10 pain to general abdomen. Medicated per order. Denies n/v at this time. Educated on narcotic administration precautions. Instructed to call for assistance with ambulation.

## 2023-03-30 NOTE — ED PROVIDER NOTES
Encounter Date: 3/30/2023       History     Chief Complaint   Patient presents with    Abdominal Pain     27-year-old female presents emergency room with complaints of having abdominal pain that began yesterday morning and persisted.  Patient describes the pain as sharp and burning in nature.  She admitted the pain began primarily left lower quadrant and has now progressed to be generalized.  No fever or chills.  Last normal bowel movement was 2 hours prior to arrival.  No nausea vomiting or diarrhea.  No complaints of any dysuria or flank pain.  No history of hematuria other than the fact that she is currently on her menses for the last 3 days.  Patient admits that her pain is much worse when she is walking.  Appetite has been diminished.  No prior history of any known ovarian cysts or history of diverticular disease.  The patient is .    Review of patient's allergies indicates:   Allergen Reactions    Iodine and iodide containing products Hives    Ketorolac Hives     Pt verbalized I dont like it doesn't work on it.    Vitamin e (d-alpha tocopherol)      Hives (skin)^Black eyed peas and oatmeal causede hives     Past Medical History:   Diagnosis Date    Anxiety     Bipolar 1 disorder     COVID-19 2021    GERD (gastroesophageal reflux disease)     Obese      Past Surgical History:   Procedure Laterality Date     SECTION  2022    LAPAROSCOPIC APPENDECTOMY N/A 2022    Procedure: APPENDECTOMY, LAPAROSCOPIC;  Surgeon: Yuniel Goldman MD;  Location: [facility] OR;  Service: General;  Laterality: N/A;    LAPAROSCOPIC CHOLECYSTECTOMY N/A 2023    Procedure: CHOLECYSTECTOMY, LAPAROSCOPIC;  Surgeon: Yuniel Goldman MD;  Location: [facility] OR;  Service: General;  Laterality: N/A;    LEG SURGERY      Broke her left lower leg.     Family History   Problem Relation Age of Onset    No Known Problems Mother     No Known Problems Father     Breast cancer Neg Hx     Colon cancer Neg Hx      Ovarian cancer Neg Hx      Social History     Tobacco Use    Smoking status: Never    Smokeless tobacco: Never   Substance Use Topics    Alcohol use: Yes     Comment: occasionally    Drug use: No     Review of Systems   Constitutional:  Positive for activity change and appetite change. Negative for chills, diaphoresis and fever.   HENT: Negative.  Negative for sore throat.    Respiratory: Negative.  Negative for shortness of breath.    Cardiovascular: Negative.  Negative for chest pain.   Gastrointestinal:  Positive for abdominal pain. Negative for constipation, diarrhea, nausea and vomiting.   Genitourinary:  Negative for difficulty urinating and dysuria.   Musculoskeletal:  Negative for back pain.   Skin:  Negative for color change, pallor and rash.   Neurological:  Negative for dizziness, weakness and headaches.   All other systems reviewed and are negative.    Physical Exam     Initial Vitals   BP Pulse Resp Temp SpO2   03/30/23 0630 03/30/23 0630 03/30/23 0633 03/30/23 0633 03/30/23 0630   (!) 124/58 100 18 98.9 °F (37.2 °C) 100 %      MAP       --                Physical Exam    Vitals reviewed.  Constitutional: She appears well-developed and well-nourished. She is not diaphoretic. No distress.   Appearing acutely uncomfortable   HENT:   Head: Normocephalic and atraumatic.   Nose: Nose normal.   Mouth/Throat: Oropharynx is clear and moist. No oropharyngeal exudate.   Eyes: Conjunctivae are normal. Pupils are equal, round, and reactive to light.   Neck: Neck supple. No JVD present.   Normal range of motion.  Cardiovascular:  Normal rate, regular rhythm, normal heart sounds and intact distal pulses.     Exam reveals no gallop and no friction rub.       No murmur heard.  Pulmonary/Chest: Breath sounds normal. No respiratory distress. She has no wheezes. She has no rhonchi. She has no rales.   Abdominal: Bowel sounds are normal. She exhibits no distension. abdominal tenderness There is guarding. There is  no rebound.   Musculoskeletal:         General: No tenderness or edema. Normal range of motion.      Cervical back: Normal range of motion and neck supple.     Lymphadenopathy:     She has no cervical adenopathy.   Neurological: She is alert and oriented to person, place, and time. She has normal strength. GCS score is 15. GCS eye subscore is 4. GCS verbal subscore is 5. GCS motor subscore is 6.   Skin: Skin is warm and dry. Capillary refill takes less than 2 seconds. No rash noted. No erythema. No pallor.   Psychiatric: She has a normal mood and affect. Her behavior is normal. Judgment and thought content normal.       ED Course   Procedures  Labs Reviewed   CBC W/ AUTO DIFFERENTIAL - Abnormal; Notable for the following components:       Result Value    Hemoglobin 10.7 (*)     Hematocrit 34.7 (*)     MCV 79 (*)     MCH 24.2 (*)     MCHC 30.8 (*)     RDW 16.1 (*)     Gran # (ANC) 9.9 (*)     Immature Grans (Abs) 0.05 (*)     Gran % 82.7 (*)     Lymph % 8.8 (*)     All other components within normal limits   COMPREHENSIVE METABOLIC PANEL - Abnormal; Notable for the following components:    Sodium 135 (*)     All other components within normal limits   LIPASE   MAGNESIUM   URINALYSIS, REFLEX TO URINE CULTURE    Narrative:     Specimen Source->Urine   POCT URINE PREGNANCY          Imaging Results              CT Abdomen Pelvis  Without Contrast (Final result)  Result time 03/30/23 09:28:29   Procedure changed from CT Abdomen Pelvis With Contrast     Final result by Marisabel Valdez MD (03/30/23 09:28:29)                   Narrative:    CMS MANDATED QUALITY DATA - CT RADIATION - 436    All CT scans at this facility utilize dose modulation, iterative reconstruction, and/or weight based dosing when appropriate to reduce radiation dose to as low as reasonably achievable.    HISTORY: Abdominal pain    FINDINGS: Noncontrast axial images were obtained. Nonenhanced study is tailored for the detection of urolithiasis, and is  insensitive for abnormalities of the solid organs, vasculature and hollow viscera.    CT ABDOMEN: The lung bases are clear.  The liver, spleen and pancreas have a normal noncontrast appearance.  Gallbladder is absent  The adrenal glands are normal  The kidneys are symmetric in size without hydronephrosis or calculi.    There are no thick-walled or dilated bowel loops. The appendix is absent. There is no mesenteric or retroperitoneal adenopathy. The aorta is normal in caliber. There is no free fluid.    CT PELVIS: The uterus, ovaries and bladder are normal. There is no pelvic adenopathy. There are no acute osseous abnormalities.    IMPRESSION: Prior cholecystectomy and appendectomy with no acute abdominal abnormality.    Electronically signed by:  Marisabel Valdez MD  3/30/2023 9:28 AM CDT Workstation: [id number]                                     Medications   ondansetron injection 4 mg (4 mg Intravenous Given 3/30/23 0733)   morphine injection 4 mg (4 mg Intravenous Given 3/30/23 0735)   0.9%  NaCl infusion (1,000 mLs Intravenous New Bag 3/30/23 0732)   morphine injection 4 mg (4 mg Intravenous Given 3/30/23 0956)                Attending Attestation:             Attending ED Notes:   This 27-year-old female presented with complaints of abdominal pain is now diffuse and began yesterday, had a CT scan of the abdomen and pelvis without contrast in light of her iodine allergy which did not show any acute abnormalities.  Labs including CBC and chemistries and urinalysis are normal.  The patient's abdomen was re-evaluated and is still extremely tender to direct palpation.  The etiology of her symptoms has a this time on clear.  The patient in fact that I would also any unusual vaginal discharge prior to the onset of her recent menses.  As a consequence,  hospital Medicine will be consulted for admission.  The patient has had a prior cholecystectomy as well as appendectomy.    Differential diagnosis includes  diverticulitis, bowel obstruction, inflammatory bowel disease, ovarian cysts                 Clinical Impression:   Final diagnoses:  [R10.9] Acute abdominal pain (Primary)        ED Disposition Condition    Observation Stable                Ariel Jon Jr., MD  03/30/23 1571

## 2023-03-31 VITALS
HEIGHT: 67 IN | OXYGEN SATURATION: 98 % | DIASTOLIC BLOOD PRESSURE: 78 MMHG | HEART RATE: 81 BPM | BODY MASS INDEX: 45.99 KG/M2 | TEMPERATURE: 98 F | SYSTOLIC BLOOD PRESSURE: 120 MMHG | RESPIRATION RATE: 17 BRPM | WEIGHT: 293 LBS

## 2023-03-31 LAB — LACTATE SERPL-SCNC: 0.7 MMOL/L (ref 0.5–1.9)

## 2023-03-31 PROCEDURE — 63600175 PHARM REV CODE 636 W HCPCS: Performed by: STUDENT IN AN ORGANIZED HEALTH CARE EDUCATION/TRAINING PROGRAM

## 2023-03-31 PROCEDURE — 25000003 PHARM REV CODE 250: Performed by: STUDENT IN AN ORGANIZED HEALTH CARE EDUCATION/TRAINING PROGRAM

## 2023-03-31 PROCEDURE — 83605 ASSAY OF LACTIC ACID: CPT | Performed by: STUDENT IN AN ORGANIZED HEALTH CARE EDUCATION/TRAINING PROGRAM

## 2023-03-31 PROCEDURE — G0378 HOSPITAL OBSERVATION PER HR: HCPCS

## 2023-03-31 PROCEDURE — 36415 COLL VENOUS BLD VENIPUNCTURE: CPT | Performed by: STUDENT IN AN ORGANIZED HEALTH CARE EDUCATION/TRAINING PROGRAM

## 2023-03-31 PROCEDURE — 25000003 PHARM REV CODE 250: Performed by: HOSPITALIST

## 2023-03-31 PROCEDURE — 96376 TX/PRO/DX INJ SAME DRUG ADON: CPT

## 2023-03-31 RX ORDER — PROMETHAZINE HYDROCHLORIDE 12.5 MG/1
12.5 TABLET ORAL EVERY 6 HOURS PRN
Qty: 20 TABLET | Refills: 0 | Status: SHIPPED | OUTPATIENT
Start: 2023-03-31 | End: 2023-04-05

## 2023-03-31 RX ORDER — OXYCODONE AND ACETAMINOPHEN 10; 325 MG/1; MG/1
1 TABLET ORAL EVERY 6 HOURS PRN
Qty: 20 TABLET | Refills: 0 | Status: SHIPPED | OUTPATIENT
Start: 2023-03-31 | End: 2023-04-05

## 2023-03-31 RX ADMIN — OXYCODONE HYDROCHLORIDE AND ACETAMINOPHEN 1 TABLET: 10; 325 TABLET ORAL at 01:03

## 2023-03-31 RX ADMIN — HYDROMORPHONE HYDROCHLORIDE 1 MG: 1 INJECTION, SOLUTION INTRAMUSCULAR; INTRAVENOUS; SUBCUTANEOUS at 06:03

## 2023-03-31 RX ADMIN — PROMETHAZINE HYDROCHLORIDE 12.5 MG: 12.5 TABLET ORAL at 09:03

## 2023-03-31 RX ADMIN — OXYCODONE HYDROCHLORIDE AND ACETAMINOPHEN 1 TABLET: 10; 325 TABLET ORAL at 05:03

## 2023-03-31 RX ADMIN — OXYCODONE HYDROCHLORIDE AND ACETAMINOPHEN 1 TABLET: 10; 325 TABLET ORAL at 09:03

## 2023-03-31 RX ADMIN — HYDROMORPHONE HYDROCHLORIDE 1 MG: 1 INJECTION, SOLUTION INTRAMUSCULAR; INTRAVENOUS; SUBCUTANEOUS at 02:03

## 2023-03-31 RX ADMIN — ONDANSETRON 4 MG: 2 INJECTION INTRAMUSCULAR; INTRAVENOUS at 05:03

## 2023-03-31 RX ADMIN — HYDROMORPHONE HYDROCHLORIDE 1 MG: 1 INJECTION, SOLUTION INTRAMUSCULAR; INTRAVENOUS; SUBCUTANEOUS at 10:03

## 2023-03-31 NOTE — NURSING
Discharged for home. Verbalized understanding of discharge instructions. Pt. Became upset over not receiving 4 briefs at discharge.2 briefs were given to her. Stated she will report nurses and charge nurse.

## 2023-03-31 NOTE — NURSING
Patient telebox placed on several times. Patient refusing to place telebox in gown tele pocket. Patient leads reapplied.    no weight-bearing restrictions

## 2023-03-31 NOTE — PLAN OF CARE
Pt clear for discharge from CM standpoint. Discharging home with no new needs       03/31/23 1137   Final Note   Assessment Type Final Discharge Note   Anticipated Discharge Disposition Home

## 2023-03-31 NOTE — DISCHARGE SUMMARY
[facility]  Discharge Summary  Patient Name: Manisha Bishop MRN: [id number]   Patient Class: OP- Observation  Length of Stay: 0   Admission Date: 3/30/2023  6:24 AM Attending Physician: Jose Chavez MD   Primary Care Provider: Primary Doctor No Face-to-Face encounter date: 03/31/2023   Chief Complaint: Abdominal Pain    Date of Discharge: 3/31/2023  Discharge Disposition:Home or Self Care   Condition: Stable       Reason for Hospitalization     Active Hospital Problems    Diagnosis    *Abdominal pain         Brief History of Present Illness     Manisha Bishop is a 27 y.o. female with known history of appendectomy and cholecystectomy within the last 2-3 months, gerd, obesity, anxiety who is here with abdominal pain that is LLQ initially but now more diffuse.  The pain began yesterday, and worsened overnight.  Now patient has significant pain with any movement in bed, or light palpation.  She does not appear toxic.  Lab and imaging in the ER were unrevealing for any possible etiology.  Hospital medicine is consulted for admission mostly due to severity of symptoms.  History was obtained from the patient and ER physician Sign-out. Patient denies change in vision, hearing, headache, fever, cough, congestion, runny nose, chest pain, shortness of breath, palpitations, diarrhea, constipation, dysuria, hematuria, joint pain and back pain.     For the full HPI please refer to the History & Physical from this admission.    Hospital Course By Problem with Pertinent Findings     Abdominal pain  Sharp, initially LLQ now diffuse.    Abd/pelvis CT in ER is negative for acute problems  No fever, wbc is 12.  Baseline is around 7  Patient had a large cyst noted to left ovary during recent lap choley  Presentation is concerning for ovarian torsion  Will check pelvic US with doppler flow  Abd CT read state ovaries normal. ? Ruptured cyst  Follow up US  OB/Gyn consult if torsion confirmed  Admit to med-tele  "for overnight monitoring  Patient appears fairly comfortable on initially seeing her in ER  Again appears comfortable, on phone but states 10/10 pain  She says she has been nauseated and vomited earlier  No emesis seen by any staff  She has also had many ER visits with abdominal pain, usually with no definite etiology.  Malingering is a possibility  After overnight monitoring, malingering is more likely  Recently had kandi and amosey.    Prn pain and nausea meds  I spoke with ob/gyn and they reviewed case and images, do not suspect ovarian torsion or other pathology.  Recommend outpt follow up with her established ob/gyn physician.    Follow up scheduled.         Patient was seen and examined on the date of discharge and determined to be suitable for discharge.    Physical Exam  BP 99/76   Pulse 94   Temp 97.9 °F (36.6 °C)   Resp 18   Ht 5' 7" (1.702 m)   Wt (!) 139.4 kg (307 lb 5.1 oz)   LMP 03/30/2023   SpO2 97%   Breastfeeding No   BMI 48.13 kg/m²   Vitals reviewed.    Constitutional: No distress.   HENT: Atraumatic.   Cardiovascular: Normal rate, regular rhythm.  S1 S2.    Pulmonary/Chest: Effort normal. Clear to auscultation bilaterally. No wheezes.   Abdominal: Soft. Bowel sounds are normal. Exhibits no distension and no mass.   Neurological: Alert.   Skin: Skin is warm and dry.     Following labs were Reviewed   No results for input(s): WBC, HGB, HCT, PLT, GLUCOSE, CALCIUM, ALBUMIN, PROT, NA, K, CO2, CL, BUN, CREATININE, ALKPHOS, ALT, AST, BILITOT in the last 24 hours.  No results found for: POCTGLUCOSE     All labs within the past 24 hours have been reviewed    Microbiology Results (last 7 days)       ** No results found for the last 168 hours. **          US Transvaginal Non OB   Final Result      CT Abdomen Pelvis  Without Contrast   Final Result          No results found for this or any previous visit.      Consultants and Procedures   Consultants:  Consults (From admission, onward)          " Status Ordering Provider     Inpatient consult to OB/GYN  Once        Provider:  Alicia Ochoa MD    Ordered MICKEY MILES            Procedures:   * No surgery found *     Discharge Information:   Diet:  Resume regular    Physical Activity:  Activity as tolerated    Instructions:  1. Take all medications as prescribed  2. Keep all follow-up appointments  3. Return to the hospital or call your primary care physicians if any worsening symptoms such as chest pain, shortness of breath, bleeding,  intractable pain, fever >101 occur.      Follow-Up Appointments:  Please call your primary care physician to schedule an appointment in 1 week time.     Follow-up Information       Brooklyn Gross PA-C Follow up in 1 week(s).    Specialty: Obstetrics and Gynecology  Contact information:  [address] [place] [address]  [address] [place] [id number]  [phone]                               Pending laboratory work/Tests to be performed/followed by the Primary care Physician:    The patient was discharged with discharge instructions reviewed in written and verbal form. All questions were answered and prescriptions were provided. The importance of making follow up appointments and compliance of medications has been emphasized. The patient will follow up in 1 week or sooner as needed with the PCP. Tthe patient understands and agrees with the plan. Upon discharge, patient needs to be on following medications.    Discharge Medications:     Medication List        CHANGE how you take these medications      oxyCODONE-acetaminophen  mg per tablet  Commonly known as: PERCOCET  Take 1 tablet by mouth every 6 (six) hours as needed for Pain.  What changed: when to take this     promethazine 12.5 MG Tab  Commonly known as: PHENERGAN  Take 1 tablet (12.5 mg total) by mouth every 6 (six) hours as needed (nausea).  What changed:   when to take this  reasons to take this            CONTINUE taking these medications      ALPRAZolam 2 MG  Tab  Commonly known as: XANAX     cholestyramine 4 gram packet  Commonly known as: QUESTRAN  Take 1 packet (4 g total) by mouth 3 (three) times daily with meals.     dextroamphetamine-amphetamine 30 mg Tab     docusate sodium 100 MG capsule  Commonly known as: COLACE  Take 1 capsule (100 mg total) by mouth 2 (two) times daily as needed for Constipation.     famotidine 20 MG tablet  Commonly known as: PEPCID  Take 1 tablet (20 mg total) by mouth 2 (two) times daily as needed for Heartburn.     hyoscyamine 0.125 mg Tab  Commonly known as: ANASPAZ,LEVSIN  Take 3 tablets (375 mcg total) by mouth every 6 (six) hours as needed (Abdominal pain).     ondansetron 4 MG Tbdl  Commonly known as: ZOFRAN-ODT  Take 1 tablet (4 mg total) by mouth every 6 (six) hours as needed.     OXcarbazepine 300 MG Tab  Commonly known as: TRILEPTAL     pantoprazole 20 MG tablet  Commonly known as: PROTONIX  Take 1 tablet (20 mg total) by mouth once daily.     senna-docusate 8.6-50 mg 8.6-50 mg per tablet  Commonly known as: PERICOLACE  Take 1 tablet by mouth 2 (two) times daily as needed for Constipation.               Where to Get Your Medications        These medications were sent to [id number] - [place], [place] - [address] AT [facility]  [address], [place] [address]      Phone: [phone]   promethazine 12.5 MG Tab       Information about where to get these medications is not yet available    Ask your nurse or doctor about these medications  oxyCODONE-acetaminophen  mg per tablet           I spent 25 minutes preparing the discharge for this patient including reviewing records from previous encounters, preparation of discharge summary, assessing and final examination of the patient, discharge medicine reconciliation, discussing plan of care, follow up and education and prescriptions.       Jose Chavez  [facility] Hospitalist

## 2023-04-06 ENCOUNTER — TELEPHONE (OUTPATIENT)
Dept: BARIATRICS | Facility: CLINIC | Age: 28
End: 2023-04-06
Payer: MEDICAID

## 2023-04-06 ENCOUNTER — HOSPITAL ENCOUNTER (EMERGENCY)
Facility: HOSPITAL | Age: 28
Discharge: HOME | End: 2023-04-06
Attending: STUDENT IN AN ORGANIZED HEALTH CARE EDUCATION/TRAINING PROGRAM
Payer: MEDICAID

## 2023-04-06 DIAGNOSIS — J18.9 PNEUMONIA OF RIGHT LOWER LOBE DUE TO INFECTIOUS ORGANISM: ICD-10-CM

## 2023-04-06 DIAGNOSIS — R10.11 RIGHT UPPER QUADRANT ABDOMINAL PAIN: Primary | ICD-10-CM

## 2023-04-06 LAB
ALBUMIN SERPL BCP-MCNC: 3.7 G/DL (ref 3.5–5.2)
ALP SERPL-CCNC: 92 U/L (ref 55–135)
ALT SERPL W/O P-5'-P-CCNC: 11 U/L (ref 10–44)
ANION GAP SERPL CALC-SCNC: 11 MMOL/L (ref 8–16)
AST SERPL-CCNC: 12 U/L (ref 10–40)
B-HCG UR QL: NEGATIVE
BASOPHILS # BLD AUTO: 0.08 K/UL (ref 0–0.2)
BASOPHILS NFR BLD: 0.9 % (ref 0–1.9)
BILIRUB DIRECT SERPL-MCNC: 0.3 MG/DL (ref 0.1–0.3)
BILIRUB SERPL-MCNC: 1 MG/DL (ref 0.1–1)
BUN SERPL-MCNC: 7 MG/DL (ref 6–20)
CALCIUM SERPL-MCNC: 9.1 MG/DL (ref 8.7–10.5)
CHLORIDE SERPL-SCNC: 102 MMOL/L (ref 95–110)
CO2 SERPL-SCNC: 22 MMOL/L (ref 23–29)
CREAT SERPL-MCNC: 0.7 MG/DL (ref 0.5–1.4)
D DIMER PPP IA.FEU-MCNC: 0.45 MG/L FEU
DIFFERENTIAL METHOD: ABNORMAL
EOSINOPHIL # BLD AUTO: 0.1 K/UL (ref 0–0.5)
EOSINOPHIL NFR BLD: 0.7 % (ref 0–8)
ERYTHROCYTE [DISTWIDTH] IN BLOOD BY AUTOMATED COUNT: 15.6 % (ref 11.5–14.5)
EST. GFR  (NO RACE VARIABLE): >60 ML/MIN/1.73 M^2
GLUCOSE SERPL-MCNC: 86 MG/DL (ref 70–110)
HCT VFR BLD AUTO: 35.6 % (ref 37–48.5)
HGB BLD-MCNC: 11.2 G/DL (ref 12–16)
IMM GRANULOCYTES # BLD AUTO: 0.07 K/UL (ref 0–0.04)
IMM GRANULOCYTES NFR BLD AUTO: 0.7 % (ref 0–0.5)
LIPASE SERPL-CCNC: 14 U/L (ref 4–60)
LYMPHOCYTES # BLD AUTO: 1.7 K/UL (ref 1–4.8)
LYMPHOCYTES NFR BLD: 18.4 % (ref 18–48)
MCH RBC QN AUTO: 23.9 PG (ref 27–31)
MCHC RBC AUTO-ENTMCNC: 31.5 G/DL (ref 32–36)
MCV RBC AUTO: 76 FL (ref 82–98)
MONOCYTES # BLD AUTO: 1 K/UL (ref 0.3–1)
MONOCYTES NFR BLD: 10.4 % (ref 4–15)
NEUTROPHILS # BLD AUTO: 6.5 K/UL (ref 1.8–7.7)
NEUTROPHILS NFR BLD: 68.9 % (ref 38–73)
NRBC BLD-RTO: 0 /100 WBC
PLATELET # BLD AUTO: 365 K/UL (ref 150–450)
PMV BLD AUTO: 9.3 FL (ref 9.2–12.9)
POTASSIUM SERPL-SCNC: 3.5 MMOL/L (ref 3.5–5.1)
PROT SERPL-MCNC: 8 G/DL (ref 6–8.4)
RBC # BLD AUTO: 4.69 M/UL (ref 4–5.4)
SODIUM SERPL-SCNC: 135 MMOL/L (ref 136–145)
WBC # BLD AUTO: 9.4 K/UL (ref 3.9–12.7)

## 2023-04-06 PROCEDURE — 36415 COLL VENOUS BLD VENIPUNCTURE: CPT | Performed by: STUDENT IN AN ORGANIZED HEALTH CARE EDUCATION/TRAINING PROGRAM

## 2023-04-06 PROCEDURE — 85025 COMPLETE CBC W/AUTO DIFF WBC: CPT | Performed by: STUDENT IN AN ORGANIZED HEALTH CARE EDUCATION/TRAINING PROGRAM

## 2023-04-06 PROCEDURE — 85379 FIBRIN DEGRADATION QUANT: CPT | Performed by: STUDENT IN AN ORGANIZED HEALTH CARE EDUCATION/TRAINING PROGRAM

## 2023-04-06 PROCEDURE — 96375 TX/PRO/DX INJ NEW DRUG ADDON: CPT

## 2023-04-06 PROCEDURE — 80076 HEPATIC FUNCTION PANEL: CPT | Performed by: STUDENT IN AN ORGANIZED HEALTH CARE EDUCATION/TRAINING PROGRAM

## 2023-04-06 PROCEDURE — 96374 THER/PROPH/DIAG INJ IV PUSH: CPT

## 2023-04-06 PROCEDURE — 81003 URINALYSIS AUTO W/O SCOPE: CPT | Performed by: STUDENT IN AN ORGANIZED HEALTH CARE EDUCATION/TRAINING PROGRAM

## 2023-04-06 PROCEDURE — 63600175 PHARM REV CODE 636 W HCPCS: Performed by: STUDENT IN AN ORGANIZED HEALTH CARE EDUCATION/TRAINING PROGRAM

## 2023-04-06 PROCEDURE — 25500020 PHARM REV CODE 255

## 2023-04-06 PROCEDURE — 83690 ASSAY OF LIPASE: CPT | Performed by: STUDENT IN AN ORGANIZED HEALTH CARE EDUCATION/TRAINING PROGRAM

## 2023-04-06 PROCEDURE — 81025 URINE PREGNANCY TEST: CPT | Performed by: STUDENT IN AN ORGANIZED HEALTH CARE EDUCATION/TRAINING PROGRAM

## 2023-04-06 PROCEDURE — 99285 EMERGENCY DEPT VISIT HI MDM: CPT | Mod: 25

## 2023-04-06 PROCEDURE — 80048 BASIC METABOLIC PNL TOTAL CA: CPT | Performed by: STUDENT IN AN ORGANIZED HEALTH CARE EDUCATION/TRAINING PROGRAM

## 2023-04-06 RX ORDER — DROPERIDOL 2.5 MG/ML
1.25 INJECTION, SOLUTION INTRAMUSCULAR; INTRAVENOUS ONCE
Status: COMPLETED | OUTPATIENT
Start: 2023-04-06 | End: 2023-04-06

## 2023-04-06 RX ORDER — DIPHENHYDRAMINE HYDROCHLORIDE 50 MG/ML
25 INJECTION INTRAMUSCULAR; INTRAVENOUS
Status: COMPLETED | OUTPATIENT
Start: 2023-04-06 | End: 2023-04-06

## 2023-04-06 RX ORDER — LEVOFLOXACIN 750 MG/1
750 TABLET ORAL DAILY
Qty: 5 TABLET | Refills: 0 | Status: SHIPPED | OUTPATIENT
Start: 2023-04-06 | End: 2023-04-11

## 2023-04-06 RX ORDER — IBUPROFEN 800 MG/1
800 TABLET ORAL EVERY 6 HOURS PRN
Qty: 20 TABLET | Refills: 0 | Status: ON HOLD | OUTPATIENT
Start: 2023-04-06 | End: 2023-08-31 | Stop reason: HOSPADM

## 2023-04-06 RX ADMIN — DIPHENHYDRAMINE HYDROCHLORIDE 25 MG: 50 INJECTION INTRAMUSCULAR; INTRAVENOUS at 10:04

## 2023-04-06 RX ADMIN — IOHEXOL 100 ML: 350 INJECTION, SOLUTION INTRAVENOUS at 10:04

## 2023-04-06 RX ADMIN — METHYLPREDNISOLONE SODIUM SUCCINATE 60 MG: 40 INJECTION, POWDER, FOR SOLUTION INTRAMUSCULAR; INTRAVENOUS at 10:04

## 2023-04-06 RX ADMIN — DROPERIDOL 1.25 MG: 2.5 INJECTION, SOLUTION INTRAMUSCULAR; INTRAVENOUS at 09:04

## 2023-04-06 NOTE — TELEPHONE ENCOUNTER
Returned call to pt. No answer, LMOM    ----- Message from Fede Bobo sent at 4/6/2023 12:51 PM CDT -----  Regarding: paperwork,  Contact: patient  Patient want to know if office can complete paperwork today, patient requesting to speak with nurse, please call back at [phone] (home)

## 2023-04-07 VITALS
HEIGHT: 67 IN | TEMPERATURE: 99 F | RESPIRATION RATE: 18 BRPM | DIASTOLIC BLOOD PRESSURE: 78 MMHG | OXYGEN SATURATION: 96 % | BODY MASS INDEX: 40.81 KG/M2 | WEIGHT: 260 LBS | SYSTOLIC BLOOD PRESSURE: 126 MMHG | HEART RATE: 100 BPM

## 2023-04-07 LAB
BILIRUB UR QL STRIP: NEGATIVE
CLARITY UR: CLEAR
COLOR UR: YELLOW
GLUCOSE UR QL STRIP: NEGATIVE
HGB UR QL STRIP: NEGATIVE
KETONES UR QL STRIP: NEGATIVE
LEUKOCYTE ESTERASE UR QL STRIP: NEGATIVE
NITRITE UR QL STRIP: NEGATIVE
PH UR STRIP: 6 [PH] (ref 5–8)
PROT UR QL STRIP: NEGATIVE
SP GR UR STRIP: 1.02 (ref 1–1.03)
URN SPEC COLLECT METH UR: NORMAL
UROBILINOGEN UR STRIP-ACNC: NEGATIVE EU/DL

## 2023-04-07 NOTE — ED PROVIDER NOTES
Encounter Date: 2023    SCRIBE #1 NOTE: I, Kulwant aCban, am scribing for, and in the presence of,  Gaston Mckeon MD.     History     Chief Complaint   Patient presents with    Abdominal Pain     RUQ abd pain, onset today.      Time seen by provider: 8:14 PM on 2023    Manisha Bishop is a 27 y.o. female who presents to the ED with an onset of upper right abdominal pain starting today. The patient reports taking Ibuprofen with no relief. The patient states the pain feels similar to when she had her gallbladder removed. The patient denies any other symptoms at this time. The patient has no pertinent PMHx. The patient has a PSHx of laparoscopic appendectomy and cholecystectomy.    The history is provided by the patient.   Review of patient's allergies indicates:   Allergen Reactions    Iodine and iodide containing products Hives    Ketorolac Hives     Pt verbalized I dont like it doesn't work on it.    Vitamin e (d-alpha tocopherol)      Hives (skin)^Black eyed peas and oatmeal causede hives     Past Medical History:   Diagnosis Date    Anxiety     Bipolar 1 disorder     COVID-19 2021    GERD (gastroesophageal reflux disease)     Obese      Past Surgical History:   Procedure Laterality Date     SECTION  2022    LAPAROSCOPIC APPENDECTOMY N/A 2022    Procedure: APPENDECTOMY, LAPAROSCOPIC;  Surgeon: Yuniel Goldman MD;  Location: [facility] OR;  Service: General;  Laterality: N/A;    LAPAROSCOPIC CHOLECYSTECTOMY N/A 2023    Procedure: CHOLECYSTECTOMY, LAPAROSCOPIC;  Surgeon: Yuniel Goldman MD;  Location: [facility] OR;  Service: General;  Laterality: N/A;    LEG SURGERY      Broke her left lower leg.     Family History   Problem Relation Age of Onset    No Known Problems Mother     No Known Problems Father     Breast cancer Neg Hx     Colon cancer Neg Hx     Ovarian cancer Neg Hx      Social History     Tobacco Use    Smoking status: Never    Smokeless tobacco: Never   Substance  Use Topics    Alcohol use: Yes     Comment: occasionally    Drug use: No     Review of Systems   Constitutional:  Negative for fever.   HENT:  Negative for sore throat.    Respiratory:  Negative for shortness of breath.    Cardiovascular:  Negative for chest pain.   Gastrointestinal:  Positive for abdominal pain (upper right). Negative for nausea.   Genitourinary:  Negative for dysuria.   Musculoskeletal:  Negative for back pain.   Skin:  Negative for rash.   Neurological:  Negative for weakness.   Hematological:  Does not bruise/bleed easily.     Physical Exam     Initial Vitals [04/06/23 1956]   BP Pulse Resp Temp SpO2   (!) 131/92 (!) 115 18 100 °F (37.8 °C) 98 %      MAP       --         Physical Exam    Nursing note and vitals reviewed.  Constitutional: She appears well-developed and well-nourished. No distress.   HENT:   Head: Normocephalic and atraumatic.   Right Ear: External ear normal.   Left Ear: External ear normal.   Nose: Nose normal.   Mouth/Throat: Oropharynx is clear and moist.   Eyes: Conjunctivae and EOM are normal. Pupils are equal, round, and reactive to light.   Neck: Neck supple.   Normal range of motion.  Cardiovascular:  Normal rate, regular rhythm, normal heart sounds and intact distal pulses.     Exam reveals no gallop and no friction rub.       No murmur heard.  Pulmonary/Chest: Effort normal and breath sounds normal. No stridor. No respiratory distress. She has no wheezes. She has no rhonchi. She has no rales. She exhibits tenderness (right distal).   Abdominal: Abdomen is soft. Bowel sounds are normal. She exhibits no distension. There is no hepatomegaly. There is abdominal tenderness (Epigastric/right upper quadrant) in the right upper quadrant. There is no rebound and no guarding.   Musculoskeletal:         General: No tenderness or edema. Normal range of motion.      Cervical back: Normal range of motion and neck supple.     Neurological: She is alert and oriented to person, place,  and time. She has normal strength. No cranial nerve deficit or sensory deficit.   Skin: Skin is warm and dry. No rash noted. No erythema.   Psychiatric: She has a normal mood and affect. Thought content normal.       ED Course   Procedures  Labs Reviewed   CBC W/ AUTO DIFFERENTIAL - Abnormal; Notable for the following components:       Result Value    Hemoglobin 11.2 (*)     Hematocrit 35.6 (*)     MCV 76 (*)     MCH 23.9 (*)     MCHC 31.5 (*)     RDW 15.6 (*)     Immature Granulocytes 0.7 (*)     Immature Grans (Abs) 0.07 (*)     All other components within normal limits   BASIC METABOLIC PANEL - Abnormal; Notable for the following components:    Sodium 135 (*)     CO2 22 (*)     All other components within normal limits   LIPASE   HEPATIC FUNCTION PANEL   D DIMER, QUANTITATIVE   PREGNANCY TEST, URINE RAPID    Narrative:     Specimen Source->Urine   URINALYSIS, REFLEX TO URINE CULTURE          Imaging Results              CT Abdomen Pelvis With Contrast (Final result)  Result time 04/06/23 22:55:21      Final result by Gage Mendiola DO (04/06/23 22:55:21)                   Impression:      1. No specific etiology to explain patient's abdominal pain.  2. Airspace opacities in lung bases, right greater than left, which may represent multifocal infection or atelectasis.  3. Hepatosplenomegaly.      Electronically signed by: Gage Mendiola  Date:    04/06/2023  Time:    22:55               Narrative:    EXAMINATION:  CT ABDOMEN PELVIS WITH CONTRAST    CLINICAL HISTORY:  Abdominal pain, post-op;Epigastric pain;    TECHNIQUE:  Axial CT images with sagittal and coronal reformats were obtained of the abdomen and pelvis from the hemidiaphragms through the symphysis pubis after the administration of 100mL Omnipaque 350.    COMPARISON:  CT of the abdomen and pelvis from 03/30/2023.    FINDINGS:  Lung Bases: There are airspace opacities in the right middle lobe, the right lower lobe and to a lesser extent in the left lower  lobe.  There is a small right pleural effusion.    Heart: Heart size is normal.  No pericardial effusion.    Liver: The liver is enlarged.  No focal lesions.  The portal vasculature is patent.    Biliary tract: No intrahepatic or extrahepatic biliary ductal dilatation.    Gallbladder: The gallbladder is surgically absent.    Pancreas: Normal. No pancreatic ductal dilatation.    Spleen: The spleen is enlarged measuring up to approximately 15.5 cm.  There are no focal splenic lesions.  There is a splenule.    Adrenals: Unremarkable.    Kidneys and urinary collecting systems: Normal.  No hydronephrosis or urolithiasis.    Lymph nodes: None enlarged.    Stomach and bowel: The stomach is normal.  Loops of small and large bowel are normal in caliber without evidence for inflammation or obstruction.  The appendix is not definitively seen, may be surgically absent.    Peritoneum and mesentery: No ascites or free intraperitoneal air.  No abdominal fluid collection.    Vasculature: No aneurysm or significant atherosclerosis.    Urinary bladder: No wall thickening.    Reproductive organs: The uterus and adnexae are unremarkable.    Body wall: There is a fat containing umbilical hernia.    Musculoskeletal: No aggressive osseous lesion.                                       Medications   droPERidol injection 1.25 mg (1.25 mg Intravenous Given 4/6/23 2102)   methylPREDNISolone sodium succinate injection 60 mg (60 mg Intravenous Given 4/6/23 2212)   diphenhydrAMINE injection 25 mg (25 mg Intravenous Given 4/6/23 2212)   iohexoL (OMNIPAQUE 350) 350 mg iodine/mL injection (100 mLs  Given 4/6/23 2249)     Medical Decision Making:   History:   Old Medical Records: I decided to obtain old medical records.  Old Records Summarized: other records.       <> Summary of Records: Surgical notes and recent discharge summary  Clinical Tests:   Lab Tests: Ordered and Reviewed  ED Management:  Patient presents with right upper quadrant abdominal  pain. Abdominal exam without peritoneal signs. No evidence of acute abdomen at this time. Well appearing.  Considered and doubt ovarian torsion given history and presentation.  No pain in pelvis that would be consistent with PID or TOA especially in setting of no vaginal discharge.  Given work up, low suspicion for acute hepatobiliary disease (including acute cholecystitis), acute pancreatitis (neg lipase), PUD and gastric perforation, acute infectious processes (pneumonia, hepatitis, pyelonephritis), acute appendicitis, vascular catastrophe, bowel obstruction or viscus perforation, diverticulitis.  Serial abdominal exam performed with soft, nontender, nondistended abdomen.  Presentation not consistent with other acute, emergent causes of abdominal pain at this time.  Patient's symptoms improved greatly at time of reassessment.      CT imaging of the abdomen/pelvis obtained showing atelectasis versus pneumonia.  Patient appears safe and stable for discharge and outpatient follow-up.  Given borderline fever we will be conservative and treat for possible pneumonia.  NSAIDs for pain.  Advised continued hydration and small volume, bland diet.  Return to ED precautions given for worsening or changing symptoms.  Offered PCP referral but patient states she would prefer to find 1 on her own.  Additional MDM:   PERC Rule:   Age is greater than or equal to 50 = 0.0  Heart Rate is greater than or equal to 100 = 1.0  SaO2 on room air < 95% = 0.0  Unilateral leg swelling = 0.0  Hemoptysis = 0.0  Recent surgery or trauma = 0.0  Prior PE or DVT =  0.0  Hormone use = 0.00  PERC Score = 1    Well's Criteria Score:  -Clinical symptoms of DVT (leg swelling, pain with palpation) = 0.0  -Other diagnosis less likely than pulmonary embolism =            0.0  -Heart Rate >100 =   1.5  -Immobilization (= or > than 3 days) or surgery in the previous 4 weeks = 0.0  -Previous DVT/PE = 0.0  -Hemoptysis =          0.0  -Malignancy =            0.0  Well's Probability Score =    1.5         Scribe Attestation:   Scribe #1: I performed the above scribed service and the documentation accurately describes the services I performed. I attest to the accuracy of the note.      ED Course as of 04/07/23 0358   u Apr 06, 2023 2009 NARX Scores 4/6/2023:  Narcotic Score    530  Stimulant Score 360  Sedative Score 600  Overdose Score 900     [KB]   2012 D/c Summary from recent inpatient admission to [facility] reviewed:    Abdominal pain  Sharp, initially LLQ now diffuse.    Abd/pelvis CT in ER is negative for acute problems  No fever, wbc is 12.  Baseline is around 7  Patient had a large cyst noted to left ovary during recent lap choley  Presentation is concerning for ovarian torsion  Will check pelvic US with doppler flow  Abd CT read state ovaries normal. ? Ruptured cyst  Follow up US  OB/Gyn consult if torsion confirmed  Admit to med-tele for overnight monitoring  Patient appears fairly comfortable on initially seeing her in ER  Again appears comfortable, on phone but states 10/10 pain  She says she has been nauseated and vomited earlier  No emesis seen by any staff  She has also had many ER visits with abdominal pain, usually with no definite etiology.  Malingering is a possibility  After overnight monitoring, malingering is more likely  Recently had appy and choley.    Prn pain and nausea meds  I spoke with ob/gyn and they reviewed case and images, do not suspect ovarian torsion or other pathology.  Recommend outpt follow up with her established ob/gyn physician.    Follow up scheduled.  [KB]      ED Course User Index  [KB] Gaston Mckeon MD               I, Gaston Mckeon MD personally performed the services described in this documentation. All medical record entries made by the scribe were at my direction and in my presence.  I have reviewed the chart and agree that the record reflects my personal performance and is accurate and complete.   4:00 AM  04/07/2023       DISCLAIMER: This note was prepared with [employer] Direct voice recognition transcription software. Garbled syntax, mangled pronouns, and other bizarre constructions may be attributed to that software system.      Clinical Impression:   Final diagnoses:  [R10.11] Right upper quadrant abdominal pain (Primary)  [J18.9] Pneumonia of right lower lobe due to infectious organism        ED Disposition Condition    Discharge Stable          ED Prescriptions       Medication Sig Dispense Start Date End Date Auth. Provider    levoFLOXacin (LEVAQUIN) 750 MG tablet Take 1 tablet (750 mg total) by mouth once daily. for 5 days 5 tablet 4/6/2023 4/11/2023 Gaston Mckeon MD    ibuprofen (ADVIL,MOTRIN) 800 MG tablet Take 1 tablet (800 mg total) by mouth every 6 (six) hours as needed for Pain. 20 tablet 4/6/2023 -- Gaston Mckeon MD          Follow-up Information       Follow up With Specialties Details Why Contact Info    Primary care provider of your choice  Schedule an appointment as soon as possible for a visit in 3 days For follow-up on today's visit.     [facility] Emergency Dept Emergency Medicine Go to  As needed, If symptoms worsen [address] [place] [id number] [phone]             Gaston Mckeon MD  04/07/23 2460

## 2023-05-03 ENCOUNTER — HOSPITAL ENCOUNTER (EMERGENCY)
Facility: HOSPITAL | Age: 28
Discharge: HOME | End: 2023-05-03
Attending: EMERGENCY MEDICINE
Payer: MEDICAID

## 2023-05-03 VITALS
OXYGEN SATURATION: 100 % | HEART RATE: 97 BPM | RESPIRATION RATE: 19 BRPM | BODY MASS INDEX: 43.85 KG/M2 | WEIGHT: 280 LBS | SYSTOLIC BLOOD PRESSURE: 119 MMHG | TEMPERATURE: 98 F | DIASTOLIC BLOOD PRESSURE: 67 MMHG

## 2023-05-03 DIAGNOSIS — R10.13 EPIGASTRIC ABDOMINAL PAIN: Primary | ICD-10-CM

## 2023-05-03 DIAGNOSIS — N83.209 CYST OF OVARY, UNSPECIFIED LATERALITY: ICD-10-CM

## 2023-05-03 DIAGNOSIS — N92.6 MENSTRUAL CYCLE PROBLEM: ICD-10-CM

## 2023-05-03 LAB
ALBUMIN SERPL BCP-MCNC: 4 G/DL (ref 3.5–5.2)
ALP SERPL-CCNC: 66 U/L (ref 55–135)
ALT SERPL W/O P-5'-P-CCNC: 17 U/L (ref 10–44)
ANION GAP SERPL CALC-SCNC: 7 MMOL/L (ref 8–16)
AST SERPL-CCNC: 17 U/L (ref 10–40)
B-HCG UR QL: NEGATIVE
BASOPHILS # BLD AUTO: 0.04 K/UL (ref 0–0.2)
BASOPHILS NFR BLD: 0.4 % (ref 0–1.9)
BILIRUB SERPL-MCNC: 0.9 MG/DL (ref 0.1–1)
BILIRUB UR QL STRIP: NEGATIVE
BUN SERPL-MCNC: 7 MG/DL (ref 6–20)
CALCIUM SERPL-MCNC: 8.7 MG/DL (ref 8.7–10.5)
CHLORIDE SERPL-SCNC: 108 MMOL/L (ref 95–110)
CLARITY UR: CLEAR
CO2 SERPL-SCNC: 23 MMOL/L (ref 23–29)
COLOR UR: YELLOW
CREAT SERPL-MCNC: 0.6 MG/DL (ref 0.5–1.4)
CREAT SERPL-MCNC: 0.7 MG/DL (ref 0.5–1.4)
CTP QC/QA: YES
DIFFERENTIAL METHOD: ABNORMAL
EOSINOPHIL # BLD AUTO: 0.1 K/UL (ref 0–0.5)
EOSINOPHIL NFR BLD: 0.8 % (ref 0–8)
ERYTHROCYTE [DISTWIDTH] IN BLOOD BY AUTOMATED COUNT: 16.2 % (ref 11.5–14.5)
EST. GFR  (NO RACE VARIABLE): >60 ML/MIN/1.73 M^2
GLUCOSE SERPL-MCNC: 99 MG/DL (ref 70–110)
GLUCOSE UR QL STRIP: NEGATIVE
HCT VFR BLD AUTO: 35.7 % (ref 37–48.5)
HGB BLD-MCNC: 11.1 G/DL (ref 12–16)
HGB UR QL STRIP: NEGATIVE
IMM GRANULOCYTES # BLD AUTO: 0.04 K/UL (ref 0–0.04)
IMM GRANULOCYTES NFR BLD AUTO: 0.4 % (ref 0–0.5)
KETONES UR QL STRIP: NEGATIVE
LEUKOCYTE ESTERASE UR QL STRIP: NEGATIVE
LIPASE SERPL-CCNC: 32 U/L (ref 4–60)
LYMPHOCYTES # BLD AUTO: 1.4 K/UL (ref 1–4.8)
LYMPHOCYTES NFR BLD: 14.8 % (ref 18–48)
MCH RBC QN AUTO: 24.4 PG (ref 27–31)
MCHC RBC AUTO-ENTMCNC: 31.1 G/DL (ref 32–36)
MCV RBC AUTO: 79 FL (ref 82–98)
MONOCYTES # BLD AUTO: 0.8 K/UL (ref 0.3–1)
MONOCYTES NFR BLD: 8.2 % (ref 4–15)
NEUTROPHILS # BLD AUTO: 7.2 K/UL (ref 1.8–7.7)
NEUTROPHILS NFR BLD: 75.4 % (ref 38–73)
NITRITE UR QL STRIP: NEGATIVE
NRBC BLD-RTO: 0 /100 WBC
PH UR STRIP: 6 [PH] (ref 5–8)
PLATELET # BLD AUTO: 294 K/UL (ref 150–450)
PMV BLD AUTO: 9.9 FL (ref 9.2–12.9)
POTASSIUM SERPL-SCNC: 3.5 MMOL/L (ref 3.5–5.1)
PROT SERPL-MCNC: 7.4 G/DL (ref 6–8.4)
PROT UR QL STRIP: NEGATIVE
RBC # BLD AUTO: 4.54 M/UL (ref 4–5.4)
SAMPLE: NORMAL
SODIUM SERPL-SCNC: 138 MMOL/L (ref 136–145)
SP GR UR STRIP: 1.03 (ref 1–1.03)
URN SPEC COLLECT METH UR: NORMAL
UROBILINOGEN UR STRIP-ACNC: NEGATIVE EU/DL
WBC # BLD AUTO: 9.49 K/UL (ref 3.9–12.7)

## 2023-05-03 PROCEDURE — 96375 TX/PRO/DX INJ NEW DRUG ADDON: CPT

## 2023-05-03 PROCEDURE — 96376 TX/PRO/DX INJ SAME DRUG ADON: CPT

## 2023-05-03 PROCEDURE — 83690 ASSAY OF LIPASE: CPT | Performed by: EMERGENCY MEDICINE

## 2023-05-03 PROCEDURE — 80053 COMPREHEN METABOLIC PANEL: CPT | Performed by: EMERGENCY MEDICINE

## 2023-05-03 PROCEDURE — 96374 THER/PROPH/DIAG INJ IV PUSH: CPT | Mod: 59

## 2023-05-03 PROCEDURE — 85025 COMPLETE CBC W/AUTO DIFF WBC: CPT | Performed by: EMERGENCY MEDICINE

## 2023-05-03 PROCEDURE — 81003 URINALYSIS AUTO W/O SCOPE: CPT | Performed by: STUDENT IN AN ORGANIZED HEALTH CARE EDUCATION/TRAINING PROGRAM

## 2023-05-03 PROCEDURE — 63600175 PHARM REV CODE 636 W HCPCS: Performed by: EMERGENCY MEDICINE

## 2023-05-03 PROCEDURE — 99285 EMERGENCY DEPT VISIT HI MDM: CPT | Mod: 25

## 2023-05-03 PROCEDURE — 81025 URINE PREGNANCY TEST: CPT | Performed by: EMERGENCY MEDICINE

## 2023-05-03 PROCEDURE — 25500020 PHARM REV CODE 255: Performed by: EMERGENCY MEDICINE

## 2023-05-03 RX ORDER — METHYLPREDNISOLONE SOD SUCC 125 MG
125 VIAL (EA) INJECTION
Status: COMPLETED | OUTPATIENT
Start: 2023-05-03 | End: 2023-05-03

## 2023-05-03 RX ORDER — ACETAMINOPHEN 500 MG
1000 TABLET ORAL
Status: DISCONTINUED | OUTPATIENT
Start: 2023-05-03 | End: 2023-05-03 | Stop reason: HOSPADM

## 2023-05-03 RX ORDER — HYDROCODONE BITARTRATE AND ACETAMINOPHEN 10; 325 MG/1; MG/1
1 TABLET ORAL EVERY 6 HOURS PRN
Qty: 12 TABLET | Refills: 0 | Status: SHIPPED | OUTPATIENT
Start: 2023-05-03 | End: 2023-05-06

## 2023-05-03 RX ORDER — HYDROMORPHONE HYDROCHLORIDE 1 MG/ML
0.5 INJECTION, SOLUTION INTRAMUSCULAR; INTRAVENOUS; SUBCUTANEOUS
Status: COMPLETED | OUTPATIENT
Start: 2023-05-03 | End: 2023-05-03

## 2023-05-03 RX ORDER — METHOCARBAMOL 500 MG/1
1000 TABLET, FILM COATED ORAL 4 TIMES DAILY
Qty: 8 TABLET | Refills: 0 | Status: SHIPPED | OUTPATIENT
Start: 2023-05-03 | End: 2023-05-05

## 2023-05-03 RX ORDER — DIPHENHYDRAMINE HYDROCHLORIDE 50 MG/ML
50 INJECTION INTRAMUSCULAR; INTRAVENOUS
Status: COMPLETED | OUTPATIENT
Start: 2023-05-03 | End: 2023-05-03

## 2023-05-03 RX ORDER — HYDROCODONE BITARTRATE AND ACETAMINOPHEN 10; 325 MG/1; MG/1
1 TABLET ORAL EVERY 6 HOURS PRN
Qty: 12 TABLET | Refills: 0 | Status: SHIPPED | OUTPATIENT
Start: 2023-05-03 | End: 2023-05-03 | Stop reason: SDUPTHER

## 2023-05-03 RX ADMIN — HYDROMORPHONE HYDROCHLORIDE 0.5 MG: 0.5 INJECTION, SOLUTION INTRAMUSCULAR; INTRAVENOUS; SUBCUTANEOUS at 05:05

## 2023-05-03 RX ADMIN — IOHEXOL 100 ML: 350 INJECTION, SOLUTION INTRAVENOUS at 06:05

## 2023-05-03 RX ADMIN — HYDROMORPHONE HYDROCHLORIDE 0.5 MG: 0.5 INJECTION, SOLUTION INTRAMUSCULAR; INTRAVENOUS; SUBCUTANEOUS at 07:05

## 2023-05-03 RX ADMIN — DIPHENHYDRAMINE HYDROCHLORIDE 50 MG: 50 INJECTION INTRAMUSCULAR; INTRAVENOUS at 05:05

## 2023-05-03 RX ADMIN — METHYLPREDNISOLONE SODIUM SUCCINATE 125 MG: 125 INJECTION, POWDER, FOR SOLUTION INTRAMUSCULAR; INTRAVENOUS at 05:05

## 2023-05-03 NOTE — ED NOTES
Pt relates she needs a nurse in here [facility], and that someone needs to tell the doctor that he needs to order pain medication right now.

## 2023-05-03 NOTE — ED NOTES
MD made pt aware that he will order pain medication, pt now calm and cooperative. Has not yet received pain medication.

## 2023-05-03 NOTE — ED NOTES
"CT Tech came out of room, patient yelling "the nurse needs to get in here pronto". Went in to see what patient needed. Patient agitated and requesting pain meds, explained to her that MD had not ordered pain meds yet. Patient states that "yes he has". Told her I would check with the doctor about pain medications. Patient yelling " I have a cyst on my ovaries" "have you ever had this, obviously not".  Patient also complaining that ultrasound tech would not clean her vagina and left her exposed.  Patient was covered with multiple blankets at this time and was provided with towels to clean herself.  Left room and closed door. Patient yelling "oh hell no, you slammed the door. I'm reporting you, call your supervisor". Opened door back, patient screaming at nurse, I tried to explain that I did not slam door, however, I gave the patient my name in case she does want to make a report.  Called Charge nurse to come speak with patient and security as patient is becoming increasingly agitated.   "

## 2023-05-03 NOTE — ED NOTES
Pt requesting staff wipe her vaginal area. Pt was given towels by [employer]. Pt performs own ADLs at home.

## 2023-05-03 NOTE — ED NOTES
Patient requesting more pain medication after returning from CT scan. MD was notified. The patient was informed that she would need to walk, so the MD can verify that she would be able to go home. Nurse assisted to patient ambulate. Patient stable. Gait stable. Patient ambulated to bedside commode.

## 2023-05-03 NOTE — ED PROVIDER NOTES
"Encounter Date: 5/3/2023       History     Chief Complaint   Patient presents with    Abdominal Pain     Abdominal pain since yesterday. Denies n/v/d.     27-year-old female with history of bipolar, GERD, obesity presents for midepigastric pain radiating down into her "abel", starting today, intermittent, and nothing makes it better or worse. She tried 1600 mg of ibuprofen without relief. She denies nausea or vomiting. She denies dysuria, hematuria urgency or frequency. She just started her menstrual cycle today.       Review of patient's allergies indicates:   Allergen Reactions    Iodine and iodide containing products Hives    Ketorolac Hives     Pt verbalized I dont like it doesn't work on it.    Vitamin e (d-alpha tocopherol)      Hives (skin)^Black eyed peas and oatmeal causede hives     Past Medical History:   Diagnosis Date    Anxiety     Bipolar 1 disorder     COVID-19 2021    GERD (gastroesophageal reflux disease)     Obese      Past Surgical History:   Procedure Laterality Date     SECTION  2022    LAPAROSCOPIC APPENDECTOMY N/A 2022    Procedure: APPENDECTOMY, LAPAROSCOPIC;  Surgeon: Yuniel Goldman MD;  Location: [facility] OR;  Service: General;  Laterality: N/A;    LAPAROSCOPIC CHOLECYSTECTOMY N/A 2023    Procedure: CHOLECYSTECTOMY, LAPAROSCOPIC;  Surgeon: Yuniel Goldman MD;  Location: [facility] OR;  Service: General;  Laterality: N/A;    LEG SURGERY      Broke her left lower leg.     Family History   Problem Relation Age of Onset    No Known Problems Mother     No Known Problems Father     Breast cancer Neg Hx     Colon cancer Neg Hx     Ovarian cancer Neg Hx      Social History     Tobacco Use    Smoking status: Never    Smokeless tobacco: Never   Substance Use Topics    Alcohol use: Yes     Comment: occasionally    Drug use: No     Review of Systems   Constitutional:  Negative for activity change, appetite change, chills, fever and unexpected weight change.   HENT:  " Negative for dental problem and drooling.    Eyes:  Negative for discharge and itching.   Respiratory:  Negative for cough, chest tightness, shortness of breath, wheezing and stridor.    Cardiovascular:  Negative for chest pain, palpitations and leg swelling.   Gastrointestinal:  Positive for abdominal pain. Negative for abdominal distention, diarrhea and nausea.   Genitourinary:  Positive for vaginal bleeding. Negative for difficulty urinating, dysuria, frequency and urgency.   Musculoskeletal:  Negative for back pain, gait problem and joint swelling.   Neurological:  Negative for dizziness, syncope, numbness and headaches.   Psychiatric/Behavioral:  Negative for agitation, behavioral problems and confusion.      Physical Exam     Initial Vitals   BP Pulse Resp Temp SpO2   05/03/23 0256 05/03/23 0253 05/03/23 0253 05/03/23 0253 05/03/23 0253   (!) 141/87 99 18 98 °F (36.7 °C) 99 %      MAP       --                Physical Exam    Nursing note and vitals reviewed.  Constitutional: She appears well-developed and well-nourished. She is not diaphoretic.   HENT:   Head: Normocephalic and atraumatic.   Mouth/Throat: Oropharynx is clear and moist.   Eyes: EOM are normal. Pupils are equal, round, and reactive to light. Right eye exhibits no discharge. Left eye exhibits no discharge.   Neck: No tracheal deviation present.   Normal range of motion.  Cardiovascular:  Normal rate, regular rhythm and intact distal pulses.           Pulmonary/Chest: No respiratory distress. She has no wheezes. She exhibits no tenderness.   Abdominal: Abdomen is soft. She exhibits no distension. There is abdominal tenderness.   Mid epigastric TTP   Musculoskeletal:         General: No tenderness or edema. Normal range of motion.      Cervical back: Normal range of motion.     Neurological: She is alert and oriented to person, place, and time. She has normal strength. No cranial nerve deficit or sensory deficit. GCS eye subscore is 4. GCS verbal  subscore is 5. GCS motor subscore is 6.   Skin: Skin is warm and dry. No rash noted.   Psychiatric: She has a normal mood and affect. Her behavior is normal. Thought content normal.       ED Course   Procedures  Labs Reviewed   CBC W/ AUTO DIFFERENTIAL - Abnormal; Notable for the following components:       Result Value    Hemoglobin 11.1 (*)     Hematocrit 35.7 (*)     MCV 79 (*)     MCH 24.4 (*)     MCHC 31.1 (*)     RDW 16.2 (*)     Gran % 75.4 (*)     Lymph % 14.8 (*)     All other components within normal limits    Narrative:     Collection has been rescheduled by RE1 at 05/03/2023 05:35    COMPREHENSIVE METABOLIC PANEL - Abnormal; Notable for the following components:    Anion Gap 7 (*)     All other components within normal limits    Narrative:     Collection has been rescheduled by RE1 at 05/03/2023 05:35    URINALYSIS, REFLEX TO URINE CULTURE    Narrative:     Specimen Source->Urine   CBC W/ AUTO DIFFERENTIAL   COMPREHENSIVE METABOLIC PANEL   LIPASE   LIPASE    Narrative:     Collection has been rescheduled by RE1 at 05/03/2023 05:35    POCT URINE PREGNANCY   ISTAT CREATININE          Imaging Results              CT Abdomen Pelvis With Contrast (Final result)  Result time 05/03/23 06:46:42   Procedure changed from CT Pelvis Without Contrast     Final result by Kevin J. Jeansonne, MD (05/03/23 06:46:42)                   Narrative:    CMS MANDATED QUALITY DATA - CT RADIATION  436    All CT scans at this facility utilize dose modulation, iterative reconstruction, and/or weight based dosing when appropriate to reduce radiation dose to as low as reasonably achievable.    EXAM: CT SCAN OF THE ABDOMEN AND PELVIS WITH CONTRAST DONE5/3/2023 6:19 AM CDT  HISTORY:Abdomen pain epigastric pain pain extending to the pelvisPelvic pain, chronic, post-menopausal    COMPARISONS: March 30    FINDINGS: Axial tomographic cuts were obtained through the abdomen and pelvis during administration of contrast. Liver shows no  significant focal lesions or abnormal enhancement. Clips are seen in the gallbladder fossa. Spleen is unremarkable. Pancreas shows no focal abnormalities. Retroperitoneal great vessels appear normal in size. Kidneys are normal in size with no dilation seen. Kidneys have a normal enhancement pattern with a left duplicated system noted. Adrenals are unremarkable. Bowel loops show no significant dilatation. Appendix is not visualized. Diverticuli are seen in the colon. Small fat-containing ventral hernia is seen just above the umbilicus. Bladder appears unremarkable. uterus is unremarkable in size. Fluid in the cul-de-sac is seen. Bony structures appear intact.    IMPRESSION: No significant changes since previous CT scan. Cul-de-sac fluid is noted..      Electronically signed by:  Kevin Jeansonne MD  5/3/2023 6:46 AM CDT Workstation: [id number]                                     US Transvaginal Non OB (Final result)  Result time 05/03/23 05:28:05   Procedure changed from US Pelvis Complete Non OB     Final result by Jono Coley MD (05/03/23 05:28:05)                   Narrative:    Ultrasound pelvis transvaginal on 5/3/2023    CLINICAL INDICATION: Pelvic pain    COMPARISON: CT from 4/6/2023    FINDINGS: Multiple sonographic images are obtained throughout the pelvis by transvaginal approach, both transverse and sagittal images are obtained. Nabothian cyst is noted in the cervix. Uterus measures approximately 7.6 x 3.6 x 3.8 cm. Uterus is suboptimally visualized. Endometrial stripe measures 8 mm which is within normal limits. Uterus appears grossly unremarkable. The left ovary measures approximately 2.9 x 1.4 x 2.2 cm. Within the left ovary there is a 2.5 x 2.1 x 1.2 cm simple dominant follicle which should be considered benign with no follow-up recommended. Flow is demonstrated in the left ovary. The right ovary is not visualized. No adnexal mass or fluid collection is noted. No free fluid is  noted.    IMPRESSION:  Essentially unremarkable exam.    Electronically signed by:  Miki Coley  5/3/2023 5:28 AM CDT Workstation: [id number]                                     Medications   acetaminophen tablet 1,000 mg (1,000 mg Oral Not Given 5/3/23 0345)   HYDROmorphone injection 0.5 mg (0.5 mg Intravenous Given 5/3/23 0507)   methylPREDNISolone sodium succinate injection 125 mg (125 mg Intravenous Given 5/3/23 0508)   diphenhydrAMINE injection 50 mg (50 mg Intravenous Given 5/3/23 0507)   iohexoL (OMNIPAQUE 350) injection 100 mL (100 mLs Intravenous Given 5/3/23 0630)   HYDROmorphone injection 0.5 mg (0.5 mg Intravenous Given 5/3/23 0709)     Medical Decision Making:   ED Management:  Chief complaint is lower abdominal pain differential diagnosis includes intestinal obstruction.  Patient is status post appendectomy.  Colitis diverticulitis bowel blockage bladder infection kidney stone ruptured ovarian cyst PID among others.  The patient in the ER CT scan that was unremarkable ultrasound reveals small left ovarian follicle versus cyst.  Patient pain control will be discharged in good condition.    I am awaiting labs.  CBC normal awaiting CMP prior to discharge if negative will discharge home with instructions. Esther Alvarez MD  8:02 AM 05/03/2023                              Clinical Impression:   Final diagnoses:  [R10.13] Epigastric abdominal pain (Primary)  [N92.6] Menstrual cycle problem  [N83.209] Cyst of ovary, unspecified laterality        ED Disposition Condition    Discharge Stable          ED Prescriptions       Medication Sig Dispense Start Date End Date Auth. Provider    HYDROcodone-acetaminophen (NORCO)  mg per tablet Take 1 tablet by mouth every 6 (six) hours as needed for Pain. 12 tablet 5/3/2023 5/6/2023 Esther Alvarez MD          Follow-up Information    None          Esther Alvarez MD  05/03/23 [id number]

## 2023-05-03 NOTE — ED NOTES
"Brought tylenol to patient, she states "what's that going to do, that isn't going to help my pain". Patient refusing to try medication for pain relief.  MD notified.   "

## 2023-05-03 NOTE — ED NOTES
Patient yelling at nurse for not giving her pain medication. No orders have been put in for medication. Patient starting to get agitated. Requesting to see charge nurse and MD. MD and charge nurse notified.

## 2023-05-06 ENCOUNTER — HOSPITAL ENCOUNTER (EMERGENCY)
Facility: HOSPITAL | Age: 28
Discharge: HOME | End: 2023-05-06
Attending: EMERGENCY MEDICINE
Payer: MEDICAID

## 2023-05-06 VITALS
HEART RATE: 98 BPM | WEIGHT: 280 LBS | TEMPERATURE: 99 F | DIASTOLIC BLOOD PRESSURE: 59 MMHG | BODY MASS INDEX: 43.85 KG/M2 | OXYGEN SATURATION: 100 % | SYSTOLIC BLOOD PRESSURE: 119 MMHG | RESPIRATION RATE: 18 BRPM

## 2023-05-06 DIAGNOSIS — R52 PAIN: ICD-10-CM

## 2023-05-06 DIAGNOSIS — R10.9 ABDOMINAL PAIN, UNSPECIFIED ABDOMINAL LOCATION: Primary | ICD-10-CM

## 2023-05-06 LAB
ALBUMIN SERPL BCP-MCNC: 4 G/DL (ref 3.5–5.2)
ALP SERPL-CCNC: 67 U/L (ref 55–135)
ALT SERPL W/O P-5'-P-CCNC: 15 U/L (ref 10–44)
AMPHET+METHAMPHET UR QL: NEGATIVE
ANION GAP SERPL CALC-SCNC: 10 MMOL/L (ref 8–16)
AST SERPL-CCNC: 17 U/L (ref 10–40)
B-HCG UR QL: NEGATIVE
BACTERIA #/AREA URNS HPF: NEGATIVE /HPF
BARBITURATES UR QL SCN>200 NG/ML: NEGATIVE
BASOPHILS # BLD AUTO: 0.05 K/UL (ref 0–0.2)
BASOPHILS NFR BLD: 0.5 % (ref 0–1.9)
BENZODIAZ UR QL SCN>200 NG/ML: ABNORMAL
BILIRUB SERPL-MCNC: 0.7 MG/DL (ref 0.1–1)
BILIRUB UR QL STRIP: NEGATIVE
BUN SERPL-MCNC: 6 MG/DL (ref 6–20)
BZE UR QL SCN: NEGATIVE
CALCIUM SERPL-MCNC: 8.5 MG/DL (ref 8.7–10.5)
CANNABINOIDS UR QL SCN: ABNORMAL
CHLORIDE SERPL-SCNC: 101 MMOL/L (ref 95–110)
CLARITY UR: CLEAR
CO2 SERPL-SCNC: 26 MMOL/L (ref 23–29)
COLOR UR: YELLOW
CREAT SERPL-MCNC: 0.8 MG/DL (ref 0.5–1.4)
CREAT UR-MCNC: 89 MG/DL (ref 15–325)
CTP QC/QA: YES
DIFFERENTIAL METHOD: ABNORMAL
EOSINOPHIL # BLD AUTO: 0.1 K/UL (ref 0–0.5)
EOSINOPHIL NFR BLD: 0.9 % (ref 0–8)
ERYTHROCYTE [DISTWIDTH] IN BLOOD BY AUTOMATED COUNT: 15.9 % (ref 11.5–14.5)
EST. GFR  (NO RACE VARIABLE): >60 ML/MIN/1.73 M^2
GLUCOSE SERPL-MCNC: 94 MG/DL (ref 70–110)
GLUCOSE UR QL STRIP: NEGATIVE
HCT VFR BLD AUTO: 37.2 % (ref 37–48.5)
HGB BLD-MCNC: 11.2 G/DL (ref 12–16)
HGB UR QL STRIP: ABNORMAL
HYALINE CASTS #/AREA URNS LPF: 1 /LPF
IMM GRANULOCYTES # BLD AUTO: 0.02 K/UL (ref 0–0.04)
IMM GRANULOCYTES NFR BLD AUTO: 0.2 % (ref 0–0.5)
KETONES UR QL STRIP: NEGATIVE
LEUKOCYTE ESTERASE UR QL STRIP: NEGATIVE
LIPASE SERPL-CCNC: 22 U/L (ref 4–60)
LYMPHOCYTES # BLD AUTO: 1.1 K/UL (ref 1–4.8)
LYMPHOCYTES NFR BLD: 11.6 % (ref 18–48)
MCH RBC QN AUTO: 23.9 PG (ref 27–31)
MCHC RBC AUTO-ENTMCNC: 30.1 G/DL (ref 32–36)
MCV RBC AUTO: 80 FL (ref 82–98)
MICROSCOPIC COMMENT: ABNORMAL
MONOCYTES # BLD AUTO: 0.7 K/UL (ref 0.3–1)
MONOCYTES NFR BLD: 7.1 % (ref 4–15)
NEUTROPHILS # BLD AUTO: 7.5 K/UL (ref 1.8–7.7)
NEUTROPHILS NFR BLD: 79.7 % (ref 38–73)
NITRITE UR QL STRIP: NEGATIVE
NRBC BLD-RTO: 0 /100 WBC
OPIATES UR QL SCN: ABNORMAL
PCP UR QL SCN>25 NG/ML: NEGATIVE
PH UR STRIP: 6 [PH] (ref 5–8)
PLATELET # BLD AUTO: 317 K/UL (ref 150–450)
PMV BLD AUTO: 9.6 FL (ref 9.2–12.9)
POTASSIUM SERPL-SCNC: 3.2 MMOL/L (ref 3.5–5.1)
PROT SERPL-MCNC: 7.7 G/DL (ref 6–8.4)
PROT UR QL STRIP: NEGATIVE
RBC # BLD AUTO: 4.68 M/UL (ref 4–5.4)
RBC #/AREA URNS HPF: 9 /HPF (ref 0–4)
SODIUM SERPL-SCNC: 137 MMOL/L (ref 136–145)
SP GR UR STRIP: 1.01 (ref 1–1.03)
SQUAMOUS #/AREA URNS HPF: 2 /HPF
TOXICOLOGY INFORMATION: ABNORMAL
URN SPEC COLLECT METH UR: ABNORMAL
UROBILINOGEN UR STRIP-ACNC: NEGATIVE EU/DL
WBC # BLD AUTO: 9.41 K/UL (ref 3.9–12.7)
WBC #/AREA URNS HPF: 0 /HPF (ref 0–5)

## 2023-05-06 PROCEDURE — 81001 URINALYSIS AUTO W/SCOPE: CPT | Mod: 59 | Performed by: NURSE PRACTITIONER

## 2023-05-06 PROCEDURE — 80053 COMPREHEN METABOLIC PANEL: CPT | Performed by: NURSE PRACTITIONER

## 2023-05-06 PROCEDURE — 96375 TX/PRO/DX INJ NEW DRUG ADDON: CPT

## 2023-05-06 PROCEDURE — 96376 TX/PRO/DX INJ SAME DRUG ADON: CPT

## 2023-05-06 PROCEDURE — 63600175 PHARM REV CODE 636 W HCPCS: Performed by: NURSE PRACTITIONER

## 2023-05-06 PROCEDURE — 63600175 PHARM REV CODE 636 W HCPCS: Performed by: EMERGENCY MEDICINE

## 2023-05-06 PROCEDURE — 80307 DRUG TEST PRSMV CHEM ANLYZR: CPT | Performed by: NURSE PRACTITIONER

## 2023-05-06 PROCEDURE — 85025 COMPLETE CBC W/AUTO DIFF WBC: CPT | Performed by: NURSE PRACTITIONER

## 2023-05-06 PROCEDURE — 96374 THER/PROPH/DIAG INJ IV PUSH: CPT

## 2023-05-06 PROCEDURE — 99284 EMERGENCY DEPT VISIT MOD MDM: CPT | Mod: 25

## 2023-05-06 PROCEDURE — 83690 ASSAY OF LIPASE: CPT | Performed by: NURSE PRACTITIONER

## 2023-05-06 PROCEDURE — 25000003 PHARM REV CODE 250: Performed by: NURSE PRACTITIONER

## 2023-05-06 PROCEDURE — 81025 URINE PREGNANCY TEST: CPT | Performed by: NURSE PRACTITIONER

## 2023-05-06 RX ORDER — HYDROMORPHONE HYDROCHLORIDE 1 MG/ML
0.5 INJECTION, SOLUTION INTRAMUSCULAR; INTRAVENOUS; SUBCUTANEOUS
Status: COMPLETED | OUTPATIENT
Start: 2023-05-06 | End: 2023-05-06

## 2023-05-06 RX ORDER — DICYCLOMINE HYDROCHLORIDE 20 MG/1
20 TABLET ORAL 2 TIMES DAILY
Qty: 20 TABLET | Refills: 0 | Status: SHIPPED | OUTPATIENT
Start: 2023-05-06 | End: 2023-06-05

## 2023-05-06 RX ORDER — OXYCODONE AND ACETAMINOPHEN 5; 325 MG/1; MG/1
1 TABLET ORAL
Status: COMPLETED | OUTPATIENT
Start: 2023-05-06 | End: 2023-05-06

## 2023-05-06 RX ORDER — ONDANSETRON 2 MG/ML
4 INJECTION INTRAMUSCULAR; INTRAVENOUS
Status: DISCONTINUED | OUTPATIENT
Start: 2023-05-06 | End: 2023-05-06 | Stop reason: HOSPADM

## 2023-05-06 RX ORDER — POTASSIUM CHLORIDE 20 MEQ/1
40 TABLET, EXTENDED RELEASE ORAL ONCE
Status: COMPLETED | OUTPATIENT
Start: 2023-05-06 | End: 2023-05-06

## 2023-05-06 RX ORDER — ONDANSETRON 2 MG/ML
4 INJECTION INTRAMUSCULAR; INTRAVENOUS
Status: COMPLETED | OUTPATIENT
Start: 2023-05-06 | End: 2023-05-06

## 2023-05-06 RX ADMIN — ONDANSETRON 4 MG: 2 INJECTION INTRAMUSCULAR; INTRAVENOUS at 09:05

## 2023-05-06 RX ADMIN — OXYCODONE AND ACETAMINOPHEN 1 TABLET: 325; 5 TABLET ORAL at 12:05

## 2023-05-06 RX ADMIN — HYDROMORPHONE HYDROCHLORIDE 0.5 MG: 0.5 INJECTION, SOLUTION INTRAMUSCULAR; INTRAVENOUS; SUBCUTANEOUS at 11:05

## 2023-05-06 RX ADMIN — POTASSIUM CHLORIDE 40 MEQ: 1500 TABLET, EXTENDED RELEASE ORAL at 11:05

## 2023-05-06 RX ADMIN — SODIUM CHLORIDE 1000 ML: 0.9 INJECTION, SOLUTION INTRAVENOUS at 09:05

## 2023-05-06 RX ADMIN — HYDROMORPHONE HYDROCHLORIDE 0.5 MG: 0.5 INJECTION, SOLUTION INTRAMUSCULAR; INTRAVENOUS; SUBCUTANEOUS at 09:05

## 2023-05-06 NOTE — ED PROVIDER NOTES
Encounter Date: 2023       History     Chief Complaint   Patient presents with    Abdominal Pain     10/10. Since last night. Seen here recently for same.      27-year-old female with a history of obesity anxiety bipolar prior appendectomy prior cholecystectomy, who recently came to the ER last week for similar symptoms, presents today to the ER with complaints of ongoing and worsening abdominal pain.  She states her symptoms began about 1 week ago.  She came to the ER, she was evaluated with lab work CT and ultrasound and was discharged home and told to follow-up with an OBGYN.  She states her symptoms have not improved.  She is been taking muscle relaxers and pain medication as prescribed without any improvement in her symptoms.  She states she was told to follow-up with OBGYN she is not been able to get an appointment yet.  She states last night her abdominal pain drastically worsened.  She denies any associated nausea vomiting or diarrhea.  She states no constipation.  Her last bowel movement was last night and normal.  She denies any fever.  No vaginal bleeding or discharge.    Review of patient's allergies indicates:   Allergen Reactions    Iodine and iodide containing products Hives    Ketorolac Hives     Pt verbalized I dont like it doesn't work on it.    Vitamin e (d-alpha tocopherol)      Hives (skin)^Black eyed peas and oatmeal causede hives     Past Medical History:   Diagnosis Date    Anxiety     Bipolar 1 disorder     COVID-19 2021    GERD (gastroesophageal reflux disease)     Obese      Past Surgical History:   Procedure Laterality Date     SECTION  2022    LAPAROSCOPIC APPENDECTOMY N/A 2022    Procedure: APPENDECTOMY, LAPAROSCOPIC;  Surgeon: Yuniel Goldman MD;  Location: [facility] OR;  Service: General;  Laterality: N/A;    LAPAROSCOPIC CHOLECYSTECTOMY N/A 2023    Procedure: CHOLECYSTECTOMY, LAPAROSCOPIC;  Surgeon: Yuniel Goldman MD;  Location: [facility] OR;   Service: General;  Laterality: N/A;    LEG SURGERY      Broke her left lower leg.     Family History   Problem Relation Age of Onset    No Known Problems Mother     No Known Problems Father     Breast cancer Neg Hx     Colon cancer Neg Hx     Ovarian cancer Neg Hx      Social History     Tobacco Use    Smoking status: Never    Smokeless tobacco: Never   Substance Use Topics    Alcohol use: Yes     Comment: occasionally    Drug use: No     Review of Systems   Constitutional:  Negative for fever.   HENT:  Negative for sore throat.    Respiratory:  Negative for shortness of breath.    Cardiovascular:  Negative for chest pain.   Gastrointestinal:  Positive for abdominal pain. Negative for abdominal distention, anal bleeding, blood in stool, constipation, diarrhea, nausea, rectal pain and vomiting.   Genitourinary:  Negative for decreased urine volume, difficulty urinating, dysuria, flank pain, frequency, genital sores and urgency.   Musculoskeletal:  Negative for arthralgias and back pain.   Skin:  Negative for rash.   Neurological:  Negative for seizures, syncope, weakness and numbness.   Hematological:  Does not bruise/bleed easily.   Psychiatric/Behavioral:  Negative for agitation and confusion.    All other systems reviewed and are negative.    Physical Exam     Initial Vitals [05/06/23 0828]   BP Pulse Resp Temp SpO2   (!) 141/72 104 19 99 °F (37.2 °C) 100 %      MAP       --         Physical Exam    Nursing note and vitals reviewed.  Constitutional: She appears well-developed and well-nourished. She is not diaphoretic. She is Obese . She appears distressed.   HENT:   Head: Normocephalic and atraumatic.   Right Ear: External ear normal.   Left Ear: External ear normal.   Mouth/Throat: Oropharynx is clear and moist.   Eyes: Conjunctivae are normal.   Neck: Neck supple.   Normal range of motion.  Cardiovascular:  Normal rate.           No murmur heard.  Pulmonary/Chest: Breath sounds normal. No respiratory distress.  She has no wheezes. She has no rales.   Abdominal: Abdomen is soft. Bowel sounds are normal. She exhibits no distension. There is generalized abdominal tenderness. There is no tenderness at McBurney's point and negative Segura's sign.   Musculoskeletal:         General: Normal range of motion.      Cervical back: Normal range of motion and neck supple.     Neurological: She is alert and oriented to person, place, and time. She has normal strength. GCS score is 15. GCS eye subscore is 4. GCS verbal subscore is 5. GCS motor subscore is 6.   Skin: Skin is warm and dry. Capillary refill takes less than 2 seconds. No rash noted. No erythema.   Psychiatric: She has a normal mood and affect. Thought content normal.       ED Course   Procedures  Labs Reviewed   CBC W/ AUTO DIFFERENTIAL - Abnormal; Notable for the following components:       Result Value    Hemoglobin 11.2 (*)     MCV 80 (*)     MCH 23.9 (*)     MCHC 30.1 (*)     RDW 15.9 (*)     Gran % 79.7 (*)     Lymph % 11.6 (*)     All other components within normal limits   COMPREHENSIVE METABOLIC PANEL - Abnormal; Notable for the following components:    Potassium 3.2 (*)     Calcium 8.5 (*)     All other components within normal limits   URINALYSIS, REFLEX TO URINE CULTURE - Abnormal; Notable for the following components:    Occult Blood UA 3+ (*)     All other components within normal limits    Narrative:     Specimen Source->Urine   DRUG SCREEN PANEL, URINE EMERGENCY - Abnormal; Notable for the following components:    Benzodiazepines Presumptive Positive (*)     Opiate Scrn, Ur Presumptive Positive (*)     THC Presumptive Positive (*)     All other components within normal limits   URINALYSIS MICROSCOPIC - Abnormal; Notable for the following components:    RBC, UA 9 (*)     All other components within normal limits    Narrative:     Specimen Source->Urine   LIPASE   DRUG SCREEN PANEL, URINE EMERGENCY   POCT URINE PREGNANCY          Imaging Results               X-Ray Chest AP Portable (Final result)  Result time 05/06/23 11:32:29   Procedure changed from X-Ray Chest 1 View     Final result by Rubén Sotomayor MD (05/06/23 11:32:29)                   Impression:      Unremarkable underinflated chest.      Electronically signed by: Rubén Sotomayor MD  Date:    05/06/2023  Time:    11:32               Narrative:    EXAMINATION:  XR CHEST AP PORTABLE    CLINICAL HISTORY:  pain; Pain, unspecified    FINDINGS:  Portable chest at 1018 compared with 09/21/2021 shows normal cardiomediastinal silhouette. Patient is rotated.    Lungs are hypoinflated but clear. Pulmonary vasculature is normal. No acute osseous abnormality.                                    Results for orders placed or performed during the hospital encounter of 05/06/23   CBC auto differential   Result Value Ref Range    WBC 9.41 3.90 - 12.70 K/uL    RBC 4.68 4.00 - 5.40 M/uL    Hemoglobin 11.2 (L) 12.0 - 16.0 g/dL    Hematocrit 37.2 37.0 - 48.5 %    MCV 80 (L) 82 - 98 fL    MCH 23.9 (L) 27.0 - 31.0 pg    MCHC 30.1 (L) 32.0 - 36.0 g/dL    RDW 15.9 (H) 11.5 - 14.5 %    Platelets 317 150 - 450 K/uL    MPV 9.6 9.2 - 12.9 fL    Immature Granulocytes 0.2 0.0 - 0.5 %    Gran # (ANC) 7.5 1.8 - 7.7 K/uL    Immature Grans (Abs) 0.02 0.00 - 0.04 K/uL    Lymph # 1.1 1.0 - 4.8 K/uL    Mono # 0.7 0.3 - 1.0 K/uL    Eos # 0.1 0.0 - 0.5 K/uL    Baso # 0.05 0.00 - 0.20 K/uL    nRBC 0 0 /100 WBC    Gran % 79.7 (H) 38.0 - 73.0 %    Lymph % 11.6 (L) 18.0 - 48.0 %    Mono % 7.1 4.0 - 15.0 %    Eosinophil % 0.9 0.0 - 8.0 %    Basophil % 0.5 0.0 - 1.9 %    Differential Method Automated    Comprehensive metabolic panel   Result Value Ref Range    Sodium 137 136 - 145 mmol/L    Potassium 3.2 (L) 3.5 - 5.1 mmol/L    Chloride 101 95 - 110 mmol/L    CO2 26 23 - 29 mmol/L    Glucose 94 70 - 110 mg/dL    BUN 6 6 - 20 mg/dL    Creatinine 0.8 0.5 - 1.4 mg/dL    Calcium 8.5 (L) 8.7 - 10.5 mg/dL    Total Protein 7.7 6.0 - 8.4 g/dL    Albumin 4.0 3.5 - 5.2  g/dL    Total Bilirubin 0.7 0.1 - 1.0 mg/dL    Alkaline Phosphatase 67 55 - 135 U/L    AST 17 10 - 40 U/L    ALT 15 10 - 44 U/L    Anion Gap 10 8 - 16 mmol/L    eGFR >60.0 >60 mL/min/1.73 m^2   Lipase   Result Value Ref Range    Lipase 22 4 - 60 U/L   Urinalysis, Reflex to Urine Culture Urine, Clean Catch    Specimen: Urine   Result Value Ref Range    Specimen UA Urine, Clean Catch     Color, UA Yellow Yellow, Straw, Joselyn    Appearance, UA Clear Clear    pH, UA 6.0 5.0 - 8.0    Specific Gravity, UA 1.015 1.005 - 1.030    Protein, UA Negative Negative    Glucose, UA Negative Negative    Ketones, UA Negative Negative    Bilirubin (UA) Negative Negative    Occult Blood UA 3+ (A) Negative    Nitrite, UA Negative Negative    Urobilinogen, UA Negative Negative EU/dL    Leukocytes, UA Negative Negative   Drug screen panel, in-house   Result Value Ref Range    Benzodiazepines Presumptive Positive (A) Negative    Cocaine (Metab.) Negative Negative    Opiate Scrn, Ur Presumptive Positive (A) Negative    Barbiturate Screen, Ur Negative Negative    Amphetamine Screen, Ur Negative Negative    THC Presumptive Positive (A) Negative    Phencyclidine Negative Negative    Creatinine, Urine 89.0 15.0 - 325.0 mg/dL    Toxicology Information SEE COMMENT    Urinalysis Microscopic   Result Value Ref Range    RBC, UA 9 (H) 0 - 4 /hpf    WBC, UA 0 0 - 5 /hpf    Bacteria Negative None-Occ /hpf    Squam Epithel, UA 2 /hpf    Hyaline Casts, UA 1 0-1/lpf /lpf    Microscopic Comment SEE COMMENT    POCT urine pregnancy   Result Value Ref Range    POC Preg Test, Ur Negative Negative     Acceptable Yes      Imaging Results              X-Ray Chest AP Portable (Final result)  Result time 05/06/23 11:32:29   Procedure changed from X-Ray Chest 1 View     Final result by Rubén Sotomayor MD (05/06/23 11:32:29)                   Impression:      Unremarkable underinflated chest.      Electronically signed by: Rubén Sotomayor  MD  Date:    05/06/2023  Time:    11:32               Narrative:    EXAMINATION:  XR CHEST AP PORTABLE    CLINICAL HISTORY:  pain; Pain, unspecified    FINDINGS:  Portable chest at 1018 compared with 09/21/2021 shows normal cardiomediastinal silhouette. Patient is rotated.    Lungs are hypoinflated but clear. Pulmonary vasculature is normal. No acute osseous abnormality.                                         Medications   ondansetron injection 4 mg (4 mg Intravenous Not Given 5/6/23 1145)   sodium chloride 0.9% bolus 1,000 mL 1,000 mL (0 mLs Intravenous Stopped 5/6/23 1051)   ondansetron injection 4 mg (4 mg Intravenous Given 5/6/23 0940)   HYDROmorphone injection 0.5 mg (0.5 mg Intravenous Given 5/6/23 0940)   potassium chloride SA CR tablet 40 mEq (40 mEq Oral Given 5/6/23 1139)   HYDROmorphone injection 0.5 mg (0.5 mg Intravenous Given 5/6/23 1139)   oxyCODONE-acetaminophen 5-325 mg per tablet 1 tablet (1 tablet Oral Given 5/6/23 1222)     Medical Decision Making:   Clinical Tests:   Lab Tests: Ordered and Reviewed  Radiological Study: Ordered and Reviewed  Emergent evaluation of a 27-year-old female who presents back to the ER for ongoing and worsening abdominal pain symptoms that have been persistent for greater than 1 week.  She has been to this ER and evaluated 3 days ago for these symptoms and was discharged home with prescription of pain medication as well as muscle relaxers.  She states the pain medicine muscle relaxers or not effective in managing her pain.  No associated symptoms including no constipation diarrhea fever nausea or vomiting.  She states she is eating and drinking normally.  Normal bowel movements.  No urinary complaints or flank pain.  No vaginal bleeding or discharge complaints associated.  No urinary symptoms.  Lab work obtained in the ER today was notable for normal non elevated white blood cell count, normal H&H, chemistry with a potassium slightly decreased at 3.2 which was  replenished with 40 mEq of potassium chloride p.o. in the ER.  Otherwise normal renal function normal LFTs normal electrolytes otherwise.  Her vitals are stable she is afebrile stable blood pressure O2 sat 100% on room air.  Initially she was mildly tachycardic on arrival but improved and is no longer tachycardic after IV fluid bolus and pain medicine.  She was given Dilaudid for pain management in the ER which improved her pain.  She appears in no distress.  Her abdomen is soft nonrigid nondistended no peritoneal signs on exam.  I did review over her previous 3 CT images and prior pelvic ultrasounds on file including recent CT from 3 days ago and prior 3 CTs before that as well with all unremarkable normal findings.  She has a history of a prior cholecystectomy and appendectomy.  She does not have any focal point tenderness on exam.  And seems to fixate on a stronger pain medication.  I discussed with patient that she currently has Norco at home, that further understanding of the etiology of her symptoms including over-the-counter ibuprofen could be potentially more beneficial than stronger pain medicine.  She will need to follow-up with her OBGYN for her known history of ovarian cyst, also GI for further evaluation of her ongoing abdominal pain.  She could have an element of adhesional pain after her 2 prior surgeries.  We will advise patient that she needs to drink plenty of fluids stay well hydrated eat well-balanced diet continue to monitor symptoms we will add Bentyl for a GI antispasmodic drug case this is possibly contributing to her pain and have patient return to ER for any new or worsening symptoms.  We will not administer any additional pain medications given that she is currently on the Xanax, currently prescribed Norco, and advised that she should follow-up with PCP specialist and pain management for ongoing pain control and further investigation to further evaluate non-emergent ongoing pain origins.                          Clinical Impression:   Final diagnoses:  [R52] Pain  [R10.9] Abdominal pain, unspecified abdominal location (Primary)        ED Disposition Condition    Discharge Stable          ED Prescriptions       Medication Sig Dispense Start Date End Date Auth. Provider    dicyclomine (BENTYL) 20 mg tablet Take 1 tablet (20 mg total) by mouth 2 (two) times daily. 20 tablet 5/6/2023 6/5/2023 Sarah Alfaro NP          Follow-up Information       Follow up With Specialties Details Why Contact Info Additional Information    [facility]  Schedule an appointment as soon as possible for a visit in 2 days for ER visit follow up and re-evaluation [address] [facility] [address]  [phone]       [facility] - Emergency Dept Emergency Medicine Go to  As needed, If symptoms worsen [address] [facility] [address]  [phone] 1st floor    Jason Botello MD Obstetrics and Gynecology Schedule an appointment as soon as possible for a visit in 3 days for ER visit follow up and re-evaluation [address] [facility]  [address] [facility] [address]  [phone]       Domo Zapata Jr., MD Gastroenterology Schedule an appointment as soon as possible for a visit in 3 days for ER visit follow up and re-evaluation, GI follow up [address] [facility] [address]  [phone]                Sarah Alfaro NP  05/06/23 9909

## 2023-05-06 NOTE — Clinical Note
"Manisha Martinoe" Dario was seen and treated in our emergency department on 5/6/2023.  She may return to work on 05/13/2023.       If you have any questions or concerns, please don't hesitate to call.      Sarah Alfaro NP"

## 2023-05-19 ENCOUNTER — HOSPITAL ENCOUNTER (OUTPATIENT)
Facility: HOSPITAL | Age: 28
Discharge: HOME | End: 2023-05-20
Attending: EMERGENCY MEDICINE | Admitting: INTERNAL MEDICINE
Payer: MEDICAID

## 2023-05-19 DIAGNOSIS — R10.9 ABDOMINAL PAIN: ICD-10-CM

## 2023-05-19 DIAGNOSIS — R52 INTRACTABLE PAIN: ICD-10-CM

## 2023-05-19 DIAGNOSIS — R07.9 CHEST PAIN: ICD-10-CM

## 2023-05-19 DIAGNOSIS — K52.9 COLITIS: Primary | ICD-10-CM

## 2023-05-19 LAB
ALBUMIN SERPL BCP-MCNC: 3.9 G/DL (ref 3.5–5.2)
ALP SERPL-CCNC: 65 U/L (ref 55–135)
ALT SERPL W/O P-5'-P-CCNC: 18 U/L (ref 10–44)
AMPHET+METHAMPHET UR QL: NEGATIVE
ANION GAP SERPL CALC-SCNC: 7 MMOL/L (ref 8–16)
AST SERPL-CCNC: 16 U/L (ref 10–40)
B-HCG UR QL: NEGATIVE
BARBITURATES UR QL SCN>200 NG/ML: NEGATIVE
BASOPHILS # BLD AUTO: 0.07 K/UL (ref 0–0.2)
BASOPHILS NFR BLD: 0.6 % (ref 0–1.9)
BENZODIAZ UR QL SCN>200 NG/ML: ABNORMAL
BILIRUB SERPL-MCNC: 0.4 MG/DL (ref 0.1–1)
BILIRUB UR QL STRIP: NEGATIVE
BUN SERPL-MCNC: 8 MG/DL (ref 6–20)
BZE UR QL SCN: NEGATIVE
CALCIUM SERPL-MCNC: 8.8 MG/DL (ref 8.7–10.5)
CANNABINOIDS UR QL SCN: ABNORMAL
CHLORIDE SERPL-SCNC: 103 MMOL/L (ref 95–110)
CK SERPL-CCNC: 95 U/L (ref 20–180)
CLARITY UR: CLEAR
CO2 SERPL-SCNC: 27 MMOL/L (ref 23–29)
COLOR UR: YELLOW
CREAT SERPL-MCNC: 0.8 MG/DL (ref 0.5–1.4)
CREAT UR-MCNC: 179 MG/DL (ref 15–325)
CTP QC/QA: YES
DIFFERENTIAL METHOD: ABNORMAL
EOSINOPHIL # BLD AUTO: 0.1 K/UL (ref 0–0.5)
EOSINOPHIL NFR BLD: 0.8 % (ref 0–8)
ERYTHROCYTE [DISTWIDTH] IN BLOOD BY AUTOMATED COUNT: 16 % (ref 11.5–14.5)
EST. GFR  (NO RACE VARIABLE): >60 ML/MIN/1.73 M^2
FILAMENT FUNGI VAG WET PREP-#/AREA: NORMAL
GLUCOSE SERPL-MCNC: 82 MG/DL (ref 70–110)
GLUCOSE UR QL STRIP: NEGATIVE
HCT VFR BLD AUTO: 36.1 % (ref 37–48.5)
HGB BLD-MCNC: 11 G/DL (ref 12–16)
HGB UR QL STRIP: NEGATIVE
IMM GRANULOCYTES # BLD AUTO: 0.05 K/UL (ref 0–0.04)
IMM GRANULOCYTES NFR BLD AUTO: 0.4 % (ref 0–0.5)
INFLUENZA A, MOLECULAR: NEGATIVE
INFLUENZA B, MOLECULAR: NEGATIVE
KETONES UR QL STRIP: NEGATIVE
LACTATE SERPL-SCNC: 1.5 MMOL/L (ref 0.5–1.9)
LEUKOCYTE ESTERASE UR QL STRIP: NEGATIVE
LIPASE SERPL-CCNC: 32 U/L (ref 4–60)
LYMPHOCYTES # BLD AUTO: 1.9 K/UL (ref 1–4.8)
LYMPHOCYTES NFR BLD: 15.8 % (ref 18–48)
MAGNESIUM SERPL-MCNC: 1.8 MG/DL (ref 1.6–2.6)
MCH RBC QN AUTO: 24 PG (ref 27–31)
MCHC RBC AUTO-ENTMCNC: 30.5 G/DL (ref 32–36)
MCV RBC AUTO: 79 FL (ref 82–98)
MONOCYTES # BLD AUTO: 0.5 K/UL (ref 0.3–1)
MONOCYTES NFR BLD: 4.5 % (ref 4–15)
NEUTROPHILS # BLD AUTO: 9.3 K/UL (ref 1.8–7.7)
NEUTROPHILS NFR BLD: 77.9 % (ref 38–73)
NITRITE UR QL STRIP: NEGATIVE
NRBC BLD-RTO: 0 /100 WBC
OPIATES UR QL SCN: NEGATIVE
PCP UR QL SCN>25 NG/ML: NEGATIVE
PH UR STRIP: 6 [PH] (ref 5–8)
PLATELET # BLD AUTO: 366 K/UL (ref 150–450)
PMV BLD AUTO: 9.1 FL (ref 9.2–12.9)
POTASSIUM SERPL-SCNC: 3.7 MMOL/L (ref 3.5–5.1)
PROT SERPL-MCNC: 7.6 G/DL (ref 6–8.4)
PROT UR QL STRIP: NEGATIVE
RBC # BLD AUTO: 4.58 M/UL (ref 4–5.4)
SARS-COV-2 RDRP RESP QL NAA+PROBE: NEGATIVE
SODIUM SERPL-SCNC: 137 MMOL/L (ref 136–145)
SP GR UR STRIP: 1.02 (ref 1–1.03)
SPECIMEN SOURCE: NORMAL
SPECIMEN SOURCE: NORMAL
T VAGINALIS GENITAL QL WET PREP: NORMAL
TOXICOLOGY INFORMATION: ABNORMAL
TSH SERPL DL<=0.005 MIU/L-ACNC: 2.29 UIU/ML (ref 0.34–5.6)
URN SPEC COLLECT METH UR: NORMAL
UROBILINOGEN UR STRIP-ACNC: NEGATIVE EU/DL
WBC # BLD AUTO: 11.95 K/UL (ref 3.9–12.7)
YEAST GENITAL QL WET PREP: NORMAL

## 2023-05-19 PROCEDURE — 93005 ELECTROCARDIOGRAM TRACING: CPT | Performed by: SPECIALIST

## 2023-05-19 PROCEDURE — 83605 ASSAY OF LACTIC ACID: CPT | Performed by: EMERGENCY MEDICINE

## 2023-05-19 PROCEDURE — 36415 COLL VENOUS BLD VENIPUNCTURE: CPT | Performed by: EMERGENCY MEDICINE

## 2023-05-19 PROCEDURE — 25500020 PHARM REV CODE 255: Performed by: EMERGENCY MEDICINE

## 2023-05-19 PROCEDURE — U0002 COVID-19 LAB TEST NON-CDC: HCPCS | Performed by: EMERGENCY MEDICINE

## 2023-05-19 PROCEDURE — 85025 COMPLETE CBC W/AUTO DIFF WBC: CPT | Performed by: NURSE PRACTITIONER

## 2023-05-19 PROCEDURE — 96361 HYDRATE IV INFUSION ADD-ON: CPT

## 2023-05-19 PROCEDURE — 96366 THER/PROPH/DIAG IV INF ADDON: CPT

## 2023-05-19 PROCEDURE — 87591 N.GONORRHOEAE DNA AMP PROB: CPT | Mod: 59 | Performed by: EMERGENCY MEDICINE

## 2023-05-19 PROCEDURE — 82550 ASSAY OF CK (CPK): CPT | Performed by: EMERGENCY MEDICINE

## 2023-05-19 PROCEDURE — 83690 ASSAY OF LIPASE: CPT | Performed by: EMERGENCY MEDICINE

## 2023-05-19 PROCEDURE — 87502 INFLUENZA DNA AMP PROBE: CPT | Performed by: EMERGENCY MEDICINE

## 2023-05-19 PROCEDURE — 80307 DRUG TEST PRSMV CHEM ANLYZR: CPT | Performed by: EMERGENCY MEDICINE

## 2023-05-19 PROCEDURE — 63600175 PHARM REV CODE 636 W HCPCS: Performed by: EMERGENCY MEDICINE

## 2023-05-19 PROCEDURE — 25000003 PHARM REV CODE 250: Performed by: EMERGENCY MEDICINE

## 2023-05-19 PROCEDURE — 81003 URINALYSIS AUTO W/O SCOPE: CPT | Performed by: NURSE PRACTITIONER

## 2023-05-19 PROCEDURE — 83735 ASSAY OF MAGNESIUM: CPT | Performed by: EMERGENCY MEDICINE

## 2023-05-19 PROCEDURE — 87040 BLOOD CULTURE FOR BACTERIA: CPT | Mod: 59 | Performed by: EMERGENCY MEDICINE

## 2023-05-19 PROCEDURE — 81025 URINE PREGNANCY TEST: CPT | Performed by: NURSE PRACTITIONER

## 2023-05-19 PROCEDURE — 96376 TX/PRO/DX INJ SAME DRUG ADON: CPT

## 2023-05-19 PROCEDURE — 87081 CULTURE SCREEN ONLY: CPT | Performed by: EMERGENCY MEDICINE

## 2023-05-19 PROCEDURE — 87205 SMEAR GRAM STAIN: CPT | Performed by: EMERGENCY MEDICINE

## 2023-05-19 PROCEDURE — 96375 TX/PRO/DX INJ NEW DRUG ADDON: CPT

## 2023-05-19 PROCEDURE — 99285 EMERGENCY DEPT VISIT HI MDM: CPT | Mod: 25

## 2023-05-19 PROCEDURE — 96365 THER/PROPH/DIAG IV INF INIT: CPT | Mod: 59

## 2023-05-19 PROCEDURE — 84443 ASSAY THYROID STIM HORMONE: CPT | Performed by: EMERGENCY MEDICINE

## 2023-05-19 PROCEDURE — 93010 ELECTROCARDIOGRAM REPORT: CPT | Mod: ,,, | Performed by: SPECIALIST

## 2023-05-19 PROCEDURE — 80053 COMPREHEN METABOLIC PANEL: CPT | Performed by: NURSE PRACTITIONER

## 2023-05-19 PROCEDURE — 93010 EKG 12-LEAD: ICD-10-PCS | Mod: ,,, | Performed by: SPECIALIST

## 2023-05-19 PROCEDURE — 87210 SMEAR WET MOUNT SALINE/INK: CPT | Performed by: EMERGENCY MEDICINE

## 2023-05-19 RX ORDER — MORPHINE SULFATE 2 MG/ML
2 INJECTION, SOLUTION INTRAMUSCULAR; INTRAVENOUS
Status: COMPLETED | OUTPATIENT
Start: 2023-05-19 | End: 2023-05-19

## 2023-05-19 RX ORDER — DIPHENHYDRAMINE HYDROCHLORIDE 50 MG/ML
25 INJECTION INTRAMUSCULAR; INTRAVENOUS
Status: COMPLETED | OUTPATIENT
Start: 2023-05-19 | End: 2023-05-19

## 2023-05-19 RX ORDER — DROPERIDOL 2.5 MG/ML
0.62 INJECTION, SOLUTION INTRAMUSCULAR; INTRAVENOUS ONCE
Status: COMPLETED | OUTPATIENT
Start: 2023-05-19 | End: 2023-05-19

## 2023-05-19 RX ORDER — ACETAMINOPHEN 500 MG
1000 TABLET ORAL
Status: DISPENSED | OUTPATIENT
Start: 2023-05-19 | End: 2023-05-20

## 2023-05-19 RX ORDER — DROPERIDOL 2.5 MG/ML
1.25 INJECTION, SOLUTION INTRAMUSCULAR; INTRAVENOUS ONCE
Status: COMPLETED | OUTPATIENT
Start: 2023-05-19 | End: 2023-05-19

## 2023-05-19 RX ORDER — MORPHINE SULFATE 2 MG/ML
2 INJECTION, SOLUTION INTRAMUSCULAR; INTRAVENOUS
Status: COMPLETED | OUTPATIENT
Start: 2023-05-19 | End: 2023-05-20

## 2023-05-19 RX ORDER — SODIUM CHLORIDE 9 MG/ML
1000 INJECTION, SOLUTION INTRAVENOUS
Status: COMPLETED | OUTPATIENT
Start: 2023-05-19 | End: 2023-05-19

## 2023-05-19 RX ADMIN — DIPHENHYDRAMINE HYDROCHLORIDE 25 MG: 50 INJECTION INTRAMUSCULAR; INTRAVENOUS at 06:05

## 2023-05-19 RX ADMIN — PIPERACILLIN AND TAZOBACTAM 4.5 G: 4; .5 INJECTION, POWDER, LYOPHILIZED, FOR SOLUTION INTRAVENOUS; PARENTERAL at 10:05

## 2023-05-19 RX ADMIN — DROPERIDOL 0.62 MG: 2.5 INJECTION, SOLUTION INTRAMUSCULAR; INTRAVENOUS at 06:05

## 2023-05-19 RX ADMIN — SODIUM CHLORIDE 500 ML: 0.9 INJECTION, SOLUTION INTRAVENOUS at 09:05

## 2023-05-19 RX ADMIN — DROPERIDOL 1.25 MG: 2.5 INJECTION, SOLUTION INTRAMUSCULAR; INTRAVENOUS at 07:05

## 2023-05-19 RX ADMIN — IOHEXOL 100 ML: 350 INJECTION, SOLUTION INTRAVENOUS at 08:05

## 2023-05-19 RX ADMIN — DIPHENHYDRAMINE HYDROCHLORIDE 25 MG: 50 INJECTION INTRAMUSCULAR; INTRAVENOUS at 07:05

## 2023-05-19 RX ADMIN — MORPHINE SULFATE 2 MG: 2 INJECTION, SOLUTION INTRAMUSCULAR; INTRAVENOUS at 10:05

## 2023-05-19 RX ADMIN — SODIUM CHLORIDE 1000 ML: 0.9 INJECTION, SOLUTION INTRAVENOUS at 06:05

## 2023-05-19 NOTE — ED PROVIDER NOTES
"Encounter Date: 5/19/2023       History     Chief Complaint   Patient presents with    Abdominal Pain     Has been here multiple times and can't get answers     This is a 27-year-old female who presents complaining of lower abdominal pain.  The patient reports lower abdominal pain constantly throughout the day today.  She reports the pain has been severe.  She denies any exacerbating or alleviating factors.  Her appetite and activity level have been normal.  The pain is not worse with ambulation or movement.  She denies any associated vaginal discharge or abnormal vaginal bleeding.  Her last menstrual period was 1 week ago and was normal by her account.  She reports she is not been sexually active over the past 5 months.  She denies fever, nausea, vomiting or diarrhea.  She had a normal formed stool today after the pain started with no change in the pain level.  She denies gastrointestinal bleeding of any sort.  She denies any associated cough, sore throat, sinus congestion or any upper or lower respiratory type symptoms.  She does report that it "hurts" when she urinates today.  She denies any associated urinary frequency, hesitancy, bladder or bowel incontinence or urinary retention type symptoms.  She denies any associated flank pain.  She denies chest pain, shortness of breath or palpitations.  She denies any sort of pleuritic pain or discomfort.  The patient has had multiple ED presentations over the past 5 months.  She has not however followed up with a primary care provider, gynecologist or gastroenterologist regarding this chronic intermittent discomfort.  She does smoke marijuana.  Last smoked 2 days ago.  She denies any alcohol or other illicit drug use.  She denies any other problems or complaints.  She was previously offered Tylenol for pain and declined to have the Tylenol.      Review of patient's allergies indicates:   Allergen Reactions    Iodine and iodide containing products Hives    Vitamin e " (d-alpha tocopherol)      Hives (skin)^Black eyed peas and oatmeal causede hives     Past Medical History:   Diagnosis Date    Anxiety     Bipolar 1 disorder     COVID-19 2021    GERD (gastroesophageal reflux disease)     Obese      Past Surgical History:   Procedure Laterality Date     SECTION  2022    LAPAROSCOPIC APPENDECTOMY N/A 2022    Procedure: APPENDECTOMY, LAPAROSCOPIC;  Surgeon: Yuniel Goldman MD;  Location: [facility] OR;  Service: General;  Laterality: N/A;    LAPAROSCOPIC CHOLECYSTECTOMY N/A 2023    Procedure: CHOLECYSTECTOMY, LAPAROSCOPIC;  Surgeon: Yuniel Goldman MD;  Location: [facility] OR;  Service: General;  Laterality: N/A;    LEG SURGERY      Broke her left lower leg.     Family History   Problem Relation Age of Onset    No Known Problems Mother     No Known Problems Father     Breast cancer Neg Hx     Colon cancer Neg Hx     Ovarian cancer Neg Hx      Social History     Tobacco Use    Smoking status: Never    Smokeless tobacco: Never   Substance Use Topics    Alcohol use: Yes     Comment: occasionally    Drug use: No     Review of Systems   Constitutional: Negative.  Negative for activity change, appetite change, chills, fatigue and fever.   HENT: Negative.  Negative for congestion, ear pain, rhinorrhea, sore throat and trouble swallowing.    Eyes: Negative.  Negative for photophobia, pain, redness and visual disturbance.   Respiratory: Negative.  Negative for cough, chest tightness, shortness of breath and wheezing.    Cardiovascular: Negative.  Negative for chest pain, palpitations and leg swelling.   Gastrointestinal:  Positive for abdominal pain. Negative for abdominal distention, anal bleeding, blood in stool, constipation, diarrhea, nausea, rectal pain and vomiting.   Endocrine: Negative.    Genitourinary:  Positive for dysuria and pelvic pain. Negative for decreased urine volume, difficulty urinating, flank pain, frequency, genital sores, hematuria, menstrual  problem, urgency, vaginal bleeding, vaginal discharge and vaginal pain.   Musculoskeletal: Negative.  Negative for arthralgias, back pain, gait problem, myalgias, neck pain and neck stiffness.   Skin: Negative.  Negative for rash.   Neurological: Negative.  Negative for dizziness, syncope, facial asymmetry, speech difficulty, weakness, light-headedness, numbness and headaches.   Hematological:  Does not bruise/bleed easily.   Psychiatric/Behavioral:  Positive for agitation. Negative for confusion.    All other systems reviewed and are negative.    Physical Exam     Initial Vitals [05/19/23 1609]   BP Pulse Resp Temp SpO2   (!) 143/101 101 18 98.2 °F (36.8 °C) 98 %      MAP       --         Physical Exam    Nursing note and vitals reviewed.  Constitutional: She is not diaphoretic. She is uncooperative.  Non-toxic appearance. She does not have a sickly appearance. She does not appear ill.   The patient required frequent redirecting during her history and physical exam.  She was very angry and belligerent regarding any questioning.  She seemed to be angry at me when I walked in the room and persisted with an angry demeanor throughout her history and physical exam.   HENT:   Head: Normocephalic and atraumatic.   Right Ear: Tympanic membrane normal.   Left Ear: Tympanic membrane normal.   Nose: Nose normal.   Mouth/Throat: Uvula is midline, oropharynx is clear and moist and mucous membranes are normal. No oral lesions. No uvula swelling. No oropharyngeal exudate, posterior oropharyngeal edema or posterior oropharyngeal erythema.   Eyes: Conjunctivae, EOM and lids are normal. Pupils are equal, round, and reactive to light. No scleral icterus.   Neck: Trachea normal and phonation normal. Neck supple. No thyroid mass and no thyromegaly present. No stridor present. No JVD present.   Normal range of motion.   Full passive range of motion without pain.     Cardiovascular:  Normal rate, regular rhythm, normal heart sounds,  intact distal pulses and normal pulses.     Exam reveals no gallop, no distant heart sounds and no friction rub.       No murmur heard.  Pulmonary/Chest: Effort normal and breath sounds normal. No accessory muscle usage or stridor. No tachypnea. No respiratory distress. She has no wheezes. She has no rhonchi. She has no rales.   Abdominal: Abdomen is soft. Bowel sounds are normal. She exhibits no distension, no pulsatile midline mass and no mass. There is generalized abdominal tenderness. No hernia.   Mild diffuse tenderness, patient is distractible from pain, no rebound, no guarding, abdomen is soft throughout.   No right CVA tenderness.  No left CVA tenderness. There is no rigidity, no rebound and no guarding. negative psoas sign and negative Rovsing's sign  Genitourinary:    Vagina normal.   Cervix exhibits no motion tenderness, no discharge and no friability. Right adnexum displays no mass, no tenderness and no fullness. Left adnexum displays no mass, no tenderness and no fullness.    No vaginal discharge, erythema, tenderness or bleeding.   No erythema, tenderness or bleeding in the vagina.    No signs of injury in the vagina.     Musculoskeletal:         General: No tenderness or edema. Normal range of motion.      Right hand: Normal. Normal range of motion. Normal strength. Normal sensation. Normal capillary refill. Normal pulse.      Left hand: Normal. Normal range of motion. Normal strength. Normal sensation. Normal capillary refill. Normal pulse.      Cervical back: Normal, full passive range of motion without pain, normal range of motion and neck supple. No edema, erythema, rigidity or bony tenderness. No spinous process tenderness or muscular tenderness. Normal range of motion.      Thoracic back: Normal. No bony tenderness. Normal range of motion.      Lumbar back: Normal. No bony tenderness. Normal range of motion.      Right foot: Normal. Normal range of motion and normal capillary refill. No  tenderness or bony tenderness. Normal pulse.      Left foot: Normal. Normal range of motion and normal capillary refill. No tenderness or bony tenderness. Normal pulse.      Comments: Pulses are 2+ throughout, cap refill is less than 2 sec throughout, extremities are nontender throughout with full range of motion. There is no spinal tenderness to palpation.     Neurological: She is alert and oriented to person, place, and time. She has normal strength. She displays normal reflexes. No cranial nerve deficit or sensory deficit. Gait normal.   No focal deficits.   Skin: Skin is warm, dry and intact. Capillary refill takes less than 2 seconds. No ecchymosis, no petechiae and no rash noted. No erythema. No pallor.   Psychiatric: Her speech is normal. Thought content normal. Her mood appears anxious. Her affect is labile. She is agitated. Cognition and memory are normal. She expresses impulsivity.       ED Course   Procedures  Labs Reviewed   CBC W/ AUTO DIFFERENTIAL - Abnormal; Notable for the following components:       Result Value    Hemoglobin 11.0 (*)     Hematocrit 36.1 (*)     MCV 79 (*)     MCH 24.0 (*)     MCHC 30.5 (*)     RDW 16.0 (*)     MPV 9.1 (*)     Gran # (ANC) 9.3 (*)     Immature Grans (Abs) 0.05 (*)     Gran % 77.9 (*)     Lymph % 15.8 (*)     All other components within normal limits   COMPREHENSIVE METABOLIC PANEL - Abnormal; Notable for the following components:    Anion Gap 7 (*)     All other components within normal limits   DRUG SCREEN PANEL, URINE EMERGENCY - Abnormal; Notable for the following components:    Benzodiazepines Presumptive Positive (*)     THC Presumptive Positive (*)     All other components within normal limits    Narrative:     Specimen Source->Urine   C. TRACHOMATIS/N. GONORRHOEAE BY AMP DNA   VAGINAL SCREEN   C. TRACHOMATIS/N. GONORRHOEAE BY AMP DNA   C. TRACHOMATIS/N. GONORRHOEAE BY AMP DNA   URINALYSIS, REFLEX TO URINE CULTURE    Narrative:     In and Out Cath as needed it  patient unable to void  Specimen Source->Urine   LIPASE   CK   MAGNESIUM   TSH   CK   LIPASE   TSH   MAGNESIUM   INFLUENZA A AND B ANTIGEN    Narrative:     Specimen Source->Nasopharyngeal Swab   SARS-COV-2 RNA AMPLIFICATION, QUAL   LACTIC ACID, PLASMA   POCT URINE PREGNANCY        ECG Results              EKG 12-lead (Final result)  Result time 05/20/23 14:14:34      Final result by Interface, Lab In [facility] (05/20/23 14:14:34)                   Narrative:    Test Reason : R10.9,    Vent. Rate : 107 BPM     Atrial Rate : 107 BPM     P-R Int : 160 ms          QRS Dur : 092 ms      QT Int : 334 ms       P-R-T Axes : 030 021 011 degrees     QTc Int : 445 ms    Sinus tachycardia  Otherwise normal ECG  When compared with ECG of 30-MAR-2023 12:42,  No significant change was found  Confirmed by Mayo QUIROGA, Sudhir LEUNG ([id number]) on 5/20/2023 2:14:27 PM    Referred By: [email or web address]   SELF           Confirmed By:Sudhir Huber MD                                  Imaging Results              US Pelvis Complete Non OB (Final result)  Result time 05/19/23 22:07:58   Procedure changed from US Pelvis Comp with Transvag NON-OB (xpd     Final result by Festus Goldman MD (05/19/23 22:07:58)                   Narrative:    EXAM DESCRIPTION:  US PELVIS COMPLETE NON OB    CLINICAL HISTORY:  27 years  Female  pelvic pain    COMPARISON:  Main 19 2023 CT abdomen pelvis.    TECHNIQUE:  Transabdominal ultrasound images of the pelvis were acquired. Transvaginal ultrasound images were acquired for improved visualization of the endometrial canal and adnexa. . Doppler evaluation was also performed.    FINDINGS:  Uterus: Unremarkable in echotexture measuring 10.5 x 3.9 x 5.4cm. No conspicuous myometrial mass. The endometrium measures 0.3cm in thickness without focal lesion evident.    Right ovary:  Measures 2.9 x 2.9 x 3.8cm. Within normal limits color Doppler appearance and arterial/venous waveforms.    Left ovary: Obscured and cannot be  visualized    Other:  No free fluid in the pelvis.    IMPRESSION:  1. Nonvisualization of the left ovary.  2. Otherwise no acute findings in the pelvis.    Electronically signed by:  Festus Goldman MD  5/19/2023 10:07 PM CDT Workstation: [id number]                                     CT Abdomen Pelvis With Contrast (Final result)  Result time 05/19/23 21:16:53      Final result by Karlo Moore MD (05/19/23 21:16:53)                   Narrative:    CT SCANS ABDOMEN AND PELVIS    HISTORY: LLQ abdominal pain; Epigastric pain    COMPARISON: 5/3/2023.    TECHNIQUE: Radiation dose reduction techniques were utilized, including automated exposure control and exposure modulation based on body size. Axial images were obtained from the lung bases to the symphysis pubis IV and a small amount of oral contrast    FINDINGS:  Stable splenomegaly again noted at 16.5 cm without discrete mass lesion. Cholecystectomy. Remaining abdominal organs have an unremarkable appearance. Normal aorta. Small hiatal hernia.    There is subtle pericolonic inflammation about the cecum and proximal ascending colon suggesting a mild nonspecific colitis. Appendix is not visualized for assessment. The poorly opacified GI tract is nonobstructive in appearance.    No constipation or diverticular disease. No adnexal mass or significant free fluid. There is a small fat containing umbilical hernia without associated inflammation.    IMPRESSION :  1. Mild nonspecific right colitis, likely infectious or inflammatory. No obstruction.  2. Stable homogeneous splenic enlargement.              Electronically signed by:  Karlo Moore MD  5/19/2023 9:16 PM CDT Workstation: [id number]                                     Medications   acetaminophen tablet 1,000 mg (1,000 mg Oral Not Given 5/19/23 1715)   0.9%  NaCl infusion (0 mLs Intravenous Stopped 5/19/23 2130)   droPERidol injection 0.625 mg (0.625 mg Intravenous Given 5/19/23 1834)   diphenhydrAMINE  injection 25 mg (25 mg Intravenous Given 5/19/23 1816)   diphenhydrAMINE injection 25 mg (25 mg Intravenous Given 5/19/23 1940)   droPERidol injection 1.25 mg (1.25 mg Intravenous Given 5/19/23 1941)   iohexoL (OMNIPAQUE 350) injection 100 mL (100 mLs Intravenous Given 5/19/23 2057)   sodium chloride 0.9% bolus 500 mL 500 mL (0 mLs Intravenous Stopped 5/19/23 2200)   morphine injection 2 mg (2 mg Intravenous Given 5/19/23 2239)   piperacillin-tazobactam 4.5 g in dextrose 5 % 100 mL IVPB (ready to mix system) (0 g Intravenous Stopped 5/20/23 0058)   morphine injection 2 mg (2 mg Intravenous Given 5/20/23 0057)   potassium chloride SA CR tablet 40 mEq (40 mEq Oral Given 5/20/23 0941)     Medical Decision Making:   Clinical Tests:   Lab Tests: Ordered and Reviewed  Radiological Study: Ordered and Reviewed  Medical Tests: Ordered and Reviewed  ED Management:  Emergent evaluation of a 27-year-old female with chronic recurrent abdominal pain presenting with same today.  Initially did have some improvement with Inapsine and Benadryl.  Pain reoccurred.  Inapsine and Benadryl redosed.  Also with improvement.  Labs are unremarkable.  CT scan with evidence of possible colitis.  Blood cultures obtained.  IV antibiotics given.  Patient reports increasing pain.  Abdominal exam is benign with only minimal left lower quadrant tenderness and no rebound or guarding.  Pain medicine again redosed.  Secondary to intractable pain with findings of colitis I have discussed the case with the hospitalist provider who has assumed care and will admit.                        Clinical Impression:   Final diagnoses:  [R10.9] Abdominal pain  [K52.9] Colitis (Primary)  [R52] Intractable pain        ED Disposition Condition    Observation                 Carolina Rodgers MD  05/21/23 9823

## 2023-05-19 NOTE — FIRST PROVIDER EVALUATION
Medical screening examination initiated.  I have conducted a focused provider triage encounter, findings are as follows:    Brief history of present illness:  Patient is a 27-year-old female presenting for acute on chronic abdominal pain.  Patient states she has lower abdominal pain with dysuria.  Patient states she is been to the ED multiple time and has not gotten any answers.  Patient states she wants to get down to the needing gritty and will not leave without answers.  Patient then began yelling at staff demanding to see a physician.  Patient also demanding narcotic pain medication.  Nursing staff at bedside when state patient states multiple time she wants to see a doctor and to be taken seriously.  Patient demanding admission for her abdominal pain.  Patient updated on how workup works and is upset that we are checking urine and offering nonnarcotic pain medication until workup is started.    Vitals:    05/19/23 1609   BP: (!) 143/101   BP Location: Left arm   Patient Position: Sitting   Pulse: 101   Resp: 18   Temp: 98.2 °F (36.8 °C)   TempSrc: Oral   SpO2: 98%

## 2023-05-19 NOTE — ED NOTES
Pt very rude, irritable and argumentative with staff and doctor. Pt demands answers and states we have to admit her and find out where the pain is coming from. Generalized abd pain. Denies any other pain.

## 2023-05-20 VITALS
HEIGHT: 67 IN | BODY MASS INDEX: 45.36 KG/M2 | TEMPERATURE: 99 F | RESPIRATION RATE: 16 BRPM | SYSTOLIC BLOOD PRESSURE: 122 MMHG | OXYGEN SATURATION: 99 % | WEIGHT: 289 LBS | DIASTOLIC BLOOD PRESSURE: 77 MMHG | HEART RATE: 101 BPM

## 2023-05-20 PROBLEM — K52.9 COLITIS: Status: ACTIVE | Noted: 2023-05-20

## 2023-05-20 LAB
ANION GAP SERPL CALC-SCNC: 7 MMOL/L (ref 8–16)
BUN SERPL-MCNC: 6 MG/DL (ref 6–20)
CALCIUM SERPL-MCNC: 8.4 MG/DL (ref 8.7–10.5)
CHLORIDE SERPL-SCNC: 103 MMOL/L (ref 95–110)
CO2 SERPL-SCNC: 26 MMOL/L (ref 23–29)
CREAT SERPL-MCNC: 0.7 MG/DL (ref 0.5–1.4)
ERYTHROCYTE [DISTWIDTH] IN BLOOD BY AUTOMATED COUNT: 16 % (ref 11.5–14.5)
EST. GFR  (NO RACE VARIABLE): >60 ML/MIN/1.73 M^2
FERRITIN SERPL-MCNC: 16 NG/ML (ref 20–300)
FOLATE SERPL-MCNC: 19.7 NG/ML (ref 4–24)
GLUCOSE SERPL-MCNC: 100 MG/DL (ref 70–110)
HCT VFR BLD AUTO: 33 % (ref 37–48.5)
HGB BLD-MCNC: 10.1 G/DL (ref 12–16)
IRON SERPL-MCNC: 14 UG/DL (ref 30–160)
LACTATE SERPL-SCNC: 0.9 MMOL/L (ref 0.5–1.9)
MCH RBC QN AUTO: 23.9 PG (ref 27–31)
MCHC RBC AUTO-ENTMCNC: 30.6 G/DL (ref 32–36)
MCV RBC AUTO: 78 FL (ref 82–98)
PLATELET # BLD AUTO: 315 K/UL (ref 150–450)
PMV BLD AUTO: 9.4 FL (ref 9.2–12.9)
POTASSIUM SERPL-SCNC: 3.4 MMOL/L (ref 3.5–5.1)
RBC # BLD AUTO: 4.22 M/UL (ref 4–5.4)
RETICS/RBC NFR AUTO: 2 % (ref 0.5–2.5)
SATURATED IRON: 4 % (ref 20–50)
SODIUM SERPL-SCNC: 136 MMOL/L (ref 136–145)
TOTAL IRON BINDING CAPACITY: 384 UG/DL (ref 250–450)
TRANSFERRIN SERPL-MCNC: 274 MG/DL (ref 200–375)
VIT B12 SERPL-MCNC: 251 PG/ML (ref 210–950)
WBC # BLD AUTO: 9.57 K/UL (ref 3.9–12.7)

## 2023-05-20 PROCEDURE — 63600175 PHARM REV CODE 636 W HCPCS: Performed by: EMERGENCY MEDICINE

## 2023-05-20 PROCEDURE — 82607 VITAMIN B-12: CPT | Performed by: STUDENT IN AN ORGANIZED HEALTH CARE EDUCATION/TRAINING PROGRAM

## 2023-05-20 PROCEDURE — G0378 HOSPITAL OBSERVATION PER HR: HCPCS

## 2023-05-20 PROCEDURE — 85045 AUTOMATED RETICULOCYTE COUNT: CPT | Performed by: STUDENT IN AN ORGANIZED HEALTH CARE EDUCATION/TRAINING PROGRAM

## 2023-05-20 PROCEDURE — 96376 TX/PRO/DX INJ SAME DRUG ADON: CPT

## 2023-05-20 PROCEDURE — 36415 COLL VENOUS BLD VENIPUNCTURE: CPT | Performed by: STUDENT IN AN ORGANIZED HEALTH CARE EDUCATION/TRAINING PROGRAM

## 2023-05-20 PROCEDURE — 25000003 PHARM REV CODE 250: Performed by: INTERNAL MEDICINE

## 2023-05-20 PROCEDURE — 82746 ASSAY OF FOLIC ACID SERUM: CPT | Performed by: STUDENT IN AN ORGANIZED HEALTH CARE EDUCATION/TRAINING PROGRAM

## 2023-05-20 PROCEDURE — 36415 COLL VENOUS BLD VENIPUNCTURE: CPT | Performed by: INTERNAL MEDICINE

## 2023-05-20 PROCEDURE — 63600175 PHARM REV CODE 636 W HCPCS: Performed by: STUDENT IN AN ORGANIZED HEALTH CARE EDUCATION/TRAINING PROGRAM

## 2023-05-20 PROCEDURE — 96366 THER/PROPH/DIAG IV INF ADDON: CPT

## 2023-05-20 PROCEDURE — 84466 ASSAY OF TRANSFERRIN: CPT | Performed by: STUDENT IN AN ORGANIZED HEALTH CARE EDUCATION/TRAINING PROGRAM

## 2023-05-20 PROCEDURE — 25000003 PHARM REV CODE 250: Performed by: STUDENT IN AN ORGANIZED HEALTH CARE EDUCATION/TRAINING PROGRAM

## 2023-05-20 PROCEDURE — 83605 ASSAY OF LACTIC ACID: CPT | Performed by: STUDENT IN AN ORGANIZED HEALTH CARE EDUCATION/TRAINING PROGRAM

## 2023-05-20 PROCEDURE — 63600175 PHARM REV CODE 636 W HCPCS: Performed by: INTERNAL MEDICINE

## 2023-05-20 PROCEDURE — 80048 BASIC METABOLIC PNL TOTAL CA: CPT | Performed by: INTERNAL MEDICINE

## 2023-05-20 PROCEDURE — 82728 ASSAY OF FERRITIN: CPT | Performed by: STUDENT IN AN ORGANIZED HEALTH CARE EDUCATION/TRAINING PROGRAM

## 2023-05-20 PROCEDURE — 85027 COMPLETE CBC AUTOMATED: CPT | Performed by: INTERNAL MEDICINE

## 2023-05-20 RX ORDER — CIPROFLOXACIN 500 MG/1
500 TABLET ORAL EVERY 12 HOURS
Qty: 20 TABLET | Refills: 0 | Status: SHIPPED | OUTPATIENT
Start: 2023-05-20 | End: 2023-05-30

## 2023-05-20 RX ORDER — ONDANSETRON 4 MG/1
4 TABLET, ORALLY DISINTEGRATING ORAL EVERY 6 HOURS PRN
Status: DISCONTINUED | OUTPATIENT
Start: 2023-05-20 | End: 2023-05-20 | Stop reason: HOSPADM

## 2023-05-20 RX ORDER — OXCARBAZEPINE 150 MG/1
150 TABLET, FILM COATED ORAL DAILY
Status: DISCONTINUED | OUTPATIENT
Start: 2023-05-20 | End: 2023-05-20 | Stop reason: HOSPADM

## 2023-05-20 RX ORDER — MORPHINE SULFATE 2 MG/ML
2 INJECTION, SOLUTION INTRAMUSCULAR; INTRAVENOUS EVERY 6 HOURS PRN
Status: DISCONTINUED | OUTPATIENT
Start: 2023-05-20 | End: 2023-05-20

## 2023-05-20 RX ORDER — SODIUM CHLORIDE 9 MG/ML
INJECTION, SOLUTION INTRAVENOUS CONTINUOUS
Status: DISCONTINUED | OUTPATIENT
Start: 2023-05-20 | End: 2023-05-20 | Stop reason: HOSPADM

## 2023-05-20 RX ORDER — IBUPROFEN 200 MG
16 TABLET ORAL
Status: DISCONTINUED | OUTPATIENT
Start: 2023-05-20 | End: 2023-05-20 | Stop reason: HOSPADM

## 2023-05-20 RX ORDER — MORPHINE SULFATE 4 MG/ML
4 INJECTION, SOLUTION INTRAMUSCULAR; INTRAVENOUS EVERY 4 HOURS PRN
Status: DISCONTINUED | OUTPATIENT
Start: 2023-05-20 | End: 2023-05-20 | Stop reason: HOSPADM

## 2023-05-20 RX ORDER — FAMOTIDINE 20 MG/1
20 TABLET, FILM COATED ORAL 2 TIMES DAILY PRN
Status: DISCONTINUED | OUTPATIENT
Start: 2023-05-20 | End: 2023-05-20 | Stop reason: HOSPADM

## 2023-05-20 RX ORDER — NALOXONE HCL 0.4 MG/ML
0.02 VIAL (ML) INJECTION
Status: DISCONTINUED | OUTPATIENT
Start: 2023-05-20 | End: 2023-05-20 | Stop reason: HOSPADM

## 2023-05-20 RX ORDER — TRAMADOL HYDROCHLORIDE 50 MG/1
50 TABLET ORAL EVERY 6 HOURS PRN
Qty: 12 TABLET | Refills: 0 | Status: SHIPPED | OUTPATIENT
Start: 2023-05-20 | End: 2023-05-23

## 2023-05-20 RX ORDER — SODIUM CHLORIDE 0.9 % (FLUSH) 0.9 %
10 SYRINGE (ML) INJECTION EVERY 12 HOURS PRN
Status: DISCONTINUED | OUTPATIENT
Start: 2023-05-20 | End: 2023-05-20 | Stop reason: HOSPADM

## 2023-05-20 RX ORDER — METRONIDAZOLE 500 MG/1
500 TABLET ORAL EVERY 8 HOURS
Qty: 30 TABLET | Refills: 0 | Status: SHIPPED | OUTPATIENT
Start: 2023-05-20 | End: 2023-05-30

## 2023-05-20 RX ORDER — PANTOPRAZOLE SODIUM 20 MG/1
20 TABLET, DELAYED RELEASE ORAL DAILY
Status: DISCONTINUED | OUTPATIENT
Start: 2023-05-20 | End: 2023-05-20 | Stop reason: HOSPADM

## 2023-05-20 RX ORDER — POTASSIUM CHLORIDE 20 MEQ/1
40 TABLET, EXTENDED RELEASE ORAL ONCE
Status: COMPLETED | OUTPATIENT
Start: 2023-05-20 | End: 2023-05-20

## 2023-05-20 RX ORDER — IBUPROFEN 200 MG
24 TABLET ORAL
Status: DISCONTINUED | OUTPATIENT
Start: 2023-05-20 | End: 2023-05-20 | Stop reason: HOSPADM

## 2023-05-20 RX ORDER — GLUCAGON 1 MG
1 KIT INJECTION
Status: DISCONTINUED | OUTPATIENT
Start: 2023-05-20 | End: 2023-05-20 | Stop reason: HOSPADM

## 2023-05-20 RX ORDER — CHOLESTYRAMINE 4 G/4.8G
1 POWDER, FOR SUSPENSION ORAL 2 TIMES DAILY
Status: DISCONTINUED | OUTPATIENT
Start: 2023-05-20 | End: 2023-05-20 | Stop reason: HOSPADM

## 2023-05-20 RX ADMIN — ONDANSETRON 4 MG: 4 TABLET, ORALLY DISINTEGRATING ORAL at 09:05

## 2023-05-20 RX ADMIN — SODIUM CHLORIDE: 0.9 INJECTION, SOLUTION INTRAVENOUS at 06:05

## 2023-05-20 RX ADMIN — MORPHINE SULFATE 2 MG: 2 INJECTION, SOLUTION INTRAMUSCULAR; INTRAVENOUS at 02:05

## 2023-05-20 RX ADMIN — PIPERACILLIN AND TAZOBACTAM 3.38 G: 3; .375 INJECTION, POWDER, LYOPHILIZED, FOR SOLUTION INTRAVENOUS; PARENTERAL at 06:05

## 2023-05-20 RX ADMIN — PANTOPRAZOLE SODIUM 20 MG: 20 TABLET, DELAYED RELEASE ORAL at 06:05

## 2023-05-20 RX ADMIN — MORPHINE SULFATE 4 MG: 4 INJECTION, SOLUTION INTRAMUSCULAR; INTRAVENOUS at 12:05

## 2023-05-20 RX ADMIN — MORPHINE SULFATE 2 MG: 2 INJECTION, SOLUTION INTRAMUSCULAR; INTRAVENOUS at 12:05

## 2023-05-20 RX ADMIN — POTASSIUM CHLORIDE 40 MEQ: 1500 TABLET, EXTENDED RELEASE ORAL at 09:05

## 2023-05-20 RX ADMIN — MORPHINE SULFATE 2 MG: 2 INJECTION, SOLUTION INTRAMUSCULAR; INTRAVENOUS at 08:05

## 2023-05-20 NOTE — H&P
[facility] - Emergency Dept  Hospital Medicine  History & Physical    Patient Name: Manisha Bishop  MRN: [id number]  Patient Class: OP- Observation  Admission Date: 2023  Attending Physician: Benjamin Thompson MD   Primary Care Provider: Primary Doctor No         Patient information was obtained from patient and ER records.     Subjective:     Principal Problem:Colitis    Chief Complaint:   Chief Complaint   Patient presents with    Abdominal Pain     Has been here multiple times and can't get answers        HPI: 27 y.o. female with known history of appendectomy and cholecystectomy within the last 2-3 months, gerd, obesity, anxiety  was admitted with  complaining of lower abdominal pain.   She was doing well yesterday but suddenly she got right-sided lower abdominal pain this morning which was severe but not associated with ambulation or movement and no nausea and vomiting and eventually came to the hospital.  CT scan of the abdomen was done in the emergency room and found to have right-sided colitis and admitted.  CT scan did not mention adnexal mass or ovarian cyst.  On examination patient abdominal is soft at the exam and mild tender lower abdomen.  [facility] showed she was on Xanax and oxycodone and Adderall.  She told that they were prescribed by the psychiatrist.   The patient has had multiple ED presentations over the past 5 months.  Urine drug screen with marijuana and benzodiazepine.      Past Medical History:   Diagnosis Date    Anxiety     Bipolar 1 disorder     COVID-19 2021    GERD (gastroesophageal reflux disease)     Obese        Past Surgical History:   Procedure Laterality Date     SECTION  2022    LAPAROSCOPIC APPENDECTOMY N/A 2022    Procedure: APPENDECTOMY, LAPAROSCOPIC;  Surgeon: Yuniel Goldman MD;  Location: [facility];  Service: General;  Laterality: N/A;    LAPAROSCOPIC CHOLECYSTECTOMY N/A 2023    Procedure: CHOLECYSTECTOMY, LAPAROSCOPIC;   Surgeon: Yuniel Goldman MD;  Location: [facility];  Service: General;  Laterality: N/A;    LEG SURGERY      Broke her left lower leg.       Review of patient's allergies indicates:   Allergen Reactions    Iodine and iodide containing products Hives    Ketorolac Hives     Pt verbalized I dont like it doesn't work on it.    Vitamin e (d-alpha tocopherol)      Hives (skin)^Black eyed peas and oatmeal causede hives       No current facility-administered medications on file prior to encounter.     Current Outpatient Medications on File Prior to Encounter   Medication Sig    ALPRAZolam (XANAX) 2 MG Tab     cholestyramine (QUESTRAN) 4 gram packet Take 1 packet (4 g total) by mouth 3 (three) times daily with meals.    dextroamphetamine-amphetamine 30 mg Tab Take 1 tablet by mouth 4 (four) times daily.    dicyclomine (BENTYL) 20 mg tablet Take 1 tablet (20 mg total) by mouth 2 (two) times daily.    docusate sodium (COLACE) 100 MG capsule Take 1 capsule (100 mg total) by mouth 2 (two) times daily as needed for Constipation.    famotidine (PEPCID) 20 MG tablet Take 1 tablet (20 mg total) by mouth 2 (two) times daily as needed for Heartburn.    hyoscyamine (ANASPAZ,LEVSIN) 0.125 mg Tab Take 3 tablets (375 mcg total) by mouth every 6 (six) hours as needed (Abdominal pain).    ibuprofen (ADVIL,MOTRIN) 800 MG tablet Take 1 tablet (800 mg total) by mouth every 6 (six) hours as needed for Pain.    ondansetron (ZOFRAN-ODT) 4 MG TbDL Take 1 tablet (4 mg total) by mouth every 6 (six) hours as needed.    OXcarbazepine (TRILEPTAL) 300 MG Tab SMARTSI Tablet(s) By Mouth Morning-Evening    pantoprazole (PROTONIX) 20 MG tablet Take 1 tablet (20 mg total) by mouth once daily.    senna-docusate 8.6-50 mg (PERICOLACE) 8.6-50 mg per tablet Take 1 tablet by mouth 2 (two) times daily as needed for Constipation.    [DISCONTINUED] albuterol (PROVENTIL/VENTOLIN HFA) 90 mcg/actuation inhaler Inhale 1-2 puffs into the lungs every 6 (six) hours as  needed for Wheezing or Shortness of Breath. Rescue (Patient not taking: Reported on 7/19/2021)     Family History       Problem Relation (Age of Onset)    No Known Problems Mother, Father          Tobacco Use    Smoking status: Never    Smokeless tobacco: Never   Substance and Sexual Activity    Alcohol use: Yes     Comment: occasionally    Drug use: No    Sexual activity: Yes     Partners: Male     Birth control/protection: None     Review of Systems   Constitutional:  Negative for activity change and fever.   Respiratory:  Negative for shortness of breath and wheezing.    Cardiovascular:  Negative for chest pain and leg swelling.   Gastrointestinal:  Positive for abdominal pain. Negative for abdominal distention.   Genitourinary:  Negative for difficulty urinating.   Skin:  Negative for color change.   Neurological:  Negative for dizziness and facial asymmetry.   Psychiatric/Behavioral:  Negative for agitation and confusion.    Objective:     Vital Signs (Most Recent):  Temp: 98.2 °F (36.8 °C) (05/19/23 1609)  Pulse: 101 (05/20/23 0030)  Resp: 16 (05/20/23 0057)  BP: (!) 159/83 (05/20/23 0030)  SpO2: 100 % (05/20/23 0030) Vital Signs (24h Range):  Temp:  [98.2 °F (36.8 °C)] 98.2 °F (36.8 °C)  Pulse:  [101-116] 101  Resp:  [16-18] 16  SpO2:  [97 %-100 %] 100 %  BP: (120-165)/() 159/83        There is no height or weight on file to calculate BMI.     Physical Exam  Vitals and nursing note reviewed.   HENT:      Head: Normocephalic and atraumatic.   Eyes:      Conjunctiva/sclera: Conjunctivae normal.   Neck:      Vascular: No JVD.   Cardiovascular:      Rate and Rhythm: Normal rate.      Heart sounds: Normal heart sounds.   Pulmonary:      Effort: Pulmonary effort is normal.      Breath sounds: Normal breath sounds.   Abdominal:      General: Abdomen is flat. Bowel sounds are normal.      Palpations: Abdomen is soft.   Skin:     General: Skin is warm.   Neurological:      Mental Status: She is alert and  oriented to person, place, and time.   Psychiatric:         Mood and Affect: Mood normal.              Significant Labs: All pertinent labs within the past 24 hours have been reviewed.  BMP:   Recent Labs   Lab 05/19/23  1700   GLU 82      K 3.7      CO2 27   BUN 8   CREATININE 0.8   CALCIUM 8.8   MG 1.8     CBC:   Recent Labs   Lab 05/19/23  1700   WBC 11.95   HGB 11.0*   HCT 36.1*        CMP:   Recent Labs   Lab 05/19/23  1700      K 3.7      CO2 27   GLU 82   BUN 8   CREATININE 0.8   CALCIUM 8.8   PROT 7.6   ALBUMIN 3.9   BILITOT 0.4   ALKPHOS 65   AST 16   ALT 18   ANIONGAP 7*       Significant Imaging: I have reviewed all pertinent imaging results/findings within the past 24 hours.    Assessment/Plan:     Active Hospital Problems    Diagnosis    *Colitis    GERD (gastroesophageal reflux disease)    Severe obesity (BMI >= 40)    Umbilical hernia    Abdominal pain    Bipolar 1 disorder       PLAN     IVF   IV antibiotics  Pain killer PRN   Possible need for outpatient colonoscopy   Should be able to manage as OP when better   I confronted her about LA  with opoid and benzo and marijuana .          VTE Risk Mitigation (From admission, onward)           Ordered     IP VTE HIGH RISK PATIENT  Once         05/20/23 0124     Place sequential compression device  Until discontinued         05/20/23 0124                       On 05/20/2023, patient should be placed in hospital observation services under my care.        Benjamin Thompson MD  Department of Hospital Medicine  [facility] - Emergency Dept

## 2023-05-20 NOTE — HPI
27 y.o. female with known history of appendectomy and cholecystectomy within the last 2-3 months, gerd, obesity, anxiety  was admitted with  complaining of lower abdominal pain.   She was doing well yesterday but suddenly she got right-sided lower abdominal pain this morning which was severe but not associated with ambulation or movement and no nausea and vomiting and eventually came to the hospital.  CT scan of the abdomen was done in the emergency room and found to have right-sided colitis and admitted.  CT scan did not mention adnexal mass or ovarian cyst.  On examination patient abdominal is soft at the exam and mild tender lower abdomen.  [facility] showed she was on Xanax and oxycodone and Adderall.  She told that they were prescribed by the psychiatrist.   The patient has had multiple ED presentations over the past 5 months.  Urine drug screen with marijuana and benzodiazepine.

## 2023-05-20 NOTE — NURSING
"Pt called the call light and stated that the nurse better be there in 2 minutes to give her her pain meds or she will call the supervisor.   Her meds were due at 1230 and the patient woke up at 1228 to call the nurses station to tell us to bring her pain meds NOW.      I asked the patient why she is threatening and she stated she is not threatening us.    That threatening is "I am going to whip your ass".      I gave patient the morphine and then she stated, WHY don't you flush it through?   I don't feel it yet"     "

## 2023-05-20 NOTE — NURSING
Pt called for charge nurse to come to her room. Pt stated that she spoke with the house supervisor and she said that she would come see her in 15 minutes and she is not here yet. Pt stated that the Dr is not here yet either. Pt stated that everyone is lying to her. She is demanding to see the house supervisor and Dr right now!! She wants her pain medicine now. She told me to put her in a wheelchair and take her to Dr. Smiht so she can handle it herself. Pt stated she is about to lose it and we do not want to see her when she loses it. Pt has been calling the nurses station every 5-10 minutes and has called the house supervisor multiple times. She has been cursing and yelling at staff. Pt stated that she is going to tad the hospital and make sure that medicaid does not pay us.

## 2023-05-20 NOTE — PLAN OF CARE
Patient cleared for discharge from case management standpoint.    Chart and discharge orders reviewed.  Patient discharged home with no further case management needs.       05/20/23 1354   Final Note   Assessment Type Final Discharge Note   Anticipated Discharge Disposition Home   What phone number can be called within the next 1-3 days to see how you are doing after discharge? [phone]   Post-Acute Status   Discharge Delays None known at this time

## 2023-05-20 NOTE — NURSING
Patient was admitted to the floor c/o abdominal pain. Patient received 4mg of morphine in the ED. I let the doctor know that the patient was still complaining of 10/10 pain so the doctor ordered morphine 2mg q6hr. I administered the dose and within minutes the patient was back on the call light stating that she was still in severe pain but rating it as a 9/10. I messaged the doctor about the patient still being in pain, he stated that he was not going to order any more medication at this time. I relied the message to the patient and she then wanted to request for a new doctor.

## 2023-05-20 NOTE — NURSING
Pt called nursing station asking for charge nurse. Upon entering pt's room she is yelling and cursing stating that she needs her pain medicine right now!! Pt informed that her Dr. Is not ordering her anymore pain medicine at this time. Pt stated that she is going to have Dr. Thompson fired and she needs to see the house supervisor and a new Dr right now. Pt stated she wants to talk to Dr. Thompson's boss. Pt informed that we cannot give her anymore pain medicine at this time. Pt stated that this is unacceptable and she wants to see someone right now!!

## 2023-05-20 NOTE — SUBJECTIVE & OBJECTIVE
Past Medical History:   Diagnosis Date    Anxiety     Bipolar 1 disorder     COVID-19 2021    GERD (gastroesophageal reflux disease)     Obese        Past Surgical History:   Procedure Laterality Date     SECTION  2022    LAPAROSCOPIC APPENDECTOMY N/A 2022    Procedure: APPENDECTOMY, LAPAROSCOPIC;  Surgeon: Yuniel Goldman MD;  Location: [facility] OR;  Service: General;  Laterality: N/A;    LAPAROSCOPIC CHOLECYSTECTOMY N/A 2023    Procedure: CHOLECYSTECTOMY, LAPAROSCOPIC;  Surgeon: Yuniel Goldman MD;  Location: [facility] OR;  Service: General;  Laterality: N/A;    LEG SURGERY      Broke her left lower leg.       Review of patient's allergies indicates:   Allergen Reactions    Iodine and iodide containing products Hives    Ketorolac Hives     Pt verbalized I dont like it doesn't work on it.    Vitamin e (d-alpha tocopherol)      Hives (skin)^Black eyed peas and oatmeal causede hives       No current facility-administered medications on file prior to encounter.     Current Outpatient Medications on File Prior to Encounter   Medication Sig    ALPRAZolam (XANAX) 2 MG Tab     cholestyramine (QUESTRAN) 4 gram packet Take 1 packet (4 g total) by mouth 3 (three) times daily with meals.    dextroamphetamine-amphetamine 30 mg Tab Take 1 tablet by mouth 4 (four) times daily.    dicyclomine (BENTYL) 20 mg tablet Take 1 tablet (20 mg total) by mouth 2 (two) times daily.    docusate sodium (COLACE) 100 MG capsule Take 1 capsule (100 mg total) by mouth 2 (two) times daily as needed for Constipation.    famotidine (PEPCID) 20 MG tablet Take 1 tablet (20 mg total) by mouth 2 (two) times daily as needed for Heartburn.    hyoscyamine (ANASPAZ,LEVSIN) 0.125 mg Tab Take 3 tablets (375 mcg total) by mouth every 6 (six) hours as needed (Abdominal pain).    ibuprofen (ADVIL,MOTRIN) 800 MG tablet Take 1 tablet (800 mg total) by mouth every 6 (six) hours as needed for Pain.    ondansetron (ZOFRAN-ODT) 4 MG TbDL  Take 1 tablet (4 mg total) by mouth every 6 (six) hours as needed.    OXcarbazepine (TRILEPTAL) 300 MG Tab SMARTSI Tablet(s) By Mouth Morning-Evening    pantoprazole (PROTONIX) 20 MG tablet Take 1 tablet (20 mg total) by mouth once daily.    senna-docusate 8.6-50 mg (PERICOLACE) 8.6-50 mg per tablet Take 1 tablet by mouth 2 (two) times daily as needed for Constipation.    [DISCONTINUED] albuterol (PROVENTIL/VENTOLIN HFA) 90 mcg/actuation inhaler Inhale 1-2 puffs into the lungs every 6 (six) hours as needed for Wheezing or Shortness of Breath. Rescue (Patient not taking: Reported on 2021)     Family History       Problem Relation (Age of Onset)    No Known Problems Mother, Father          Tobacco Use    Smoking status: Never    Smokeless tobacco: Never   Substance and Sexual Activity    Alcohol use: Yes     Comment: occasionally    Drug use: No    Sexual activity: Yes     Partners: Male     Birth control/protection: None     Review of Systems   Constitutional:  Negative for activity change and fever.   Respiratory:  Negative for shortness of breath and wheezing.    Cardiovascular:  Negative for chest pain and leg swelling.   Gastrointestinal:  Positive for abdominal pain. Negative for abdominal distention.   Genitourinary:  Negative for difficulty urinating.   Skin:  Negative for color change.   Neurological:  Negative for dizziness and facial asymmetry.   Psychiatric/Behavioral:  Negative for agitation and confusion.    Objective:     Vital Signs (Most Recent):  Temp: 98.2 °F (36.8 °C) (23 1609)  Pulse: 101 (23 0030)  Resp: 16 (23 0057)  BP: (!) 159/83 (23 0030)  SpO2: 100 % (23 0030) Vital Signs (24h Range):  Temp:  [98.2 °F (36.8 °C)] 98.2 °F (36.8 °C)  Pulse:  [101-116] 101  Resp:  [16-18] 16  SpO2:  [97 %-100 %] 100 %  BP: (120-165)/() 159/83        There is no height or weight on file to calculate BMI.     Physical Exam  Vitals and nursing note reviewed.   HENT:       Head: Normocephalic and atraumatic.   Eyes:      Conjunctiva/sclera: Conjunctivae normal.   Neck:      Vascular: No JVD.   Cardiovascular:      Rate and Rhythm: Normal rate.      Heart sounds: Normal heart sounds.   Pulmonary:      Effort: Pulmonary effort is normal.      Breath sounds: Normal breath sounds.   Abdominal:      General: Abdomen is flat. Bowel sounds are normal.      Palpations: Abdomen is soft.   Skin:     General: Skin is warm.   Neurological:      Mental Status: She is alert and oriented to person, place, and time.   Psychiatric:         Mood and Affect: Mood normal.              Significant Labs: All pertinent labs within the past 24 hours have been reviewed.  BMP:   Recent Labs   Lab 05/19/23  1700   GLU 82      K 3.7      CO2 27   BUN 8   CREATININE 0.8   CALCIUM 8.8   MG 1.8     CBC:   Recent Labs   Lab 05/19/23  1700   WBC 11.95   HGB 11.0*   HCT 36.1*        CMP:   Recent Labs   Lab 05/19/23  1700      K 3.7      CO2 27   GLU 82   BUN 8   CREATININE 0.8   CALCIUM 8.8   PROT 7.6   ALBUMIN 3.9   BILITOT 0.4   ALKPHOS 65   AST 16   ALT 18   ANIONGAP 7*       Significant Imaging: I have reviewed all pertinent imaging results/findings within the past 24 hours.

## 2023-05-20 NOTE — PLAN OF CARE
notified by charge nurse that patient was requesting to speak with case management. Met with patient at bedside. Patient inquired about appealing discharge, informed patient that there is no appeal process for non-Medicare covered patients. Patient stated she felt she needed to be seen by OBGYN prior to discharge, explained to patient that MD has cleared her medically for discharge and it would be appropriate to follow up with PCP and OBGYN as outpatient. Patient replied she had no OBGYN or PCP. Chart review shows established care with OBGYN as recent as December 2022.     Patient stated that if she left the hospital and something happened to her,  and medical staff would be responsible. Patient became upset when security entered the room. She told  and security to leave the room before asking for social workers supervisor.  left the room as asked by the patient, notified her supervisor by phone and provided supervisors name and phone number to be given to patient by nurse, but patient had already left the unit.    External genitalia is normal. External genitalia is normal. +cremasteric b/l

## 2023-05-20 NOTE — PROGRESS NOTES
"Day shift RN and night shift RN entered pt's room for bedside report, pt immediately asked if day shift RN was MD. RN responded no, both RNs responded this is shift change and we are introducing new nurse. Patient asked if MD had been contacted about increasing pain meds and adjusting pain med frequency. Night RN responded nocturnal MD was notified and was not increasing pain med. Patient began increasing tone of voice, appeared frustrated and angry regarding pt pain needs have not been met. Pt stated the nocturnal MD was a "bitch". Day RN told pt pt could not continue to act like this, causing disturbance to other patients and if yelling continued security would be notified. Patient then told day RN pt did not like RN's attitude and said RN was fired and requested charge RN. Charge RN was notified, who had previously been in pt's room prior to this encounter. Pt using call light frequently requesting pain meds and asking to speak to nursing supervisor.   "

## 2023-05-20 NOTE — NURSING
Nurses Note -- 4 Eyes      5/20/2023   3:11 AM      Skin assessed during: Admit      [x] No Altered Skin Integrity Present    []Prevention Measures Documented      [] Yes- Altered Skin Integrity Present or Discovered   [] LDA Added if Not in Epic (Describe Wound)   [] New Altered Skin Integrity was Present on Admit and Documented in LDA   [] Wound Image Taken    Wound Care Consulted? No    Attending Nurse:  Caridad Lebron RN     Second RN/Staff Member:  cristina

## 2023-05-20 NOTE — NURSING
"Pt calling on the call light every 5 minutes demanding pain meds.   She Said  the Nurse better be there at 0830 as was told.    I went into the room at 826 and was constantly being questioned.   I gave her her pain meds and she said she didn't feel the "rush" like it usually does.   I stated that I push it slow to avoid a head rush.  She said she didn't feel it.   I flushed her line and then she said, Oh, I feel it now.  She wants her pain meds to be stronger.    Consistently asking for nurse supervisor to come and where is she.   stat    "

## 2023-05-20 NOTE — NURSING
Pt refused to get in wheelchair for CT, stating she cannot walk.  She did get on bedside commode after pleading she could'nt walk.    After pt got back from CT, she asked to call the doctor to get her pain meds early, (2 hours) because she had to move from the bed to table and had to use her muscles!!!!    Notified doctor of this behavior

## 2023-05-20 NOTE — NURSING
Pt cursing staff out when told she was discharged.    Refusing to leave.  Security called.    Doctor ordered an Ultrasound for her ovary because she said she had a cyst.   Pt then stated she had an ultrasound and it hurts.    Pt then got dressed and was wheeled out by supervisor.   She stated that she was going to get everyone fired.

## 2023-05-20 NOTE — PROGRESS NOTES
"Seen this morning on rounds.  She has been reportedly verbally abusive and belligerent to staff. On my initial conversation with the patient she stated that the "overnight doctor was a butt hole". I asked her to be kind and respectful to our staff and that name calling and belligerent behavior would not be tolerated.      I then discussed the patient's symptoms she is complaining of worsening abdominal pain.  CT scan on admission with evidence of right-sided colitis.  Given her worsening symptoms and concerns we will go ahead and repeat the CT scan for further workup.  Check lactic acid.  Will adjust her pain control try to adequately control her pain.     Of note, the patient is exhibiting drug seeking behavior. She has current medical needs which we are addressing, however, she has asked nursing to "rapidly push" the IV pain medication. Additionally, on review of LA  she has multiple prescriptions for xanax already this month exceeding 2-3 months supply. Additionally, she has multiple Adderall prescriptions. We will address her medical needs, but will need to be cautious with narcotic medications for pain control upon discharge.   "

## 2023-05-22 NOTE — HOSPITAL COURSE
Pt is a 28 y/o F with hx of recent appendectomy, cholecystectomy, GERD, obesity, anxiety who presented with recurrent right lower quadrant abdominal pain. She was evaluated in the ED and CT imaging initially showed mild right sided non-specific colitis. She was admitted with IV antibiotics and given IV pain medication for pain control. The patient stated that she was having worsening pain and continued to ask for additional pain medications. I was concerned regarding her pain and repeated CT imaging for further evaluation as well as lactic acid. Lactic acid is normal. Her CT scan repeat shows no signs of colitis. I did also review the pelvic ultrasound which showed no signs of ovarian torsion on the right side though left ovary was non-visualized. I attempted to repeat pelvic ultrasound to confirm however the patient declined. She had very mild leukocytosis with left shift on admission, and with colitis findings on initial CT with contrast is reasonable to treat as an outpatient with oral ciprofloxacin and flagyl. With repeat imaging and blood work showing no signs of complication she is able to discharge home with plans for close outpatient follow up. She did request a GYN consult prior to discharge which we placed, and planned to have consult however she left before this could be completed.     Of note, the patient was verbally abusive and aggressive with staff during her admission. She demanded pain medications as outlined by the nursing documentation. She displayed drug seeking behavior. I reviewed her  records and she has filled multiple prescriptions for xanax this month alone totaling 2-3 month supply. I am concerned about prescribing narcotics in this instance. I do feel we should make reasonable attempt to treat her pain, and will prescribe a short course of tramadol which should be adequate for her degree of medical illness.    I reviewed the discharge plan of care and instructions with the patient on  the day of discharge. She was discharged in good condition with plans for close outpatient follow up. I have given her return precautions

## 2023-05-22 NOTE — DISCHARGE SUMMARY
[facility]  Discharge Summary      Patient Name: Manisha Bishop  MRN: [id number]  LINDSAY: [id number]  Patient Class: OP- Observation  Admission Date: 5/19/2023  Hospital Length of Stay: 0 days  Discharge Date and Time:  05/22/2023 4:32 PM  Attending Physician: Tamy att. providers found   Discharging Provider: Andreas Smith MD  Primary Care Provider: Primary Doctor Tamy    Primary Care Team: Networked reference to record PCT     HPI:   27 y.o. female with known history of appendectomy and cholecystectomy within the last 2-3 months, gerd, obesity, anxiety  was admitted with  complaining of lower abdominal pain.   She was doing well yesterday but suddenly she got right-sided lower abdominal pain this morning which was severe but not associated with ambulation or movement and no nausea and vomiting and eventually came to the hospital.  CT scan of the abdomen was done in the emergency room and found to have right-sided colitis and admitted.  CT scan did not mention adnexal mass or ovarian cyst.  On examination patient abdominal is soft at the exam and mild tender lower abdomen.  [facility] showed she was on Xanax and oxycodone and Adderall.  She told that they were prescribed by the psychiatrist.   The patient has had multiple ED presentations over the past 5 months.  Urine drug screen with marijuana and benzodiazepine.      * No surgery found *      Hospital Course:   Pt is a 28 y/o F with hx of recent appendectomy, cholecystectomy, GERD, obesity, anxiety who presented with recurrent right lower quadrant abdominal pain. She was evaluated in the ED and CT imaging initially showed mild right sided non-specific colitis. She was admitted with IV antibiotics and given IV pain medication for pain control. The patient stated that she was having worsening pain and continued to ask for additional pain medications. I was concerned regarding her pain and repeated CT imaging for further  evaluation as well as lactic acid. Lactic acid is normal. Her CT scan repeat shows no signs of colitis. I did also review the pelvic ultrasound which showed no signs of ovarian torsion on the right side though left ovary was non-visualized. I attempted to repeat pelvic ultrasound to confirm however the patient declined. She had very mild leukocytosis with left shift on admission, and with colitis findings on initial CT with contrast is reasonable to treat as an outpatient with oral ciprofloxacin and flagyl. With repeat imaging and blood work showing no signs of complication she is able to discharge home with plans for close outpatient follow up. She did request a GYN consult prior to discharge which we placed, and planned to have consult however she left before this could be completed.     Of note, the patient was verbally abusive and aggressive with staff during her admission. She demanded pain medications as outlined by the nursing documentation. She displayed drug seeking behavior. I reviewed her  records and she has filled multiple prescriptions for xanax this month alone totaling 2-3 month supply. I am concerned about prescribing narcotics in this instance. I do feel we should make reasonable attempt to treat her pain, and will prescribe a short course of tramadol which should be adequate for her degree of medical illness.    I reviewed the discharge plan of care and instructions with the patient on the day of discharge. She was discharged in good condition with plans for close outpatient follow up. I have given her return precautions       Goals of Care Treatment Preferences:  Code Status: Full Code      Consults:     No new Assessment & Plan notes have been filed under this hospital service since the last note was generated.  Service: Hospital Medicine    Final Active Diagnoses:    Diagnosis Date Noted POA    PRINCIPAL PROBLEM:  Colitis [K52.9] 05/20/2023 Unknown    GERD (gastroesophageal reflux disease)  [K21.9]  Unknown    Severe obesity (BMI >= 40) [E66.01] 01/13/2023 Yes    Umbilical hernia [K42.9] 06/18/2021 Yes    Abdominal pain [R10.9] 11/26/2016 Yes    Bipolar 1 disorder [F31.9] 06/16/2016 Yes      Problems Resolved During this Admission:       Discharged Condition: good    Disposition: Home or Self Care    Follow Up:   Follow-up Information     Primary care physician. Schedule an appointment as soon as possible for a visit in 1 week(s).           [facility]. Schedule an appointment as soon as possible for a visit in 1 week(s).    Contact information:  [place] [id number] [phone]                       Patient Instructions:      Ambulatory referral/consult to Gastroenterology   Standing Status: Future   Referral Priority: Urgent Referral Type: Consultation   Referral Reason: Specialty Services Required   Requested Specialty: Gastroenterology   Number of Visits Requested: 1     Ambulatory referral/consult to Family Practice   Standing Status: Future   Referral Priority: Urgent Referral Type: Consultation   Referral Reason: Specialty Services Required   Requested Specialty: Family Medicine   Number of Visits Requested: 1     Diet Adult Regular     No dressing needed     Activity as tolerated       Significant Diagnostic Studies: Labs: BMP: No results for input(s): GLU, NA, K, CL, CO2, BUN, CREATININE, CALCIUM, MG in the last 48 hours., CBC No results for input(s): WBC, HGB, HCT, PLT in the last 48 hours. and All labs within the past 24 hours have been reviewed    Pending Diagnostic Studies:     None         Medications:  Reconciled Home Medications:      Medication List      START taking these medications    ciprofloxacin HCl 500 MG tablet  Commonly known as: CIPRO  Take 1 tablet (500 mg total) by mouth every 12 (twelve) hours. for 10 days     metroNIDAZOLE 500 MG tablet  Commonly known as: FLAGYL  Take 1 tablet (500 mg total) by mouth every 8  (eight) hours. for 10 days     traMADoL 50 mg tablet  Commonly known as: ULTRAM  Take 1 tablet (50 mg total) by mouth every 6 (six) hours as needed for Pain.        CONTINUE taking these medications    ALPRAZolam 2 MG Tab  Commonly known as: XANAX     dextroamphetamine-amphetamine 30 mg Tab  Take 1 tablet by mouth 4 (four) times daily.     dicyclomine 20 mg tablet  Commonly known as: BENTYL  Take 1 tablet (20 mg total) by mouth 2 (two) times daily.     docusate sodium 100 MG capsule  Commonly known as: COLACE  Take 1 capsule (100 mg total) by mouth 2 (two) times daily as needed for Constipation.     famotidine 20 MG tablet  Commonly known as: PEPCID  Take 1 tablet (20 mg total) by mouth 2 (two) times daily as needed for Heartburn.     hyoscyamine 0.125 mg Tab  Commonly known as: ANASPAZ,LEVSIN  Take 3 tablets (375 mcg total) by mouth every 6 (six) hours as needed (Abdominal pain).     ibuprofen 800 MG tablet  Commonly known as: ADVIL,MOTRIN  Take 1 tablet (800 mg total) by mouth every 6 (six) hours as needed for Pain.     ondansetron 4 MG Tbdl  Commonly known as: ZOFRAN-ODT  Take 1 tablet (4 mg total) by mouth every 6 (six) hours as needed.     OXcarbazepine 300 MG Tab  Commonly known as: TRILEPTAL  SMARTSI Tablet(s) By Mouth Morning-Evening     pantoprazole 20 MG tablet  Commonly known as: PROTONIX  Take 1 tablet (20 mg total) by mouth once daily.     senna-docusate 8.6-50 mg 8.6-50 mg per tablet  Commonly known as: PERICOLACE  Take 1 tablet by mouth 2 (two) times daily as needed for Constipation.            Indwelling Lines/Drains at time of discharge:   Lines/Drains/Airways     None                 Time spent on the discharge of patient: 50 minutes         Andreas Smith MD  Department of Hospital Medicine  [facility]

## 2023-05-23 LAB
BACTERIA GENITAL AEROBE CULT: NORMAL
CHLAMYDIA, AMPLIFIED DNA: NEGATIVE
CHLAMYDIA, AMPLIFIED DNA: NEGATIVE
GRAM STN SPEC: NORMAL
GRAM STN SPEC: NORMAL
N GONORRHOEAE, AMPLIFIED DNA: NEGATIVE
N GONORRHOEAE, AMPLIFIED DNA: NEGATIVE

## 2023-05-24 LAB — BACTERIA BLD CULT: NORMAL

## 2023-06-07 ENCOUNTER — HOSPITAL ENCOUNTER (EMERGENCY)
Facility: HOSPITAL | Age: 28
Discharge: HOME | End: 2023-06-07
Attending: EMERGENCY MEDICINE
Payer: MEDICAID

## 2023-06-07 ENCOUNTER — HOSPITAL ENCOUNTER (EMERGENCY)
Facility: HOSPITAL | Age: 28
Discharge: HOME | End: 2023-06-08
Attending: EMERGENCY MEDICINE
Payer: MEDICAID

## 2023-06-07 VITALS
HEART RATE: 101 BPM | DIASTOLIC BLOOD PRESSURE: 60 MMHG | SYSTOLIC BLOOD PRESSURE: 135 MMHG | WEIGHT: 280 LBS | TEMPERATURE: 98 F | OXYGEN SATURATION: 100 % | RESPIRATION RATE: 18 BRPM | BODY MASS INDEX: 43.85 KG/M2

## 2023-06-07 DIAGNOSIS — Z76.5 DRUG-SEEKING BEHAVIOR: ICD-10-CM

## 2023-06-07 DIAGNOSIS — R10.9 ABDOMINAL PAIN, UNSPECIFIED ABDOMINAL LOCATION: Primary | ICD-10-CM

## 2023-06-07 LAB
ALBUMIN SERPL BCP-MCNC: 4 G/DL (ref 3.5–5.2)
ALP SERPL-CCNC: 64 U/L (ref 55–135)
ALT SERPL W/O P-5'-P-CCNC: 11 U/L (ref 10–44)
ANION GAP SERPL CALC-SCNC: 5 MMOL/L (ref 8–16)
AST SERPL-CCNC: 15 U/L (ref 10–40)
BACTERIA #/AREA URNS HPF: ABNORMAL /HPF
BASOPHILS # BLD AUTO: 0.07 K/UL (ref 0–0.2)
BASOPHILS NFR BLD: 0.6 % (ref 0–1.9)
BILIRUB SERPL-MCNC: 0.7 MG/DL (ref 0.1–1)
BILIRUB UR QL STRIP: NEGATIVE
BUN SERPL-MCNC: 8 MG/DL (ref 6–20)
CALCIUM SERPL-MCNC: 9 MG/DL (ref 8.7–10.5)
CHLORIDE SERPL-SCNC: 105 MMOL/L (ref 95–110)
CLARITY UR: CLEAR
CO2 SERPL-SCNC: 25 MMOL/L (ref 23–29)
COLOR UR: YELLOW
CREAT SERPL-MCNC: 0.8 MG/DL (ref 0.5–1.4)
DIFFERENTIAL METHOD: ABNORMAL
EOSINOPHIL # BLD AUTO: 0.1 K/UL (ref 0–0.5)
EOSINOPHIL NFR BLD: 0.7 % (ref 0–8)
ERYTHROCYTE [DISTWIDTH] IN BLOOD BY AUTOMATED COUNT: 16.3 % (ref 11.5–14.5)
EST. GFR  (NO RACE VARIABLE): >60 ML/MIN/1.73 M^2
GLUCOSE SERPL-MCNC: 104 MG/DL (ref 70–110)
GLUCOSE UR QL STRIP: NEGATIVE
HCG INTACT+B SERPL-ACNC: <0.6 MIU/ML
HCT VFR BLD AUTO: 35.9 % (ref 37–48.5)
HGB BLD-MCNC: 10.8 G/DL (ref 12–16)
HGB UR QL STRIP: ABNORMAL
IMM GRANULOCYTES # BLD AUTO: 0.03 K/UL (ref 0–0.04)
IMM GRANULOCYTES NFR BLD AUTO: 0.3 % (ref 0–0.5)
KETONES UR QL STRIP: NEGATIVE
LEUKOCYTE ESTERASE UR QL STRIP: ABNORMAL
LIPASE SERPL-CCNC: 23 U/L (ref 4–60)
LYMPHOCYTES # BLD AUTO: 1.1 K/UL (ref 1–4.8)
LYMPHOCYTES NFR BLD: 9.4 % (ref 18–48)
MCH RBC QN AUTO: 23.7 PG (ref 27–31)
MCHC RBC AUTO-ENTMCNC: 30.1 G/DL (ref 32–36)
MCV RBC AUTO: 79 FL (ref 82–98)
MICROSCOPIC COMMENT: ABNORMAL
MONOCYTES # BLD AUTO: 0.8 K/UL (ref 0.3–1)
MONOCYTES NFR BLD: 7.1 % (ref 4–15)
NEUTROPHILS # BLD AUTO: 9.3 K/UL (ref 1.8–7.7)
NEUTROPHILS NFR BLD: 81.9 % (ref 38–73)
NITRITE UR QL STRIP: NEGATIVE
NRBC BLD-RTO: 0 /100 WBC
PH UR STRIP: 8 [PH] (ref 5–8)
PLATELET # BLD AUTO: 368 K/UL (ref 150–450)
PMV BLD AUTO: 8.9 FL (ref 9.2–12.9)
POTASSIUM SERPL-SCNC: 3.8 MMOL/L (ref 3.5–5.1)
PROT SERPL-MCNC: 7.9 G/DL (ref 6–8.4)
PROT UR QL STRIP: NEGATIVE
RBC # BLD AUTO: 4.56 M/UL (ref 4–5.4)
RBC #/AREA URNS HPF: 26 /HPF (ref 0–4)
SODIUM SERPL-SCNC: 135 MMOL/L (ref 136–145)
SP GR UR STRIP: 1 (ref 1–1.03)
SQUAMOUS #/AREA URNS HPF: 2 /HPF
URN SPEC COLLECT METH UR: ABNORMAL
UROBILINOGEN UR STRIP-ACNC: NEGATIVE EU/DL
WBC # BLD AUTO: 11.33 K/UL (ref 3.9–12.7)
WBC #/AREA URNS HPF: 3 /HPF (ref 0–5)

## 2023-06-07 PROCEDURE — 36415 COLL VENOUS BLD VENIPUNCTURE: CPT

## 2023-06-07 PROCEDURE — 63600175 PHARM REV CODE 636 W HCPCS: Performed by: EMERGENCY MEDICINE

## 2023-06-07 PROCEDURE — 81000 URINALYSIS NONAUTO W/SCOPE: CPT | Performed by: EMERGENCY MEDICINE

## 2023-06-07 PROCEDURE — 84702 CHORIONIC GONADOTROPIN TEST: CPT

## 2023-06-07 PROCEDURE — 99285 EMERGENCY DEPT VISIT HI MDM: CPT | Mod: 25

## 2023-06-07 PROCEDURE — 85025 COMPLETE CBC W/AUTO DIFF WBC: CPT

## 2023-06-07 PROCEDURE — 80053 COMPREHEN METABOLIC PANEL: CPT

## 2023-06-07 PROCEDURE — 25000003 PHARM REV CODE 250: Performed by: EMERGENCY MEDICINE

## 2023-06-07 PROCEDURE — 99283 EMERGENCY DEPT VISIT LOW MDM: CPT

## 2023-06-07 PROCEDURE — 83690 ASSAY OF LIPASE: CPT

## 2023-06-07 RX ORDER — MORPHINE SULFATE 2 MG/ML
2 INJECTION, SOLUTION INTRAMUSCULAR; INTRAVENOUS
Status: COMPLETED | OUTPATIENT
Start: 2023-06-07 | End: 2023-06-07

## 2023-06-07 RX ORDER — TRAMADOL HYDROCHLORIDE 50 MG/1
50 TABLET ORAL
Status: DISCONTINUED | OUTPATIENT
Start: 2023-06-07 | End: 2023-06-07 | Stop reason: HOSPADM

## 2023-06-07 RX ORDER — DIPHENHYDRAMINE HYDROCHLORIDE 50 MG/ML
25 INJECTION INTRAMUSCULAR; INTRAVENOUS
Status: DISCONTINUED | OUTPATIENT
Start: 2023-06-07 | End: 2023-06-07 | Stop reason: HOSPADM

## 2023-06-07 RX ORDER — METOCLOPRAMIDE HYDROCHLORIDE 5 MG/ML
10 INJECTION INTRAMUSCULAR; INTRAVENOUS
Status: COMPLETED | OUTPATIENT
Start: 2023-06-07 | End: 2023-06-07

## 2023-06-07 RX ORDER — MORPHINE SULFATE 2 MG/ML
2 INJECTION, SOLUTION INTRAMUSCULAR; INTRAVENOUS
Status: DISCONTINUED | OUTPATIENT
Start: 2023-06-07 | End: 2023-06-07

## 2023-06-07 RX ORDER — DROPERIDOL 2.5 MG/ML
1.25 INJECTION, SOLUTION INTRAMUSCULAR; INTRAVENOUS ONCE
Status: DISCONTINUED | OUTPATIENT
Start: 2023-06-07 | End: 2023-06-07 | Stop reason: HOSPADM

## 2023-06-07 RX ADMIN — MORPHINE SULFATE 2 MG: 2 INJECTION, SOLUTION INTRAMUSCULAR; INTRAVENOUS at 10:06

## 2023-06-07 RX ADMIN — METOCLOPRAMIDE 10 MG: 5 INJECTION, SOLUTION INTRAMUSCULAR; INTRAVENOUS at 10:06

## 2023-06-07 RX ADMIN — SODIUM CHLORIDE 1000 ML: 9 INJECTION, SOLUTION INTRAVENOUS at 10:06

## 2023-06-07 NOTE — ED NOTES
Pt stats she took ibuprofen prior to arrival to ed. Pt refused to take another dose.  Pt refuses the medication tramadol ordered.

## 2023-06-07 NOTE — ED NOTES
Pt request to have a cathter placed, I informed her that I would need orders for placement. I offered to take pt to restroom by wheelchair pt refused. I offered pt a bed allen, pt refused.

## 2023-06-07 NOTE — ED NOTES
Adult Physical Assessment  LOC: Manisha Bishop, 27 y.o. female verified via two identifiers.  The patient is awake, alert, oriented and speaking appropriately at this time.  APPEARANCE: Patient is in er bed and appears to be in no acute distress at this time. Patient is clean and well groomed, patient's clothing is properly fastened.  SKIN:The skin is warm and dry, color consistent with ethnicity, patient has normal skin turgor and moist mucus membranes, skin intact, no breakdown or brusing noted.  MUSCULOSKELETAL: Patient moving all extremities well, no obvious swelling or deformities noted.  RESPIRATORY: Airway is open and patent, respirations are spontaneous, patient has a normal effort and rate, no accessory muscle use noted.  CARDIAC: Patient has a normal rate and rhythm, no periphreal edema noted in any extremity  ABDOMEN: Soft and tender to palpation on left side, no abdominal distention noted.   NEUROLOGIC: Eyes open spontaneously, behavior appropriate to situation, follows commands
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
## 2023-06-07 NOTE — FIRST PROVIDER EVALUATION
Medical screening examination initiated.  I have conducted a focused provider triage encounter, findings are as follows:    Brief history of present illness:  Patient presents to ED for concern for abdominal pain since this morning.  Patient states it is a constant pain that is on the left side of her abdomen and in her pelvis.  Patient reports vomiting.  Patient denies fever.    Vitals:    06/07/23 1552   BP: 128/76   BP Location: Left arm   Patient Position: Lying   Pulse: 98   Resp: 18   Temp: 98.3 °F (36.8 °C)   TempSrc: Oral   SpO2: 100%   Weight: 127 kg (280 lb)       Pertinent physical exam:  Patient is awake and alert in moderate distress due to pain.    Brief workup plan:  Labs, urine    Preliminary workup initiated; this workup will be continued and followed by the physician or advanced practice provider that is assigned to the patient when roomed.

## 2023-06-08 VITALS
SYSTOLIC BLOOD PRESSURE: 121 MMHG | OXYGEN SATURATION: 98 % | BODY MASS INDEX: 43.85 KG/M2 | TEMPERATURE: 99 F | WEIGHT: 280 LBS | HEART RATE: 102 BPM | RESPIRATION RATE: 17 BRPM | DIASTOLIC BLOOD PRESSURE: 74 MMHG
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
## 2023-06-08 NOTE — ED PROVIDER NOTES
Encounter Date: 2023       History     Chief Complaint   Patient presents with    Abdominal Pain     C/o lower abd pain since this morning, with nausea/vomiting. Stated she had a syncopal episode earlier.      27-year-old female with past medical history bipolar disorder, anxiety, appendectomy, cholecystectomy, known ovarian cyst presenting with left-sided lower abdominal pain.  Patient was previously seen the same day at [facility], was abusive towards staff, requesting narcotics, offered imaging at the time which patient refused, was calling EMS to take her to a different hospital while she was in their emergency department.  Patient with multiple prior imaging studies in the past several months. Labs unremarkable at outside ED visit, patient came to the emergency department today requesting Dilaudid and imaging.  Patient denies dysuria, flank pain, fevers, vaginal bleeding.    Review of patient's allergies indicates:   Allergen Reactions    Iodine and iodide containing products Hives    Toradol [ketorolac] Hives    Vitamin e (d-alpha tocopherol)      Hives (skin)^Black eyed peas and oatmeal causede hives     Past Medical History:   Diagnosis Date    Anxiety     Bipolar 1 disorder     COVID-19 2021    GERD (gastroesophageal reflux disease)     Obese      Past Surgical History:   Procedure Laterality Date     SECTION  2022    LAPAROSCOPIC APPENDECTOMY N/A 2022    Procedure: APPENDECTOMY, LAPAROSCOPIC;  Surgeon: Yuniel Goldman MD;  Location: [facility] OR;  Service: General;  Laterality: N/A;    LAPAROSCOPIC CHOLECYSTECTOMY N/A 2023    Procedure: CHOLECYSTECTOMY, LAPAROSCOPIC;  Surgeon: Yuniel Goldman MD;  Location: [facility] OR;  Service: General;  Laterality: N/A;    LEG SURGERY      Broke her left lower leg.     Family History   Problem Relation Age of Onset    No Known Problems Mother     No Known Problems Father     Breast cancer Neg Hx     Colon cancer Neg Hx     Ovarian  cancer Neg Hx      Social History     Tobacco Use    Smoking status: Never    Smokeless tobacco: Never   Substance Use Topics    Alcohol use: Yes     Comment: occasionally    Drug use: No     Review of Systems   Constitutional:  Negative for appetite change.   HENT:  Negative for facial swelling.    Eyes:  Negative for itching.   Respiratory:  Negative for apnea and stridor.    Gastrointestinal:  Positive for abdominal pain. Negative for abdominal distention.   Genitourinary:  Negative for enuresis.   Musculoskeletal:  Negative for neck stiffness.   Skin:  Negative for color change.   Neurological:  Negative for tremors.   Hematological:  Does not bruise/bleed easily.   Psychiatric/Behavioral:  Negative for decreased concentration.      Physical Exam     Initial Vitals [06/07/23 2034]   BP Pulse Resp Temp SpO2   135/82 (!) 117 (!) 22 99.7 °F (37.6 °C) 99 %      MAP       --         Physical Exam    Constitutional:   Obese, intermittently abusive with staff, displaying observed splitting behavior between different ED employees   HENT:   Head: Normocephalic.   Nose: Nose normal.   Eyes: Right eye exhibits no discharge. Left eye exhibits no discharge.   Cardiovascular:  Normal rate.           Pulmonary/Chest: No stridor. No respiratory distress.   Abdominal: She exhibits no distension.   Mild left lower quadrant tenderness to palpation     Neurological: She is alert.   Skin: Skin is warm and dry.   Psychiatric: She has a normal mood and affect.       ED Course   Procedures  Labs Reviewed   URINALYSIS - Abnormal; Notable for the following components:       Result Value    Occult Blood UA 3+ (*)     Leukocytes, UA Trace (*)     All other components within normal limits   URINALYSIS MICROSCOPIC - Abnormal; Notable for the following components:    RBC, UA 26 (*)     All other components within normal limits          Imaging Results              US Pelvis Comp with Transvag NON-OB (xpd (Final result)  Result time 06/07/23  22:46:19      Final result by Uday Mojica MD (06/07/23 22:46:19)                   Impression:      Right ovary not identified.    Simple appearing cyst or dominant follicle of the left ovary with normal blood flow.    Normal appearing uterus.      Electronically signed by: Uday Mojica  Date:    06/07/2023  Time:    22:46               Narrative:    EXAMINATION:  Transabdominal and Transvaginal Pelvic Ultrasound.    CLINICAL HISTORY:  r/o torsion;    TECHNIQUE:  Ultrasound images of the pelvis were obtained.    COMPARISON:  None available.    FINDINGS:  The uterus is normal in size measuring 8.6 x 3.9 x 3.7 cm. The endometrial stripe is uniform in thickness measuring 7 mm.  No focal uterine lesions identified.    The right ovary is not identified.  The left ovary measures 2.6 x 2.3 x 1.4 cm with normal blood flow. A 1.9 x 1 cm dominant follicle or cyst is noted with no solid component or vascularity.    There is no free pelvic fluid.                                       CT Abdomen Pelvis  Without Contrast (Final result)  Result time 06/07/23 22:47:25   Procedure changed from CT Abdomen Pelvis With Contrast     Final result by Eveline Mojica MD (06/07/23 22:47:25)                   Impression:      No acute abnormality.    3.8 cm right adnexal cyst likely functional cyst.  A follow-up as clinically warranted.    Additional incidental findings as above.      Electronically signed by: Eveline Mojica  Date:    06/07/2023  Time:    22:47               Narrative:    EXAMINATION:  CT ABDOMEN PELVIS WITHOUT CONTRAST    CLINICAL HISTORY:  LLQ abdominal pain;    TECHNIQUE:  Low dose axial images, sagittal and coronal reformations were obtained from the lung bases to the pubic symphysis without oral or IV contrast.    COMPARISON:  None.    FINDINGS:  Abdomen:    - Lower thorax:Heart is not enlarged.  There is no effusion.    - Lung bases: No infiltrates and no nodules.    - Liver: No focal mass.    -  Gallbladder: Cholecystectomy.    - Bile Ducts: No evidence of intra or extra hepatic biliary ductal dilation.    - Spleen: Negative.    - Kidneys: There is no mass or hydronephrosis.    - Adrenals: Unremarkable.    - Pancreas: No mass or peripancreatic fat stranding.    - Retroperitoneum:  No significant adenopathy.    - Vascular: No abdominal aortic aneurysm.    - Abdominal wall:  There is small fat containing umbilical hernia..    Pelvis:    There is a cystic pelvic mass on the right measuring 3.8 cm likely ovarian cyst.  No pelvic adenopathy, or free fluid.  Bladder is unremarkable.  Uterus is unremarkable.    Bowel/Mesentery:    Scattered diverticulosis is seen in the distal colon.  There is no diverticulitis. No evidence of bowel obstruction or inflammation.    Bones:  No acute osseous abnormality and no suspicious lytic or blastic lesion.                                       Medications   sodium chloride 0.9% bolus 1,000 mL 1,000 mL (0 mLs Intravenous Stopped 6/7/23 3198)   metoclopramide HCl injection 10 mg (10 mg Intravenous Given 6/7/23 3805)   morphine injection 2 mg (2 mg Intravenous Given 6/7/23 8796)     Medical Decision Making:   Initial Assessment:   A/P:  Initial concern to rule out diverticulitis versus ovarian pathology, the patient noted to be allergic to IV contrast, so CT was changed to CT noncontrast.  Arrangement was made with patient that she could receive 2 mg of morphine in the emergency department at a time of her choosing as long as she complied with staff conducting her imaging studies.  If no further pathology identified, patient will be discharged home with outpatient follow-up.  Patient agreed with this plan.  No obvious explanation found for patient's complaints.  No indication for admission, further imaging or consult.  Patient discharged home with strict immediate return precautions for worsening symptoms, outpatient follow-up.                        Clinical Impression:   Final  diagnoses:  [R10.9] Abdominal pain, unspecified abdominal location (Primary)        ED Disposition Condition    Discharge Stable          ED Prescriptions    None       Follow-up Information       Follow up With Specialties Details Why Contact Info    PCP  Schedule an appointment as soon as possible for a visit       [facility]  Schedule an appointment as soon as possible for a visit in 3 days As needed [address]  [facility] [address]  [phone]               Tiburcio Mayfield MD  06/08/23 0413

## 2023-06-08 NOTE — ED NOTES
Pt AAO and ambulatory.  VSS; no signs of distress.  Pt instructed regarding written discharge instructions received upon completion of current visit; verbal acknowledgement of understanding.  Pt instructed regarding concerns/reasons for return visit relative to same issues surrounding current visit.

## 2023-06-08 NOTE — ED NOTES
Pt called 911 from her cell phone, pt states dispatch told pt they would not come que her that it was illegal
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
## 2023-06-08 NOTE — ED PROVIDER NOTES
Encounter Date: 2023       History     Chief Complaint   Patient presents with    Nausea    Vomiting    Abdominal Pain     Ovarian cyst Left, Feels like it ruptured. Started last night     This is a 27-year-old female complaining of abdominal pain.  This is a recurrent problem for the patient.  She has been seen in the ED multiple times this year for abdominal pain related complaints.  She has had appendectomy and cholecystectomy earlier this year.  She is also had ovarian cysts in the past.  She was just admitted here and discharged from this facility after CT showed colitis, although subsequent CT showed no evidence of colitis.  Patient says she had abdominal pain that developed last night.  She thinks she had an ovarian cyst that ruptured.  She is reporting vaginal bleeding.      The history is provided by the patient.   Review of patient's allergies indicates:   Allergen Reactions    Iodine and iodide containing products Hives    Vitamin e (d-alpha tocopherol)      Hives (skin)^Black eyed peas and oatmeal causede hives     Past Medical History:   Diagnosis Date    Anxiety     Bipolar 1 disorder     COVID-19 2021    GERD (gastroesophageal reflux disease)     Obese      Past Surgical History:   Procedure Laterality Date     SECTION  2022    LAPAROSCOPIC APPENDECTOMY N/A 2022    Procedure: APPENDECTOMY, LAPAROSCOPIC;  Surgeon: Yuniel Goldman MD;  Location: [facility] OR;  Service: General;  Laterality: N/A;    LAPAROSCOPIC CHOLECYSTECTOMY N/A 2023    Procedure: CHOLECYSTECTOMY, LAPAROSCOPIC;  Surgeon: Yuniel Goldman MD;  Location: [facility] OR;  Service: General;  Laterality: N/A;    LEG SURGERY      Broke her left lower leg.     Family History   Problem Relation Age of Onset    No Known Problems Mother     No Known Problems Father     Breast cancer Neg Hx     Colon cancer Neg Hx     Ovarian cancer Neg Hx      Social History     Tobacco Use    Smoking status: Never    Smokeless  tobacco: Never   Substance Use Topics    Alcohol use: Yes     Comment: occasionally    Drug use: No     Review of Systems   Gastrointestinal:  Positive for abdominal pain and nausea.   All other systems reviewed and are negative.    Physical Exam     Initial Vitals [06/07/23 1552]   BP Pulse Resp Temp SpO2   128/76 98 18 98.3 °F (36.8 °C) 100 %      MAP       --         Physical Exam    Nursing note and vitals reviewed.  Constitutional: She appears well-developed and well-nourished. She is not diaphoretic. No distress.   HENT:   Head: Normocephalic.   Eyes: Conjunctivae are normal.   Neck: Neck supple.   Normal range of motion.  Cardiovascular:  Normal rate.           Pulmonary/Chest: No respiratory distress.   Abdominal: She exhibits no distension. There is abdominal tenderness.   Generalized abdominal tenderness   Musculoskeletal:         General: No edema.      Cervical back: Normal range of motion and neck supple.     Neurological: She is alert. She has normal strength. GCS eye subscore is 4. GCS verbal subscore is 5. GCS motor subscore is 6.   Skin: Skin is warm and dry.   Psychiatric: She has a normal mood and affect.   Agitated, yelling       ED Course   Procedures  Labs Reviewed   CBC W/ AUTO DIFFERENTIAL - Abnormal; Notable for the following components:       Result Value    Hemoglobin 10.8 (*)     Hematocrit 35.9 (*)     MCV 79 (*)     MCH 23.7 (*)     MCHC 30.1 (*)     RDW 16.3 (*)     MPV 8.9 (*)     Gran # (ANC) 9.3 (*)     Gran % 81.9 (*)     Lymph % 9.4 (*)     All other components within normal limits   COMPREHENSIVE METABOLIC PANEL - Abnormal; Notable for the following components:    Sodium 135 (*)     Anion Gap 5 (*)     All other components within normal limits   LIPASE   HCG, QUANTITATIVE   HCG, QUANTITATIVE   URINALYSIS, REFLEX TO URINE CULTURE   POCT URINE PREGNANCY          Imaging Results    None          Medications   droPERidol injection 1.25 mg (has no administration in time range)    diphenhydrAMINE injection 25 mg (has no administration in time range)   traMADoL tablet 50 mg (has no administration in time range)   ibuprofen tablet 600 mg (has no administration in time range)     Medical Decision Making:   Initial Assessment:   Patient is verbally abusive with staff, yelling and demanding for narcotic pain medication.  Reviewing the recent admission it seems that she exhibited drug-seeking behavior during that admission.  I explained that I did not feel comfortable giving her IV narcotic medication for this recurrent problem.  I offered her IV Toradol, she refused, stating she was allergic although this is not listed on her allergies.  She refused phenergan and droperidol for her nausea.  She refused oral naproxen and tramadol.  Patient's WBCs 11.  Hemoglobin is 10.8.  Renal function, electrolytes, LFTs, lipase are normal.  Patient refused to give a urine specimen.  Reviewing her chart patient had 3 abdominal CT scans last month and 5 abdominal CT scans in the last 3 months.  My suspicion for significant pathology is low, although out of an abundance of caution I did offer to obtain CT abdomen to rule out any emergent condition.  Patient is now refusing this.  She is refusing to leave AMA.  I believe she is malingering and will discharge her.  She will be escorted out by security.                        Clinical Impression:   Final diagnoses:  [R10.9] Abdominal pain, unspecified abdominal location (Primary)  [Z76.5] Drug-seeking behavior        ED Disposition Condition    Discharge Stable          ED Prescriptions    None       Follow-up Information    None          Madhu Hartley MD  06/07/23 2009

## 2023-06-08 NOTE — ED NOTES
Pt is requesting to be discharged, pt states she does not want to wait for her CT, pt iv removed.

## 2023-06-15 ENCOUNTER — HOSPITAL ENCOUNTER (EMERGENCY)
Facility: HOSPITAL | Age: 28
Discharge: HOME | End: 2023-06-15
Attending: STUDENT IN AN ORGANIZED HEALTH CARE EDUCATION/TRAINING PROGRAM
Payer: MEDICAID

## 2023-06-15 VITALS
DIASTOLIC BLOOD PRESSURE: 75 MMHG | TEMPERATURE: 99 F | RESPIRATION RATE: 18 BRPM | OXYGEN SATURATION: 99 % | SYSTOLIC BLOOD PRESSURE: 131 MMHG | HEART RATE: 105 BPM

## 2023-06-15 DIAGNOSIS — R10.9 ABDOMINAL PAIN: ICD-10-CM

## 2023-06-15 LAB
ALBUMIN SERPL BCP-MCNC: 3.8 G/DL (ref 3.5–5.2)
ALP SERPL-CCNC: 83 U/L (ref 55–135)
ALT SERPL W/O P-5'-P-CCNC: 11 U/L (ref 10–44)
ANION GAP SERPL CALC-SCNC: 14 MMOL/L (ref 8–16)
AST SERPL-CCNC: 12 U/L (ref 10–40)
BASOPHILS # BLD AUTO: 0.06 K/UL (ref 0–0.2)
BASOPHILS NFR BLD: 0.7 % (ref 0–1.9)
BILIRUB SERPL-MCNC: 0.7 MG/DL (ref 0.1–1)
BILIRUB UR QL STRIP: NEGATIVE
BUN SERPL-MCNC: 6 MG/DL (ref 6–20)
CALCIUM SERPL-MCNC: 9 MG/DL (ref 8.7–10.5)
CHLORIDE SERPL-SCNC: 101 MMOL/L (ref 95–110)
CLARITY UR: CLEAR
CO2 SERPL-SCNC: 23 MMOL/L (ref 23–29)
COLOR UR: YELLOW
CREAT SERPL-MCNC: 0.8 MG/DL (ref 0.5–1.4)
DIFFERENTIAL METHOD: ABNORMAL
EOSINOPHIL # BLD AUTO: 0.1 K/UL (ref 0–0.5)
EOSINOPHIL NFR BLD: 0.8 % (ref 0–8)
ERYTHROCYTE [DISTWIDTH] IN BLOOD BY AUTOMATED COUNT: 15.7 % (ref 11.5–14.5)
EST. GFR  (NO RACE VARIABLE): >60 ML/MIN/1.73 M^2
GLUCOSE SERPL-MCNC: 80 MG/DL (ref 70–110)
GLUCOSE UR QL STRIP: NEGATIVE
HCT VFR BLD AUTO: 35.9 % (ref 37–48.5)
HGB BLD-MCNC: 11 G/DL (ref 12–16)
HGB UR QL STRIP: NEGATIVE
IMM GRANULOCYTES # BLD AUTO: 0.06 K/UL (ref 0–0.04)
IMM GRANULOCYTES NFR BLD AUTO: 0.7 % (ref 0–0.5)
KETONES UR QL STRIP: NEGATIVE
LEUKOCYTE ESTERASE UR QL STRIP: NEGATIVE
LIPASE SERPL-CCNC: 10 U/L (ref 4–60)
LYMPHOCYTES # BLD AUTO: 1.5 K/UL (ref 1–4.8)
LYMPHOCYTES NFR BLD: 17 % (ref 18–48)
MCH RBC QN AUTO: 23.7 PG (ref 27–31)
MCHC RBC AUTO-ENTMCNC: 30.6 G/DL (ref 32–36)
MCV RBC AUTO: 77 FL (ref 82–98)
MONOCYTES # BLD AUTO: 0.7 K/UL (ref 0.3–1)
MONOCYTES NFR BLD: 8.3 % (ref 4–15)
NEUTROPHILS # BLD AUTO: 6.3 K/UL (ref 1.8–7.7)
NEUTROPHILS NFR BLD: 72.5 % (ref 38–73)
NITRITE UR QL STRIP: NEGATIVE
NRBC BLD-RTO: 0 /100 WBC
PH UR STRIP: 8.5 [PH] (ref 5–8)
PLATELET # BLD AUTO: 375 K/UL (ref 150–450)
PMV BLD AUTO: 9.1 FL (ref 9.2–12.9)
POTASSIUM SERPL-SCNC: 3.6 MMOL/L (ref 3.5–5.1)
PROT SERPL-MCNC: 7.9 G/DL (ref 6–8.4)
PROT UR QL STRIP: ABNORMAL
RBC # BLD AUTO: 4.64 M/UL (ref 4–5.4)
SODIUM SERPL-SCNC: 138 MMOL/L (ref 136–145)
SP GR UR STRIP: 1 (ref 1–1.03)
URN SPEC COLLECT METH UR: ABNORMAL
UROBILINOGEN UR STRIP-ACNC: NEGATIVE EU/DL
WBC # BLD AUTO: 8.72 K/UL (ref 3.9–12.7)

## 2023-06-15 PROCEDURE — 81003 URINALYSIS AUTO W/O SCOPE: CPT | Performed by: STUDENT IN AN ORGANIZED HEALTH CARE EDUCATION/TRAINING PROGRAM

## 2023-06-15 PROCEDURE — 96374 THER/PROPH/DIAG INJ IV PUSH: CPT

## 2023-06-15 PROCEDURE — 87389 HIV-1 AG W/HIV-1&-2 AB AG IA: CPT | Performed by: STUDENT IN AN ORGANIZED HEALTH CARE EDUCATION/TRAINING PROGRAM

## 2023-06-15 PROCEDURE — 96375 TX/PRO/DX INJ NEW DRUG ADDON: CPT

## 2023-06-15 PROCEDURE — 25000003 PHARM REV CODE 250: Performed by: STUDENT IN AN ORGANIZED HEALTH CARE EDUCATION/TRAINING PROGRAM

## 2023-06-15 PROCEDURE — 36415 COLL VENOUS BLD VENIPUNCTURE: CPT | Performed by: STUDENT IN AN ORGANIZED HEALTH CARE EDUCATION/TRAINING PROGRAM

## 2023-06-15 PROCEDURE — 86803 HEPATITIS C AB TEST: CPT | Performed by: STUDENT IN AN ORGANIZED HEALTH CARE EDUCATION/TRAINING PROGRAM

## 2023-06-15 PROCEDURE — 83690 ASSAY OF LIPASE: CPT | Performed by: STUDENT IN AN ORGANIZED HEALTH CARE EDUCATION/TRAINING PROGRAM

## 2023-06-15 PROCEDURE — 80053 COMPREHEN METABOLIC PANEL: CPT | Performed by: STUDENT IN AN ORGANIZED HEALTH CARE EDUCATION/TRAINING PROGRAM

## 2023-06-15 PROCEDURE — 85025 COMPLETE CBC W/AUTO DIFF WBC: CPT | Performed by: STUDENT IN AN ORGANIZED HEALTH CARE EDUCATION/TRAINING PROGRAM

## 2023-06-15 PROCEDURE — 99284 EMERGENCY DEPT VISIT MOD MDM: CPT | Mod: 25

## 2023-06-15 PROCEDURE — 63600175 PHARM REV CODE 636 W HCPCS: Performed by: STUDENT IN AN ORGANIZED HEALTH CARE EDUCATION/TRAINING PROGRAM

## 2023-06-15 RX ORDER — DIPHENHYDRAMINE HYDROCHLORIDE 50 MG/ML
25 INJECTION INTRAMUSCULAR; INTRAVENOUS
Status: COMPLETED | OUTPATIENT
Start: 2023-06-15 | End: 2023-06-15

## 2023-06-15 RX ORDER — TRAMADOL HYDROCHLORIDE 50 MG/1
50 TABLET ORAL EVERY 6 HOURS PRN
Qty: 12 TABLET | Refills: 0 | OUTPATIENT
Start: 2023-06-15 | End: 2023-07-11

## 2023-06-15 RX ORDER — HYOSCYAMINE SULFATE 0.12 MG/1
0.12 TABLET SUBLINGUAL EVERY 4 HOURS PRN
Qty: 28 TABLET | Refills: 0 | Status: ON HOLD | OUTPATIENT
Start: 2023-06-15 | End: 2023-08-31 | Stop reason: HOSPADM

## 2023-06-15 RX ORDER — DROPERIDOL 2.5 MG/ML
1.25 INJECTION, SOLUTION INTRAMUSCULAR; INTRAVENOUS ONCE
Status: COMPLETED | OUTPATIENT
Start: 2023-06-15 | End: 2023-06-15

## 2023-06-15 RX ORDER — TRAMADOL HYDROCHLORIDE 50 MG/1
100 TABLET ORAL
Status: COMPLETED | OUTPATIENT
Start: 2023-06-15 | End: 2023-06-15

## 2023-06-15 RX ORDER — METOCLOPRAMIDE HYDROCHLORIDE 5 MG/ML
10 INJECTION INTRAMUSCULAR; INTRAVENOUS
Status: DISCONTINUED | OUTPATIENT
Start: 2023-06-15 | End: 2023-06-15

## 2023-06-15 RX ADMIN — DROPERIDOL 1.25 MG: 2.5 INJECTION, SOLUTION INTRAMUSCULAR; INTRAVENOUS at 08:06

## 2023-06-15 RX ADMIN — TRAMADOL HYDROCHLORIDE 100 MG: 50 TABLET, COATED ORAL at 09:06

## 2023-06-15 RX ADMIN — DIPHENHYDRAMINE HYDROCHLORIDE 25 MG: 50 INJECTION INTRAMUSCULAR; INTRAVENOUS at 08:06

## 2023-06-16 LAB
HCV AB SERPL QL IA: NORMAL
HIV 1+2 AB+HIV1 P24 AG SERPL QL IA: NORMAL

## 2023-06-16 NOTE — ED NOTES
Not agreeing with medication treatment modality, discussed x 3 with Dr. Power. Dr. Power to bedside to discuss ordered medications. Agreed with medication orders.

## 2023-06-16 NOTE — DISCHARGE INSTRUCTIONS

## 2023-06-16 NOTE — ED PROVIDER NOTES
Encounter Date: 6/15/2023    SCRIBE #1 NOTE: I, Asya Landry, am scribing for, and in the presence of,  Jose Elias Power MD.     History     Chief Complaint   Patient presents with    Abdominal Pain     ABD pain since 0700hrs, also complaints of flank pain.       Time seen by provider: 7:22 PM on 06/15/2023    Manisha Bishop is a 27 y.o. female who presents to the ED with an onset of left sided abdominal pain that began 4 days ago. Her pain resolved for a couple days and returned today. She also had nausea and one episode of vomiting today. Patient took Tylenol with little relief. She has been seen by OBGyn for this issue in the past. The patient denies fever, pain with urination, blood in the urine, vaginal bleeding or any other symptoms at this time. She has a PMHx of bipolar disorder, anxiety, obesity, ovarian cyst, and GERD. She has a PSHx of , appendectomy, and cholecystectomy.    The history is provided by the patient.   Review of patient's allergies indicates:   Allergen Reactions    Iodine and iodide containing products Hives    Toradol [ketorolac] Hives    Vitamin e (d-alpha tocopherol)      Hives (skin)^Black eyed peas and oatmeal causede hives     Past Medical History:   Diagnosis Date    Anxiety     Bipolar 1 disorder     COVID-19 2021    GERD (gastroesophageal reflux disease)     Obese      Past Surgical History:   Procedure Laterality Date     SECTION  2022    LAPAROSCOPIC APPENDECTOMY N/A 2022    Procedure: APPENDECTOMY, LAPAROSCOPIC;  Surgeon: Yuniel Goldman MD;  Location: [facility] OR;  Service: General;  Laterality: N/A;    LAPAROSCOPIC CHOLECYSTECTOMY N/A 2023    Procedure: CHOLECYSTECTOMY, LAPAROSCOPIC;  Surgeon: Yuniel Goldman MD;  Location: [facility] OR;  Service: General;  Laterality: N/A;    LEG SURGERY      Broke her left lower leg.     Family History   Problem Relation Age of Onset    No Known Problems Mother     No Known Problems Father      Breast cancer Neg Hx     Colon cancer Neg Hx     Ovarian cancer Neg Hx      Social History     Tobacco Use    Smoking status: Never    Smokeless tobacco: Never   Substance Use Topics    Alcohol use: Yes     Comment: occasionally    Drug use: No     Review of Systems   Constitutional:  Negative for chills and fever.   HENT:  Negative for facial swelling and sore throat.    Eyes:  Negative for visual disturbance.   Respiratory:  Negative for cough and shortness of breath.    Cardiovascular:  Negative for chest pain and palpitations.   Gastrointestinal:  Positive for abdominal pain, nausea and vomiting.   Genitourinary:  Negative for dysuria, hematuria and vaginal bleeding.   Musculoskeletal:  Negative for back pain.   Skin:  Negative for rash.   Neurological:  Negative for weakness and headaches.   Hematological:  Does not bruise/bleed easily.   Psychiatric/Behavioral: Negative.       Physical Exam     Initial Vitals [06/15/23 1909]   BP Pulse Resp Temp SpO2   128/80 105 16 99.1 °F (37.3 °C) 98 %      MAP       --         Physical Exam    Nursing note and vitals reviewed.  Constitutional: She appears well-developed and well-nourished. She is not diaphoretic. She is Obese . She appears distressed (mildly).   HENT:   Head: Normocephalic and atraumatic.   Right Ear: External ear normal.   Left Ear: External ear normal.   Nose: Nose normal.   Eyes: Conjunctivae are normal. No scleral icterus.   Neck: Neck supple. No tracheal deviation present.   Normal range of motion.  Cardiovascular:  Normal rate, regular rhythm and normal heart sounds.           Pulmonary/Chest: Breath sounds normal. No respiratory distress.   Abdominal: Abdomen is soft. Bowel sounds are normal. She exhibits no distension. There is abdominal tenderness in the left upper quadrant and left lower quadrant.   No right CVA tenderness.  No left CVA tenderness. There is no rebound and no guarding.   Musculoskeletal:      Cervical back: Normal range of  motion and neck supple.     Neurological: She is alert and oriented to person, place, and time.   Skin: Skin is warm and dry.   Psychiatric: She has a normal mood and affect. Thought content normal.       ED Course   Procedures  Labs Reviewed   CBC W/ AUTO DIFFERENTIAL - Abnormal; Notable for the following components:       Result Value    Hemoglobin 11.0 (*)     Hematocrit 35.9 (*)     MCV 77 (*)     MCH 23.7 (*)     MCHC 30.6 (*)     RDW 15.7 (*)     MPV 9.1 (*)     Immature Granulocytes 0.7 (*)     Immature Grans (Abs) 0.06 (*)     Lymph % 17.0 (*)     All other components within normal limits   URINALYSIS, REFLEX TO URINE CULTURE - Abnormal; Notable for the following components:    Protein, UA Trace (*)     All other components within normal limits    Narrative:     Specimen Source->Urine   COMPREHENSIVE METABOLIC PANEL   LIPASE   HIV 1 / 2 ANTIBODY   HEPATITIS C ANTIBODY          Imaging Results              X-Ray Abdomen Flat And Erect (Final result)  Result time 06/15/23 20:44:55      Final result by Gage Mendiola DO (06/15/23 20:44:55)                   Impression:      Nonobstructive bowel gas pattern.      Electronically signed by: Gage Mendiola  Date:    06/15/2023  Time:    20:44               Narrative:    EXAMINATION:  XR ABDOMEN FLAT AND ERECT    CLINICAL HISTORY:  Unspecified abdominal pain    TECHNIQUE:  Flat and erect AP views of the abdomen were performed.    COMPARISON:  06/19/2021.    FINDINGS:  There is a normal, nonobstructive bowel gas pattern.  There is no free air.  There is no abnormal calcification. The visualized osseous structures are unremarkable. The visualized lung bases are clear.  There are right upper quadrant surgical clips.                                       Medications   diphenhydrAMINE injection 25 mg (25 mg Intravenous Given 6/15/23 2006)   droPERidol injection 1.25 mg (1.25 mg Intravenous Given 6/15/23 2007)   traMADoL tablet 100 mg (100 mg Oral Given 6/15/23 2104)      Medical Decision Making:   History:   Old Medical Records: I decided to obtain old medical records.  Clinical Tests:   Lab Tests: Ordered and Reviewed        Scribe Attestation:   Scribe #1: I performed the above scribed service and the documentation accurately describes the services I performed. I attest to the accuracy of the note.      ED Course as of 06/15/23 2206   Thu Jareth 15, 2023   2048 X-Ray Abdomen Flat And Erect  Impression:     Nonobstructive bowel gas pattern.    [CC]   220 27-year-old presenting to the emergency department for evaluation of abdominal pain.  Patient has been seen multiple times over the last few months.  Patient has known ovarian cyst.  Patient's vitals are stable, hemoglobin is at baseline, doubt significant hemorrhage.  No peritonitis noted on exam.  Patient's exam was soft abdomen no rebound or guarding.  Doubt acute abdomen.  UA is not indicative of UTI.  Lipase is normal, doubt pancreatitis.  Doubt vascular catastrophe, other worrisome intra-abdominal pathology.  X-ray without evidence of small-bowel obstruction.  Clinically patient does not appear to have small-bowel obstruction.  Patient treated with droperidol, Benadryl as well as tramadol in the emergency department.  Patient notes resolution of pain on re-evaluation.  I do not believe further imaging indicated as patient has been scanned in CT as well as ultrasound multiple times recently.  I believe further workup in the outpatient setting is appropriate.  Will start patient on a prescription for tramadol and Levsin.  Counseled on proper return ER precautions.  Strict return precautions given. I discussed with the patient/family the diagnosis, treatment plan, indications for return to the emergency department, and for expected follow-up. The patient/family verbalized an understanding. The patient/family is asked if there are any questions or concerns. We discuss the case, until all issues are addressed to the  patient/family's satisfaction. Patient/family understands and is agreeable to the plan. Patient is stable and ready for discharge.      [CC]      ED Course User Index  [CC] Jose Elias Power MD               I, Jose Elias Power MD    , personally performed the services described in this documentation. All medical record entries made by the scribe were at my direction and in my presence.  I have reviewed the chart and agree that the record reflects my personal performance and is accurate and complete.   Clinical Impression:   Final diagnoses:  [R10.9] Abdominal pain        ED Disposition Condition    Discharge Stable          ED Prescriptions       Medication Sig Dispense Start Date End Date Auth. Provider    traMADoL (ULTRAM) 50 mg tablet Take 1 tablet (50 mg total) by mouth every 6 (six) hours as needed for Pain. 12 tablet 6/15/2023 -- Jose Elias Power MD    hyoscyamine (LEVSIN/SL) 0.125 mg Subl Place 1 tablet (0.125 mg total) under the tongue every 4 (four) hours as needed (abdominal cramping). 28 tablet 6/15/2023 6/22/2023 Jose Elias Power MD          Follow-up Information       Follow up With Specialties Details Why Contact Info    [facility] Emergency Dept Emergency Medicine Go to  If symptoms worsen [address] [place] [id number] [phone]    Your PCP  Schedule an appointment as soon as possible for a visit  If symptoms worsen     OBGYN  Schedule an appointment as soon as possible for a visit  If symptoms worsen              Jose Elias Power MD  06/15/23 9607

## 2023-07-11 ENCOUNTER — HOSPITAL ENCOUNTER (EMERGENCY)
Facility: HOSPITAL | Age: 28
Discharge: HOME | End: 2023-07-11
Attending: EMERGENCY MEDICINE
Payer: MEDICAID

## 2023-07-11 VITALS
OXYGEN SATURATION: 100 % | RESPIRATION RATE: 20 BRPM | WEIGHT: 260 LBS | DIASTOLIC BLOOD PRESSURE: 72 MMHG | BODY MASS INDEX: 40.81 KG/M2 | HEIGHT: 67 IN | SYSTOLIC BLOOD PRESSURE: 135 MMHG | HEART RATE: 100 BPM | TEMPERATURE: 99 F

## 2023-07-11 DIAGNOSIS — Z87.42 HX OF OVARIAN CYST: ICD-10-CM

## 2023-07-11 DIAGNOSIS — Z76.5 DRUG-SEEKING BEHAVIOR: ICD-10-CM

## 2023-07-11 DIAGNOSIS — Z76.5 MALINGERING: ICD-10-CM

## 2023-07-11 DIAGNOSIS — R10.30 LOWER ABDOMINAL PAIN: Primary | ICD-10-CM

## 2023-07-11 LAB
ALBUMIN SERPL BCP-MCNC: 3.7 G/DL (ref 3.5–5.2)
ALP SERPL-CCNC: 82 U/L (ref 55–135)
ALT SERPL W/O P-5'-P-CCNC: 11 U/L (ref 10–44)
ANION GAP SERPL CALC-SCNC: 9 MMOL/L (ref 8–16)
AST SERPL-CCNC: 14 U/L (ref 10–40)
B-HCG UR QL: NEGATIVE
BACTERIA #/AREA URNS HPF: ABNORMAL /HPF
BASOPHILS # BLD AUTO: 0.06 K/UL (ref 0–0.2)
BASOPHILS NFR BLD: 0.5 % (ref 0–1.9)
BILIRUB SERPL-MCNC: 1 MG/DL (ref 0.1–1)
BILIRUB UR QL STRIP: NEGATIVE
BUN SERPL-MCNC: 8 MG/DL (ref 6–20)
CALCIUM SERPL-MCNC: 8.8 MG/DL (ref 8.7–10.5)
CHLORIDE SERPL-SCNC: 105 MMOL/L (ref 95–110)
CLARITY UR: ABNORMAL
CO2 SERPL-SCNC: 23 MMOL/L (ref 23–29)
COLOR UR: YELLOW
CREAT SERPL-MCNC: 0.8 MG/DL (ref 0.5–1.4)
DIFFERENTIAL METHOD: ABNORMAL
EOSINOPHIL # BLD AUTO: 0.1 K/UL (ref 0–0.5)
EOSINOPHIL NFR BLD: 0.8 % (ref 0–8)
ERYTHROCYTE [DISTWIDTH] IN BLOOD BY AUTOMATED COUNT: 16.2 % (ref 11.5–14.5)
EST. GFR  (NO RACE VARIABLE): >60 ML/MIN/1.73 M^2
GLUCOSE SERPL-MCNC: 108 MG/DL (ref 70–110)
GLUCOSE UR QL STRIP: NEGATIVE
HCT VFR BLD AUTO: 34.9 % (ref 37–48.5)
HGB BLD-MCNC: 10.6 G/DL (ref 12–16)
HGB UR QL STRIP: ABNORMAL
IMM GRANULOCYTES # BLD AUTO: 0.04 K/UL (ref 0–0.04)
IMM GRANULOCYTES NFR BLD AUTO: 0.4 % (ref 0–0.5)
KETONES UR QL STRIP: NEGATIVE
LEUKOCYTE ESTERASE UR QL STRIP: NEGATIVE
LIPASE SERPL-CCNC: 9 U/L (ref 4–60)
LYMPHOCYTES # BLD AUTO: 0.7 K/UL (ref 1–4.8)
LYMPHOCYTES NFR BLD: 6.3 % (ref 18–48)
MCH RBC QN AUTO: 23.6 PG (ref 27–31)
MCHC RBC AUTO-ENTMCNC: 30.4 G/DL (ref 32–36)
MCV RBC AUTO: 78 FL (ref 82–98)
MICROSCOPIC COMMENT: ABNORMAL
MONOCYTES # BLD AUTO: 0.9 K/UL (ref 0.3–1)
MONOCYTES NFR BLD: 7.9 % (ref 4–15)
NEUTROPHILS # BLD AUTO: 9.2 K/UL (ref 1.8–7.7)
NEUTROPHILS NFR BLD: 84.1 % (ref 38–73)
NITRITE UR QL STRIP: NEGATIVE
NRBC BLD-RTO: 0 /100 WBC
PH UR STRIP: 6 [PH] (ref 5–8)
PLATELET # BLD AUTO: 279 K/UL (ref 150–450)
PMV BLD AUTO: 9.1 FL (ref 9.2–12.9)
POTASSIUM SERPL-SCNC: 3.8 MMOL/L (ref 3.5–5.1)
PROT SERPL-MCNC: 7.4 G/DL (ref 6–8.4)
PROT UR QL STRIP: NEGATIVE
RBC # BLD AUTO: 4.5 M/UL (ref 4–5.4)
RBC #/AREA URNS HPF: >100 /HPF (ref 0–4)
SODIUM SERPL-SCNC: 137 MMOL/L (ref 136–145)
SP GR UR STRIP: 1.02 (ref 1–1.03)
SQUAMOUS #/AREA URNS HPF: 2 /HPF
URN SPEC COLLECT METH UR: ABNORMAL
UROBILINOGEN UR STRIP-ACNC: NEGATIVE EU/DL
WBC # BLD AUTO: 10.97 K/UL (ref 3.9–12.7)
WBC #/AREA URNS HPF: 8 /HPF (ref 0–5)

## 2023-07-11 PROCEDURE — 85025 COMPLETE CBC W/AUTO DIFF WBC: CPT | Performed by: EMERGENCY MEDICINE

## 2023-07-11 PROCEDURE — 80053 COMPREHEN METABOLIC PANEL: CPT | Performed by: EMERGENCY MEDICINE

## 2023-07-11 PROCEDURE — 99285 EMERGENCY DEPT VISIT HI MDM: CPT | Mod: 25

## 2023-07-11 PROCEDURE — 63600175 PHARM REV CODE 636 W HCPCS: Performed by: EMERGENCY MEDICINE

## 2023-07-11 PROCEDURE — 83690 ASSAY OF LIPASE: CPT | Performed by: EMERGENCY MEDICINE

## 2023-07-11 PROCEDURE — 96372 THER/PROPH/DIAG INJ SC/IM: CPT | Performed by: EMERGENCY MEDICINE

## 2023-07-11 PROCEDURE — 36415 COLL VENOUS BLD VENIPUNCTURE: CPT | Performed by: EMERGENCY MEDICINE

## 2023-07-11 PROCEDURE — 81000 URINALYSIS NONAUTO W/SCOPE: CPT | Performed by: EMERGENCY MEDICINE

## 2023-07-11 PROCEDURE — 81025 URINE PREGNANCY TEST: CPT | Performed by: EMERGENCY MEDICINE

## 2023-07-11 RX ORDER — PROMETHAZINE HYDROCHLORIDE 25 MG/ML
25 INJECTION, SOLUTION INTRAMUSCULAR; INTRAVENOUS
Status: COMPLETED | OUTPATIENT
Start: 2023-07-11 | End: 2023-07-11

## 2023-07-11 RX ORDER — TRAMADOL HYDROCHLORIDE 50 MG/1
50 TABLET ORAL EVERY 6 HOURS PRN
Qty: 4 TABLET | Refills: 0 | Status: ON HOLD | OUTPATIENT
Start: 2023-07-11 | End: 2023-08-31 | Stop reason: HOSPADM

## 2023-07-11 RX ORDER — MORPHINE SULFATE 4 MG/ML
4 INJECTION, SOLUTION INTRAMUSCULAR; INTRAVENOUS
Status: COMPLETED | OUTPATIENT
Start: 2023-07-11 | End: 2023-07-11

## 2023-07-11 RX ADMIN — PROMETHAZINE HYDROCHLORIDE 25 MG: 25 INJECTION INTRAMUSCULAR; INTRAVENOUS at 10:07

## 2023-07-11 RX ADMIN — MORPHINE SULFATE 4 MG: 4 INJECTION INTRAVENOUS at 10:07

## 2023-07-11 NOTE — ED NOTES
"Pt updated on wait for results and ED MD review, all questions and concerns addressed at this time. Pt states "I will be admitted, there will be no plan to discuss". RN informed patient that ED MD will be in shortly for update on plan of care. Monitoring continued, call bell within reach, side rails x 2.   "

## 2023-07-11 NOTE — ED NOTES
RN and Charge RN at the bedside per patient request. Pt updated on status and plan of care, pt provided ice chips approved by ED MD. Pt resting on stretcher in NAD, respirations even and unlabored, call light within reach, monitoring in place. Will continue to monitor and update pt on plan of care.

## 2023-07-11 NOTE — ED NOTES
House supervisor at the bedside. Security at the bedside with wheelchair to assist patient to waiting room.

## 2023-07-11 NOTE — ED PROVIDER NOTES
Encounter Date: 2023       History     Chief Complaint   Patient presents with    Abdominal Pain     Patient states she is having abdominal pain and vomiting states she is having trouble getting help from her GYN doctors saying her cysts are not big enough for intervention but she is hurting      27-year-old female with a past medical history of bipolar disorder, reflux disease, and anxiety presents with a chief complaint of abdominal pain.  She reports started approximately 1 day ago.  She reports that it is associated nausea vomiting.  She believes that it coming from her chronic ovarian cysts.  The patient reports starting her menstrual cycle 1 day ago as well.  She denies any diarrhea, melena, dysuria, fever/chills, or hematochezia.    Review of patient's allergies indicates:   Allergen Reactions    Iodine and iodide containing products Hives    Toradol [ketorolac] Hives    Vitamin e (d-alpha tocopherol)      Hives (skin)^Black eyed peas and oatmeal causede hives     Past Medical History:   Diagnosis Date    Anxiety     Bipolar 1 disorder     COVID-19 2021    GERD (gastroesophageal reflux disease)     Obese      Past Surgical History:   Procedure Laterality Date     SECTION  2022    LAPAROSCOPIC APPENDECTOMY N/A 2022    Procedure: APPENDECTOMY, LAPAROSCOPIC;  Surgeon: Yuniel Goldman MD;  Location: [facility] OR;  Service: General;  Laterality: N/A;    LAPAROSCOPIC CHOLECYSTECTOMY N/A 2023    Procedure: CHOLECYSTECTOMY, LAPAROSCOPIC;  Surgeon: Yuniel Goldman MD;  Location: [facility] OR;  Service: General;  Laterality: N/A;    LEG SURGERY      Broke her left lower leg.     Family History   Problem Relation Age of Onset    No Known Problems Mother     No Known Problems Father     Breast cancer Neg Hx     Colon cancer Neg Hx     Ovarian cancer Neg Hx      Social History     Tobacco Use    Smoking status: Never    Smokeless tobacco: Never   Substance Use Topics    Alcohol use: Yes      Comment: occasionally    Drug use: No     Review of Systems   Constitutional:  Negative for chills and fever.   HENT:  Negative for congestion.    Respiratory:  Negative for cough and shortness of breath.    Cardiovascular:  Negative for chest pain.   Gastrointestinal:  Positive for abdominal pain, nausea and vomiting. Negative for abdominal distention, blood in stool, constipation and diarrhea.   Genitourinary:  Negative for dysuria.   Musculoskeletal:  Negative for gait problem.   Skin:  Negative for color change.   Neurological:  Negative for dizziness and numbness.   Psychiatric/Behavioral:  Negative for agitation.      Physical Exam     Initial Vitals [07/11/23 0924]   BP Pulse Resp Temp SpO2   (!) 122/95 101 20 98.5 °F (36.9 °C) 97 %      MAP       --         Physical Exam    Nursing note and vitals reviewed.  Constitutional: She appears well-developed and well-nourished.   HENT:   Head: Atraumatic.   Eyes: EOM are normal. Pupils are equal, round, and reactive to light.   Neck:   Normal range of motion.  Cardiovascular:  Normal rate and regular rhythm.           Pulmonary/Chest: Breath sounds normal.   Abdominal: Abdomen is soft. Bowel sounds are normal. She exhibits no distension. There is abdominal tenderness.   Lower abdominal tenderness to palpation.  No rebound or guarding noted. There is no rebound and no guarding.   Musculoskeletal:         General: Normal range of motion.      Right shoulder: Normal.      Left shoulder: Normal.      Cervical back: Normal range of motion.     Neurological: She is alert and oriented to person, place, and time. She has normal strength. GCS score is 15. GCS eye subscore is 4. GCS verbal subscore is 5. GCS motor subscore is 6.   Skin: Skin is warm and dry.   Psychiatric: She has a normal mood and affect.       ED Course   Procedures  Labs Reviewed   CBC W/ AUTO DIFFERENTIAL - Abnormal; Notable for the following components:       Result Value    Hemoglobin 10.6 (*)      Hematocrit 34.9 (*)     MCV 78 (*)     MCH 23.6 (*)     MCHC 30.4 (*)     RDW 16.2 (*)     MPV 9.1 (*)     Gran # (ANC) 9.2 (*)     Lymph # 0.7 (*)     Gran % 84.1 (*)     Lymph % 6.3 (*)     All other components within normal limits   URINALYSIS, REFLEX TO URINE CULTURE - Abnormal; Notable for the following components:    Appearance, UA Hazy (*)     Occult Blood UA 3+ (*)     All other components within normal limits    Narrative:     Specimen Source->Urine   URINALYSIS MICROSCOPIC - Abnormal; Notable for the following components:    RBC, UA >100 (*)     WBC, UA 8 (*)     All other components within normal limits    Narrative:     Specimen Source->Urine   COMPREHENSIVE METABOLIC PANEL   LIPASE   PREGNANCY TEST, URINE RAPID    Narrative:     Specimen Source->Urine          Imaging Results              US Pelvis Comp with Transvag NON-OB (xpd (Final result)  Result time 07/11/23 11:59:36      Final result by Uday Gould Jr., MD (07/11/23 11:59:36)                   Impression:      The right ovary is not seen.  Otherwise negative pelvic ultrasound      Electronically signed by: Uday Gould MD  Date:    07/11/2023  Time:    11:59               Narrative:    EXAMINATION:  US PELVIS COMP WITH TRANSVAG NON-OB (XPD)    CLINICAL HISTORY:  pelvic pain;    TECHNIQUE:  Transabdominal sonography of the pelvis was performed, followed by transvaginal sonography to better evaluate the uterus and ovaries.    COMPARISON:  None.    FINDINGS:  Uterus:    Size: 8.6 x 6.3 cm    Masses: None    Endometrium: Normal in this pre menopausal patient, measuring 5.8 mm.    Right ovary:    Not seen    Left ovary:    Size: 2.1 x 2.1 cm    Appearance: Normal    Vascular Flow: Normal.    Free Fluid:    None.                                       Medications   morphine injection 4 mg (4 mg Intramuscular Given 7/11/23 1004)   promethazine injection 25 mg (25 mg Intramuscular Given 7/11/23 1005)     Medical Decision Making:   Initial  Assessment:   27-year-old female presented for lower abdominal pain.  Differential Diagnosis:   Initial differential diagnosis included but not limited to colitis, enteritis, and ovarian cyst.  Clinical Tests:   Lab Tests: Ordered and Reviewed  Radiological Study: Ordered and Reviewed  ED Management:  The patient was emergently evaluated in the emergency department, her evaluation was significant for a young female left lower abdominal tenderness to palpation.  The patient's labs showed no acute abnormalities.  The patient's symptoms were treated with a dose of IM morphine and IM Phenergan.  The patient is concerned about her ovarian cyst.  A pelvic ultrasound showed no free fluid and the left ovary was noted to be normal.  They were unable to visualize the right ovary, which looking back on the patient's multiple previous pelvic ultrasounds, the right ovary is only intermittently visualized with a cyst in place on her CT scan done last month.  The ultrasound done last month actually does not show her right ovary per Radiology.  The patient has had multiple previous CT scans and ultrasound for this reported abdominal pain over the last several months.  The patient was offered CT scan here today, which she has refused.  I am unclear as to the etiology of her pain at this time but could be related to her menstrual cycle.  The patient is stable for discharge to home she will be discharged with p.o. Ultram and she is referred to gyn for follow-up and further workup as an outpatient..           ED Course as of 07/11/23 1317   Tue Jul 11, 2023   1300 While being discharge the patient did become belligerent with the nursing staff.  She was demanding other narcotic prescriptions.  I suspect that the patient is exhibiting malingering and drug-seeking behavior, which could be the etiology of the patient's symptoms overall.  The patient is still stable for discharge to home. [PE]      ED Course User Index  [PE] Carlos CASTRO  MD Ayala                 Clinical Impression:   Final diagnoses:  [R10.30] Lower abdominal pain (Primary)  [Z87.42] Hx of ovarian cyst  [Z76.5] Malingering  [Z76.5] Drug-seeking behavior        ED Disposition Condition    Discharge Stable          ED Prescriptions       Medication Sig Dispense Start Date End Date Auth. Provider    traMADoL (ULTRAM) 50 mg tablet Take 1 tablet (50 mg total) by mouth every 6 (six) hours as needed for Pain. 4 tablet 7/11/2023 -- Carlos Cai MD          Follow-up Information       Follow up With Specialties Details Why Contact Info    follow up with GYN                 Carlos Cai MD  07/11/23 9818

## 2023-07-11 NOTE — ED NOTES
"Pt identifiers checked and accurate with Manisha Bishop     Pt presents to ED with complaints of lower abdominal pain with vaginal bleeding, pt reports having ovarian cyst and being followed by OB for same symptoms. Pt reports pelvic pressure with pain, "feels like a baby down there, I'm not sure if I'm bleeding from my menstrual cycle or what". Pt denies all other complaints at this time.   "

## 2023-08-25 ENCOUNTER — TELEPHONE (OUTPATIENT)
Dept: OBSTETRICS AND GYNECOLOGY | Facility: CLINIC | Age: 28
End: 2023-08-25
Payer: MEDICAID

## 2023-08-25 NOTE — TELEPHONE ENCOUNTER
----- Message from Ny Deangelo sent at 8/25/2023 11:14 AM CDT -----  Regarding: advice  Contact: patient  Type: Needs Medical Advice  Who Called:  patient  Symptoms (please be specific):  cyst on ovary  How long has patient had these symptoms:  months  Pharmacy name and phone #:      [facility] #[id number] - [address] & [address] [address]  Phone: [phone] Fax: [phone]      Best Call Back Number: [phone] (home)     Additional Information: Patient states she has a cyst on her ovary and needs medication to dissolve and pain medication. Please call patient to advise.

## 2023-08-30 ENCOUNTER — HOSPITAL ENCOUNTER (OUTPATIENT)
Facility: HOSPITAL | Age: 28
Discharge: HOME | End: 2023-08-31
Attending: STUDENT IN AN ORGANIZED HEALTH CARE EDUCATION/TRAINING PROGRAM | Admitting: STUDENT IN AN ORGANIZED HEALTH CARE EDUCATION/TRAINING PROGRAM
Payer: MEDICAID

## 2023-08-30 DIAGNOSIS — R07.9 CHEST PAIN: Primary | ICD-10-CM

## 2023-08-30 DIAGNOSIS — J81.1 PULMONARY EDEMA: ICD-10-CM

## 2023-08-30 DIAGNOSIS — J18.9 PNEUMONIA OF BOTH LOWER LOBES DUE TO INFECTIOUS ORGANISM: ICD-10-CM

## 2023-08-30 LAB
ALBUMIN SERPL BCP-MCNC: 3.7 G/DL (ref 3.5–5.2)
ALLENS TEST: NORMAL
ALP SERPL-CCNC: 74 U/L (ref 55–135)
ALT SERPL W/O P-5'-P-CCNC: 16 U/L (ref 10–44)
ANION GAP SERPL CALC-SCNC: 13 MMOL/L (ref 8–16)
AST SERPL-CCNC: 16 U/L (ref 10–40)
B-HCG UR QL: NEGATIVE
BASOPHILS # BLD AUTO: 0.05 K/UL (ref 0–0.2)
BASOPHILS NFR BLD: 0.5 % (ref 0–1.9)
BILIRUB SERPL-MCNC: 0.8 MG/DL (ref 0.1–1)
BILIRUB UR QL STRIP: NEGATIVE
BNP SERPL-MCNC: 10 PG/ML (ref 0–99)
BUN SERPL-MCNC: 5 MG/DL (ref 6–20)
CALCIUM SERPL-MCNC: 9.1 MG/DL (ref 8.7–10.5)
CHLORIDE SERPL-SCNC: 104 MMOL/L (ref 95–110)
CLARITY UR: CLEAR
CO2 SERPL-SCNC: 17 MMOL/L (ref 23–29)
COLOR UR: YELLOW
CREAT SERPL-MCNC: 0.7 MG/DL (ref 0.5–1.4)
CTP QC/QA: YES
D DIMER PPP IA.FEU-MCNC: 0.44 MG/L FEU
DELSYS: NORMAL
DIFFERENTIAL METHOD: ABNORMAL
EOSINOPHIL # BLD AUTO: 0.1 K/UL (ref 0–0.5)
EOSINOPHIL NFR BLD: 0.8 % (ref 0–8)
ERYTHROCYTE [DISTWIDTH] IN BLOOD BY AUTOMATED COUNT: 15.5 % (ref 11.5–14.5)
EST. GFR  (NO RACE VARIABLE): >60 ML/MIN/1.73 M^2
GLUCOSE SERPL-MCNC: 84 MG/DL (ref 70–110)
GLUCOSE UR QL STRIP: NEGATIVE
HCT VFR BLD AUTO: 37.9 % (ref 37–48.5)
HGB BLD-MCNC: 11.8 G/DL (ref 12–16)
HGB UR QL STRIP: NEGATIVE
IMM GRANULOCYTES # BLD AUTO: 0.04 K/UL (ref 0–0.04)
IMM GRANULOCYTES NFR BLD AUTO: 0.4 % (ref 0–0.5)
INFLUENZA A, MOLECULAR: NEGATIVE
INFLUENZA B, MOLECULAR: NEGATIVE
KETONES UR QL STRIP: ABNORMAL
LACTATE SERPL-SCNC: 0.5 MMOL/L (ref 0.5–2.2)
LDH SERPL L TO P-CCNC: 0.69 MMOL/L (ref 0.5–2.2)
LEUKOCYTE ESTERASE UR QL STRIP: NEGATIVE
LYMPHOCYTES # BLD AUTO: 1.6 K/UL (ref 1–4.8)
LYMPHOCYTES NFR BLD: 15.6 % (ref 18–48)
MCH RBC QN AUTO: 24.3 PG (ref 27–31)
MCHC RBC AUTO-ENTMCNC: 31.1 G/DL (ref 32–36)
MCV RBC AUTO: 78 FL (ref 82–98)
MONOCYTES # BLD AUTO: 0.7 K/UL (ref 0.3–1)
MONOCYTES NFR BLD: 6.7 % (ref 4–15)
NEUTROPHILS # BLD AUTO: 7.9 K/UL (ref 1.8–7.7)
NEUTROPHILS NFR BLD: 76 % (ref 38–73)
NITRITE UR QL STRIP: NEGATIVE
NRBC BLD-RTO: 0 /100 WBC
PH UR STRIP: 7 [PH] (ref 5–8)
PLATELET # BLD AUTO: 400 K/UL (ref 150–450)
PMV BLD AUTO: 9.1 FL (ref 9.2–12.9)
POTASSIUM SERPL-SCNC: 4.1 MMOL/L (ref 3.5–5.1)
PROCALCITONIN SERPL IA-MCNC: 0.05 NG/ML
PROT SERPL-MCNC: 7.8 G/DL (ref 6–8.4)
PROT UR QL STRIP: NEGATIVE
RBC # BLD AUTO: 4.86 M/UL (ref 4–5.4)
SAMPLE: NORMAL
SARS-COV-2 RDRP RESP QL NAA+PROBE: NEGATIVE
SITE: NORMAL
SODIUM SERPL-SCNC: 134 MMOL/L (ref 136–145)
SP GR UR STRIP: 1.01 (ref 1–1.03)
SPECIMEN SOURCE: NORMAL
TROPONIN I SERPL DL<=0.01 NG/ML-MCNC: <0.006 NG/ML (ref 0–0.03)
URN SPEC COLLECT METH UR: ABNORMAL
UROBILINOGEN UR STRIP-ACNC: NEGATIVE EU/DL
WBC # BLD AUTO: 10.33 K/UL (ref 3.9–12.7)

## 2023-08-30 PROCEDURE — 81025 URINE PREGNANCY TEST: CPT | Performed by: NURSE PRACTITIONER

## 2023-08-30 PROCEDURE — 25000003 PHARM REV CODE 250: Performed by: STUDENT IN AN ORGANIZED HEALTH CARE EDUCATION/TRAINING PROGRAM

## 2023-08-30 PROCEDURE — G0378 HOSPITAL OBSERVATION PER HR: HCPCS

## 2023-08-30 PROCEDURE — 83880 ASSAY OF NATRIURETIC PEPTIDE: CPT | Performed by: NURSE PRACTITIONER

## 2023-08-30 PROCEDURE — 84484 ASSAY OF TROPONIN QUANT: CPT | Performed by: NURSE PRACTITIONER

## 2023-08-30 PROCEDURE — 96375 TX/PRO/DX INJ NEW DRUG ADDON: CPT

## 2023-08-30 PROCEDURE — 25000003 PHARM REV CODE 250: Performed by: NURSE PRACTITIONER

## 2023-08-30 PROCEDURE — 99900035 HC TECH TIME PER 15 MIN (STAT)

## 2023-08-30 PROCEDURE — 36415 COLL VENOUS BLD VENIPUNCTURE: CPT | Performed by: NURSE PRACTITIONER

## 2023-08-30 PROCEDURE — 85025 COMPLETE CBC W/AUTO DIFF WBC: CPT | Performed by: NURSE PRACTITIONER

## 2023-08-30 PROCEDURE — 63700000 PHARM REV CODE 250 ALT 637 W/O HCPCS: Performed by: NURSE PRACTITIONER

## 2023-08-30 PROCEDURE — 84145 PROCALCITONIN (PCT): CPT | Performed by: NURSE PRACTITIONER

## 2023-08-30 PROCEDURE — 80307 DRUG TEST PRSMV CHEM ANLYZR: CPT | Performed by: NURSE PRACTITIONER

## 2023-08-30 PROCEDURE — 87040 BLOOD CULTURE FOR BACTERIA: CPT | Performed by: STUDENT IN AN ORGANIZED HEALTH CARE EDUCATION/TRAINING PROGRAM

## 2023-08-30 PROCEDURE — 93010 ELECTROCARDIOGRAM REPORT: CPT | Mod: ,,, | Performed by: GENERAL PRACTICE

## 2023-08-30 PROCEDURE — 96365 THER/PROPH/DIAG IV INF INIT: CPT

## 2023-08-30 PROCEDURE — 63600175 PHARM REV CODE 636 W HCPCS: Performed by: NURSE PRACTITIONER

## 2023-08-30 PROCEDURE — 83605 ASSAY OF LACTIC ACID: CPT

## 2023-08-30 PROCEDURE — 99285 EMERGENCY DEPT VISIT HI MDM: CPT | Mod: 25

## 2023-08-30 PROCEDURE — 63600175 PHARM REV CODE 636 W HCPCS: Performed by: STUDENT IN AN ORGANIZED HEALTH CARE EDUCATION/TRAINING PROGRAM

## 2023-08-30 PROCEDURE — U0002 COVID-19 LAB TEST NON-CDC: HCPCS | Performed by: STUDENT IN AN ORGANIZED HEALTH CARE EDUCATION/TRAINING PROGRAM

## 2023-08-30 PROCEDURE — 81003 URINALYSIS AUTO W/O SCOPE: CPT | Mod: 59 | Performed by: NURSE PRACTITIONER

## 2023-08-30 PROCEDURE — 93005 ELECTROCARDIOGRAM TRACING: CPT

## 2023-08-30 PROCEDURE — 85379 FIBRIN DEGRADATION QUANT: CPT | Performed by: STUDENT IN AN ORGANIZED HEALTH CARE EDUCATION/TRAINING PROGRAM

## 2023-08-30 PROCEDURE — 83605 ASSAY OF LACTIC ACID: CPT | Performed by: STUDENT IN AN ORGANIZED HEALTH CARE EDUCATION/TRAINING PROGRAM

## 2023-08-30 PROCEDURE — 80053 COMPREHEN METABOLIC PANEL: CPT | Performed by: NURSE PRACTITIONER

## 2023-08-30 PROCEDURE — 87502 INFLUENZA DNA AMP PROBE: CPT | Performed by: STUDENT IN AN ORGANIZED HEALTH CARE EDUCATION/TRAINING PROGRAM

## 2023-08-30 PROCEDURE — 93010 EKG 12-LEAD: ICD-10-PCS | Mod: ,,, | Performed by: GENERAL PRACTICE

## 2023-08-30 RX ORDER — TRAMADOL HYDROCHLORIDE 50 MG/1
50 TABLET ORAL EVERY 6 HOURS PRN
Status: DISCONTINUED | OUTPATIENT
Start: 2023-08-30 | End: 2023-08-30

## 2023-08-30 RX ORDER — BUPROPION HYDROCHLORIDE 300 MG/1
300 TABLET ORAL EVERY MORNING
Status: ON HOLD | COMMUNITY
Start: 2023-04-07 | End: 2023-08-31

## 2023-08-30 RX ORDER — IPRATROPIUM BROMIDE AND ALBUTEROL SULFATE 2.5; .5 MG/3ML; MG/3ML
3 SOLUTION RESPIRATORY (INHALATION) EVERY 6 HOURS PRN
Status: DISCONTINUED | OUTPATIENT
Start: 2023-08-30 | End: 2023-08-31 | Stop reason: HOSPADM

## 2023-08-30 RX ORDER — ENOXAPARIN SODIUM 100 MG/ML
40 INJECTION SUBCUTANEOUS EVERY 12 HOURS
Status: DISCONTINUED | OUTPATIENT
Start: 2023-08-31 | End: 2023-08-31 | Stop reason: HOSPADM

## 2023-08-30 RX ORDER — ONDANSETRON 2 MG/ML
4 INJECTION INTRAMUSCULAR; INTRAVENOUS EVERY 6 HOURS PRN
Status: DISCONTINUED | OUTPATIENT
Start: 2023-08-30 | End: 2023-08-30

## 2023-08-30 RX ORDER — AMOXICILLIN 250 MG
1 CAPSULE ORAL 2 TIMES DAILY
Status: DISCONTINUED | OUTPATIENT
Start: 2023-08-30 | End: 2023-08-31 | Stop reason: HOSPADM

## 2023-08-30 RX ORDER — ACETAMINOPHEN 325 MG/1
650 TABLET ORAL EVERY 8 HOURS PRN
Status: DISCONTINUED | OUTPATIENT
Start: 2023-08-30 | End: 2023-08-31 | Stop reason: HOSPADM

## 2023-08-30 RX ORDER — AZITHROMYCIN 250 MG/1
500 TABLET, FILM COATED ORAL
Status: COMPLETED | OUTPATIENT
Start: 2023-08-30 | End: 2023-08-30

## 2023-08-30 RX ORDER — ALPRAZOLAM 1 MG/1
2 TABLET ORAL NIGHTLY PRN
Status: DISCONTINUED | OUTPATIENT
Start: 2023-08-30 | End: 2023-08-31 | Stop reason: HOSPADM

## 2023-08-30 RX ORDER — TALC
6 POWDER (GRAM) TOPICAL NIGHTLY PRN
Status: DISCONTINUED | OUTPATIENT
Start: 2023-08-30 | End: 2023-08-31 | Stop reason: HOSPADM

## 2023-08-30 RX ORDER — LANOLIN ALCOHOL/MO/W.PET/CERES
800 CREAM (GRAM) TOPICAL
Status: DISCONTINUED | OUTPATIENT
Start: 2023-08-30 | End: 2023-08-31 | Stop reason: HOSPADM

## 2023-08-30 RX ORDER — PROCHLORPERAZINE EDISYLATE 5 MG/ML
5 INJECTION INTRAMUSCULAR; INTRAVENOUS EVERY 6 HOURS PRN
Status: DISCONTINUED | OUTPATIENT
Start: 2023-08-30 | End: 2023-08-31 | Stop reason: HOSPADM

## 2023-08-30 RX ORDER — MAG HYDROX/ALUMINUM HYD/SIMETH 200-200-20
30 SUSPENSION, ORAL (FINAL DOSE FORM) ORAL 4 TIMES DAILY PRN
Status: DISCONTINUED | OUTPATIENT
Start: 2023-08-30 | End: 2023-08-31 | Stop reason: HOSPADM

## 2023-08-30 RX ORDER — NALOXONE HCL 0.4 MG/ML
0.02 VIAL (ML) INJECTION
Status: DISCONTINUED | OUTPATIENT
Start: 2023-08-30 | End: 2023-08-31 | Stop reason: HOSPADM

## 2023-08-30 RX ORDER — ACETAMINOPHEN 500 MG
1000 TABLET ORAL
Status: COMPLETED | OUTPATIENT
Start: 2023-08-30 | End: 2023-08-30

## 2023-08-30 RX ORDER — OXYCODONE AND ACETAMINOPHEN 5; 325 MG/1; MG/1
1 TABLET ORAL EVERY 4 HOURS PRN
Status: DISCONTINUED | OUTPATIENT
Start: 2023-08-30 | End: 2023-08-31 | Stop reason: HOSPADM

## 2023-08-30 RX ORDER — FAMOTIDINE 20 MG/1
20 TABLET, FILM COATED ORAL DAILY
Status: DISCONTINUED | OUTPATIENT
Start: 2023-08-31 | End: 2023-08-31

## 2023-08-30 RX ORDER — POLYETHYLENE GLYCOL 3350 17 G/17G
17 POWDER, FOR SOLUTION ORAL 2 TIMES DAILY PRN
Status: DISCONTINUED | OUTPATIENT
Start: 2023-08-30 | End: 2023-08-31 | Stop reason: HOSPADM

## 2023-08-30 RX ORDER — SODIUM CHLORIDE 0.9 % (FLUSH) 0.9 %
10 SYRINGE (ML) INJECTION EVERY 12 HOURS PRN
Status: DISCONTINUED | OUTPATIENT
Start: 2023-08-30 | End: 2023-08-31 | Stop reason: HOSPADM

## 2023-08-30 RX ORDER — IBUPROFEN 200 MG
16 TABLET ORAL
Status: DISCONTINUED | OUTPATIENT
Start: 2023-08-30 | End: 2023-08-31 | Stop reason: HOSPADM

## 2023-08-30 RX ORDER — GLUCAGON 1 MG
1 KIT INJECTION
Status: DISCONTINUED | OUTPATIENT
Start: 2023-08-30 | End: 2023-08-31 | Stop reason: HOSPADM

## 2023-08-30 RX ORDER — BUPROPION HYDROCHLORIDE 150 MG/1
300 TABLET ORAL EVERY MORNING
Status: DISCONTINUED | OUTPATIENT
Start: 2023-08-31 | End: 2023-08-31

## 2023-08-30 RX ORDER — IBUPROFEN 200 MG
24 TABLET ORAL
Status: DISCONTINUED | OUTPATIENT
Start: 2023-08-30 | End: 2023-08-31 | Stop reason: HOSPADM

## 2023-08-30 RX ORDER — OXCARBAZEPINE 150 MG/1
300 TABLET, FILM COATED ORAL 2 TIMES DAILY
Status: DISCONTINUED | OUTPATIENT
Start: 2023-08-30 | End: 2023-08-30

## 2023-08-30 RX ORDER — MORPHINE SULFATE 4 MG/ML
4 INJECTION, SOLUTION INTRAMUSCULAR; INTRAVENOUS
Status: COMPLETED | OUTPATIENT
Start: 2023-08-30 | End: 2023-08-30

## 2023-08-30 RX ORDER — PANTOPRAZOLE SODIUM 20 MG/1
20 TABLET, DELAYED RELEASE ORAL DAILY
Status: DISCONTINUED | OUTPATIENT
Start: 2023-08-31 | End: 2023-08-31 | Stop reason: HOSPADM

## 2023-08-30 RX ADMIN — CEFTRIAXONE SODIUM 1 G: 1 INJECTION, POWDER, FOR SOLUTION INTRAMUSCULAR; INTRAVENOUS at 08:08

## 2023-08-30 RX ADMIN — ACETAMINOPHEN 1000 MG: 500 TABLET ORAL at 07:08

## 2023-08-30 RX ADMIN — MORPHINE SULFATE 4 MG: 4 INJECTION, SOLUTION INTRAMUSCULAR; INTRAVENOUS at 07:08

## 2023-08-30 RX ADMIN — AZITHROMYCIN MONOHYDRATE 500 MG: 250 TABLET ORAL at 07:08

## 2023-08-30 RX ADMIN — SODIUM CHLORIDE 500 ML: 9 INJECTION, SOLUTION INTRAVENOUS at 08:08

## 2023-08-30 NOTE — FIRST PROVIDER EVALUATION
Emergency Department TeleTriage Encounter Note      CHIEF COMPLAINT    Chief Complaint   Patient presents with    Chest Pain     CP since 4AM, right side of chest, right flank pain.  Light headed.         VITAL SIGNS   Initial Vitals [08/30/23 1750]   BP Pulse Resp Temp SpO2   127/86 (!) 116 17 98.6 °F (37 °C) 97 %      MAP       --            ALLERGIES    Review of patient's allergies indicates:   Allergen Reactions    Iodine and iodide containing products Hives    Toradol [ketorolac] Hives    Vitamin e (d-alpha tocopherol)      Hives (skin)^Black eyed peas and oatmeal causede hives       PROVIDER TRIAGE NOTE  This is a teletriage evaluation of a 27 y.o. female presenting to the ED complaining of right sided CP and flank pain starting today. Pt states that she feels light-headed. Reports fever today.  Denies n/v/d.      Initial orders will be placed and care will be transferred to an alternate provider when patient is roomed for a full evaluation. Any additional orders and the final disposition will be determined by that provider.         ORDERS  Labs Reviewed   CBC W/ AUTO DIFFERENTIAL   COMPREHENSIVE METABOLIC PANEL   TROPONIN I   B-TYPE NATRIURETIC PEPTIDE   POCT TROPONIN       ED Orders (720h ago, onward)      Start Ordered     Status Ordering Provider    08/30/23 1800 08/30/23 1757  sodium chloride 0.9% bolus 500 mL 500 mL  Once         Ordered ALEXX DOMINGUEZ N.    08/30/23 1757 08/30/23 1757  Vital signs  Every 15 min         Ordered ALEXX DOMINGUEZ N.    08/30/23 1757 08/30/23 1757  Cardiac Monitoring - Adult  Continuous        Comments: Notify Physician If:    Ordered ALEXX DOMINGUEZ N.    08/30/23 1757 08/30/23 1757  Pulse Oximetry Continuous  Continuous         Ordered ALEXX DOMINGUEZ N.    08/30/23 1757 08/30/23 1757  Diet NPO  Diet effective now         Ordered ALEXX DOMINGUEZ N.    08/30/23 1757 08/30/23 1757  Saline lock IV  Once         Ordered ANGELICA  ALEXX N.    08/30/23 1757 08/30/23 1757  EKG 12-lead  Once        Comments: Do not perform if previously done during this visit/ triage    Ordered ALEXX DOMINGUEZ N.    08/30/23 1757 08/30/23 1757  CBC auto differential  STAT         Ordered ANGELICA ALEXX N.    08/30/23 1757 08/30/23 1757  Comprehensive metabolic panel  STAT         Ordered SANTINONATY ALEXX N.    08/30/23 1757 08/30/23 1757  Troponin I #1  STAT        See Hyperspace for full Linked Orders Report.    Ordered ANGELICA ALEXX N.    08/30/23 1757 08/30/23 1757  POCT Troponin #1  Once        See Hyperspace for full Linked Orders Report.    Ordered ANGELICA ALEXX N.    08/30/23 1757 08/30/23 1757  B-Type natriuretic peptide (BNP)  STAT         Ordered BINSUSANA ALEXX N.    08/30/23 1757 08/30/23 1757  X-Ray Chest AP Portable  1 time imaging         Ordered LIENHIMANSHUMYLANUZHAT ALEXX N.              Virtual Visit Note: The provider triage portion of this emergency department evaluation and documentation was performed via [employer], a HIPAA-compliant telemedicine application, in concert with a tele-presenter in the room. A face to face patient evaluation with one of my colleagues will occur once the patient is placed in an emergency department room.      DISCLAIMER: This note was prepared with [employer] voice recognition transcription software. Garbled syntax, mangled pronouns, and other bizarre constructions may be attributed to that software system.

## 2023-08-31 ENCOUNTER — TELEPHONE (OUTPATIENT)
Dept: FAMILY MEDICINE | Facility: CLINIC | Age: 28
End: 2023-08-31

## 2023-08-31 VITALS
BODY MASS INDEX: 45.99 KG/M2 | RESPIRATION RATE: 16 BRPM | WEIGHT: 293 LBS | HEIGHT: 67 IN | DIASTOLIC BLOOD PRESSURE: 64 MMHG | OXYGEN SATURATION: 94 % | TEMPERATURE: 98 F | SYSTOLIC BLOOD PRESSURE: 124 MMHG | HEART RATE: 103 BPM

## 2023-08-31 PROBLEM — J18.9 PNEUMONIA: Status: ACTIVE | Noted: 2023-08-31

## 2023-08-31 PROBLEM — K52.9 COLITIS: Status: RESOLVED | Noted: 2023-05-20 | Resolved: 2023-08-31

## 2023-08-31 PROBLEM — J18.9 PNEUMONIA: Status: RESOLVED | Noted: 2023-08-31 | Resolved: 2023-08-31

## 2023-08-31 LAB
ALBUMIN SERPL BCP-MCNC: 3.3 G/DL (ref 3.5–5.2)
ALP SERPL-CCNC: 63 U/L (ref 55–135)
ALT SERPL W/O P-5'-P-CCNC: 12 U/L (ref 10–44)
AMPHET+METHAMPHET UR QL: NEGATIVE
ANION GAP SERPL CALC-SCNC: 8 MMOL/L (ref 8–16)
AST SERPL-CCNC: 11 U/L (ref 10–40)
BARBITURATES UR QL SCN>200 NG/ML: NEGATIVE
BASOPHILS # BLD AUTO: 0.07 K/UL (ref 0–0.2)
BASOPHILS NFR BLD: 0.9 % (ref 0–1.9)
BENZODIAZ UR QL SCN>200 NG/ML: ABNORMAL
BILIRUB SERPL-MCNC: 0.9 MG/DL (ref 0.1–1)
BUN SERPL-MCNC: 5 MG/DL (ref 6–20)
BZE UR QL SCN: NEGATIVE
CALCIUM SERPL-MCNC: 8.8 MG/DL (ref 8.7–10.5)
CANNABINOIDS UR QL SCN: ABNORMAL
CHLORIDE SERPL-SCNC: 103 MMOL/L (ref 95–110)
CO2 SERPL-SCNC: 26 MMOL/L (ref 23–29)
CREAT SERPL-MCNC: 0.8 MG/DL (ref 0.5–1.4)
CREAT UR-MCNC: 109.9 MG/DL (ref 15–325)
DIFFERENTIAL METHOD: ABNORMAL
EOSINOPHIL # BLD AUTO: 0.1 K/UL (ref 0–0.5)
EOSINOPHIL NFR BLD: 1.3 % (ref 0–8)
ERYTHROCYTE [DISTWIDTH] IN BLOOD BY AUTOMATED COUNT: 15.4 % (ref 11.5–14.5)
EST. GFR  (NO RACE VARIABLE): >60 ML/MIN/1.73 M^2
GLUCOSE SERPL-MCNC: 103 MG/DL (ref 70–110)
HCT VFR BLD AUTO: 34.3 % (ref 37–48.5)
HGB BLD-MCNC: 10.1 G/DL (ref 12–16)
IMM GRANULOCYTES # BLD AUTO: 0.05 K/UL (ref 0–0.04)
IMM GRANULOCYTES NFR BLD AUTO: 0.7 % (ref 0–0.5)
LYMPHOCYTES # BLD AUTO: 1.7 K/UL (ref 1–4.8)
LYMPHOCYTES NFR BLD: 21.6 % (ref 18–48)
MAGNESIUM SERPL-MCNC: 1.7 MG/DL (ref 1.6–2.6)
MCH RBC QN AUTO: 23.2 PG (ref 27–31)
MCHC RBC AUTO-ENTMCNC: 29.4 G/DL (ref 32–36)
MCV RBC AUTO: 79 FL (ref 82–98)
METHADONE UR QL SCN>300 NG/ML: NEGATIVE
MONOCYTES # BLD AUTO: 0.6 K/UL (ref 0.3–1)
MONOCYTES NFR BLD: 8.1 % (ref 4–15)
NEUTROPHILS # BLD AUTO: 5.2 K/UL (ref 1.8–7.7)
NEUTROPHILS NFR BLD: 67.4 % (ref 38–73)
NRBC BLD-RTO: 0 /100 WBC
OPIATES UR QL SCN: NEGATIVE
PCP UR QL SCN>25 NG/ML: NEGATIVE
PLATELET # BLD AUTO: 350 K/UL (ref 150–450)
PMV BLD AUTO: 9.1 FL (ref 9.2–12.9)
POTASSIUM SERPL-SCNC: 3.2 MMOL/L (ref 3.5–5.1)
PROT SERPL-MCNC: 7 G/DL (ref 6–8.4)
RBC # BLD AUTO: 4.35 M/UL (ref 4–5.4)
SODIUM SERPL-SCNC: 137 MMOL/L (ref 136–145)
TOXICOLOGY INFORMATION: ABNORMAL
TROPONIN I SERPL DL<=0.01 NG/ML-MCNC: <0.006 NG/ML (ref 0–0.03)
TROPONIN I SERPL DL<=0.01 NG/ML-MCNC: <0.006 NG/ML (ref 0–0.03)
WBC # BLD AUTO: 7.67 K/UL (ref 3.9–12.7)

## 2023-08-31 PROCEDURE — 99900035 HC TECH TIME PER 15 MIN (STAT)

## 2023-08-31 PROCEDURE — 93005 ELECTROCARDIOGRAM TRACING: CPT

## 2023-08-31 PROCEDURE — 80053 COMPREHEN METABOLIC PANEL: CPT | Performed by: NURSE PRACTITIONER

## 2023-08-31 PROCEDURE — 25000003 PHARM REV CODE 250: Performed by: NURSE PRACTITIONER

## 2023-08-31 PROCEDURE — 93010 ELECTROCARDIOGRAM REPORT: CPT | Mod: ,,, | Performed by: GENERAL PRACTICE

## 2023-08-31 PROCEDURE — 83735 ASSAY OF MAGNESIUM: CPT | Performed by: NURSE PRACTITIONER

## 2023-08-31 PROCEDURE — G0378 HOSPITAL OBSERVATION PER HR: HCPCS

## 2023-08-31 PROCEDURE — 63600175 PHARM REV CODE 636 W HCPCS: Performed by: NURSE PRACTITIONER

## 2023-08-31 PROCEDURE — 36415 COLL VENOUS BLD VENIPUNCTURE: CPT | Performed by: NURSE PRACTITIONER

## 2023-08-31 PROCEDURE — 93010 EKG 12-LEAD: ICD-10-PCS | Mod: ,,, | Performed by: GENERAL PRACTICE

## 2023-08-31 PROCEDURE — 25000242 PHARM REV CODE 250 ALT 637 W/ HCPCS: Performed by: NURSE PRACTITIONER

## 2023-08-31 PROCEDURE — 96372 THER/PROPH/DIAG INJ SC/IM: CPT | Performed by: NURSE PRACTITIONER

## 2023-08-31 PROCEDURE — 96375 TX/PRO/DX INJ NEW DRUG ADDON: CPT

## 2023-08-31 PROCEDURE — 84484 ASSAY OF TROPONIN QUANT: CPT | Performed by: NURSE PRACTITIONER

## 2023-08-31 PROCEDURE — 85025 COMPLETE CBC W/AUTO DIFF WBC: CPT | Performed by: NURSE PRACTITIONER

## 2023-08-31 RX ORDER — IBUPROFEN 600 MG/1
600 TABLET ORAL EVERY 6 HOURS PRN
Status: DISCONTINUED | OUTPATIENT
Start: 2023-08-31 | End: 2023-08-31 | Stop reason: HOSPADM

## 2023-08-31 RX ADMIN — PROCHLORPERAZINE EDISYLATE 5 MG: 5 INJECTION INTRAMUSCULAR; INTRAVENOUS at 12:08

## 2023-08-31 RX ADMIN — ALPRAZOLAM 2 MG: 1 TABLET ORAL at 12:08

## 2023-08-31 RX ADMIN — PANTOPRAZOLE SODIUM 20 MG: 20 TABLET, DELAYED RELEASE ORAL at 08:08

## 2023-08-31 RX ADMIN — OXYCODONE HYDROCHLORIDE AND ACETAMINOPHEN 1 TABLET: 5; 325 TABLET ORAL at 12:08

## 2023-08-31 RX ADMIN — OXYCODONE HYDROCHLORIDE AND ACETAMINOPHEN 1 TABLET: 5; 325 TABLET ORAL at 03:08

## 2023-08-31 RX ADMIN — OXYCODONE HYDROCHLORIDE AND ACETAMINOPHEN 1 TABLET: 5; 325 TABLET ORAL at 08:08

## 2023-08-31 RX ADMIN — ENOXAPARIN SODIUM 40 MG: 40 INJECTION SUBCUTANEOUS at 08:08

## 2023-08-31 NOTE — TELEPHONE ENCOUNTER
----- Message from Isela Garvin MA sent at 8/31/2023  9:11 AM CDT -----  Regarding: sameday appt  Type: Sameday appt   Caller is requesting a same day appointment.  Caller declined first available appointment listed below.      Name of Caller:  pt  When is the first available appointment?  NA  Symptoms:  chest pains, admitted to hospital for fluid on lungs, follow up from hospital  Best Call Back Number:  [phone]    Additional Information:   please advise Saumya Pollard is calling regarding pt needing to bee seen urgently today please reach out today please reach out to [facility] for an appt  please reach out  [phone]

## 2023-08-31 NOTE — NURSING
Notified of pt stating her was having a little difficulty breathing and that her chest was still hurting. Vitals taken, VSS, /73, HR 98, O2 97% RA. NP Valerie notified and STAT EKG ordered.

## 2023-08-31 NOTE — ASSESSMENT & PLAN NOTE
Admit to med/tele  Chest pain is atypical and possibly pleuritic   Will trend troponins - first is WNL  Prophylactic lovenox  Echo in AM given concern for possible pulm edema on xray, BNP can be falsely low in pt's with obesity

## 2023-08-31 NOTE — TELEPHONE ENCOUNTER
Returned patients call to advise we do not have any new medicaid appointments available. Our panel is full. Patient will need to call medicaid escalation line to see which providers are accepting new patients.   Medicaid escalation number [phone]  [facility] in [place].     Please go the ED if you are experiencing chest pain. No answer , unable to leave voicemail.

## 2023-08-31 NOTE — ASSESSMENT & PLAN NOTE
Given pt reported fever, although odd such a high fever spontaneously resolved without antipyretics, will cover for CAP with rocephin and azithromycin  Blood cultures drawn in ED  Check procal   Get sputum culture  duonebs PRN

## 2023-08-31 NOTE — NURSING
In to remove IV and tele. Pt still angry. States that Dr is gloria she isnt feeling better because she would punch him in the face. Demanding a cab or to be wheeled to .

## 2023-08-31 NOTE — NURSING
"Pt refusing Wellbutrin. States "I dont take this and never have, why this lady doing this and ordering what she wants. I talked to pharmacy and told them what i take." Valerie NAILS notified.   "

## 2023-08-31 NOTE — DISCHARGE SUMMARY
"[facility]  Discharge Summary      Patient Name: Manisha Bishop  MRN: [id number]  LINDSAY: [id number]  Patient Class: OP- Observation  Admission Date: 8/30/2023  Hospital Length of Stay: 0 days  Discharge Date and Time: No discharge date for patient encounter.  Attending Physician: Junior Tello MD   Discharging Provider: Junior Tello MD  Primary Care Provider: Tamy, Primary Doctor    Primary Care Team: Networked reference to record PCT     HPI:   Ms. Bishop is a 27 year old female with a history of GERD, obesity BMI 48, Bipolar disorder/anxiety, and left ovarian cyst who presents today with complaints of right sided chest pain and right flank pain. It is severe. It is associated with nonproductive cough, reported fever at home 103.6, lightheadedness, and SOB. She denies N/V/D, palpitations or LOC. Symptoms began over several days and was isolated to the right of her chest, but she felt it move more to the left. It is nonexertional and partially reproducible and worse with deep inhalation and cough. She states she checked her temp at home about an hour prior to arrival and it was 103.6. However, on arrival to the ED temp was 98.6 and she DENIES taking any antipyretics. Covid and flu are negative. She has no known sick exposures. She has no reported preceding URI symptoms. She denies vomiting. WBC WNL. She is tachycardic to 116 on arrival, but improved with rest, and she is satting 99% on room air. EKG: Sinus tachycardia 108, nonspecific T wave abnormality in lateral leads. CXR: "New bibasilar infiltrates.  This along with cardiac silhouette enlargement raise the question of pulmonary edema.  Pneumonia is difficult to exclude radiographically" BNP is WNL, Troponin is WNL, D-dimer WNL, Lactate is WNL. Pt continued to complain of right sided chest pain radiating to the left and hospital medicine is consulted for admission for further work up and treatment.     On my exam, she " "is requesting something for pain. I stated I continued her home tramadol, to which she replied she isn't taking this and "it doesn't work". She stated "they gave me something that started with an 'M' and it helped some, it was through the IV" there was a pause and she stated, "I think morphine?" I explained there was no indication for morphine at this time and explained the risk of diminished respiratory drive. I offered her norco. She then requested "lortab." To which I responded, this has the same active ingredients. She stated "well the norco didn't work". She then requested "percocet" as this has helped in the past. She requested something as needed for nausea. I explained that zofran would be available as needed. She stated "that one doesn't work. They've given me something different in the past." I asked, reglan or compazine? She stated, "no, it started with a P I think." Then stated, "I think the name for it is promethazine." I told her her that I would not be giving her promethazine in combination with her current medications and offered compazine.     Per discharge summary 5/22 by Dr. Smith, "Of note, the patient was verbally abusive and aggressive with staff during her admission. She demanded pain medications as outlined by the nursing documentation. She displayed drug seeking behavior. I reviewed her  records and she has filled multiple prescriptions for xanax this month alone totaling 2-3 month supply. I am concerned about prescribing narcotics in this instance. I do feel we should make reasonable attempt to treat her pain, and will prescribe a short course of tramadol which should be adequate for her degree of medical illness."    Per ED note 7/11/23 by Dr. Cai- "While being discharge the patient did become belligerent with the nursing staff.  She was demanding other narcotic prescriptions.  I suspect that the patient is exhibiting malingering and drug-seeking behavior, which could be the " "etiology of the patient's symptoms overall.  The patient is still stable for discharge to home"        * No surgery found *      Hospital Course:   Manisha Bishop is a 27 year old female with a past medical history of severe obesity, bipolar disorder, and GERD who presented with subjective fever. There was initial concern for a bilateral bacterial pneumonia on CXR; however, the patient's procalcitonin was unremarkable. Troponin, EKG and D-dimer levels were also unremarkable (patient also complained of chest pain). During her course, the patient became impatient regarding the use of oxycodone for pain control. There is concern for developing opiate dependence. She was discharged 8/31/2023.        Goals of Care Treatment Preferences:  Code Status: Full Code      Consults:     Endocrine  Severe obesity (BMI >= 40)  Body mass index is 48.13 kg/m². Morbid obesity complicates all aspects of disease management from diagnostic modalities to treatment.       GI  GERD (gastroesophageal reflux disease)  -Continue PPI      Other  Bipolar 1 disorder  -Continue home medications        Final Active Diagnoses:    Diagnosis Date Noted POA    GERD (gastroesophageal reflux disease) [K21.9]  Yes    Severe obesity (BMI >= 40) [E66.01] 01/13/2023 Yes    Bipolar 1 disorder [F31.9] 06/16/2016 Yes      Problems Resolved During this Admission:    Diagnosis Date Noted Date Resolved POA    PRINCIPAL PROBLEM:  Chest pain [R07.9] 06/16/2016 08/31/2023 Yes    Pneumonia vs. pulmonary edema [J18.9] 08/31/2023 08/31/2023 Yes       Discharged Condition: stable    Disposition: Home or Self Care    Follow Up:    Patient Instructions:      Diet Adult Regular     Notify your health care provider if you experience any of the following:  increased confusion or weakness     Notify your health care provider if you experience any of the following:  persistent dizziness, light-headedness, or visual disturbances     Notify your health care provider if you " experience any of the following:  severe persistent headache     Notify your health care provider if you experience any of the following:  difficulty breathing or increased cough     Notify your health care provider if you experience any of the following:  severe uncontrolled pain     Notify your health care provider if you experience any of the following:  persistent nausea and vomiting or diarrhea     Notify your health care provider if you experience any of the following:  temperature >100.4     Activity as tolerated       Significant Diagnostic Studies: Labs: All labs within the past 24 hours have been reviewed    Pending Diagnostic Studies:     Procedure Component Value Units Date/Time    EKG 12-lead [[id number]]     Order Status: Sent Lab Status: No result     Echo [[id number]]     Order Status: Sent Lab Status: No result          Medications:  Reconciled Home Medications:      Medication List      STOP taking these medications    ALPRAZolam 2 MG Tab  Commonly known as: XANAX     dextroamphetamine-amphetamine 30 mg Tab     docusate sodium 100 MG capsule  Commonly known as: COLACE     famotidine 20 MG tablet  Commonly known as: PEPCID     hyoscyamine 0.125 mg Subl  Commonly known as: LEVSIN/SL     ibuprofen 800 MG tablet  Commonly known as: ADVIL,MOTRIN     ondansetron 4 MG Tbdl  Commonly known as: ZOFRAN-ODT     pantoprazole 20 MG tablet  Commonly known as: PROTONIX     senna-docusate 8.6-50 mg 8.6-50 mg per tablet  Commonly known as: PERICOLACE     traMADoL 50 mg tablet  Commonly known as: ULTRAM            Indwelling Lines/Drains at time of discharge:   Lines/Drains/Airways     None                 Time spent on the discharge of patient: 31 minutes         Junior Tello MD  Department of Hospital Medicine  [facility]/Surg

## 2023-08-31 NOTE — ASSESSMENT & PLAN NOTE
Body mass index is 48.06 kg/m². Morbid obesity complicates all aspects of disease management from diagnostic modalities to treatment. Weight loss encouraged and health benefits explained to patient.

## 2023-08-31 NOTE — PLAN OF CARE
POC reviewed with pt, verbalized understanding. Pt AAO x 4, able to make needs known. Meds given per MAR. Continuous cardiac monitoring in place as ordered. Safety maintained with side rails up x3, bed locked and in lowest positioned, bed alarm set, slip resistant socks in use, call light in reach. Pt educated to call for assistance with ambulation, verbalized understanding. Pt remains free of falls. No further needs expressed at this time. Will continue to monitor.     Problem: Violence Risk or Actual  Goal: Anger and Impulse Control  Outcome: Ongoing, Progressing     Problem: Adult Inpatient Plan of Care  Goal: Plan of Care Review  Outcome: Ongoing, Progressing  Goal: Patient-Specific Goal (Individualized)  Outcome: Ongoing, Progressing  Goal: Absence of Hospital-Acquired Illness or Injury  Outcome: Ongoing, Progressing  Goal: Optimal Comfort and Wellbeing  Outcome: Ongoing, Progressing  Goal: Readiness for Transition of Care  Outcome: Ongoing, Progressing     Problem: Bariatric Environmental Safety  Goal: Safety Maintained with Care  Outcome: Ongoing, Progressing     Problem: Fluid Imbalance (Pneumonia)  Goal: Fluid Balance  Outcome: Ongoing, Progressing     Problem: Infection (Pneumonia)  Goal: Resolution of Infection Signs and Symptoms  Outcome: Ongoing, Progressing     Problem: Respiratory Compromise (Pneumonia)  Goal: Effective Oxygenation and Ventilation  Outcome: Ongoing, Progressing

## 2023-08-31 NOTE — ED PROVIDER NOTES
Encounter Date: 2023       History     Chief Complaint   Patient presents with    Chest Pain     CP since 4AM, right side of chest, right flank pain.  Light headed.       Patient is a 27 y.o. female who presents to the ED 2023 with a chief complaint of cough, 103 fever, and chest pain that started in the night last night.  She denies any nausea, vomiting.  Does report some shortness of breath.  She is a history of anxiety, bipolar disorder, GERD, and obesity.  Past surgical history noted below.  She denies history Of blood clots and does not take any hormones.  She denies recent surgeries or lower extremity swelling.               Review of patient's allergies indicates:   Allergen Reactions    Iodine and iodide containing products Hives    Toradol [ketorolac] Hives    Vitamin e (d-alpha tocopherol)      Hives (skin)^Black eyed peas and oatmeal causede hives     Past Medical History:   Diagnosis Date    Anxiety     Bipolar 1 disorder     COVID-19 2021    GERD (gastroesophageal reflux disease)     Obese      Past Surgical History:   Procedure Laterality Date     SECTION  2022    LAPAROSCOPIC APPENDECTOMY N/A 2022    Procedure: APPENDECTOMY, LAPAROSCOPIC;  Surgeon: Yuniel Goldman MD;  Location: [facility] OR;  Service: General;  Laterality: N/A;    LAPAROSCOPIC CHOLECYSTECTOMY N/A 2023    Procedure: CHOLECYSTECTOMY, LAPAROSCOPIC;  Surgeon: Yuniel Goldman MD;  Location: [facility] OR;  Service: General;  Laterality: N/A;    LEG SURGERY      Broke her left lower leg.     Family History   Problem Relation Age of Onset    No Known Problems Mother     No Known Problems Father     Breast cancer Neg Hx     Colon cancer Neg Hx     Ovarian cancer Neg Hx      Social History     Tobacco Use    Smoking status: Never    Smokeless tobacco: Never   Substance Use Topics    Alcohol use: Yes     Comment: occasionally    Drug use: No     Review of Systems   Constitutional:  Positive for fever.  Negative for chills.   HENT:  Negative for sore throat.    Respiratory:  Positive for cough and chest tightness. Negative for choking and shortness of breath.    Cardiovascular:  Positive for chest pain. Negative for leg swelling.   Gastrointestinal:  Negative for abdominal pain.   Genitourinary:  Negative for dysuria.   Musculoskeletal:  Negative for arthralgias and myalgias.   Skin:  Negative for rash and wound.   Neurological:  Negative for syncope.   Hematological:  Does not bruise/bleed easily.       Physical Exam     Initial Vitals [08/30/23 1750]   BP Pulse Resp Temp SpO2   127/86 (!) 116 17 98.6 °F (37 °C) 97 %      MAP       --         Physical Exam    Nursing note and vitals reviewed.  Constitutional: Vital signs are normal. She appears well-developed and well-nourished. She is Obese .   HENT:   Head: Normocephalic and atraumatic.   Eyes: Pupils are equal, round, and reactive to light.   Neck: Neck supple.   Cardiovascular:  Regular rhythm, normal heart sounds and intact distal pulses.   Tachycardia present.   Exam reveals no gallop and no friction rub.       No murmur heard.  Pulmonary/Chest: She has decreased breath sounds in the right lower field and the left lower field. She has no wheezes. She has rhonchi in the right middle field and the left middle field. She has no rales.   Abdominal: Abdomen is soft. Bowel sounds are normal. There is no abdominal tenderness.   Musculoskeletal:      Cervical back: Neck supple.     Neurological: She is alert and oriented to person, place, and time. She has normal strength.   Skin: Skin is warm, dry and intact.   Psychiatric: She has a normal mood and affect. Her speech is normal and behavior is normal.         ED Course   Procedures  Labs Reviewed   CBC W/ AUTO DIFFERENTIAL - Abnormal; Notable for the following components:       Result Value    Hemoglobin 11.8 (*)     MCV 78 (*)     MCH 24.3 (*)     MCHC 31.1 (*)     RDW 15.5 (*)     MPV 9.1 (*)     Gran # (ANC) 7.9  (*)     Gran % 76.0 (*)     Lymph % 15.6 (*)     All other components within normal limits   COMPREHENSIVE METABOLIC PANEL - Abnormal; Notable for the following components:    Sodium 134 (*)     CO2 17 (*)     BUN 5 (*)     All other components within normal limits   URINALYSIS, REFLEX TO URINE CULTURE - Abnormal; Notable for the following components:    Ketones, UA Trace (*)     All other components within normal limits    Narrative:     Specimen Source->Urine   INFLUENZA A & B BY MOLECULAR   CULTURE, BLOOD   CULTURE, BLOOD   TROPONIN I   B-TYPE NATRIURETIC PEPTIDE   D DIMER, QUANTITATIVE   SARS-COV-2 RNA AMPLIFICATION, QUAL   DRUG SCREEN PANEL, URINE EMERGENCY   POCT URINE PREGNANCY   ISTAT LACTATE   POCT TROPONIN     EKG Readings: (Independently Interpreted)   Initial Reading: No STEMI. Rhythm: Sinus Tachycardia. Heart Rate: 108. Ectopy: No Ectopy. Conduction: Normal. ST Segments: Normal ST Segments. Axis: Normal. Other Impression: T-wave inversions in lead 3, AVF, and V4 through V6   Reviewed by Dr. Fajardo and myself.        Imaging Results              X-Ray Chest AP Portable (Final result)  Result time 08/30/23 18:52:57      Final result by Uday Mojica MD (08/30/23 18:52:57)                   Impression:      New bibasilar infiltrates.  This along with cardiac silhouette enlargement raise the question of pulmonary edema.  Pneumonia is difficult to exclude radiographically.      Electronically signed by: Uday Mojica  Date:    08/30/2023  Time:    18:52               Narrative:    EXAMINATION:  XR CHEST AP PORTABLE    CLINICAL HISTORY:  Chest Pain;    TECHNIQUE:  Single frontal view of the chest was performed.    COMPARISON:  None    FINDINGS:  New bibasilar airspace opacities are present.  The cardiac silhouette remains enlarged with left ventricular configuration.                                       Medications   sodium chloride 0.9% bolus 500 mL 500 mL (0 mLs Intravenous Stopped 8/30/23  2103)   cefTRIAXone (ROCEPHIN) 1 g in dextrose 5 % in water (D5W) 100 mL IVPB (MB+) (0 g Intravenous Stopped 8/30/23 2035)   azithromycin tablet 500 mg (500 mg Oral Given 8/30/23 1958)   morphine injection 4 mg (4 mg Intravenous Given 8/30/23 1954)   acetaminophen tablet 1,000 mg (1,000 mg Oral Given 8/30/23 1955)     Medical Decision Making  Amount and/or Complexity of Data Reviewed  Labs: ordered.    Risk  OTC drugs.  Prescription drug management.  Decision regarding hospitalization.         APC / Resident Notes:   Patient is a 27 y.o. female who presents to the ED 08/30/2023 who underwent emergent evaluation for chest pain.  EKG without evidence of ischemia.  Troponin normal.  I do not think ACS.  Chest x-ray consistent with bibasilar infiltrates.  In the setting reported fever and tachycardia patient appears to have a pneumonia.  BNP normal.  She is no history of heart failure and no lower extremity swelling I do not think congestive heart failure.  D-dimer normal.  I do not think PE.  No leukocytosis.  Tachycardia or improves with antipyretics and IV fluids.  Blood pressure stable.  She does not appear to be in septic shock.  IV antibiotics are initiated in the emergency department.  Blood cultures are pending.  Patient is not hypoxic.  She is not wheezing.  She is in no acute respiratory distress.  No signs of end-organ dysfunction at this time.  Case discussed with hospital medicine team is accepting of this admission for community-acquired pneumonia with persistent tachycardia. Case also discussed with Dr. Fajardo who also evaluated pt and is agreeable to plan of care.             Attending Attestation:   Physician Attestation Statement for Resident:  As the supervising MD  .  -: 27-year-old female presents with infectious symptoms and chest pain.   -: Patient with no respiratory distress on my evaluation, no oxygen requirement   -: Workup initiated concern for infectious etiology.  Chest x-ray with  bilateral infiltrates.  Patient evaluated by nurse practitioner and antibiotics ordered and admitted to hospitalist team for further management evaluation.  Patient updated and agrees with plan for admission.  No significant evidence of any end-organ damage.                              Medical Decision Making:   Differential Diagnosis:   Pneumonia  Sepsis  PE      Clinical Impression:   Final diagnoses:  [R07.9] Chest pain  [J18.9] Pneumonia of both lower lobes due to infectious organism (Primary)        ED Disposition Condition    Observation                 Brigette Urrutia NP  08/30/23 2124       Toby Fajardo Jr.,   08/30/23 2208

## 2023-08-31 NOTE — PHARMACY MED REC
"              .        Admission Medication History     The home medication history was taken by Tierra Arias.    You may go to "Admission" then "Reconcile Home Medications" tabs to review and/or act upon these items.     The home medication list has been updated by the Pharmacy department.   Please read ALL comments highlighted in yellow.   Please address this information as you see fit.    Feel free to contact us if you have any questions or require assistance.        Medications listed below were obtained from: Patient/family and Analytic software- [employer]  No current facility-administered medications on file prior to encounter.     Current Outpatient Medications on File Prior to Encounter   Medication Sig Dispense Refill    ALPRAZolam (XANAX) 2 MG Tab Take 2 mg by mouth nightly as needed.      buPROPion (WELLBUTRIN XL) 300 MG 24 hr tablet Take 300 mg by mouth every morning.      dextroamphetamine-amphetamine 30 mg Tab Take 1 tablet by mouth 4 (four) times daily.      docusate sodium (COLACE) 100 MG capsule Take 1 capsule (100 mg total) by mouth 2 (two) times daily as needed for Constipation. (Patient not taking: Reported on 8/30/2023) 60 capsule 0    famotidine (PEPCID) 20 MG tablet Take 1 tablet (20 mg total) by mouth 2 (two) times daily as needed for Heartburn. (Patient not taking: Reported on 8/30/2023) 60 tablet 0    hyoscyamine (LEVSIN/SL) 0.125 mg Subl Place 1 tablet (0.125 mg total) under the tongue every 4 (four) hours as needed (abdominal cramping). (Patient not taking: Reported on 8/30/2023) 28 tablet 0    ibuprofen (ADVIL,MOTRIN) 800 MG tablet Take 1 tablet (800 mg total) by mouth every 6 (six) hours as needed for Pain. (Patient not taking: Reported on 8/30/2023) 20 tablet 0    ondansetron (ZOFRAN-ODT) 4 MG TbDL Take 1 tablet (4 mg total) by mouth every 6 (six) hours as needed. (Patient not taking: Reported on 8/30/2023) 20 tablet 0    pantoprazole (PROTONIX) 20 MG tablet Take 1 tablet (20 mg " total) by mouth once daily. (Patient not taking: Reported on 8/30/2023) 90 tablet 0    senna-docusate 8.6-50 mg (PERICOLACE) 8.6-50 mg per tablet Take 1 tablet by mouth 2 (two) times daily as needed for Constipation. (Patient not taking: Reported on 8/30/2023) 20 tablet 0    traMADoL (ULTRAM) 50 mg tablet Take 1 tablet (50 mg total) by mouth every 6 (six) hours as needed for Pain. (Patient not taking: Reported on 8/30/2023) 4 tablet 0    [DISCONTINUED] albuterol (PROVENTIL/VENTOLIN HFA) 90 mcg/actuation inhaler Inhale 1-2 puffs into the lungs every 6 (six) hours as needed for Wheezing or Shortness of Breath. Rescue (Patient not taking: Reported on 7/19/2021) 8 g 0    [DISCONTINUED] OXcarbazepine (TRILEPTAL) 300 MG Tab Take 300 mg by mouth 2 (two) times daily.         Potential issues to be addressed PRIOR TO DISCHARGE  Patient reported not taking the following medications: (Xanax, Tramadol, Senna, Pantoprazole, Zofran, Ibuprofen, Wellbutrin, Adderall, Colace, Pepcid & Hyoscyamine ). These medications remain on the home medication list. Please address accordingly.     Tierra GUZMAN [id number]

## 2023-08-31 NOTE — PROGRESS NOTES
The enoxaparin dose has been adjusted for Manisha Bishop [id number] according to the pharmacy practice protocol.      Based on the patient's Estimated Creatinine Clearance: 157.4 mL/min (based on SCr of 0.7 mg/dL)., Body mass index is 40.72 kg/m²., and weight= 117.9 kg (260 lb), the enoxaparin dose has been adjusted 40 mg every 12 hours for CrCl =/>30 and BMI=/>40.    Thank you,  Vaughn Caban

## 2023-08-31 NOTE — H&P
"Count includes the [facility] Medicine  History & Physical    Patient Name: Manisha Bishop  MRN: [id number]  Patient Class: OP- Observation  Admission Date: 8/30/2023  Attending Physician: Dr. Tello  Primary Care Provider: Tamy, Primary Doctor         Patient information was obtained from patient, past medical records and ER records.     Subjective:     Principal Problem:Chest pain    Chief Complaint:   Chief Complaint   Patient presents with    Chest Pain     CP since 4AM, right side of chest, right flank pain.  Light headed.          HPI: Ms. Bishop is a 27 year old female with a history of GERD, obesity BMI 48, Bipolar disorder/anxiety, and left ovarian cyst who presents today with complaints of right sided chest pain and right flank pain. It is severe. It is associated with nonproductive cough, reported fever at home 103.6, lightheadedness, and SOB. She denies N/V/D, palpitations or LOC. Symptoms began over several days and was isolated to the right of her chest, but she felt it move more to the left. It is nonexertional and partially reproducible and worse with deep inhalation and cough. She states she checked her temp at home about an hour prior to arrival and it was 103.6. However, on arrival to the ED temp was 98.6 and she DENIES taking any antipyretics. Covid and flu are negative. She has no known sick exposures. She has no reported preceding URI symptoms. She denies vomiting. WBC WNL. She is tachycardic to 116 on arrival, but improved with rest, and she is satting 99% on room air. EKG: Sinus tachycardia 108, nonspecific T wave abnormality in lateral leads. CXR: "New bibasilar infiltrates.  This along with cardiac silhouette enlargement raise the question of pulmonary edema.  Pneumonia is difficult to exclude radiographically" BNP is WNL, Troponin is WNL, D-dimer WNL, Lactate is WNL. Pt continued to complain of right sided chest pain radiating to the left and hospital medicine is consulted " "for admission for further work up and treatment.     On my exam, she is requesting something for pain. I stated I continued her home tramadol, to which she replied she isn't taking this and "it doesn't work". She stated "they gave me something that started with an 'M' and it helped some, it was through the IV" there was a pause and she stated, "I think morphine?" I explained there was no indication for morphine at this time and explained the risk of diminished respiratory drive. I offered her norco. She then requested "lortab." To which I responded, this has the same active ingredients. She stated "well the norco didn't work". She then requested "percocet" as this has helped in the past. She requested something as needed for nausea. I explained that zofran would be available as needed. She stated "that one doesn't work. They've given me something different in the past." I asked, reglan or compazine? She stated, "no, it started with a P I think." Then stated, "I think the name for it is promethazine." I told her her that I would not be giving her promethazine in combination with her current medications and offered compazine.     Per discharge summary 5/22 by Dr. Smith, "Of note, the patient was verbally abusive and aggressive with staff during her admission. She demanded pain medications as outlined by the nursing documentation. She displayed drug seeking behavior. I reviewed her  records and she has filled multiple prescriptions for xanax this month alone totaling 2-3 month supply. I am concerned about prescribing narcotics in this instance. I do feel we should make reasonable attempt to treat her pain, and will prescribe a short course of tramadol which should be adequate for her degree of medical illness."    Per ED note 7/11/23 by Dr. Cai- "While being discharge the patient did become belligerent with the nursing staff.  She was demanding other narcotic prescriptions.  I suspect that the patient is " "exhibiting malingering and drug-seeking behavior, which could be the etiology of the patient's symptoms overall.  The patient is still stable for discharge to home"        Past Medical History:   Diagnosis Date    Anxiety     Bipolar 1 disorder     COVID-19 2021    GERD (gastroesophageal reflux disease)     Obese        Past Surgical History:   Procedure Laterality Date     SECTION  2022    LAPAROSCOPIC APPENDECTOMY N/A 2022    Procedure: APPENDECTOMY, LAPAROSCOPIC;  Surgeon: Yuniel Goldman MD;  Location: [facility] OR;  Service: General;  Laterality: N/A;    LAPAROSCOPIC CHOLECYSTECTOMY N/A 2023    Procedure: CHOLECYSTECTOMY, LAPAROSCOPIC;  Surgeon: Yuniel Goldman MD;  Location: [facility] OR;  Service: General;  Laterality: N/A;    LEG SURGERY      Broke her left lower leg.       Review of patient's allergies indicates:   Allergen Reactions    Iodine and iodide containing products Hives    Toradol [ketorolac] Hives    Vitamin e (d-alpha tocopherol)      Hives (skin)^Black eyed peas and oatmeal causede hives       No current facility-administered medications on file prior to encounter.     Current Outpatient Medications on File Prior to Encounter   Medication Sig    ALPRAZolam (XANAX) 2 MG Tab Take 2 mg by mouth nightly as needed.    buPROPion (WELLBUTRIN XL) 300 MG 24 hr tablet Take 300 mg by mouth every morning.    dextroamphetamine-amphetamine 30 mg Tab Take 1 tablet by mouth 4 (four) times daily.    docusate sodium (COLACE) 100 MG capsule Take 1 capsule (100 mg total) by mouth 2 (two) times daily as needed for Constipation. (Patient not taking: Reported on 2023)    famotidine (PEPCID) 20 MG tablet Take 1 tablet (20 mg total) by mouth 2 (two) times daily as needed for Heartburn. (Patient not taking: Reported on 2023)    hyoscyamine (LEVSIN/SL) 0.125 mg Subl Place 1 tablet (0.125 mg total) under the tongue every 4 (four) hours as needed (abdominal cramping). " (Patient not taking: Reported on 8/30/2023)    ibuprofen (ADVIL,MOTRIN) 800 MG tablet Take 1 tablet (800 mg total) by mouth every 6 (six) hours as needed for Pain. (Patient not taking: Reported on 8/30/2023)    ondansetron (ZOFRAN-ODT) 4 MG TbDL Take 1 tablet (4 mg total) by mouth every 6 (six) hours as needed. (Patient not taking: Reported on 8/30/2023)    pantoprazole (PROTONIX) 20 MG tablet Take 1 tablet (20 mg total) by mouth once daily. (Patient not taking: Reported on 8/30/2023)    senna-docusate 8.6-50 mg (PERICOLACE) 8.6-50 mg per tablet Take 1 tablet by mouth 2 (two) times daily as needed for Constipation. (Patient not taking: Reported on 8/30/2023)    traMADoL (ULTRAM) 50 mg tablet Take 1 tablet (50 mg total) by mouth every 6 (six) hours as needed for Pain. (Patient not taking: Reported on 8/30/2023)    [DISCONTINUED] albuterol (PROVENTIL/VENTOLIN HFA) 90 mcg/actuation inhaler Inhale 1-2 puffs into the lungs every 6 (six) hours as needed for Wheezing or Shortness of Breath. Rescue (Patient not taking: Reported on 7/19/2021)     Family History       Problem Relation (Age of Onset)    No Known Problems Mother, Father          Tobacco Use    Smoking status: Never    Smokeless tobacco: Never   Substance and Sexual Activity    Alcohol use: Yes     Comment: occasionally    Drug use: No    Sexual activity: Yes     Partners: Male     Birth control/protection: None     Review of Systems   Constitutional:  Negative for activity change, appetite change, chills, diaphoresis, fatigue, fever and unexpected weight change.   HENT:  Negative for congestion, ear pain, facial swelling, hearing loss, sore throat and trouble swallowing.    Eyes:  Negative for pain and discharge.   Respiratory:  Positive for cough, chest tightness and shortness of breath. Negative for wheezing.    Cardiovascular:  Positive for chest pain. Negative for palpitations and leg swelling.   Gastrointestinal:  Negative for abdominal pain,  blood in stool, diarrhea, nausea and vomiting.   Endocrine: Negative for polydipsia, polyphagia and polyuria.   Genitourinary:  Positive for flank pain. Negative for difficulty urinating, dysuria, frequency and urgency.   Musculoskeletal:  Negative for arthralgias, back pain, joint swelling, neck pain and neck stiffness.   Skin:  Negative for rash and wound.   Allergic/Immunologic: Negative for environmental allergies and immunocompromised state.   Neurological:  Positive for light-headedness. Negative for dizziness, seizures, syncope, speech difficulty, weakness, numbness and headaches.   Hematological:  Negative for adenopathy.   Psychiatric/Behavioral:  Negative for sleep disturbance and suicidal ideas. The patient is not nervous/anxious.    All other systems reviewed and are negative.    Objective:     Vital Signs (Most Recent):  Temp: 98.6 °F (37 °C) (08/30/23 2314)  Pulse: 96 (08/30/23 2314)  Resp: 16 (08/30/23 2030)  BP: 126/76 (08/30/23 2314)  SpO2: 96 % (08/30/23 2314) Vital Signs (24h Range):  Temp:  [98.5 °F (36.9 °C)-99 °F (37.2 °C)] 98.6 °F (37 °C)  Pulse:  [] 96  Resp:  [14-18] 16  SpO2:  [96 %-100 %] 96 %  BP: (126-156)/(60-89) 126/76     Weight: (!) 139.2 kg (306 lb 14.1 oz)  Body mass index is 48.06 kg/m².     Physical Exam  Vitals and nursing note reviewed.   Constitutional:       Appearance: She is obese.   HENT:      Head: Normocephalic and atraumatic.      Nose: Nose normal.      Mouth/Throat:      Mouth: Mucous membranes are moist.      Pharynx: Oropharynx is clear.   Eyes:      Extraocular Movements: Extraocular movements intact.      Pupils: Pupils are equal, round, and reactive to light.   Cardiovascular:      Rate and Rhythm: Normal rate and regular rhythm.      Pulses: Normal pulses.      Heart sounds: Normal heart sounds.   Pulmonary:      Effort: Pulmonary effort is normal.      Comments: Diminished in bases which may be d/t body habitus  Abdominal:      General: Bowel sounds are  normal.      Palpations: Abdomen is soft.   Musculoskeletal:         General: Normal range of motion.      Cervical back: Normal range of motion and neck supple.   Skin:     General: Skin is warm and dry.      Capillary Refill: Capillary refill takes less than 2 seconds.   Neurological:      General: No focal deficit present.      Mental Status: She is alert and oriented to person, place, and time.   Psychiatric:      Comments: Manipulative behavior              CRANIAL NERVES     CN III, IV, VI   Pupils are equal, round, and reactive to light.       Significant Labs: All pertinent labs within the past 24 hours have been reviewed.  CBC:   Recent Labs   Lab 08/30/23 1912   WBC 10.33   HGB 11.8*   HCT 37.9        CMP:   Recent Labs   Lab 08/30/23 1846   *   K 4.1      CO2 17*   GLU 84   BUN 5*   CREATININE 0.7   CALCIUM 9.1   PROT 7.8   ALBUMIN 3.7   BILITOT 0.8   ALKPHOS 74   AST 16   ALT 16   ANIONGAP 13     Cardiac Markers:   Recent Labs   Lab 08/30/23 1912   BNP 10     Lactic Acid:   Recent Labs   Lab 08/30/23  2253   LACTATE 0.5     Troponin:   Recent Labs   Lab 08/30/23 1846   TROPONINI <0.006     Urine Studies:   Recent Labs   Lab 08/30/23  1920   COLORU Yellow   APPEARANCEUA Clear   PHUR 7.0   SPECGRAV 1.010   PROTEINUA Negative   GLUCUA Negative   KETONESU Trace*   BILIRUBINUA Negative   OCCULTUA Negative   NITRITE Negative   UROBILINOGEN Negative   LEUKOCYTESUR Negative       Significant Imaging: I have reviewed all pertinent imaging results/findings within the past 24 hours.  EKG: I have reviewed all pertinent results/findings within the past 24 hours and my personal findings are: Sinus tachycardia rate 108, nonspecific T wave abnormality in lateral leads  X-Ray Chest AP Portable    Result Date: 8/30/2023  EXAMINATION: XR CHEST AP PORTABLE CLINICAL HISTORY: Chest Pain; TECHNIQUE: Single frontal view of the chest was performed. COMPARISON: None FINDINGS: New bibasilar airspace  opacities are present.  The cardiac silhouette remains enlarged with left ventricular configuration.     New bibasilar infiltrates.  This along with cardiac silhouette enlargement raise the question of pulmonary edema.  Pneumonia is difficult to exclude radiographically. Electronically signed by: Uday Mojica Date:    08/30/2023 Time:    18:52       Assessment/Plan:     * Chest pain  Admit to med/tele  Chest pain is atypical and possibly pleuritic   Will trend troponins - first is WNL  Prophylactic lovenox  Echo in AM given concern for possible pulm edema on xray, BNP can be falsely low in pt's with obesity      Pneumonia vs. pulmonary edema  Given pt reported fever, although odd such a high fever spontaneously resolved without antipyretics, will cover for CAP with rocephin and azithromycin  Blood cultures drawn in ED  Check procal   Get sputum culture  duonebs PRN        GERD (gastroesophageal reflux disease)  Continue PPI      Severe obesity (BMI >= 40)  Body mass index is 48.06 kg/m². Morbid obesity complicates all aspects of disease management from diagnostic modalities to treatment. Weight loss encouraged and health benefits explained to patient.           VTE Risk Mitigation (From admission, onward)         Ordered     enoxaparin injection 40 mg  Every 12 hours         08/30/23 2210     IP VTE HIGH RISK PATIENT  Once         08/30/23 2210     Place sequential compression device  Until discontinued         08/30/23 2210                           Valerie Carrillo NP  Department of Hospital Medicine  [facility]/Surg

## 2023-08-31 NOTE — NURSING
Nurses Note -- 4 Eyes      8/31/2023   1:44 AM      Skin assessed during: Admit      [x] No Altered Skin Integrity Present    [x]Prevention Measures Documented      [] Yes- Altered Skin Integrity Present or Discovered   [] LDA Added if Not in Epic (Describe Wound)   [] New Altered Skin Integrity was Present on Admit and Documented in LDA   [] Wound Image Taken    Wound Care Consulted? No    Attending Nurse:  Yany SIMMONS LPN    Second RN/Staff Member:   Shon SIMMONS RN

## 2023-08-31 NOTE — ED NOTES
Telemetry monitor #[id number] applied to pt and called in to monitor room in preparation for admission.

## 2023-08-31 NOTE — RESPIRATORY THERAPY
08/31/23 0812   Patient Assessment/Suction   Level of Consciousness (AVPU) alert   Respiratory Effort Normal;Unlabored   Expansion/Accessory Muscles/Retractions expansion symmetric;no retractions;no use of accessory muscles   Rhythm/Pattern, Respiratory depth regular;pattern regular;unlabored   Cough Frequency no cough   PRE-TX-O2   Device (Oxygen Therapy) room air   SpO2   (refused)   Resp 16   Aerosol Therapy   $ Aerosol Therapy Charges Refused   Respiratory Treatment Status (SVN) refused  (pt called then refused stated someone told her she did not need our services. RN at bedside)

## 2023-08-31 NOTE — RESPIRATORY THERAPY
"I received a phone call from RN  Yany that Pt was requesting a breathing treatment. I scanned patient's armband and applied the pulse ox on pt's finger. Shortly after, I asked patient if she had a breathing treatment before and patient replied "No". I explained to patient that she may or may not feel some "mild jitters" shortly after treatment but it doesn't last long. Pt then replied "well I don't want the treatment now, I need to sleep". Patient then told me to come back between 7am and 7:10 am . I replied to patient that a respiratory therapist will stop by between 7am -8am to assess her. Patient then advised me to "pencil her in at 7:30 am". I advise patient that I will advise her RN and her reply.    "

## 2023-08-31 NOTE — SUBJECTIVE & OBJECTIVE
Past Medical History:   Diagnosis Date    Anxiety     Bipolar 1 disorder     COVID-19 2021    GERD (gastroesophageal reflux disease)     Obese        Past Surgical History:   Procedure Laterality Date     SECTION  2022    LAPAROSCOPIC APPENDECTOMY N/A 2022    Procedure: APPENDECTOMY, LAPAROSCOPIC;  Surgeon: Yuniel Goldman MD;  Location: [facility] OR;  Service: General;  Laterality: N/A;    LAPAROSCOPIC CHOLECYSTECTOMY N/A 2023    Procedure: CHOLECYSTECTOMY, LAPAROSCOPIC;  Surgeon: Yuniel Goldman MD;  Location: [facility] OR;  Service: General;  Laterality: N/A;    LEG SURGERY      Broke her left lower leg.       Review of patient's allergies indicates:   Allergen Reactions    Iodine and iodide containing products Hives    Toradol [ketorolac] Hives    Vitamin e (d-alpha tocopherol)      Hives (skin)^Black eyed peas and oatmeal causede hives       No current facility-administered medications on file prior to encounter.     Current Outpatient Medications on File Prior to Encounter   Medication Sig    ALPRAZolam (XANAX) 2 MG Tab Take 2 mg by mouth nightly as needed.    buPROPion (WELLBUTRIN XL) 300 MG 24 hr tablet Take 300 mg by mouth every morning.    dextroamphetamine-amphetamine 30 mg Tab Take 1 tablet by mouth 4 (four) times daily.    docusate sodium (COLACE) 100 MG capsule Take 1 capsule (100 mg total) by mouth 2 (two) times daily as needed for Constipation. (Patient not taking: Reported on 2023)    famotidine (PEPCID) 20 MG tablet Take 1 tablet (20 mg total) by mouth 2 (two) times daily as needed for Heartburn. (Patient not taking: Reported on 2023)    hyoscyamine (LEVSIN/SL) 0.125 mg Subl Place 1 tablet (0.125 mg total) under the tongue every 4 (four) hours as needed (abdominal cramping). (Patient not taking: Reported on 2023)    ibuprofen (ADVIL,MOTRIN) 800 MG tablet Take 1 tablet (800 mg total) by mouth every 6 (six) hours as needed for Pain. (Patient not taking:  Reported on 8/30/2023)    ondansetron (ZOFRAN-ODT) 4 MG TbDL Take 1 tablet (4 mg total) by mouth every 6 (six) hours as needed. (Patient not taking: Reported on 8/30/2023)    pantoprazole (PROTONIX) 20 MG tablet Take 1 tablet (20 mg total) by mouth once daily. (Patient not taking: Reported on 8/30/2023)    senna-docusate 8.6-50 mg (PERICOLACE) 8.6-50 mg per tablet Take 1 tablet by mouth 2 (two) times daily as needed for Constipation. (Patient not taking: Reported on 8/30/2023)    traMADoL (ULTRAM) 50 mg tablet Take 1 tablet (50 mg total) by mouth every 6 (six) hours as needed for Pain. (Patient not taking: Reported on 8/30/2023)    [DISCONTINUED] albuterol (PROVENTIL/VENTOLIN HFA) 90 mcg/actuation inhaler Inhale 1-2 puffs into the lungs every 6 (six) hours as needed for Wheezing or Shortness of Breath. Rescue (Patient not taking: Reported on 7/19/2021)     Family History       Problem Relation (Age of Onset)    No Known Problems Mother, Father          Tobacco Use    Smoking status: Never    Smokeless tobacco: Never   Substance and Sexual Activity    Alcohol use: Yes     Comment: occasionally    Drug use: No    Sexual activity: Yes     Partners: Male     Birth control/protection: None     Review of Systems   Constitutional:  Negative for activity change, appetite change, chills, diaphoresis, fatigue, fever and unexpected weight change.   HENT:  Negative for congestion, ear pain, facial swelling, hearing loss, sore throat and trouble swallowing.    Eyes:  Negative for pain and discharge.   Respiratory:  Positive for cough, chest tightness and shortness of breath. Negative for wheezing.    Cardiovascular:  Positive for chest pain. Negative for palpitations and leg swelling.   Gastrointestinal:  Negative for abdominal pain, blood in stool, diarrhea, nausea and vomiting.   Endocrine: Negative for polydipsia, polyphagia and polyuria.   Genitourinary:  Positive for flank pain. Negative for difficulty urinating, dysuria,  frequency and urgency.   Musculoskeletal:  Negative for arthralgias, back pain, joint swelling, neck pain and neck stiffness.   Skin:  Negative for rash and wound.   Allergic/Immunologic: Negative for environmental allergies and immunocompromised state.   Neurological:  Positive for light-headedness. Negative for dizziness, seizures, syncope, speech difficulty, weakness, numbness and headaches.   Hematological:  Negative for adenopathy.   Psychiatric/Behavioral:  Negative for sleep disturbance and suicidal ideas. The patient is not nervous/anxious.    All other systems reviewed and are negative.    Objective:     Vital Signs (Most Recent):  Temp: 98.6 °F (37 °C) (08/30/23 2314)  Pulse: 96 (08/30/23 2314)  Resp: 16 (08/30/23 2030)  BP: 126/76 (08/30/23 2314)  SpO2: 96 % (08/30/23 2314) Vital Signs (24h Range):  Temp:  [98.5 °F (36.9 °C)-99 °F (37.2 °C)] 98.6 °F (37 °C)  Pulse:  [] 96  Resp:  [14-18] 16  SpO2:  [96 %-100 %] 96 %  BP: (126-156)/(60-89) 126/76     Weight: (!) 139.2 kg (306 lb 14.1 oz)  Body mass index is 48.06 kg/m².     Physical Exam  Vitals and nursing note reviewed.   Constitutional:       Appearance: She is obese.   HENT:      Head: Normocephalic and atraumatic.      Nose: Nose normal.      Mouth/Throat:      Mouth: Mucous membranes are moist.      Pharynx: Oropharynx is clear.   Eyes:      Extraocular Movements: Extraocular movements intact.      Pupils: Pupils are equal, round, and reactive to light.   Cardiovascular:      Rate and Rhythm: Normal rate and regular rhythm.      Pulses: Normal pulses.      Heart sounds: Normal heart sounds.   Pulmonary:      Effort: Pulmonary effort is normal.      Comments: Diminished in bases which may be d/t body habitus  Abdominal:      General: Bowel sounds are normal.      Palpations: Abdomen is soft.   Musculoskeletal:         General: Normal range of motion.      Cervical back: Normal range of motion and neck supple.   Skin:     General: Skin is warm  and dry.      Capillary Refill: Capillary refill takes less than 2 seconds.   Neurological:      General: No focal deficit present.      Mental Status: She is alert and oriented to person, place, and time.   Psychiatric:      Comments: Manipulative behavior              CRANIAL NERVES     CN III, IV, VI   Pupils are equal, round, and reactive to light.       Significant Labs: All pertinent labs within the past 24 hours have been reviewed.  CBC:   Recent Labs   Lab 08/30/23 1912   WBC 10.33   HGB 11.8*   HCT 37.9        CMP:   Recent Labs   Lab 08/30/23  1846   *   K 4.1      CO2 17*   GLU 84   BUN 5*   CREATININE 0.7   CALCIUM 9.1   PROT 7.8   ALBUMIN 3.7   BILITOT 0.8   ALKPHOS 74   AST 16   ALT 16   ANIONGAP 13     Cardiac Markers:   Recent Labs   Lab 08/30/23 1912   BNP 10     Lactic Acid:   Recent Labs   Lab 08/30/23  2253   LACTATE 0.5     Troponin:   Recent Labs   Lab 08/30/23 1846   TROPONINI <0.006     Urine Studies:   Recent Labs   Lab 08/30/23 1920   COLORU Yellow   APPEARANCEUA Clear   PHUR 7.0   SPECGRAV 1.010   PROTEINUA Negative   GLUCUA Negative   KETONESU Trace*   BILIRUBINUA Negative   OCCULTUA Negative   NITRITE Negative   UROBILINOGEN Negative   LEUKOCYTESUR Negative       Significant Imaging: I have reviewed all pertinent imaging results/findings within the past 24 hours.  EKG: I have reviewed all pertinent results/findings within the past 24 hours and my personal findings are: Sinus tachycardia rate 108, nonspecific T wave abnormality in lateral leads  X-Ray Chest AP Portable    Result Date: 8/30/2023  EXAMINATION: XR CHEST AP PORTABLE CLINICAL HISTORY: Chest Pain; TECHNIQUE: Single frontal view of the chest was performed. COMPARISON: None FINDINGS: New bibasilar airspace opacities are present.  The cardiac silhouette remains enlarged with left ventricular configuration.     New bibasilar infiltrates.  This along with cardiac silhouette enlargement raise the question of  pulmonary edema.  Pneumonia is difficult to exclude radiographically. Electronically signed by: Uday Mojica Date:    08/30/2023 Time:    18:52      0

## 2023-08-31 NOTE — HPI
"Ms. Bishop is a 27 year old female with a history of GERD, obesity BMI 48, Bipolar disorder/anxiety, and left ovarian cyst who presents today with complaints of right sided chest pain and right flank pain. It is severe. It is associated with nonproductive cough, reported fever at home 103.6, lightheadedness, and SOB. She denies N/V/D, palpitations or LOC. Symptoms began over several days and was isolated to the right of her chest, but she felt it move more to the left. It is nonexertional and partially reproducible and worse with deep inhalation and cough. She states she checked her temp at home about an hour prior to arrival and it was 103.6. However, on arrival to the ED temp was 98.6 and she DENIES taking any antipyretics. Covid and flu are negative. She has no known sick exposures. She has no reported preceding URI symptoms. She denies vomiting. WBC WNL. She is tachycardic to 116 on arrival, but improved with rest, and she is satting 99% on room air. EKG: Sinus tachycardia 108, nonspecific T wave abnormality in lateral leads. CXR: "New bibasilar infiltrates.  This along with cardiac silhouette enlargement raise the question of pulmonary edema.  Pneumonia is difficult to exclude radiographically" BNP is WNL, Troponin is WNL, D-dimer WNL, Lactate is WNL. Pt continued to complain of right sided chest pain radiating to the left and hospital medicine is consulted for admission for further work up and treatment.     On my exam, she is requesting something for pain. I stated I continued her home tramadol, to which she replied she isn't taking this and "it doesn't work". She stated "they gave me something that started with an 'M' and it helped some, it was through the IV" there was a pause and she stated, "I think morphine?" I explained there was no indication for morphine at this time and explained the risk of diminished respiratory drive. I offered her norco. She then requested "lortab." To which I responded, this has " "the same active ingredients. She stated "well the norco didn't work". She then requested "percocet" as this has helped in the past. She requested something as needed for nausea. I explained that zofran would be available as needed. She stated "that one doesn't work. They've given me something different in the past." I asked, reglan or compazine? She stated, "no, it started with a P I think." Then stated, "I think the name for it is promethazine." I told her her that I would not be giving her promethazine in combination with her current medications and offered compazine.     Per discharge summary 5/22 by Dr. Smith, "Of note, the patient was verbally abusive and aggressive with staff during her admission. She demanded pain medications as outlined by the nursing documentation. She displayed drug seeking behavior. I reviewed her  records and she has filled multiple prescriptions for xanax this month alone totaling 2-3 month supply. I am concerned about prescribing narcotics in this instance. I do feel we should make reasonable attempt to treat her pain, and will prescribe a short course of tramadol which should be adequate for her degree of medical illness."    Per ED note 7/11/23 by Dr. Cai- "While being discharge the patient did become belligerent with the nursing staff.  She was demanding other narcotic prescriptions.  I suspect that the patient is exhibiting malingering and drug-seeking behavior, which could be the etiology of the patient's symptoms overall.  The patient is still stable for discharge to home"    "

## 2023-08-31 NOTE — HOSPITAL COURSE
Manisha Bishop is a 27 year old female with a past medical history of severe obesity, bipolar disorder, and GERD who presented with subjective fever. There was initial concern for a bilateral bacterial pneumonia on CXR; however, the patient's procalcitonin was unremarkable. Troponin, EKG and D-dimer levels were also unremarkable (patient also complained of chest pain). During her course, the patient became impatient regarding the use of oxycodone for pain control. There is concern for developing opiate dependence. She was discharged 8/31/2023.

## 2023-08-31 NOTE — NURSING
"Pt asking for something to eat. Asking for ice cream, popsicle, and a turkey sandwich specifically. Advised pt that eating those foods could worsen her chest pain, cause indigestion, and cause nausea. Pt stated "I dont care what that NP says, do I need to call house supervisor for a turkey sandwich?" Informed her I would call house sup to see but all we had on both floors was chicken salad sandwiches.   "

## 2023-08-31 NOTE — PLAN OF CARE
[facility] - Med/Surg  Initial Discharge Assessment       Primary Care Provider: Tamy, Primary Doctor    Admission Diagnosis: Pneumonia of both lower lobes due to infectious organism [J18.9]    Admission Date: 8/30/2023  Expected Discharge Date: 8/31/2023    Met with pt at bedside, but unable to complete assessment because pt was very angry about being discharged.  Pt was hyper focused on getting an appt with a MD or MARIO, today.  Pt stated she made an appt with to see a nurse at 9am at [facility] but prefer a MD or NP.  SW called appt line [phone], spoke to Isela and the appt w/ nurse was cancelled because it was made in error.  Will send urgent message to staff and see if some can call pt or SW back regarding appt.  Pt continued to strongly express her negative feelings about the MD and insisted that ED MD told her that she had pneumonia and felt she should not be discharged with pneumonia. While in room with pt, CINTHYA called the following clinics, in an attempt to get an appt for pt today or tomorrow:  [facility], no new pt appt until Dec.;   Dr. Gomez MD out of office next week, no appt until mid Sept.;   [facility], no longer accept Medicaid;    [facility], next new pt appt 9/13 at 12:30pm w/ Marsha Barreto NP, but will reach out to MD and try to get an appt for tomorrow, will call pt if able to get appt.  Pt was upset with appt for 9/13 and stated she was going to call [facility].  CINTHYA wrote the appt for 9/13 on pt's dc instructions, along with clinic address, phone #, NP name and appt time and date.       Payor: MEDICAID / Plan: [place] [facility] CONNECT / Product Type: Managed Medicaid /     Extended Emergency Contact Information  Primary Emergency Contact: antonietta,pt refuses  Mobile Phone: [phone]  Relation: Other   needed? No        [id number] - [place] - [address]  [address]  [place]  [address]  Phone: [phone] Fax: [phone]    [id number] - [place] - [address] & [facility]  [address]  [place] [address]  Phone: [phone] Fax: [phone]    [id number] - [place] - [address]  [address] [place] [address]  Phone: [phone] Fax: [phone]      Initial Assessment (most recent)       Adult Discharge Assessment - 08/31/23 1054          Discharge Assessment    Assessment Type Discharge Planning Assessment     Source of Information patient     Communicated LINH with patient/caregiver Yes     Do you expect to return to your current living situation? Yes     Prior to hospitilization cognitive status: Alert/Oriented     Current cognitive status: Alert/Oriented

## 2023-08-31 NOTE — ASSESSMENT & PLAN NOTE
Body mass index is 48.13 kg/m². Morbid obesity complicates all aspects of disease management from diagnostic modalities to treatment.

## 2023-08-31 NOTE — NURSING
"Pt complaining of chest pain. Notified JESU Padilla. Ibuprofen ordered as pt is not due for another dose of oxycodone. Upon entering room to administer the Ibuprofen, pt asked what I was giving her. Explained to her NP ordered ibuprofen for her as she could not get another dose of oxycodone until 0415. Informed pt if she didn't want the ibuprofen I could get Tylenol, Pt refused ibuprofen and tylenol stating, "I will wait until I can get another oxycodone." NP notified.   "

## 2023-08-31 NOTE — PLAN OF CARE
"Patient called requesting pain medicine. Went to patient's room and pt stated, "I need something stronger for the pain. I need to see my doctor now. I've already talked to the house supervisor." Notified pt next dose of pain med is due at 0758 and will communicate her requests to the oncoming provider. Notified Dr. Tello.   "

## 2023-08-31 NOTE — NURSING
Approximately 20mins after eating, pt complaining of nausea and dry heaving. Valerie FLETCHER NP notified.

## 2023-08-31 NOTE — NURSING
"Upon entering pt room to complete admit questions and go over medications, pt became belligerent and started yelling when going over medications. Pt ordered Oxycodone 5mg and she is demanding Oxycodone 10mg stating "Why would she order that knowing how much pain I am in? Has she ever been in this type of pain? She must not know what she is doing, is there another Dr that can give me what I want?" Pt then demanded I give her 2 5mg oxycodone pills. Told pt I am unable to give her 2 pills because that is not what is ordered that I can only give what is ordered by the NP. Informed pt I would contact on call NP about her concerns with her pain medication and when she responds I would let her know her decision. Pt then asked how long would it take for her to respond. Told pt I can not put a time limit on how long it will take for NP to respond as it depends on what type of situations she is in with other pts. Pt stated "There is no damn emergency down stairs its dry as hell and she needs to call back in the next 5-10 minutes because I would hate to call the house supervisor or pt advocate and I will not hesitate to do so." Informed pt I would contact house supervisor for her to see her about her concerns. Charge nurse GONZALES Menendez, Valerie TATE NP and Samaria Garcia supervisor called and notified of situation. Samaria garcia supervisor to bedside to speak with pt.   "

## 2023-08-31 NOTE — NURSING
In to d/c pt home. Pt was angry . Stated she wants another dose of pain meds . I explained that we cannot give another dose. She stated she hopes The Dr gets in a car wreck and  dies. I told pt that's not nice. She refused to let me remove IV and tele till after breakfast. Charge nurse notified.

## 2023-08-31 NOTE — NURSING
Notified by Gina COSTA, of pt refusing her breathing treatment after Gina COSTA went to pt bedside to administer her requested treatment.

## 2023-09-01 NOTE — PLAN OF CARE
09/01/23 0731   Final Note   Assessment Type Final Discharge Note   Anticipated Discharge Disposition Home

## 2023-09-05 LAB
BACTERIA BLD CULT: NORMAL
BACTERIA BLD CULT: NORMAL

## 2023-11-17 ENCOUNTER — HOSPITAL ENCOUNTER (EMERGENCY)
Facility: HOSPITAL | Age: 28
Discharge: HOME | End: 2023-11-18
Attending: EMERGENCY MEDICINE
Payer: MEDICAID

## 2023-11-17 DIAGNOSIS — B34.9 VIRAL SYNDROME: Primary | ICD-10-CM

## 2023-11-17 DIAGNOSIS — R00.2 PALPITATIONS: ICD-10-CM

## 2023-11-17 DIAGNOSIS — R05.9 COUGH: ICD-10-CM

## 2023-11-17 LAB
B-HCG UR QL: NEGATIVE
CTP QC/QA: YES
INFLUENZA A, MOLECULAR: NEGATIVE
INFLUENZA B, MOLECULAR: NEGATIVE
SARS-COV-2 RDRP RESP QL NAA+PROBE: NEGATIVE
SPECIMEN SOURCE: NORMAL

## 2023-11-17 PROCEDURE — 81025 URINE PREGNANCY TEST: CPT | Performed by: EMERGENCY MEDICINE

## 2023-11-17 PROCEDURE — 99284 EMERGENCY DEPT VISIT MOD MDM: CPT | Mod: 25

## 2023-11-17 PROCEDURE — 87502 INFLUENZA DNA AMP PROBE: CPT | Performed by: EMERGENCY MEDICINE

## 2023-11-17 PROCEDURE — U0002 COVID-19 LAB TEST NON-CDC: HCPCS | Performed by: EMERGENCY MEDICINE

## 2023-11-17 RX ORDER — ONDANSETRON 2 MG/ML
4 INJECTION INTRAMUSCULAR; INTRAVENOUS
Status: COMPLETED | OUTPATIENT
Start: 2023-11-18 | End: 2023-11-18

## 2023-11-18 VITALS
HEIGHT: 67 IN | SYSTOLIC BLOOD PRESSURE: 125 MMHG | HEART RATE: 98 BPM | DIASTOLIC BLOOD PRESSURE: 57 MMHG | WEIGHT: 286.63 LBS | OXYGEN SATURATION: 100 % | TEMPERATURE: 98 F | BODY MASS INDEX: 44.99 KG/M2 | RESPIRATION RATE: 18 BRPM

## 2023-11-18 PROCEDURE — 63600175 PHARM REV CODE 636 W HCPCS: Performed by: EMERGENCY MEDICINE

## 2023-11-18 PROCEDURE — 25000003 PHARM REV CODE 250: Performed by: EMERGENCY MEDICINE

## 2023-11-18 PROCEDURE — 93010 ELECTROCARDIOGRAM REPORT: CPT | Mod: ,,, | Performed by: INTERNAL MEDICINE

## 2023-11-18 PROCEDURE — 93005 ELECTROCARDIOGRAM TRACING: CPT

## 2023-11-18 PROCEDURE — 93010 EKG 12-LEAD: ICD-10-PCS | Mod: ,,, | Performed by: INTERNAL MEDICINE

## 2023-11-18 PROCEDURE — 96372 THER/PROPH/DIAG INJ SC/IM: CPT | Performed by: EMERGENCY MEDICINE

## 2023-11-18 RX ORDER — LIDOCAINE HYDROCHLORIDE 20 MG/ML
15 SOLUTION OROPHARYNGEAL ONCE
Status: COMPLETED | OUTPATIENT
Start: 2023-11-18 | End: 2023-11-18

## 2023-11-18 RX ORDER — MAG HYDROX/ALUMINUM HYD/SIMETH 200-200-20
30 SUSPENSION, ORAL (FINAL DOSE FORM) ORAL ONCE
Status: COMPLETED | OUTPATIENT
Start: 2023-11-18 | End: 2023-11-18

## 2023-11-18 RX ADMIN — ONDANSETRON 4 MG: 2 INJECTION INTRAMUSCULAR; INTRAVENOUS at 12:11

## 2023-11-18 RX ADMIN — ALUMINUM HYDROXIDE, MAGNESIUM HYDROXIDE, AND DIMETHICONE 30 ML: 200; 20; 200 SUSPENSION ORAL at 01:11

## 2023-11-18 RX ADMIN — LIDOCAINE HYDROCHLORIDE 15 ML: 20 SOLUTION ORAL; TOPICAL at 01:11

## 2023-11-18 NOTE — ED NOTES
"Pt. Acting aggressive towards staff and being verbally harassing, "All y'all need to be fired" after being told that the  Did not order pain medicine for her.  Pt. Requested " Tell him right now, I want pain medicine, I think I need fluids."  Pt. Requested to speak with Charge nurse, Charge nurse notified of pt. Behavior towards staff.    "

## 2023-11-18 NOTE — ED NOTES
"Pt stating "this ain't what you want." Yelling "don't touch my door." Charge nurse in with pt who continues to yell and be extremely hostile with staff.   "

## 2023-11-18 NOTE — ED PROVIDER NOTES
Encounter Date: 2023       History     Chief Complaint   Patient presents with    Fever     Patient c/o fever, cough with productive yellow sputum, also c/o upper chest pain.    Palpitations     Chest pain     28-year-old female with a past medical history of anxiety, reflux disease, and bipolar disorder presents with multiple complaints.  The patient reports that approximately 1 hour prior to arrival she started with feeling palpitations.  Additionally she reports that she is been having a fever, cough with productive yellow sputum, and upper chest pain.  She also reports an episode of nausea and vomiting tonight.  She denies any sore throat, diarrhea, or abdominal pain.  She reports everyone in her house is sick.  There are no alleviating or aggravating factors.      Review of patient's allergies indicates:   Allergen Reactions    Iodine and iodide containing products Hives    Toradol [ketorolac] Hives    Vitamin e (d-alpha tocopherol)      Hives (skin)^Black eyed peas and oatmeal causede hives     Past Medical History:   Diagnosis Date    Anxiety     Bipolar 1 disorder     COVID-19 2021    GERD (gastroesophageal reflux disease)     Obese      Past Surgical History:   Procedure Laterality Date     SECTION  2022    LAPAROSCOPIC APPENDECTOMY N/A 2022    Procedure: APPENDECTOMY, LAPAROSCOPIC;  Surgeon: Yuniel Goldman MD;  Location: [facility] OR;  Service: General;  Laterality: N/A;    LAPAROSCOPIC CHOLECYSTECTOMY N/A 2023    Procedure: CHOLECYSTECTOMY, LAPAROSCOPIC;  Surgeon: Yuniel Goldman MD;  Location: [facility] OR;  Service: General;  Laterality: N/A;    LEG SURGERY      Broke her left lower leg.     Family History   Problem Relation Age of Onset    No Known Problems Mother     No Known Problems Father     Breast cancer Neg Hx     Colon cancer Neg Hx     Ovarian cancer Neg Hx      Social History     Tobacco Use    Smoking status: Never    Smokeless tobacco: Never   Substance  Use Topics    Alcohol use: Yes     Comment: occasionally    Drug use: No     Review of Systems   Constitutional:  Negative for chills and fever.   HENT:  Positive for congestion.    Respiratory:  Positive for cough. Negative for shortness of breath.    Cardiovascular:  Positive for chest pain and palpitations.   Gastrointestinal:  Positive for nausea and vomiting. Negative for abdominal pain.   Genitourinary:  Negative for dysuria.   Musculoskeletal:  Negative for gait problem.   Skin:  Negative for color change.   Neurological:  Negative for dizziness and numbness.   Psychiatric/Behavioral:  Negative for agitation.        Physical Exam     Initial Vitals [11/17/23 2229]   BP Pulse Resp Temp SpO2   (!) 142/98 (!) 113 18 99.5 °F (37.5 °C) 100 %      MAP       --         Physical Exam    Nursing note and vitals reviewed.  Constitutional: She appears well-developed and well-nourished.   HENT:   Head: Normocephalic and atraumatic.   Eyes: EOM are normal. Pupils are equal, round, and reactive to light.   Neck:   Normal range of motion.  Cardiovascular:  Normal rate and regular rhythm.           Pulmonary/Chest: Breath sounds normal.   Abdominal: Abdomen is soft. Bowel sounds are normal. She exhibits no distension. There is no abdominal tenderness. There is no rebound.   Musculoskeletal:         General: Normal range of motion.      Right shoulder: Normal.      Left shoulder: Normal.      Cervical back: Normal range of motion.     Neurological: She is alert and oriented to person, place, and time.   Skin: Skin is warm and dry.   Psychiatric: She has a normal mood and affect.         ED Course   Procedures  Labs Reviewed   INFLUENZA A & B BY MOLECULAR   SARS-COV-2 RNA AMPLIFICATION, QUAL   POCT URINE PREGNANCY     EKG Readings: (Independently Interpreted)   Initial Reading: No STEMI. Rhythm: Sinus Tachycardia. Heart Rate: 105. Ectopy: No Ectopy. Conduction: Normal. ST Segments: Normal ST Segments. T Waves: Normal. Clinical  Impression: Sinus Tachycardia       Imaging Results              X-Ray Chest PA And Lateral (In process)                      Medications   ondansetron injection 4 mg (4 mg Intramuscular Given 11/18/23 0013)   aluminum-magnesium hydroxide-simethicone 200-200-20 mg/5 mL suspension 30 mL (30 mLs Oral Given 11/18/23 0153)     And   LIDOcaine HCl 2% oral solution 15 mL (15 mLs Oral Given 11/18/23 0153)     Medical Decision Making  28-year-old female presented with multiple complaints.    Initial differential diagnosis included but not limited to influenza, COVID, and viral illness.    Amount and/or Complexity of Data Reviewed  Labs: ordered.  Radiology: ordered.    Risk  OTC drugs.  Prescription drug management.  Risk Details: The patient was emergently evaluated in the emergency department, her evaluation was significant for a young female with a normal lung exam.  The patient was treated here with a GI cocktail and parental Zofran, for symptomatic relief.  The patient's chest x-ray showed no acute abnormalities per virtual Radiology.  The patient's labs showed no acute abnormalities.  The patient's EKG showed no acute abnormalities per my independent interpretation.  The patient likely has a viral illness causing her symptoms.  The patient is stable for discharge home and does not require further care or workup at this time.  She can take over-the-counter medications as needed for symptomatic relief.  She is referred to primary care for follow-up.                                   Clinical Impression:  Final diagnoses:  [R05.9] Cough  [R00.2] Palpitations  [B34.9] Viral syndrome (Primary)          ED Disposition Condition    Discharge Stable          ED Prescriptions    None       Follow-up Information       Follow [facility] With Specialties Details [facility]  Schedule an appointment as soon as possible for a visit   [address] [place]  [address]  [phone]               Carlos Cai MD  11/18/23 0521

## 2023-11-18 NOTE — ED NOTES
"Pt yelling insults to ER staff from hospital bed. Refuses to keep the door to her ER room closed. Pt yelling at staff "with ya charmainely ass."   "

## 2024-07-17 ENCOUNTER — HOSPITAL ENCOUNTER (EMERGENCY)
Facility: HOSPITAL | Age: 29
Discharge: LEFT BEFORE DISCHARGE | End: 2024-07-17
Attending: EMERGENCY MEDICINE
Payer: MEDICAID

## 2024-07-17 VITALS
BODY MASS INDEX: 41.44 KG/M2 | HEART RATE: 90 BPM | RESPIRATION RATE: 16 BRPM | HEIGHT: 67 IN | TEMPERATURE: 99 F | SYSTOLIC BLOOD PRESSURE: 120 MMHG | DIASTOLIC BLOOD PRESSURE: 70 MMHG | WEIGHT: 264 LBS | OXYGEN SATURATION: 98 %

## 2024-07-17 DIAGNOSIS — R10.30 LOWER ABDOMINAL PAIN: Primary | ICD-10-CM

## 2024-07-17 LAB
ALBUMIN SERPL BCP-MCNC: 4.2 G/DL (ref 3.5–5.2)
ALP SERPL-CCNC: 76 U/L (ref 55–135)
ALT SERPL W/O P-5'-P-CCNC: 7 U/L (ref 10–44)
ANION GAP SERPL CALC-SCNC: 11 MMOL/L (ref 8–16)
AST SERPL-CCNC: 9 U/L (ref 10–40)
B-HCG UR QL: NEGATIVE
BACTERIA #/AREA URNS HPF: NORMAL /HPF
BASOPHILS # BLD AUTO: 0.07 K/UL (ref 0–0.2)
BASOPHILS NFR BLD: 0.5 % (ref 0–1.9)
BILIRUB SERPL-MCNC: 0.9 MG/DL (ref 0.1–1)
BILIRUB UR QL STRIP: NEGATIVE
BUN SERPL-MCNC: 8 MG/DL (ref 6–20)
CALCIUM SERPL-MCNC: 9 MG/DL (ref 8.7–10.5)
CHLORIDE SERPL-SCNC: 101 MMOL/L (ref 95–110)
CLARITY UR: ABNORMAL
CO2 SERPL-SCNC: 23 MMOL/L (ref 23–29)
COLOR UR: YELLOW
CREAT SERPL-MCNC: 0.9 MG/DL (ref 0.5–1.4)
CTP QC/QA: YES
DIFFERENTIAL METHOD BLD: ABNORMAL
EOSINOPHIL # BLD AUTO: 0.2 K/UL (ref 0–0.5)
EOSINOPHIL NFR BLD: 1.2 % (ref 0–8)
ERYTHROCYTE [DISTWIDTH] IN BLOOD BY AUTOMATED COUNT: 15.9 % (ref 11.5–14.5)
EST. GFR  (NO RACE VARIABLE): >60 ML/MIN/1.73 M^2
GLUCOSE SERPL-MCNC: 94 MG/DL (ref 70–110)
GLUCOSE UR QL STRIP: NEGATIVE
HCT VFR BLD AUTO: 34 % (ref 37–48.5)
HGB BLD-MCNC: 10.3 G/DL (ref 12–16)
HGB UR QL STRIP: NEGATIVE
IMM GRANULOCYTES # BLD AUTO: 0.07 K/UL (ref 0–0.04)
IMM GRANULOCYTES NFR BLD AUTO: 0.5 % (ref 0–0.5)
KETONES UR QL STRIP: NEGATIVE
LEUKOCYTE ESTERASE UR QL STRIP: ABNORMAL
LIPASE SERPL-CCNC: 13 U/L (ref 4–60)
LYMPHOCYTES # BLD AUTO: 1.9 K/UL (ref 1–4.8)
LYMPHOCYTES NFR BLD: 14.7 % (ref 18–48)
MCH RBC QN AUTO: 23 PG (ref 27–31)
MCHC RBC AUTO-ENTMCNC: 30.3 G/DL (ref 32–36)
MCV RBC AUTO: 76 FL (ref 82–98)
MICROSCOPIC COMMENT: NORMAL
MONOCYTES # BLD AUTO: 0.9 K/UL (ref 0.3–1)
MONOCYTES NFR BLD: 6.8 % (ref 4–15)
NEUTROPHILS # BLD AUTO: 10.1 K/UL (ref 1.8–7.7)
NEUTROPHILS NFR BLD: 76.3 % (ref 38–73)
NITRITE UR QL STRIP: NEGATIVE
NRBC BLD-RTO: 0 /100 WBC
PH UR STRIP: 6 [PH] (ref 5–8)
PLATELET # BLD AUTO: 386 K/UL (ref 150–450)
PMV BLD AUTO: 9.5 FL (ref 9.2–12.9)
POTASSIUM SERPL-SCNC: 3.7 MMOL/L (ref 3.5–5.1)
PROT SERPL-MCNC: 7.8 G/DL (ref 6–8.4)
PROT UR QL STRIP: ABNORMAL
RBC # BLD AUTO: 4.48 M/UL (ref 4–5.4)
SODIUM SERPL-SCNC: 135 MMOL/L (ref 136–145)
SP GR UR STRIP: 1.02 (ref 1–1.03)
URN SPEC COLLECT METH UR: ABNORMAL
UROBILINOGEN UR STRIP-ACNC: NEGATIVE EU/DL
WBC # BLD AUTO: 13.21 K/UL (ref 3.9–12.7)
WBC #/AREA URNS HPF: 4 /HPF (ref 0–5)

## 2024-07-17 PROCEDURE — 63600175 PHARM REV CODE 636 W HCPCS: Performed by: NURSE PRACTITIONER

## 2024-07-17 PROCEDURE — 81025 URINE PREGNANCY TEST: CPT | Performed by: NURSE PRACTITIONER

## 2024-07-17 PROCEDURE — 80053 COMPREHEN METABOLIC PANEL: CPT | Performed by: NURSE PRACTITIONER

## 2024-07-17 PROCEDURE — 96374 THER/PROPH/DIAG INJ IV PUSH: CPT

## 2024-07-17 PROCEDURE — 85025 COMPLETE CBC W/AUTO DIFF WBC: CPT | Performed by: NURSE PRACTITIONER

## 2024-07-17 PROCEDURE — 25000003 PHARM REV CODE 250: Performed by: NURSE PRACTITIONER

## 2024-07-17 PROCEDURE — 83690 ASSAY OF LIPASE: CPT | Performed by: NURSE PRACTITIONER

## 2024-07-17 PROCEDURE — 81001 URINALYSIS AUTO W/SCOPE: CPT | Performed by: NURSE PRACTITIONER

## 2024-07-17 PROCEDURE — 96375 TX/PRO/DX INJ NEW DRUG ADDON: CPT

## 2024-07-17 PROCEDURE — 99284 EMERGENCY DEPT VISIT MOD MDM: CPT | Mod: 25

## 2024-07-17 RX ORDER — DROPERIDOL 2.5 MG/ML
1.25 INJECTION, SOLUTION INTRAMUSCULAR; INTRAVENOUS
Status: DISCONTINUED | OUTPATIENT
Start: 2024-07-17 | End: 2024-07-17

## 2024-07-17 RX ORDER — DROPERIDOL 2.5 MG/ML
2.5 INJECTION, SOLUTION INTRAMUSCULAR; INTRAVENOUS
Status: COMPLETED | OUTPATIENT
Start: 2024-07-17 | End: 2024-07-17

## 2024-07-17 RX ORDER — DIPHENHYDRAMINE HYDROCHLORIDE 50 MG/ML
12.5 INJECTION INTRAMUSCULAR; INTRAVENOUS
Status: COMPLETED | OUTPATIENT
Start: 2024-07-17 | End: 2024-07-17

## 2024-07-17 RX ADMIN — DROPERIDOL 2.5 MG: 2.5 INJECTION, SOLUTION INTRAMUSCULAR; INTRAVENOUS at 07:07

## 2024-07-17 RX ADMIN — SODIUM CHLORIDE 1000 ML: 9 INJECTION, SOLUTION INTRAVENOUS at 07:07

## 2024-07-17 RX ADMIN — DIPHENHYDRAMINE HYDROCHLORIDE 12.5 MG: 50 INJECTION INTRAMUSCULAR; INTRAVENOUS at 07:07

## 2024-07-17 NOTE — ED PROVIDER NOTES
Source of History:  Patient, chart    Chief complaint:  Abdominal Pain (Lower abdominal pain since this morning.)      HPI:  Manisha Bishop is a 28 y.o. female with medical history of anxiety, bipolar disorder, GERD, obesity presenting with bilateral lower abdominal pain.  Patient states pain started this morning.  Patient states she is currently incarcerated and was given Tylenol and ibuprofen by the nurse at the custodial.  Patient states pain has continued.    This is the extent to the patients complaints today here in the emergency department.    ROS: As per HPI and below:  Constitutional: No fever.  No chills.  Eyes: No visual changes.  ENT: No sore throat. No ear pain    Cardiovascular: No chest pain.  Respiratory: No shortness of breath.  GI:  Positive for abdominal pain.  No nausea.  No vomiting.  Genitourinary: No abnormal urination.  Neurologic: No headache. No focal weakness.  No numbness.  MSK: no back pain.  Integument: No rashes or lesions.  Hematologic: No easy bruising.  Endocrine: No excessive thirst or urination.    Review of patient's allergies indicates:   Allergen Reactions    Iodine and iodide containing products Hives    Toradol [ketorolac] Hives    Vitamin e (d-alpha tocopherol)      Hives (skin)^Black eyed peas and oatmeal causede hives       PMH:  As per HPI and below:  Past Medical History:   Diagnosis Date    Anxiety     Bipolar 1 disorder     COVID-19 2021    GERD (gastroesophageal reflux disease)     Obese      Past Surgical History:   Procedure Laterality Date     SECTION  2022    LAPAROSCOPIC APPENDECTOMY N/A 2022    Procedure: APPENDECTOMY, LAPAROSCOPIC;  Surgeon: Yuniel Goldman MD;  Location: [facility] OR;  Service: General;  Laterality: N/A;    LAPAROSCOPIC CHOLECYSTECTOMY N/A 2023    Procedure: CHOLECYSTECTOMY, LAPAROSCOPIC;  Surgeon: Yuniel Goldman MD;  Location: [facility] OR;  Service: General;  Laterality: N/A;    LEG SURGERY      Broke her left  "lower leg.       Social History     Tobacco Use    Smoking status: Never    Smokeless tobacco: Never   Substance Use Topics    Alcohol use: Yes     Comment: occasionally    Drug use: No       Physical Exam:    /70   Pulse 90   Temp 98.7 °F (37.1 °C) (Oral)   Resp 16   Ht 5' 7" (1.702 m)   Wt 119.7 kg (264 lb)   SpO2 98%   BMI 41.35 kg/m²   Nursing note and vital signs reviewed.  Obese.  Constitutional: No acute distress.  Nontoxic  Eyes: No conjunctival injection.  Extraocular muscles are intact.  ENT: Oropharynx clear.  Normal phonation.  Cardiovascular: Regular rate and rhythm.  No murmurs. No gallops. No rubs  Respiratory: Clear to auscultation bilaterally.  Good air movement.  No wheezes.  No rhonchi. No rales. No accessory muscle use.  Abdomen:  Abdomen is soft with generalized lower tenderness to palpation.  No rebound.  Voluntary guarding.  Bowel sounds within normal limits.  Negative McBurney sign.  Negative Segura's sign.  Musculoskeletal: Good range of motion all joints.  No deformities.  Neck supple.  No meningismus.  Skin: No rashes seen.  Good turgor.  No abrasions.  No ecchymoses.  Neuro: alert and oriented x3,  no focal neurological deficits.  Psych: Appropriate, conversant    MDM    Emergent evaluation of an obese 27 yo female presenting for lower abdominal pain.  Patient denies any nausea or vomiting.  Patient states pain started this morning.  Patient states she was given Tylenol and ibuprofen at the skilled nursing with no relief of symptoms.  On exam pt is A&Ox3. VSS. Nonfebrile and nontoxic appearing.  Mucous membranes pink and moist. Abdomen is soft with generalized lower tenderness to palpation.  No rebound.  Voluntary guarding.  Bowel sounds within normal limits.  Negative McBurney sign.  Negative Segura's sign.   Pt speaking in full sentences. Cap refill < 3 seconds.      History Acquisition   Additional history was acquired from other historians.  Chart, EMS    The patient's list of " active medical problems, social history, medications, and allergies as documented per RN staff has been reviewed.     Differential Diagnoses   Based on available information and the initial assessment, the working differential diagnoses considered during this evaluation include but are not limited to gastritis, gastroenteritis, diverticulitis, UTI, pyelonephritis, appendicitis, cholecystitis, others.    I will get labs, imaging, hydrate, medicate and reassess.      LABS     Labs Reviewed   CBC W/ AUTO DIFFERENTIAL - Abnormal; Notable for the following components:       Result Value    WBC 13.21 (*)     Hemoglobin 10.3 (*)     Hematocrit 34.0 (*)     MCV 76 (*)     MCH 23.0 (*)     MCHC 30.3 (*)     RDW 15.9 (*)     Gran # (ANC) 10.1 (*)     Immature Grans (Abs) 0.07 (*)     Gran % 76.3 (*)     Lymph % 14.7 (*)     All other components within normal limits   COMPREHENSIVE METABOLIC PANEL - Abnormal; Notable for the following components:    Sodium 135 (*)     AST 9 (*)     ALT 7 (*)     All other components within normal limits   URINALYSIS, REFLEX TO URINE CULTURE - Abnormal; Notable for the following components:    Appearance, UA Hazy (*)     Protein, UA Trace (*)     Leukocytes, UA Trace (*)     All other components within normal limits    Narrative:     Specimen Source->Urine   LIPASE   URINALYSIS MICROSCOPIC    Narrative:     Specimen Source->Urine   POCT URINE PREGNANCY     Additional Consideration   All available testing was considered during the course of this workup.  Comorbidities taken into consideration during the patient's evaluation and treatment include weight, age.    Social determinants of health were taken into consideration during development of our treatment plan.    Medications   diphenhydrAMINE injection 12.5 mg (12.5 mg Intravenous Given 7/17/24 1923)   sodium chloride 0.9% bolus 1,000 mL 1,000 mL (1,000 mLs Intravenous New Bag 7/17/24 1921)   droPERidol injection 2.5 mg (2.5 mg Intravenous  "Given 7/17/24 1924)      ED Course as of 07/17/24 2101 Wed Jul 17, 2024   1845 Called to patient's bedside.  Patient requesting different pain medications.  Patient advised that we do not start with narcotics.  Advised that we will try alternative medication until labs and CT complete.  Patient states "What are you giving me?". Pt advised that droperidol and Benadryl is ordered for abdominal pain.  Patient states "those do not work".  Patient advised again that we do not order narcotics initially for abdominal pain.  Advised that she can try Tylenol or ibuprofen.  Patient states "they just gave me that in the assisted, I want something stronger".  Advised that we would start with these medications and that she has the right to refuse.  Patient verbalized understanding [RZ]   1855 Patient states "I do not want to see a nurse practitioner I only want a doctor".  Dr. Rodgers aware.   [RZ]   2022 Alerted by nursing staff patient is requesting to leave against medical advice.  Patient refusing CT scan.  Patient to sign out.  Patient understands that we can not give her a diagnosis without complete imaging. [RZ]   2035 Patient signed AMA and ambulated out of ED in police custody.  Patient was stable on discharge. [RZ]      ED Course User Index  [RZ] Le Murcia NP             CLINICAL IMPRESSION  1. Lower abdominal pain         ED Disposition Condition    AMA Stable            Instruction:  I see no indication of an emergent process beyond that addressed during our encounter but have duly counseled the patient/family regarding the need for prompt follow-up as well as the indications that should prompt immediate return to the emergency room should new or worrisome developments occur.  The patient/family has been provided with verbal and printed direction regarding our final diagnosis(es) as well as instructions regarding use of OTC and/or Rx medications intended to manage the patient's aforementioned conditions " including:  ED Prescriptions    None       Patient has been advised of following recommended follow-up instructions:  Follow-up Information       Follow up With Specialties Details Why Contact Info    [facility]  Schedule an appointment as soon as possible for a visit   [facility] [place] [address]  [phone]            The patient/family communicates understanding of all this information and all remaining questions and concerns were addressed at this time.      The patient's condition did not warrant review of the  and prescription of controlled substances.      This note was created using dictation software.  This program may occasionally mistype words and phrases.         Le Murcia, JESU  07/17/24 [id number]

## 2024-07-18 NOTE — ED NOTES
Pt is wanting to sign out AMA and is giving hospital staff permission to call [employer] to pick her up.

## 2024-07-18 NOTE — DISCHARGE INSTRUCTIONS
You were seen and evaluated in the ER today.  You did not allow us to complete your workup in the ED.  Please follow-up with your PCP as needed.  Please return to the ED for any worsening symptoms such as chest pain, shortness of breath, fever not controlled with Tylenol or ibuprofen or uncontrolled pain.      Our goal in the emergency department is to always give you outstanding care and exceptional service. You may receive a survey by mail or e-mail in the next week regarding your experience in our ED. We would greatly appreciate your completing and returning the survey. Your feedback provides us with a way to recognize our staff who give very good care and it helps us learn how to improve when your experience was below our aspiration of excellence.

## 2024-08-01 ENCOUNTER — HOSPITAL ENCOUNTER (EMERGENCY)
Facility: HOSPITAL | Age: 29
Discharge: HOME | End: 2024-08-01
Attending: EMERGENCY MEDICINE
Payer: MEDICAID

## 2024-08-01 VITALS
RESPIRATION RATE: 20 BRPM | WEIGHT: 260 LBS | OXYGEN SATURATION: 100 % | HEART RATE: 106 BPM | HEIGHT: 67 IN | DIASTOLIC BLOOD PRESSURE: 89 MMHG | BODY MASS INDEX: 40.81 KG/M2 | SYSTOLIC BLOOD PRESSURE: 146 MMHG | TEMPERATURE: 98 F

## 2024-08-01 DIAGNOSIS — R52 PAIN: ICD-10-CM

## 2024-08-01 DIAGNOSIS — K52.9 GASTROENTERITIS: Primary | ICD-10-CM

## 2024-08-01 LAB
ALBUMIN SERPL BCP-MCNC: 4 G/DL (ref 3.5–5.2)
ALP SERPL-CCNC: 95 U/L (ref 55–135)
ALT SERPL W/O P-5'-P-CCNC: 22 U/L (ref 10–44)
ANION GAP SERPL CALC-SCNC: 11 MMOL/L (ref 8–16)
AST SERPL-CCNC: 37 U/L (ref 10–40)
B-HCG UR QL: NEGATIVE
BASOPHILS # BLD AUTO: 0.07 K/UL (ref 0–0.2)
BASOPHILS NFR BLD: 0.6 % (ref 0–1.9)
BILIRUB SERPL-MCNC: 0.7 MG/DL (ref 0.1–1)
BILIRUB UR QL STRIP: NEGATIVE
BUN SERPL-MCNC: 6 MG/DL (ref 6–20)
CALCIUM SERPL-MCNC: 9.1 MG/DL (ref 8.7–10.5)
CHLORIDE SERPL-SCNC: 102 MMOL/L (ref 95–110)
CLARITY UR: CLEAR
CO2 SERPL-SCNC: 24 MMOL/L (ref 23–29)
COLOR UR: YELLOW
CREAT SERPL-MCNC: 1 MG/DL (ref 0.5–1.4)
CTP QC/QA: YES
DIFFERENTIAL METHOD BLD: ABNORMAL
EOSINOPHIL # BLD AUTO: 0.1 K/UL (ref 0–0.5)
EOSINOPHIL NFR BLD: 0.8 % (ref 0–8)
ERYTHROCYTE [DISTWIDTH] IN BLOOD BY AUTOMATED COUNT: 15.4 % (ref 11.5–14.5)
EST. GFR  (NO RACE VARIABLE): >60 ML/MIN/1.73 M^2
GLUCOSE SERPL-MCNC: 94 MG/DL (ref 70–110)
GLUCOSE UR QL STRIP: NEGATIVE
HCT VFR BLD AUTO: 36.8 % (ref 37–48.5)
HGB BLD-MCNC: 10.9 G/DL (ref 12–16)
HGB UR QL STRIP: NEGATIVE
IMM GRANULOCYTES # BLD AUTO: 0.03 K/UL (ref 0–0.04)
IMM GRANULOCYTES NFR BLD AUTO: 0.3 % (ref 0–0.5)
KETONES UR QL STRIP: NEGATIVE
LEUKOCYTE ESTERASE UR QL STRIP: NEGATIVE
LIPASE SERPL-CCNC: 14 U/L (ref 4–60)
LYMPHOCYTES # BLD AUTO: 1.3 K/UL (ref 1–4.8)
LYMPHOCYTES NFR BLD: 10.5 % (ref 18–48)
MCH RBC QN AUTO: 22.6 PG (ref 27–31)
MCHC RBC AUTO-ENTMCNC: 29.6 G/DL (ref 32–36)
MCV RBC AUTO: 76 FL (ref 82–98)
MONOCYTES # BLD AUTO: 0.6 K/UL (ref 0.3–1)
MONOCYTES NFR BLD: 4.9 % (ref 4–15)
NEUTROPHILS # BLD AUTO: 9.9 K/UL (ref 1.8–7.7)
NEUTROPHILS NFR BLD: 82.9 % (ref 38–73)
NITRITE UR QL STRIP: NEGATIVE
NRBC BLD-RTO: 0 /100 WBC
PH UR STRIP: 6 [PH] (ref 5–8)
PLATELET # BLD AUTO: 384 K/UL (ref 150–450)
PMV BLD AUTO: 8.9 FL (ref 9.2–12.9)
POTASSIUM SERPL-SCNC: 3.5 MMOL/L (ref 3.5–5.1)
PROT SERPL-MCNC: 8 G/DL (ref 6–8.4)
PROT UR QL STRIP: ABNORMAL
RBC # BLD AUTO: 4.82 M/UL (ref 4–5.4)
SODIUM SERPL-SCNC: 137 MMOL/L (ref 136–145)
SP GR UR STRIP: 1.02 (ref 1–1.03)
URN SPEC COLLECT METH UR: ABNORMAL
UROBILINOGEN UR STRIP-ACNC: NEGATIVE EU/DL
WBC # BLD AUTO: 11.92 K/UL (ref 3.9–12.7)

## 2024-08-01 PROCEDURE — 99285 EMERGENCY DEPT VISIT HI MDM: CPT | Mod: 25

## 2024-08-01 PROCEDURE — 25500020 PHARM REV CODE 255

## 2024-08-01 PROCEDURE — 96374 THER/PROPH/DIAG INJ IV PUSH: CPT

## 2024-08-01 PROCEDURE — 96361 HYDRATE IV INFUSION ADD-ON: CPT

## 2024-08-01 PROCEDURE — 36415 COLL VENOUS BLD VENIPUNCTURE: CPT | Performed by: NURSE PRACTITIONER

## 2024-08-01 PROCEDURE — 93005 ELECTROCARDIOGRAM TRACING: CPT

## 2024-08-01 PROCEDURE — 63600175 PHARM REV CODE 636 W HCPCS: Performed by: NURSE PRACTITIONER

## 2024-08-01 PROCEDURE — 81025 URINE PREGNANCY TEST: CPT | Performed by: NURSE PRACTITIONER

## 2024-08-01 PROCEDURE — 81003 URINALYSIS AUTO W/O SCOPE: CPT | Performed by: NURSE PRACTITIONER

## 2024-08-01 PROCEDURE — 96375 TX/PRO/DX INJ NEW DRUG ADDON: CPT

## 2024-08-01 PROCEDURE — 83690 ASSAY OF LIPASE: CPT | Performed by: NURSE PRACTITIONER

## 2024-08-01 PROCEDURE — 80053 COMPREHEN METABOLIC PANEL: CPT | Performed by: NURSE PRACTITIONER

## 2024-08-01 PROCEDURE — 93010 ELECTROCARDIOGRAM REPORT: CPT | Mod: ,,, | Performed by: INTERNAL MEDICINE

## 2024-08-01 PROCEDURE — 85025 COMPLETE CBC W/AUTO DIFF WBC: CPT | Performed by: NURSE PRACTITIONER

## 2024-08-01 PROCEDURE — 25000003 PHARM REV CODE 250: Performed by: NURSE PRACTITIONER

## 2024-08-01 RX ORDER — DICYCLOMINE HYDROCHLORIDE 10 MG/1
20 CAPSULE ORAL
Status: DISCONTINUED | OUTPATIENT
Start: 2024-08-01 | End: 2024-08-01 | Stop reason: HOSPADM

## 2024-08-01 RX ORDER — CIPROFLOXACIN 500 MG/1
500 TABLET ORAL 2 TIMES DAILY
Qty: 20 TABLET | Refills: 0 | Status: SHIPPED | OUTPATIENT
Start: 2024-08-01 | End: 2024-08-11

## 2024-08-01 RX ORDER — METRONIDAZOLE 500 MG/1
500 TABLET ORAL
Status: COMPLETED | OUTPATIENT
Start: 2024-08-01 | End: 2024-08-01

## 2024-08-01 RX ORDER — DIPHENHYDRAMINE HYDROCHLORIDE 50 MG/ML
25 INJECTION INTRAMUSCULAR; INTRAVENOUS
Status: COMPLETED | OUTPATIENT
Start: 2024-08-01 | End: 2024-08-01

## 2024-08-01 RX ORDER — ACETAMINOPHEN 500 MG
1000 TABLET ORAL
Status: DISCONTINUED | OUTPATIENT
Start: 2024-08-01 | End: 2024-08-01 | Stop reason: HOSPADM

## 2024-08-01 RX ORDER — ONDANSETRON HYDROCHLORIDE 2 MG/ML
4 INJECTION, SOLUTION INTRAVENOUS
Status: DISCONTINUED | OUTPATIENT
Start: 2024-08-01 | End: 2024-08-01

## 2024-08-01 RX ORDER — METRONIDAZOLE 500 MG/1
500 TABLET ORAL EVERY 12 HOURS
Qty: 20 TABLET | Refills: 0 | Status: SHIPPED | OUTPATIENT
Start: 2024-08-01 | End: 2024-08-11

## 2024-08-01 RX ORDER — PROMETHAZINE HYDROCHLORIDE 25 MG/1
25 SUPPOSITORY RECTAL EVERY 6 HOURS PRN
Qty: 10 SUPPOSITORY | Refills: 0 | Status: SHIPPED | OUTPATIENT
Start: 2024-08-01

## 2024-08-01 RX ORDER — CIPROFLOXACIN 500 MG/1
500 TABLET ORAL
Status: COMPLETED | OUTPATIENT
Start: 2024-08-01 | End: 2024-08-01

## 2024-08-01 RX ORDER — DROPERIDOL 2.5 MG/ML
2.5 INJECTION, SOLUTION INTRAMUSCULAR; INTRAVENOUS
Status: COMPLETED | OUTPATIENT
Start: 2024-08-01 | End: 2024-08-01

## 2024-08-01 RX ADMIN — SODIUM CHLORIDE 1000 ML: 0.9 INJECTION, SOLUTION INTRAVENOUS at 07:08

## 2024-08-01 RX ADMIN — CIPROFLOXACIN 500 MG: 500 TABLET ORAL at 08:08

## 2024-08-01 RX ADMIN — METRONIDAZOLE 500 MG: 500 TABLET ORAL at 08:08

## 2024-08-01 RX ADMIN — DIPHENHYDRAMINE HYDROCHLORIDE 25 MG: 50 INJECTION INTRAMUSCULAR; INTRAVENOUS at 07:08

## 2024-08-01 RX ADMIN — DROPERIDOL 2.5 MG: 2.5 INJECTION, SOLUTION INTRAMUSCULAR; INTRAVENOUS at 07:08

## 2024-08-01 RX ADMIN — IOHEXOL 75 ML: 350 INJECTION, SOLUTION INTRAVENOUS at 07:08

## 2024-08-08 LAB
OHS QRS DURATION: 88 MS
OHS QTC CALCULATION: 434 MS

## (undated) DEVICE — DISSECTOR CURVED 5DCD

## (undated) DEVICE — SUTURE MONOCRYL 4-0 PS-2 27 MCP426H

## (undated) DEVICE — TROCAR KII FIOS 5MM X 100MM

## (undated) DEVICE — SUT MONOCRYL 4-0 SH UND MON

## (undated) DEVICE — APPLIER CLIP EPIX UNIV 5X34

## (undated) DEVICE — NDL SPINAL 18GX3.5 SPINOCAN

## (undated) DEVICE — Device

## (undated) DEVICE — SUTURE VICRYL 0 UR-5 27 VCP376H

## (undated) DEVICE — PACK CUSTOM ENDO CHOLO SLI

## (undated) DEVICE — SUTURE MONOCRYL 3-0 SH 27 Y416H

## (undated) DEVICE — WARMER SCOPE SEE SHARP

## (undated) DEVICE — APPLICATOR CHLORAPREP ORN 26ML

## (undated) DEVICE — LINER SUCTION 3000CC

## (undated) DEVICE — CLOSURE SKIN STERI STRIP 1/2X4

## (undated) DEVICE — BLADE SURG CARBON STEEL SZ11

## (undated) DEVICE — BAG TISS RETRV MONARCH 10MM

## (undated) DEVICE — BANDAID 3/4

## (undated) DEVICE — TROCAR OPTICAL ZTHREAD 12MMX100MM CTF73

## (undated) DEVICE — SOLUTION IRRI NS BOTTLE 1000ML R5200-01

## (undated) DEVICE — TOWEL OR DISP STRL BLUE 4/PK

## (undated) DEVICE — GLOVE BIOGEL PI ULTRA TOUCH GRAY SZ7.5

## (undated) DEVICE — SYR 30CC LUER LOCK

## (undated) DEVICE — SET TUBE PNEUMOCLEAR SE HI FLO

## (undated) DEVICE — NEEDLE INSUFFLATION 120MM 172015

## (undated) DEVICE — GOWN POLY REINF BRTH SLV XL

## (undated) DEVICE — PAD BOVIE ADULT

## (undated) DEVICE — SLEEVE SCD EXPRESS KNEE MEDIUM

## (undated) DEVICE — STRAP OR TABLE 5IN X 72IN

## (undated) DEVICE — STERISTRIP ANTIMICROBIAL 1/2X4  A1847

## (undated) DEVICE — SCISSORS 5MM APPLIED MEDICAL   CB030

## (undated) DEVICE — SUT 0 VICRYL / UR6 (J603)

## (undated) DEVICE — GLOVE SURG ULTRA TOUCH 7.5

## (undated) DEVICE — SOL CLEARIFY VISUALIZATION LAP

## (undated) DEVICE — SUTURE ENDOLOOP VICRYL 0 18 EJ10G

## (undated) DEVICE — SOLUTION IRRI H2O BOTTLE 1000ML

## (undated) DEVICE — RETRIEVER ENDOPOUCH SPEC BAG

## (undated) DEVICE — SET INSUFFLATION TUBING HIGH FLOW SMOKE EVACUATION PNEUMOCLE

## (undated) DEVICE — ELECTRODE REM PLYHSV RETURN 9

## (undated) DEVICE — IRRIGATOR SUCTION W/TIP

## (undated) DEVICE — TRAY GENERAL LAPAROSCOPY

## (undated) DEVICE — LIGASURE MARYLAND LF1937 REPLACES LF1737

## (undated) DEVICE — TROCAR ADVANCED FIXATION  CFF03

## (undated) DEVICE — SCISSOR 5MMX35CM DIRECT DRIVE

## (undated) DEVICE — BANDAID LARGE 2X3 3/4 7539LF

## (undated) DEVICE — TROCAR KII FIOS 12MM X 100MM